# Patient Record
Sex: MALE | Race: WHITE | NOT HISPANIC OR LATINO | Employment: OTHER | URBAN - METROPOLITAN AREA
[De-identification: names, ages, dates, MRNs, and addresses within clinical notes are randomized per-mention and may not be internally consistent; named-entity substitution may affect disease eponyms.]

---

## 2017-01-12 ENCOUNTER — HOSPITAL ENCOUNTER (OUTPATIENT)
Dept: LAB | Facility: HOSPITAL | Age: 72
Discharge: HOME | End: 2017-01-12
Attending: INTERNAL MEDICINE | Admitting: INTERNAL MEDICINE

## 2017-01-12 LAB
ANION GAP SERPL CALC-SCNC: 17.7 MMOL/L (ref 10–20)
BASOPHILS # BLD AUTO: 0.1 10*3/UL (ref 0–0.2)
BASOPHILS NFR BLD AUTO: 1 % (ref 0–2)
BILIRUB UR QL STRIP: NEGATIVE MG/DL
BUN SERPL-MCNC: 58 MG/DL (ref 8–20)
BUN/CREAT SERPL: 18.7 (ref 6.2–20.3)
CALCIUM SERPL-MCNC: 9.4 MG/DL (ref 8.9–10.3)
CASTS URNS QL MICRO: ABNORMAL /[LPF]
CHLORIDE SERPL-SCNC: 106 MMOL/L (ref 101–111)
COLOR UR: YELLOW
CONV BACTERIA IN URINE MICRO: NEGATIVE
CONV CLARITY OF URINE: CLEAR
CONV CO2: 21 MMOL/L (ref 22–32)
CONV HYALINE CASTS IN URINE MICRO: ABNORMAL /[LPF] (ref 0–5)
CONV PROTEIN IN URINE BY AUTOMATED TEST STRIP: 100 MG/DL
CONV SMALL ROUND CELLS: ABNORMAL /[HPF]
CONV UROBILINOGEN IN URINE BY AUTOMATED TEST STRIP: 0.2 MG/DL
CREAT 24H UR-MCNC: 179.3 MG/DL
CREAT UR-MCNC: 3.1 MG/DL (ref 0.7–1.2)
CULTURE INDICATED?: ABNORMAL
DIFFERENTIAL METHOD BLD: (no result)
EOSINOPHIL # BLD AUTO: 0.8 10*3/UL (ref 0–0.3)
EOSINOPHIL # BLD AUTO: 6 % (ref 0–3)
ERYTHROCYTE [DISTWIDTH] IN BLOOD BY AUTOMATED COUNT: 17.1 % (ref 11.5–14.5)
GLUCOSE SERPL-MCNC: 118 MG/DL (ref 65–99)
GLUCOSE UR QL: NEGATIVE MG/DL
HCT VFR BLD AUTO: 30 % (ref 40–54)
HGB BLD-MCNC: 9.6 G/DL (ref 14–18)
HGB UR QL STRIP: NEGATIVE
IRON SERPL-MCNC: 32 UG/DL (ref 45–182)
KETONES UR QL STRIP: NEGATIVE MG/DL
LEUKOCYTE ESTERASE UR QL STRIP: NEGATIVE
LYMPHOCYTES # BLD AUTO: 1.4 10*3/UL (ref 0.8–4.8)
LYMPHOCYTES NFR BLD AUTO: 12 % (ref 18–42)
MCH RBC QN AUTO: 27.4 PG (ref 26–32)
MCHC RBC AUTO-ENTMCNC: 32 G/DL (ref 32–36)
MCV RBC AUTO: 85.5 FL (ref 80–94)
MONOCYTES # BLD AUTO: 0.8 10*3/UL (ref 0.1–1.3)
MONOCYTES NFR BLD AUTO: 7 % (ref 2–11)
NEUTROPHILS # BLD AUTO: 8.7 10*3/UL (ref 2.3–8.6)
NEUTROPHILS NFR BLD AUTO: 74 % (ref 50–75)
NITRITE UR QL STRIP: NEGATIVE
NRBC BLD AUTO-RTO: 0 /100{WBCS}
NRBC/RBC NFR BLD MANUAL: 0 10*3/UL
PH UR STRIP.AUTO: 5.5 [PH] (ref 4.5–8)
PHOSPHATE SERPL-MCNC: 4.4 MG/DL (ref 2.4–4.7)
PLATELET # BLD AUTO: 236 10*3/UL (ref 150–450)
PMV BLD AUTO: 8.5 FL (ref 7.4–10.4)
POTASSIUM SERPL-SCNC: 4.7 MMOL/L (ref 3.6–5.1)
PROT UR-MCNC: 104 MG/DL
RBC # BLD AUTO: 3.51 10*6/UL (ref 4.6–6)
RBC #/AREA URNS HPF: 2 /[HPF] (ref 0–3)
SODIUM SERPL-SCNC: 140 MMOL/L (ref 136–144)
SP GR UR: 1.02 (ref 1–1.03)
SPECIMEN SOURCE: ABNORMAL
SPERM URNS QL MICRO: ABNORMAL /[HPF]
SQUAMOUS SPT QL MICRO: 1 /[HPF] (ref 0–5)
UNIDENT CRYS URNS QL MICRO: ABNORMAL /[HPF]
WBC # BLD AUTO: 11.9 10*3/UL (ref 4.5–11.5)
WBC #/AREA URNS HPF: 1 /[HPF] (ref 0–5)
YEAST SPEC QL WET PREP: ABNORMAL /[HPF]

## 2017-01-13 LAB — PTH-INTACT SERPL-MCNC: 52 PG/ML (ref 11–72)

## 2017-03-02 ENCOUNTER — HOSPITAL ENCOUNTER (OUTPATIENT)
Dept: CARDIOLOGY | Facility: HOSPITAL | Age: 72
Discharge: HOME | End: 2017-03-02
Attending: INTERNAL MEDICINE | Admitting: INTERNAL MEDICINE

## 2017-04-04 ENCOUNTER — HOSPITAL ENCOUNTER (OUTPATIENT)
Dept: LAB | Facility: HOSPITAL | Age: 72
Discharge: HOME | End: 2017-04-04
Attending: INTERNAL MEDICINE | Admitting: INTERNAL MEDICINE

## 2017-04-04 LAB
ALBUMIN SERPL-MCNC: 3.4 G/DL (ref 3.5–4.8)
ALBUMIN/GLOB SERPL: 0.9 {RATIO} (ref 1–1.7)
ALP SERPL-CCNC: 110 IU/L (ref 32–91)
ALT SERPL-CCNC: 26 IU/L (ref 17–63)
ANION GAP SERPL CALC-SCNC: 15.5 MMOL/L (ref 10–20)
AST SERPL-CCNC: 35 IU/L (ref 15–41)
BASOPHILS # BLD AUTO: 0.2 10*3/UL (ref 0–0.2)
BASOPHILS NFR BLD AUTO: 2 % (ref 0–2)
BILIRUB SERPL-MCNC: 0.9 MG/DL (ref 0.3–1.2)
BILIRUB UR QL STRIP: NEGATIVE MG/DL
BUN SERPL-MCNC: 45 MG/DL (ref 8–20)
BUN/CREAT SERPL: 16.1 (ref 6.2–20.3)
CALCIUM SERPL-MCNC: 9 MG/DL (ref 8.9–10.3)
CASTS URNS QL MICRO: ABNORMAL /[LPF]
CHLORIDE SERPL-SCNC: 106 MMOL/L (ref 101–111)
CHOLEST SERPL-MCNC: 88 MG/DL
CHOLEST/HDLC SERPL: 2.6 {RATIO}
COLOR UR: YELLOW
CONV BACTERIA IN URINE MICRO: NEGATIVE
CONV CLARITY OF URINE: CLEAR
CONV CO2: 23 MMOL/L (ref 22–32)
CONV HYALINE CASTS IN URINE MICRO: 1 /[LPF] (ref 0–5)
CONV LDL CHOLESTEROL DIRECT: 39 MG/DL (ref 0–100)
CONV PROTEIN IN URINE BY AUTOMATED TEST STRIP: 100 MG/DL
CONV SMALL ROUND CELLS: ABNORMAL /[HPF]
CONV TOTAL PROTEIN: 7 G/DL (ref 6.1–7.9)
CONV UROBILINOGEN IN URINE BY AUTOMATED TEST STRIP: 0.2 MG/DL
CREAT 24H UR-MCNC: 153.1 MG/DL
CREAT UR-MCNC: 2.8 MG/DL (ref 0.7–1.2)
CULTURE INDICATED?: ABNORMAL
DIFFERENTIAL METHOD BLD: (no result)
EOSINOPHIL # BLD AUTO: 0.7 10*3/UL (ref 0–0.3)
EOSINOPHIL # BLD AUTO: 6 % (ref 0–3)
ERYTHROCYTE [DISTWIDTH] IN BLOOD BY AUTOMATED COUNT: 17.9 % (ref 11.5–14.5)
GLOBULIN UR ELPH-MCNC: 3.6 G/DL (ref 2.5–3.8)
GLUCOSE SERPL-MCNC: 118 MG/DL (ref 65–99)
GLUCOSE UR QL: NEGATIVE MG/DL
HCT VFR BLD AUTO: 33.2 % (ref 40–54)
HDLC SERPL-MCNC: 34 MG/DL
HGB BLD-MCNC: 11 G/DL (ref 14–18)
HGB UR QL STRIP: NEGATIVE
IRON SERPL-MCNC: 45 UG/DL (ref 45–182)
KETONES UR QL STRIP: NEGATIVE MG/DL
LDLC/HDLC SERPL: 1.2 {RATIO}
LEUKOCYTE ESTERASE UR QL STRIP: NEGATIVE
LIPID INTERPRETATION: ABNORMAL
LYMPHOCYTES # BLD AUTO: 1.1 10*3/UL (ref 0.8–4.8)
LYMPHOCYTES NFR BLD AUTO: 11 % (ref 18–42)
MCH RBC QN AUTO: 28 PG (ref 26–32)
MCHC RBC AUTO-ENTMCNC: 33.2 G/DL (ref 32–36)
MCV RBC AUTO: 84.5 FL (ref 80–94)
MONOCYTES # BLD AUTO: 0.7 10*3/UL (ref 0.1–1.3)
MONOCYTES NFR BLD AUTO: 7 % (ref 2–11)
NEUTROPHILS # BLD AUTO: 7.9 10*3/UL (ref 2.3–8.6)
NEUTROPHILS NFR BLD AUTO: 74 % (ref 50–75)
NITRITE UR QL STRIP: NEGATIVE
NRBC BLD AUTO-RTO: 0 /100{WBCS}
NRBC/RBC NFR BLD MANUAL: 0 10*3/UL
PH UR STRIP.AUTO: 5.5 [PH] (ref 4.5–8)
PHOSPHATE SERPL-MCNC: 3.7 MG/DL (ref 2.4–4.7)
PLATELET # BLD AUTO: 243 10*3/UL (ref 150–450)
PMV BLD AUTO: 8.4 FL (ref 7.4–10.4)
POTASSIUM SERPL-SCNC: 4.5 MMOL/L (ref 3.6–5.1)
PROT UR-MCNC: 84 MG/DL
RBC # BLD AUTO: 3.93 10*6/UL (ref 4.6–6)
RBC #/AREA URNS HPF: 1 /[HPF] (ref 0–3)
SODIUM SERPL-SCNC: 140 MMOL/L (ref 136–144)
SP GR UR: 1.02 (ref 1–1.03)
SPERM URNS QL MICRO: ABNORMAL /[HPF]
SQUAMOUS SPT QL MICRO: 1 /[HPF] (ref 0–5)
TRIGL SERPL-MCNC: 65 MG/DL
UNIDENT CRYS URNS QL MICRO: ABNORMAL /[HPF]
URATE SERPL-MCNC: 7.3 MG/DL (ref 4.8–8.7)
VLDLC SERPL CALC-MCNC: 14.7 MG/DL
WBC # BLD AUTO: 10.6 10*3/UL (ref 4.5–11.5)
WBC #/AREA URNS HPF: 1 /[HPF] (ref 0–5)
YEAST SPEC QL WET PREP: ABNORMAL /[HPF]

## 2017-04-05 LAB — PTH-INTACT SERPL-MCNC: 63 PG/ML (ref 11–72)

## 2017-06-13 ENCOUNTER — HOSPITAL ENCOUNTER (OUTPATIENT)
Dept: CARDIOLOGY | Facility: HOSPITAL | Age: 72
Discharge: HOME | End: 2017-06-13
Attending: INTERNAL MEDICINE | Admitting: INTERNAL MEDICINE

## 2017-06-20 ENCOUNTER — HOSPITAL ENCOUNTER (OUTPATIENT)
Dept: LAB | Facility: HOSPITAL | Age: 72
Discharge: HOME | End: 2017-06-20
Attending: INTERNAL MEDICINE | Admitting: INTERNAL MEDICINE

## 2017-06-20 LAB — BNP SERPL-MCNC: 70 PG/ML

## 2018-04-25 ENCOUNTER — HOSPITAL ENCOUNTER (OUTPATIENT)
Dept: LAB | Facility: HOSPITAL | Age: 73
Discharge: HOME | End: 2018-04-25
Attending: INTERNAL MEDICINE | Admitting: INTERNAL MEDICINE

## 2018-04-25 LAB
ALBUMIN SERPL-MCNC: 2.9 G/DL (ref 3.5–4.8)
ANION GAP SERPL CALC-SCNC: 10.8 MMOL/L (ref 10–20)
BASOPHILS # BLD AUTO: 0.1 10*3/UL (ref 0–0.2)
BASOPHILS NFR BLD AUTO: 1 % (ref 0–2)
BILIRUB UR QL STRIP: NEGATIVE MG/DL
BUN SERPL-MCNC: 40 MG/DL (ref 8–20)
BUN/CREAT SERPL: 18.2 (ref 6.2–20.3)
CALCIUM SERPL-MCNC: 8.6 MG/DL (ref 8.9–10.3)
CASTS URNS QL MICRO: ABNORMAL /[LPF]
CHLORIDE SERPL-SCNC: 107 MMOL/L (ref 101–111)
COLOR UR: YELLOW
CONV BACTERIA IN URINE MICRO: NEGATIVE
CONV CLARITY OF URINE: CLEAR
CONV CO2: 25 MMOL/L (ref 22–32)
CONV HYALINE CASTS IN URINE MICRO: 0 /[LPF] (ref 0–5)
CONV PROTEIN IN URINE BY AUTOMATED TEST STRIP: 30 MG/DL
CONV SMALL ROUND CELLS: ABNORMAL /[HPF]
CONV UROBILINOGEN IN URINE BY AUTOMATED TEST STRIP: 0.2 MG/DL
CREAT 24H UR-MCNC: 113.5 MG/DL
CREAT UR-MCNC: 2.2 MG/DL (ref 0.7–1.2)
CULTURE INDICATED?: ABNORMAL
DIFFERENTIAL METHOD BLD: (no result)
EOSINOPHIL # BLD AUTO: 0.5 10*3/UL (ref 0–0.3)
EOSINOPHIL # BLD AUTO: 4 % (ref 0–3)
ERYTHROCYTE [DISTWIDTH] IN BLOOD BY AUTOMATED COUNT: 16.4 % (ref 11.5–14.5)
GLUCOSE SERPL-MCNC: 106 MG/DL (ref 65–99)
GLUCOSE UR QL: NEGATIVE MG/DL
HCT VFR BLD AUTO: 40 % (ref 40–54)
HGB BLD-MCNC: 13.1 G/DL (ref 14–18)
HGB UR QL STRIP: NEGATIVE
KETONES UR QL STRIP: NEGATIVE MG/DL
LEUKOCYTE ESTERASE UR QL STRIP: NEGATIVE
LYMPHOCYTES # BLD AUTO: 1.6 10*3/UL (ref 0.8–4.8)
LYMPHOCYTES NFR BLD AUTO: 13 % (ref 18–42)
MCH RBC QN AUTO: 29.5 PG (ref 26–32)
MCHC RBC AUTO-ENTMCNC: 32.6 G/DL (ref 32–36)
MCV RBC AUTO: 90.4 FL (ref 80–94)
MONOCYTES # BLD AUTO: 1.2 10*3/UL (ref 0.1–1.3)
MONOCYTES NFR BLD AUTO: 9 % (ref 2–11)
NEUTROPHILS # BLD AUTO: 9.3 10*3/UL (ref 2.3–8.6)
NEUTROPHILS NFR BLD AUTO: 73 % (ref 50–75)
NITRITE UR QL STRIP: NEGATIVE
NRBC BLD AUTO-RTO: 0 /100{WBCS}
NRBC/RBC NFR BLD MANUAL: 0 10*3/UL
PH UR STRIP.AUTO: 6 [PH] (ref 4.5–8)
PHOSPHATE SERPL-MCNC: 3.4 MG/DL (ref 2.4–4.7)
PLATELET # BLD AUTO: 177 10*3/UL (ref 150–450)
PMV BLD AUTO: 8.9 FL (ref 7.4–10.4)
POTASSIUM SERPL-SCNC: 3.8 MMOL/L (ref 3.6–5.1)
PROT UR-MCNC: 39 MG/DL
PROT/CREAT UR: 0.3 MG/MG (ref 0–22)
RBC # BLD AUTO: 4.43 10*6/UL (ref 4.6–6)
RBC #/AREA URNS HPF: 2 /[HPF] (ref 0–3)
SODIUM SERPL-SCNC: 139 MMOL/L (ref 136–144)
SP GR UR: 1.02 (ref 1–1.03)
SPERM URNS QL MICRO: ABNORMAL /[HPF]
SQUAMOUS SPT QL MICRO: 0 /[HPF] (ref 0–5)
UNIDENT CRYS URNS QL MICRO: ABNORMAL /[HPF]
WBC # BLD AUTO: 12.6 10*3/UL (ref 4.5–11.5)
WBC #/AREA URNS HPF: 2 /[HPF] (ref 0–5)
YEAST SPEC QL WET PREP: ABNORMAL /[HPF]

## 2018-06-15 ENCOUNTER — HOSPITAL ENCOUNTER (OUTPATIENT)
Dept: CARDIOLOGY | Facility: HOSPITAL | Age: 73
Discharge: HOME | End: 2018-06-15
Attending: INTERNAL MEDICINE | Admitting: INTERNAL MEDICINE

## 2018-11-14 ENCOUNTER — HOSPITAL ENCOUNTER (OUTPATIENT)
Dept: LAB | Facility: HOSPITAL | Age: 73
Discharge: HOME | End: 2018-11-14
Attending: INTERNAL MEDICINE | Admitting: INTERNAL MEDICINE

## 2018-11-14 LAB
ALBUMIN SERPL-MCNC: 3.5 G/DL (ref 3.5–4.8)
ANION GAP SERPL CALC-SCNC: 14.3 MMOL/L (ref 10–20)
BASOPHILS # BLD AUTO: 0.1 10*3/UL (ref 0–0.2)
BASOPHILS NFR BLD AUTO: 1 % (ref 0–2)
BILIRUB UR QL STRIP: NEGATIVE MG/DL
BUN SERPL-MCNC: 31 MG/DL (ref 8–20)
BUN/CREAT SERPL: 13.5 (ref 6.2–20.3)
CALCIUM SERPL-MCNC: 8.7 MG/DL (ref 8.9–10.3)
CASTS URNS QL MICRO: NORMAL /[LPF]
CHLORIDE SERPL-SCNC: 102 MMOL/L (ref 101–111)
COLOR UR: YELLOW
CONV BACTERIA IN URINE MICRO: NEGATIVE
CONV CLARITY OF URINE: CLEAR
CONV CO2: 26 MMOL/L (ref 22–32)
CONV HYALINE CASTS IN URINE MICRO: 0 /[LPF] (ref 0–5)
CONV PROTEIN IN URINE BY AUTOMATED TEST STRIP: NEGATIVE MG/DL
CONV SMALL ROUND CELLS: NORMAL /[HPF]
CONV UROBILINOGEN IN URINE BY AUTOMATED TEST STRIP: 0.2 MG/DL
CREAT 24H UR-MCNC: 45.8 MG/DL
CREAT UR-MCNC: 2.3 MG/DL (ref 0.7–1.2)
CULTURE INDICATED?: NORMAL
DIFFERENTIAL METHOD BLD: (no result)
EOSINOPHIL # BLD AUTO: 0.4 10*3/UL (ref 0–0.3)
EOSINOPHIL # BLD AUTO: 4 % (ref 0–3)
ERYTHROCYTE [DISTWIDTH] IN BLOOD BY AUTOMATED COUNT: 15.9 % (ref 11.5–14.5)
GLUCOSE SERPL-MCNC: 99 MG/DL (ref 65–99)
GLUCOSE UR QL: NEGATIVE MG/DL
HCT VFR BLD AUTO: 40.8 % (ref 40–54)
HGB BLD-MCNC: 13.3 G/DL (ref 14–18)
HGB UR QL STRIP: NEGATIVE
KETONES UR QL STRIP: NEGATIVE MG/DL
LEUKOCYTE ESTERASE UR QL STRIP: NEGATIVE
LYMPHOCYTES # BLD AUTO: 1.2 10*3/UL (ref 0.8–4.8)
LYMPHOCYTES NFR BLD AUTO: 12 % (ref 18–42)
MCH RBC QN AUTO: 29.8 PG (ref 26–32)
MCHC RBC AUTO-ENTMCNC: 32.6 G/DL (ref 32–36)
MCV RBC AUTO: 91.4 FL (ref 80–94)
MONOCYTES # BLD AUTO: 0.7 10*3/UL (ref 0.1–1.3)
MONOCYTES NFR BLD AUTO: 7 % (ref 2–11)
NEUTROPHILS # BLD AUTO: 7.7 10*3/UL (ref 2.3–8.6)
NEUTROPHILS NFR BLD AUTO: 76 % (ref 50–75)
NITRITE UR QL STRIP: NEGATIVE
NRBC BLD AUTO-RTO: 0 /100{WBCS}
NRBC/RBC NFR BLD MANUAL: 0 10*3/UL
PH UR STRIP.AUTO: 5.5 [PH] (ref 4.5–8)
PHOSPHATE SERPL-MCNC: 3.5 MG/DL (ref 2.4–4.7)
PLATELET # BLD AUTO: 231 10*3/UL (ref 150–450)
PMV BLD AUTO: 9.3 FL (ref 7.4–10.4)
POTASSIUM SERPL-SCNC: 4.3 MMOL/L (ref 3.6–5.1)
PROT UR-MCNC: 10 MG/DL
PROT/CREAT UR: 0.2 MG/MG (ref 0–22)
RBC # BLD AUTO: 4.47 10*6/UL (ref 4.6–6)
RBC #/AREA URNS HPF: 0 /[HPF] (ref 0–3)
SODIUM SERPL-SCNC: 138 MMOL/L (ref 136–144)
SP GR UR: 1.01 (ref 1–1.03)
SPERM URNS QL MICRO: NORMAL /[HPF]
SQUAMOUS SPT QL MICRO: 0 /[HPF] (ref 0–5)
UNIDENT CRYS URNS QL MICRO: NORMAL /[HPF]
WBC # BLD AUTO: 10.1 10*3/UL (ref 4.5–11.5)
WBC #/AREA URNS HPF: 0 /[HPF] (ref 0–5)
YEAST SPEC QL WET PREP: NORMAL /[HPF]

## 2019-01-14 ENCOUNTER — OFFICE (AMBULATORY)
Dept: URBAN - METROPOLITAN AREA CLINIC 64 | Facility: CLINIC | Age: 74
End: 2019-01-14

## 2019-01-14 VITALS
HEIGHT: 70 IN | WEIGHT: 180 LBS | HEART RATE: 55 BPM | SYSTOLIC BLOOD PRESSURE: 99 MMHG | DIASTOLIC BLOOD PRESSURE: 65 MMHG

## 2019-01-14 DIAGNOSIS — Z86.010 PERSONAL HISTORY OF COLONIC POLYPS: ICD-10-CM

## 2019-01-14 DIAGNOSIS — N18.9 CHRONIC KIDNEY DISEASE, UNSPECIFIED: ICD-10-CM

## 2019-01-14 DIAGNOSIS — K92.1 MELENA: ICD-10-CM

## 2019-01-14 DIAGNOSIS — K52.9 NONINFECTIVE GASTROENTERITIS AND COLITIS, UNSPECIFIED: ICD-10-CM

## 2019-01-14 PROCEDURE — 99204 OFFICE O/P NEW MOD 45 MIN: CPT | Performed by: INTERNAL MEDICINE

## 2019-02-18 ENCOUNTER — OFFICE (AMBULATORY)
Dept: URBAN - METROPOLITAN AREA PATHOLOGY 4 | Facility: PATHOLOGY | Age: 74
End: 2019-02-18
Payer: COMMERCIAL

## 2019-02-18 ENCOUNTER — HOSPITAL ENCOUNTER (OUTPATIENT)
Dept: OTHER | Facility: HOSPITAL | Age: 74
Setting detail: SPECIMEN
Discharge: HOME | End: 2019-02-18
Attending: INTERNAL MEDICINE | Admitting: INTERNAL MEDICINE

## 2019-02-18 ENCOUNTER — ON CAMPUS - OUTPATIENT (AMBULATORY)
Dept: URBAN - METROPOLITAN AREA HOSPITAL 2 | Facility: HOSPITAL | Age: 74
End: 2019-02-18
Payer: COMMERCIAL

## 2019-02-18 VITALS
DIASTOLIC BLOOD PRESSURE: 69 MMHG | OXYGEN SATURATION: 97 % | HEART RATE: 72 BPM | OXYGEN SATURATION: 96 % | SYSTOLIC BLOOD PRESSURE: 74 MMHG | RESPIRATION RATE: 18 BRPM | DIASTOLIC BLOOD PRESSURE: 81 MMHG | OXYGEN SATURATION: 100 % | HEART RATE: 71 BPM | SYSTOLIC BLOOD PRESSURE: 50 MMHG | DIASTOLIC BLOOD PRESSURE: 54 MMHG | RESPIRATION RATE: 16 BRPM | SYSTOLIC BLOOD PRESSURE: 122 MMHG | SYSTOLIC BLOOD PRESSURE: 69 MMHG | OXYGEN SATURATION: 98 % | TEMPERATURE: 96.5 F | SYSTOLIC BLOOD PRESSURE: 99 MMHG | HEIGHT: 70 IN | SYSTOLIC BLOOD PRESSURE: 129 MMHG | SYSTOLIC BLOOD PRESSURE: 101 MMHG | SYSTOLIC BLOOD PRESSURE: 103 MMHG | WEIGHT: 176.4 LBS | DIASTOLIC BLOOD PRESSURE: 64 MMHG | HEART RATE: 59 BPM | HEART RATE: 54 BPM | DIASTOLIC BLOOD PRESSURE: 52 MMHG | HEART RATE: 73 BPM | HEART RATE: 70 BPM | OXYGEN SATURATION: 95 % | HEART RATE: 63 BPM | HEART RATE: 64 BPM | DIASTOLIC BLOOD PRESSURE: 47 MMHG | DIASTOLIC BLOOD PRESSURE: 38 MMHG

## 2019-02-18 DIAGNOSIS — Z86.010 PERSONAL HISTORY OF COLONIC POLYPS: ICD-10-CM

## 2019-02-18 DIAGNOSIS — D12.2 BENIGN NEOPLASM OF ASCENDING COLON: ICD-10-CM

## 2019-02-18 DIAGNOSIS — K21.0 GASTRO-ESOPHAGEAL REFLUX DISEASE WITH ESOPHAGITIS: ICD-10-CM

## 2019-02-18 DIAGNOSIS — K44.9 DIAPHRAGMATIC HERNIA WITHOUT OBSTRUCTION OR GANGRENE: ICD-10-CM

## 2019-02-18 DIAGNOSIS — K64.1 SECOND DEGREE HEMORRHOIDS: ICD-10-CM

## 2019-02-18 DIAGNOSIS — K31.89 OTHER DISEASES OF STOMACH AND DUODENUM: ICD-10-CM

## 2019-02-18 DIAGNOSIS — K29.50 UNSPECIFIED CHRONIC GASTRITIS WITHOUT BLEEDING: ICD-10-CM

## 2019-02-18 DIAGNOSIS — K57.30 DIVERTICULOSIS OF LARGE INTESTINE WITHOUT PERFORATION OR ABS: ICD-10-CM

## 2019-02-18 DIAGNOSIS — K92.1 MELENA: ICD-10-CM

## 2019-02-18 PROBLEM — K29.70 GASTRITIS, UNSPECIFIED, WITHOUT BLEEDING: Status: ACTIVE | Noted: 2019-02-18

## 2019-02-18 LAB
GI HISTOLOGY: A. SELECT: (no result)
GI HISTOLOGY: B. UNSPECIFIED: (no result)
GI HISTOLOGY: PDF REPORT: (no result)

## 2019-02-18 PROCEDURE — 43239 EGD BIOPSY SINGLE/MULTIPLE: CPT | Performed by: INTERNAL MEDICINE

## 2019-02-18 PROCEDURE — 45385 COLONOSCOPY W/LESION REMOVAL: CPT | Mod: PT | Performed by: INTERNAL MEDICINE

## 2019-02-18 PROCEDURE — 88305 TISSUE EXAM BY PATHOLOGIST: CPT | Mod: 26 | Performed by: INTERNAL MEDICINE

## 2019-02-18 RX ORDER — PANTOPRAZOLE SODIUM 40 MG/1
TABLET, DELAYED RELEASE ORAL
Qty: 90 | Refills: 11 | Status: ACTIVE
Start: 2019-02-18

## 2019-02-25 PROBLEM — K92.1 MELENA: Status: ACTIVE | Noted: 2019-02-18

## 2019-02-25 PROBLEM — K20.9 ESOPHAGITIS, UNSPECIFIED: Status: ACTIVE | Noted: 2019-02-18

## 2019-05-06 ENCOUNTER — HOSPITAL ENCOUNTER (OUTPATIENT)
Dept: WOUND CARE | Facility: HOSPITAL | Age: 74
Discharge: HOME | End: 2019-05-06
Attending: SURGERY | Admitting: SURGERY

## 2019-05-31 ENCOUNTER — ON CAMPUS - OUTPATIENT (AMBULATORY)
Dept: URBAN - METROPOLITAN AREA HOSPITAL 2 | Facility: HOSPITAL | Age: 74
End: 2019-05-31
Payer: COMMERCIAL

## 2019-05-31 VITALS
SYSTOLIC BLOOD PRESSURE: 93 MMHG | TEMPERATURE: 96.8 F | DIASTOLIC BLOOD PRESSURE: 76 MMHG | HEART RATE: 50 BPM | HEART RATE: 56 BPM | SYSTOLIC BLOOD PRESSURE: 102 MMHG | HEART RATE: 58 BPM | HEART RATE: 51 BPM | HEART RATE: 54 BPM | RESPIRATION RATE: 16 BRPM | HEIGHT: 70 IN | WEIGHT: 187.4 LBS | OXYGEN SATURATION: 96 % | OXYGEN SATURATION: 95 % | OXYGEN SATURATION: 94 % | DIASTOLIC BLOOD PRESSURE: 55 MMHG | OXYGEN SATURATION: 97 % | SYSTOLIC BLOOD PRESSURE: 86 MMHG | SYSTOLIC BLOOD PRESSURE: 117 MMHG | DIASTOLIC BLOOD PRESSURE: 53 MMHG | DIASTOLIC BLOOD PRESSURE: 58 MMHG | SYSTOLIC BLOOD PRESSURE: 144 MMHG | DIASTOLIC BLOOD PRESSURE: 60 MMHG | OXYGEN SATURATION: 98 %

## 2019-05-31 DIAGNOSIS — R13.19 OTHER DYSPHAGIA: ICD-10-CM

## 2019-05-31 DIAGNOSIS — K20.9 ESOPHAGITIS, UNSPECIFIED: ICD-10-CM

## 2019-05-31 DIAGNOSIS — K44.9 DIAPHRAGMATIC HERNIA WITHOUT OBSTRUCTION OR GANGRENE: ICD-10-CM

## 2019-05-31 PROCEDURE — 43235 EGD DIAGNOSTIC BRUSH WASH: CPT | Performed by: INTERNAL MEDICINE

## 2019-05-31 PROCEDURE — 43450 DILATE ESOPHAGUS 1/MULT PASS: CPT | Performed by: INTERNAL MEDICINE

## 2019-05-31 RX ORDER — PANTOPRAZOLE SODIUM 40 MG/1
TABLET, DELAYED RELEASE ORAL
Qty: 90 | Refills: 11 | Status: ACTIVE
Start: 2019-02-18

## 2019-06-21 ENCOUNTER — OFFICE VISIT (OUTPATIENT)
Dept: CARDIOLOGY | Facility: CLINIC | Age: 74
End: 2019-06-21

## 2019-06-21 VITALS
DIASTOLIC BLOOD PRESSURE: 66 MMHG | BODY MASS INDEX: 26.34 KG/M2 | OXYGEN SATURATION: 98 % | HEIGHT: 70 IN | WEIGHT: 184 LBS | SYSTOLIC BLOOD PRESSURE: 101 MMHG | HEART RATE: 69 BPM

## 2019-06-21 DIAGNOSIS — I10 ESSENTIAL HYPERTENSION: ICD-10-CM

## 2019-06-21 DIAGNOSIS — N18.4 CKD (CHRONIC KIDNEY DISEASE) STAGE 4, GFR 15-29 ML/MIN (HCC): ICD-10-CM

## 2019-06-21 DIAGNOSIS — I25.10 CORONARY ARTERY DISEASE INVOLVING NATIVE CORONARY ARTERY OF NATIVE HEART WITHOUT ANGINA PECTORIS: ICD-10-CM

## 2019-06-21 DIAGNOSIS — Z95.1 S/P CABG (CORONARY ARTERY BYPASS GRAFT): ICD-10-CM

## 2019-06-21 DIAGNOSIS — E11.9 TYPE II DIABETES MELLITUS WITH GOAL OF SYMPTOM MANAGEMENT (HCC): ICD-10-CM

## 2019-06-21 DIAGNOSIS — I45.10 RIGHT BUNDLE BRANCH BLOCK: ICD-10-CM

## 2019-06-21 DIAGNOSIS — E78.5 DYSLIPIDEMIA: Primary | ICD-10-CM

## 2019-06-21 PROCEDURE — 99214 OFFICE O/P EST MOD 30 MIN: CPT | Performed by: INTERNAL MEDICINE

## 2019-06-21 RX ORDER — ARFORMOTEROL TARTRATE 15 UG/2ML
SOLUTION RESPIRATORY (INHALATION)
COMMUNITY
Start: 2016-07-01 | End: 2021-02-09

## 2019-06-21 RX ORDER — RANITIDINE 300 MG/1
TABLET ORAL
Refills: 2 | COMMUNITY
Start: 2019-05-31 | End: 2021-02-09 | Stop reason: ALTCHOICE

## 2019-06-21 RX ORDER — TETANUS AND DIPHTHERIA TOXOIDS ADSORBED 2; 2 [LF]/.5ML; [LF]/.5ML
INJECTION INTRAMUSCULAR
COMMUNITY
Start: 2019-03-18 | End: 2021-05-19

## 2019-06-21 RX ORDER — LEVOFLOXACIN 750 MG/1
750 TABLET ORAL 2 TIMES DAILY
Refills: 2 | COMMUNITY
Start: 2019-05-13 | End: 2021-02-09

## 2019-06-21 RX ORDER — SODIUM BICARBONATE 650 MG/1
650 TABLET ORAL 2 TIMES DAILY
COMMUNITY
Start: 2019-04-01

## 2019-06-21 RX ORDER — GABAPENTIN 300 MG/1
CAPSULE ORAL
Refills: 4 | COMMUNITY
Start: 2019-04-22 | End: 2021-05-19

## 2019-06-21 RX ORDER — GUAIFENESIN 600 MG/1
600 TABLET, EXTENDED RELEASE ORAL 2 TIMES DAILY PRN
COMMUNITY
Start: 2016-12-05 | End: 2021-06-26

## 2019-06-21 RX ORDER — DUTASTERIDE 0.5 MG/1
0.5 CAPSULE, LIQUID FILLED ORAL NIGHTLY
COMMUNITY
Start: 2019-04-01

## 2019-06-21 RX ORDER — ONDANSETRON 4 MG/1
TABLET, FILM COATED ORAL
COMMUNITY
Start: 2019-06-17 | End: 2021-02-09

## 2019-06-21 RX ORDER — FOLIC ACID 1 MG/1
1 TABLET ORAL DAILY
COMMUNITY
Start: 2019-04-01 | End: 2021-06-26

## 2019-06-21 RX ORDER — LINEZOLID 600 MG/1
TABLET, FILM COATED ORAL
Refills: 0 | COMMUNITY
Start: 2019-05-29 | End: 2021-02-09

## 2019-06-25 ENCOUNTER — TELEPHONE (OUTPATIENT)
Dept: CARDIOLOGY | Facility: CLINIC | Age: 74
End: 2019-06-25

## 2019-06-25 NOTE — TELEPHONE ENCOUNTER
Pt left vm asking if it is ok for him to take dipathoxylate-atropine for diarrhea and making sure it wont interact with any other meds.   Please advise

## 2019-06-26 NOTE — TELEPHONE ENCOUNTER
Per Dr Mistry it is okay to take anti diarrheal med short term, however not one that has atropine in it.     I called informed pt, he understood.

## 2019-06-29 ENCOUNTER — INPATIENT HOSPITAL (AMBULATORY)
Dept: URBAN - METROPOLITAN AREA HOSPITAL 76 | Facility: HOSPITAL | Age: 74
End: 2019-06-29
Payer: COMMERCIAL

## 2019-06-29 DIAGNOSIS — I73.9 PERIPHERAL VASCULAR DISEASE, UNSPECIFIED: ICD-10-CM

## 2019-06-29 DIAGNOSIS — N28.9 DISORDER OF KIDNEY AND URETER, UNSPECIFIED: ICD-10-CM

## 2019-06-29 DIAGNOSIS — Z79.02 LONG TERM (CURRENT) USE OF ANTITHROMBOTICS/ANTIPLATELETS: ICD-10-CM

## 2019-06-29 DIAGNOSIS — R19.7 DIARRHEA, UNSPECIFIED: ICD-10-CM

## 2019-06-29 DIAGNOSIS — R11.2 NAUSEA WITH VOMITING, UNSPECIFIED: ICD-10-CM

## 2019-06-29 DIAGNOSIS — R13.10 DYSPHAGIA, UNSPECIFIED: ICD-10-CM

## 2019-06-29 DIAGNOSIS — K21.9 GASTRO-ESOPHAGEAL REFLUX DISEASE WITHOUT ESOPHAGITIS: ICD-10-CM

## 2019-06-29 DIAGNOSIS — Z98.890 OTHER SPECIFIED POSTPROCEDURAL STATES: ICD-10-CM

## 2019-06-29 DIAGNOSIS — I25.10 ATHEROSCLEROTIC HEART DISEASE OF NATIVE CORONARY ARTERY WITH: ICD-10-CM

## 2019-06-29 DIAGNOSIS — J44.9 CHRONIC OBSTRUCTIVE PULMONARY DISEASE, UNSPECIFIED: ICD-10-CM

## 2019-06-29 PROCEDURE — 99221 1ST HOSP IP/OBS SF/LOW 40: CPT | Performed by: INTERNAL MEDICINE

## 2019-06-30 ENCOUNTER — INPATIENT HOSPITAL (AMBULATORY)
Dept: URBAN - METROPOLITAN AREA HOSPITAL 76 | Facility: HOSPITAL | Age: 74
End: 2019-06-30
Payer: COMMERCIAL

## 2019-06-30 DIAGNOSIS — R13.10 DYSPHAGIA, UNSPECIFIED: ICD-10-CM

## 2019-06-30 DIAGNOSIS — J44.9 CHRONIC OBSTRUCTIVE PULMONARY DISEASE, UNSPECIFIED: ICD-10-CM

## 2019-06-30 DIAGNOSIS — R19.7 DIARRHEA, UNSPECIFIED: ICD-10-CM

## 2019-06-30 DIAGNOSIS — N28.9 DISORDER OF KIDNEY AND URETER, UNSPECIFIED: ICD-10-CM

## 2019-06-30 DIAGNOSIS — D64.9 ANEMIA, UNSPECIFIED: ICD-10-CM

## 2019-06-30 DIAGNOSIS — K21.9 GASTRO-ESOPHAGEAL REFLUX DISEASE WITHOUT ESOPHAGITIS: ICD-10-CM

## 2019-06-30 PROCEDURE — 99232 SBSQ HOSP IP/OBS MODERATE 35: CPT | Performed by: INTERNAL MEDICINE

## 2019-07-03 PROBLEM — I45.10 RIGHT BUNDLE BRANCH BLOCK: Status: ACTIVE | Noted: 2019-07-03

## 2019-07-03 PROBLEM — I25.10 CORONARY ARTERY DISEASE INVOLVING NATIVE CORONARY ARTERY OF NATIVE HEART WITHOUT ANGINA PECTORIS: Status: ACTIVE | Noted: 2019-07-03

## 2019-07-09 ENCOUNTER — LAB REQUISITION (OUTPATIENT)
Dept: LAB | Facility: HOSPITAL | Age: 74
End: 2019-07-09

## 2019-07-09 DIAGNOSIS — S88.019A: ICD-10-CM

## 2019-07-09 LAB
ANION GAP SERPL CALCULATED.3IONS-SCNC: 12.9 MMOL/L (ref 5–15)
BUN BLD-MCNC: 14 MG/DL (ref 8–20)
BUN/CREAT SERPL: 7 (ref 6.2–20.3)
CALCIUM SPEC-SCNC: 8.2 MG/DL (ref 8.9–10.3)
CHLORIDE SERPL-SCNC: 106 MMOL/L (ref 101–111)
CO2 SERPL-SCNC: 24 MMOL/L (ref 22–32)
CREAT BLD-MCNC: 2 MG/DL (ref 0.7–1.2)
DEPRECATED RDW RBC AUTO: 51.2 FL (ref 37–54)
ERYTHROCYTE [DISTWIDTH] IN BLOOD BY AUTOMATED COUNT: 16.3 % (ref 12.3–15.4)
GFR SERPL CREATININE-BSD FRML MDRD: 33 ML/MIN/1.73
GLUCOSE BLD-MCNC: 89 MG/DL (ref 65–99)
HCT VFR BLD AUTO: 24.3 % (ref 37.5–51)
HGB BLD-MCNC: 7.9 G/DL (ref 13–17.7)
MCH RBC QN AUTO: 28.9 PG (ref 26.6–33)
MCHC RBC AUTO-ENTMCNC: 32.4 G/DL (ref 31.5–35.7)
MCV RBC AUTO: 89 FL (ref 79–97)
PLATELET # BLD AUTO: 286 10*3/MM3 (ref 140–450)
PMV BLD AUTO: 8.7 FL (ref 6–12)
POTASSIUM BLD-SCNC: 3.9 MMOL/L (ref 3.6–5.1)
RBC # BLD AUTO: 2.73 10*6/MM3 (ref 4.14–5.8)
SODIUM BLD-SCNC: 139 MMOL/L (ref 136–144)
WBC NRBC COR # BLD: 6.1 10*3/MM3 (ref 3.4–10.8)

## 2019-07-09 PROCEDURE — 80048 BASIC METABOLIC PNL TOTAL CA: CPT

## 2019-07-09 PROCEDURE — 85027 COMPLETE CBC AUTOMATED: CPT

## 2019-07-12 ENCOUNTER — HOSPITAL ENCOUNTER (OUTPATIENT)
Dept: GENERAL RADIOLOGY | Facility: HOSPITAL | Age: 74
Discharge: HOME | End: 2019-07-12

## 2019-07-12 DIAGNOSIS — K56.609: ICD-10-CM

## 2019-07-12 PROCEDURE — 74018 RADEX ABDOMEN 1 VIEW: CPT

## 2019-07-18 ENCOUNTER — TELEPHONE (OUTPATIENT)
Dept: CARDIOLOGY | Facility: CLINIC | Age: 74
End: 2019-07-18

## 2019-07-18 NOTE — TELEPHONE ENCOUNTER
Spouse states BP has been running low 91/50. He was seen recently at [facility] for an infection, they reduced his Metopolol from 50 mg BID, to 12.5 mg BID. His BP is still running low. They are asking what to do. [phone].

## 2019-07-19 NOTE — TELEPHONE ENCOUNTER
Phone number for wife is wrong.   Called pt, informed him to DC metoprolol call us back if bp is still low or gets too high.

## 2019-08-19 ENCOUNTER — TELEPHONE (OUTPATIENT)
Dept: CARDIOLOGY | Facility: CLINIC | Age: 74
End: 2019-08-19

## 2019-08-19 RX ORDER — METOPROLOL TARTRATE 50 MG/1
50 TABLET, FILM COATED ORAL DAILY
Qty: 90 TABLET | Refills: 1 | Status: SHIPPED | OUTPATIENT
Start: 2019-08-19 | End: 2020-01-17

## 2019-08-19 NOTE — TELEPHONE ENCOUNTER
Patient left a VM, stating his BP has been averaging 137/84. He is taking Metoprolol 50 mg daily once a day. BP goes down to around 110/70 in the afternoon. Refill sent in.

## 2019-11-19 ENCOUNTER — TRANSCRIBE ORDERS (OUTPATIENT)
Dept: ADMINISTRATIVE | Facility: HOSPITAL | Age: 74
End: 2019-11-19

## 2019-11-19 ENCOUNTER — LAB (OUTPATIENT)
Dept: LAB | Facility: HOSPITAL | Age: 74
End: 2019-11-19

## 2019-11-19 DIAGNOSIS — I10 HYPERTENSION, ESSENTIAL: ICD-10-CM

## 2019-11-19 DIAGNOSIS — E11.22 TYPE 2 DIABETES MELLITUS WITH ESRD (END-STAGE RENAL DISEASE) (HCC): ICD-10-CM

## 2019-11-19 DIAGNOSIS — N18.6 TYPE 2 DIABETES MELLITUS WITH ESRD (END-STAGE RENAL DISEASE) (HCC): ICD-10-CM

## 2019-11-19 DIAGNOSIS — I10 HYPERTENSION, ESSENTIAL: Primary | ICD-10-CM

## 2019-11-19 DIAGNOSIS — E78.5 HYPERLIPIDEMIA, UNSPECIFIED HYPERLIPIDEMIA TYPE: ICD-10-CM

## 2019-11-19 DIAGNOSIS — N18.4 CHRONIC KIDNEY DISEASE, STAGE IV (SEVERE) (HCC): Primary | ICD-10-CM

## 2019-11-19 DIAGNOSIS — N18.4 CHRONIC KIDNEY DISEASE, STAGE IV (SEVERE) (HCC): ICD-10-CM

## 2019-11-19 LAB
25(OH)D3 SERPL-MCNC: 47.6 NG/ML (ref 30–100)
ALBUMIN SERPL-MCNC: 3.7 G/DL (ref 3.5–5.2)
ALBUMIN/GLOB SERPL: 1 G/DL
ALP SERPL-CCNC: 116 U/L (ref 39–117)
ALT SERPL W P-5'-P-CCNC: 12 U/L (ref 1–41)
ANION GAP SERPL CALCULATED.3IONS-SCNC: 15.5 MMOL/L (ref 5–15)
AST SERPL-CCNC: 17 U/L (ref 1–40)
BASOPHILS # BLD AUTO: 0.09 10*3/MM3 (ref 0–0.2)
BASOPHILS NFR BLD AUTO: 1.1 % (ref 0–1.5)
BILIRUB SERPL-MCNC: 0.5 MG/DL (ref 0.2–1.2)
BUN BLD-MCNC: 46 MG/DL (ref 8–23)
BUN/CREAT SERPL: 19.7 (ref 7–25)
CALCIUM SPEC-SCNC: 9.2 MG/DL (ref 8.6–10.5)
CHLORIDE SERPL-SCNC: 104 MMOL/L (ref 98–107)
CHOLEST SERPL-MCNC: 104 MG/DL (ref 0–200)
CO2 SERPL-SCNC: 22.5 MMOL/L (ref 22–29)
CREAT BLD-MCNC: 2.34 MG/DL (ref 0.76–1.27)
DEPRECATED RDW RBC AUTO: 50.4 FL (ref 37–54)
EOSINOPHIL # BLD AUTO: 0.59 10*3/MM3 (ref 0–0.4)
EOSINOPHIL NFR BLD AUTO: 7.1 % (ref 0.3–6.2)
ERYTHROCYTE [DISTWIDTH] IN BLOOD BY AUTOMATED COUNT: 16.1 % (ref 12.3–15.4)
GFR SERPL CREATININE-BSD FRML MDRD: 27 ML/MIN/1.73
GLOBULIN UR ELPH-MCNC: 3.6 GM/DL
GLUCOSE BLD-MCNC: 105 MG/DL (ref 65–99)
HBA1C MFR BLD: 5.8 % (ref 3.5–5.6)
HCT VFR BLD AUTO: 36.7 % (ref 37.5–51)
HDLC SERPL-MCNC: 43 MG/DL (ref 40–60)
HGB BLD-MCNC: 12.2 G/DL (ref 13–17.7)
IMM GRANULOCYTES # BLD AUTO: 0.02 10*3/MM3 (ref 0–0.05)
IMM GRANULOCYTES NFR BLD AUTO: 0.2 % (ref 0–0.5)
LDLC SERPL CALC-MCNC: 44 MG/DL (ref 0–100)
LDLC/HDLC SERPL: 1.03 {RATIO}
LYMPHOCYTES # BLD AUTO: 1.45 10*3/MM3 (ref 0.7–3.1)
LYMPHOCYTES NFR BLD AUTO: 17.5 % (ref 19.6–45.3)
MCH RBC QN AUTO: 27.9 PG (ref 26.6–33)
MCHC RBC AUTO-ENTMCNC: 33.2 G/DL (ref 31.5–35.7)
MCV RBC AUTO: 84 FL (ref 79–97)
MONOCYTES # BLD AUTO: 0.74 10*3/MM3 (ref 0.1–0.9)
MONOCYTES NFR BLD AUTO: 8.9 % (ref 5–12)
NEUTROPHILS # BLD AUTO: 5.4 10*3/MM3 (ref 1.7–7)
NEUTROPHILS NFR BLD AUTO: 65.2 % (ref 42.7–76)
NRBC BLD AUTO-RTO: 0 /100 WBC (ref 0–0.2)
PHOSPHATE SERPL-MCNC: 4 MG/DL (ref 2.5–4.5)
PLATELET # BLD AUTO: 217 10*3/MM3 (ref 140–450)
PMV BLD AUTO: 10.6 FL (ref 6–12)
POTASSIUM BLD-SCNC: 4.4 MMOL/L (ref 3.5–5.2)
PROT SERPL-MCNC: 7.3 G/DL (ref 6–8.5)
PTH-INTACT SERPL-MCNC: 44.3 PG/ML (ref 15–65)
RBC # BLD AUTO: 4.37 10*6/MM3 (ref 4.14–5.8)
SODIUM BLD-SCNC: 142 MMOL/L (ref 136–145)
TRIGL SERPL-MCNC: 84 MG/DL (ref 0–150)
VLDLC SERPL-MCNC: 16.8 MG/DL (ref 5–40)
WBC NRBC COR # BLD: 8.29 10*3/MM3 (ref 3.4–10.8)

## 2019-11-19 PROCEDURE — 83036 HEMOGLOBIN GLYCOSYLATED A1C: CPT

## 2019-11-19 PROCEDURE — 83970 ASSAY OF PARATHORMONE: CPT

## 2019-11-19 PROCEDURE — 80053 COMPREHEN METABOLIC PANEL: CPT

## 2019-11-19 PROCEDURE — 82306 VITAMIN D 25 HYDROXY: CPT

## 2019-11-19 PROCEDURE — 84100 ASSAY OF PHOSPHORUS: CPT

## 2019-11-19 PROCEDURE — 85025 COMPLETE CBC W/AUTO DIFF WBC: CPT

## 2019-11-19 PROCEDURE — 36415 COLL VENOUS BLD VENIPUNCTURE: CPT

## 2019-11-19 PROCEDURE — 80061 LIPID PANEL: CPT

## 2020-01-13 ENCOUNTER — TELEPHONE (OUTPATIENT)
Dept: CARDIOLOGY | Facility: CLINIC | Age: 75
End: 2020-01-13

## 2020-01-13 NOTE — TELEPHONE ENCOUNTER
Pt contacted the office asking if he needed labs prior to his next visit with Dr. Mistry on Friday. Left a detailed VM stating that a lipid panel was ordered at his last visit in June, however he had a lipid panel in November, he does not need to repeat it.

## 2020-01-15 RX ORDER — INFLUENZA A VIRUS A/SINGAPORE/GP1908/2015 IVR-180 (H1N1) ANTIGEN (MDCK CELL DERIVED, PROPIOLACTONE INACTIVATED), INFLUENZA A VIRUS A/NORTH CAROLINA/04/2016 (H3N2) HEMAGGLUTININ ANTIGEN (MDCK CELL DERIVED, PROPIOLACTONE INACTIVATED), INFLUENZA B VIRUS B/IOWA/06/2017 HEMAGGLUTININ ANTIGEN (MDCK CELL DERIVED, PROPIOLACTONE INACTIVATED), INFLUENZA B VIRUS B/SINGAPORE/INFTT-16-0610/2016 HEMAGGLUTININ ANTIGEN (MDCK CELL DERIVED, PROPIOLACTONE INACTIVATED) 15; 15; 15; 15 UG/.5ML; UG/.5ML; UG/.5ML; UG/.5ML
INJECTION, SUSPENSION INTRAMUSCULAR
Refills: 0 | COMMUNITY
Start: 2019-10-08 | End: 2021-05-19

## 2020-01-17 ENCOUNTER — OFFICE VISIT (OUTPATIENT)
Dept: CARDIOLOGY | Facility: CLINIC | Age: 75
End: 2020-01-17

## 2020-01-17 VITALS
HEART RATE: 66 BPM | WEIGHT: 186 LBS | SYSTOLIC BLOOD PRESSURE: 94 MMHG | DIASTOLIC BLOOD PRESSURE: 62 MMHG | BODY MASS INDEX: 26.63 KG/M2 | OXYGEN SATURATION: 96 % | HEIGHT: 70 IN

## 2020-01-17 DIAGNOSIS — I73.9 PAD (PERIPHERAL ARTERY DISEASE) (HCC): Primary | ICD-10-CM

## 2020-01-17 DIAGNOSIS — E78.5 DYSLIPIDEMIA: ICD-10-CM

## 2020-01-17 DIAGNOSIS — N18.4 CKD (CHRONIC KIDNEY DISEASE) STAGE 4, GFR 15-29 ML/MIN (HCC): ICD-10-CM

## 2020-01-17 DIAGNOSIS — R00.1 BRADYCARDIA: ICD-10-CM

## 2020-01-17 DIAGNOSIS — J43.1 PANLOBULAR EMPHYSEMA (HCC): ICD-10-CM

## 2020-01-17 DIAGNOSIS — I10 ESSENTIAL HYPERTENSION: ICD-10-CM

## 2020-01-17 DIAGNOSIS — I25.810 CORONARY ARTERY DISEASE INVOLVING CORONARY BYPASS GRAFT OF NATIVE HEART WITHOUT ANGINA PECTORIS: ICD-10-CM

## 2020-01-17 DIAGNOSIS — I77.1 STENOSIS OF RIGHT SUBCLAVIAN ARTERY (HCC): ICD-10-CM

## 2020-01-17 DIAGNOSIS — I45.10 RBBB: ICD-10-CM

## 2020-01-17 PROCEDURE — 99214 OFFICE O/P EST MOD 30 MIN: CPT | Performed by: INTERNAL MEDICINE

## 2020-01-17 PROCEDURE — 93000 ELECTROCARDIOGRAM COMPLETE: CPT | Performed by: INTERNAL MEDICINE

## 2020-01-17 RX ORDER — METRONIDAZOLE 500 MG/1
TABLET ORAL
COMMUNITY
Start: 2020-01-09 | End: 2021-02-09

## 2020-01-17 RX ORDER — METOPROLOL SUCCINATE 50 MG/1
50 TABLET, EXTENDED RELEASE ORAL DAILY
Qty: 90 TABLET | Refills: 3 | Status: SHIPPED | OUTPATIENT
Start: 2020-01-17 | End: 2020-10-26

## 2020-01-17 RX ORDER — HYDROCHLOROTHIAZIDE 25 MG/1
25 TABLET ORAL DAILY
COMMUNITY
Start: 2020-01-09 | End: 2021-05-19

## 2020-01-17 NOTE — PROGRESS NOTES
Subjective:     Encounter Date:01/17/2020      Patient ID: David A Jolissaint is a 74 y.o. male.    Chief Complaint : Follow-up for CAD, CABG, PAD, hypertension, dyslipidemia  History of Present Illness       This is a 74-year-old  male with  PMH of    #  CAD, NSTEMI, 2 vessel CABG with SVG to OM and PDA 3/9/16  #  PAD, left lower extremity amputation  #  diabetes, hypertension, COPD, tobacco abuse quit in October 2015  #  CKD  #  left BKA  #  carotid disease  #Former smoker     here for  follow-up.  Patient  denies any chest pain, headache, palpitation,  loss of conscious. has  shortness of breath and dyspnea on exertion , which is chronic CAD previous BNP levels which were normal.  patient has CKD and has  AV fistula.    Patient's arterial blood pressure is 94/62, heart rate 66, O2 sat of 96% on room   Patient states that he had diabetes because of steroids and now does not have diabetes.  Patient  had carotid Doppler 06/15/2018 which showed bilateral 50-70% worse compared to 2016 which was less than 50% bilat.   labs from 10/05/2017 showed cholesterol 98 triglycerides 87 HDL 41 LDL 32 BUN 34 creatinine 2.4 hemoglobin A1c 5.7.  Repeat labs 5/18/2019 revealed BUN 32 creatinine 2.5 follows with Dr. Mendez. proBNP was elevated at 3362.  Labs from 11/19/2019 reveal cholesterol 104 HDL 73 LDL 46, A1c 5.8, CMP with a creatinine of 46/2.34  Echo revealed normal LV function 5/18/2019 and Lexiscan was negative for ischemia.  Patient is being treated for blister and cellulitis on left BKA stump and has developed diarrhea.    Patient has poor radial pulse and brachial pulse on exam.    Patient is complaining of difficulty cutting metoprolol in half    ASSESSMENT:  #PAD with absent right radial pulse  # dyspnea on exertion, chronic  #  bradycardia, RBBB  #  CAD, CABG, history of NSTEMI  #  PAD, diabetes, hypertension, CKD, COPD, dyslipidemia  #  carotid stenosis    PLAN:  Reviewed EKG lab results with  patient  Patient has peripheral arterial disease and poor right radial pulse and right subclavian bruit will check right upper extremity arterial Doppler    Patient has a blister on left BKA and is on antibiotics and has developed diarrhea advised him to see PMD and GI   patient's dyspnea appears to be due to COPD had BNP level Dilma which was normal at 70 echo showed normal LV function.   patient is bradycardic but is asymptomatic, will continue beta-blockers atorvastatin aspirin as tolerated, Explained risk benefits alternatives.   counseled on diet exercise  Advise close follow-up with nephrology  Will change metoprolol to regular dose      Assessment:          Diagnosis Plan   1. PAD (peripheral artery disease) (CMS/[facility])     2. Stenosis of right subclavian artery (CMS/HCC)     3. Bradycardia     4. Dyslipidemia     5. Coronary artery disease involving coronary bypass graft of native heart without angina pectoris     6. CKD (chronic kidney disease) stage 4, GFR 15-29 ml/min (CMS/[facility])     7. RBBB     8. Essential hypertension     9. Panlobular emphysema (CMS/[facility])            Plan:         Past Medical History:  Past Medical History:   Diagnosis Date   • Cellulitis    • CKD (chronic kidney disease), stage III (CMS/HCC)    • COPD (chronic obstructive pulmonary disease) (CMS/[facility])    • Diabetes mellitus (CMS/[facility])    • Diverticulitis    • Dyslipidemia    • Hypertension    • Myocardial infarction (CMS/HCC)    • PVD (peripheral vascular disease) (CMS/[facility])      Past Surgical History:  Past Surgical History:   Procedure Laterality Date   • LEG AMPUTATION Left       Allergies:  No Known Allergies  Home Meds:  Current Meds:     Current Outpatient Medications:   •  arformoterol (BROVANA) 15 MCG/2ML nebulizer solution, BROVANA 15 MCG/2ML NEBU, Disp: , Rfl:   •  aspirin 81 MG EC tablet, , Disp: , Rfl: 0  •  atorvastatin (LIPITOR) 40 MG tablet, , Disp: , Rfl:   •  clopidogrel (PLAVIX) 75 MG tablet, , Disp: , Rfl:   •  folic acid  (FOLVITE) 1 MG tablet, , Disp: , Rfl:   •  hydroCHLOROthiazide (HYDRODIURIL) 25 MG tablet, Take 25 mg by mouth Daily., Disp: , Rfl:   •  HYDROcodone-acetaminophen (NORCO) 5-325 MG per tablet, , Disp: , Rfl:   •  isosorbide mononitrate (IMDUR) 60 MG 24 hr tablet, , Disp: , Rfl:   •  Multiple Vitamins-Minerals (MULTI VITAMIN/MINERALS) tablet, MULTI VITAMIN/MINERALS TABS, Disp: , Rfl:   •  raNITIdine (ZANTAC) 300 MG tablet, TK 1 T PO QHS, Disp: , Rfl: 2  •  sodium bicarbonate 650 MG tablet, , Disp: , Rfl:   •  tamsulosin (FLOMAX) 0.4 MG capsule 24 hr capsule, , Disp: , Rfl:   •  ammonium lactate (LAC-HYDRIN) 12 % lotion, , Disp: , Rfl: 0  •  ciprofloxacin (CIPRO) 500 MG tablet, , Disp: , Rfl:   •  cloNIDine (CATAPRES) 0.1 MG tablet, , Disp: , Rfl:   •  dutasteride (AVODART) 0.5 MG capsule, , Disp: , Rfl:   •  FLUCELVAX QUADRIVALENT 0.5 ML suspension prefilled syringe injection, ADM 0.5ML IM UTD, Disp: , Rfl: 0  •  gabapentin (NEURONTIN) 300 MG capsule, TK 1 C PO TID, Disp: , Rfl: 4  •  guaiFENesin (MUCINEX) 600 MG 12 hr tablet, MUCINEX 600 MG XR12H-TAB, Disp: , Rfl:   •  hydrALAZINE (APRESOLINE) 10 MG tablet, , Disp: , Rfl:   •  ipratropium-albuterol (DUO-NEB) 0.5-2.5 mg/mL nebulizer, , Disp: , Rfl: 0  •  levoFLOXacin (LEVAQUIN) 750 MG tablet, Take 750 mg by mouth 2 (Two) Times a Day., Disp: , Rfl: 2  •  linezolid (ZYVOX) 600 MG tablet, TK 1 T PO BID X 6 WEEKS, Disp: , Rfl: 0  •  metoprolol succinate XL (TOPROL-XL) 50 MG 24 hr tablet, Take 1 tablet by mouth Daily., Disp: 90 tablet, Rfl: 3  •  metroNIDAZOLE (FLAGYL) 500 MG tablet, TK 1 T PO  Q 8 H, Disp: , Rfl:   •  ondansetron (ZOFRAN) 4 MG tablet, , Disp: , Rfl:   •  ONE TOUCH ULTRA TEST test strip, , Disp: , Rfl:   •  pantoprazole (PROTONIX) 40 MG EC tablet, , Disp: , Rfl:   •  POLY-IRON 150 150 MG capsule, , Disp: , Rfl: 0  •  TDVAX 2-2 LF/0.5ML injection, , Disp: , Rfl:   Social History:   Social History     Tobacco Use   • Smoking status: Former Smoker   •  "Smokeless tobacco: Never Used   Substance Use Topics   • Alcohol use: Not on file      Family History:  Family History   Problem Relation Age of Onset   • Heart attack Father    • Heart disease Father         The following portions of the patient's history were reviewed and updated as appropriate: allergies, current medications, past family history, past medical history, past social history, past surgical history and problem list.    Review of Systems   Cardiovascular: Negative for chest pain, leg swelling and palpitations.   Respiratory: Positive for shortness of breath.    Neurological: Negative for dizziness and numbness.     Comprehensive review of systems were reviewed and all others review of systems were found to be negative other than HPI      ECG 12 Lead  Date/Time: 1/17/2020 12:19 PM  Performed by: Kali Mistry MD  Authorized by: Kali Mistry MD   Comparison: compared with previous ECG from 5/15/2019  Comparison to previous ECG: EKG done today reviewed by me shows sinus rhythm at the rate of 60 bpm with right bundle branch block, no new change compared to EKG from 559                 Objective:     Physical Exam  BP 94/62 (BP Location: Left arm, Patient Position: Sitting, Cuff Size: Large Adult)   Pulse 66   Ht 177.8 cm (70\")   Wt 84.4 kg (186 lb)   SpO2 96%   BMI 26.69 kg/m²   General:  Appears in no acute distress  Eyes: Sclera is anicteric,  conjunctiva is clear   HEENT:  No JVD. Thyroid not visibly enlarged. No mucosal pallor or cyanosis  Respiratory: Respirations regular and unlabored at rest.  Bilaterally good breath sounds, with good air entry in all fields. No crackles, rubs or wheezes auscultated  Cardiovascular: S1,S2 Regular rate and rhythm. No murmur, rub or gallop auscultated. No pretibial pitting edema  Gastrointestinal: Abdomen soft, flat, non tender. Bowel sounds present.   Musculoskeletal:  No abnormal movements  Extremities: Left BKA seen.  Absent right " radial pulse with right subclavian bruit present.  Left arm AV fistula placed  Skin: Color pink. Skin warm and dry to touch. No rashes  No xanthoma  Neuro: Alert and awake, no lateralizing deficits appreciated    Lab Reviewed:

## 2020-01-20 ENCOUNTER — TELEPHONE (OUTPATIENT)
Dept: CARDIOLOGY | Facility: CLINIC | Age: 75
End: 2020-01-20

## 2020-01-20 DIAGNOSIS — I77.1 STENOSIS OF RIGHT SUBCLAVIAN ARTERY (HCC): ICD-10-CM

## 2020-01-20 DIAGNOSIS — I73.9 PAD (PERIPHERAL ARTERY DISEASE) (HCC): Primary | ICD-10-CM

## 2020-01-20 DIAGNOSIS — I25.810 CORONARY ARTERY DISEASE INVOLVING CORONARY BYPASS GRAFT OF NATIVE HEART WITHOUT ANGINA PECTORIS: ICD-10-CM

## 2020-01-20 NOTE — TELEPHONE ENCOUNTER
----- Message from July Whitt, [employer] Rep sent at 1/20/2020 10:17 AM EST -----  Regarding: Correct order needs put in  There is an order for [id number] - US ARTERIAL DOPPLER UPPER EXTREMITY RIGHT, however, it is incorrect. [facility] said it needs to be [id number] - Doppler Arterial Extremity, right.

## 2020-01-24 ENCOUNTER — HOSPITAL ENCOUNTER (OUTPATIENT)
Dept: CARDIOLOGY | Facility: HOSPITAL | Age: 75
Discharge: HOME | End: 2020-01-24
Admitting: INTERNAL MEDICINE

## 2020-01-24 DIAGNOSIS — I73.9 PAD (PERIPHERAL ARTERY DISEASE) (HCC): ICD-10-CM

## 2020-01-24 DIAGNOSIS — I77.1 STENOSIS OF RIGHT SUBCLAVIAN ARTERY (HCC): ICD-10-CM

## 2020-01-24 DIAGNOSIS — I25.810 CORONARY ARTERY DISEASE INVOLVING CORONARY BYPASS GRAFT OF NATIVE HEART WITHOUT ANGINA PECTORIS: ICD-10-CM

## 2020-01-24 LAB
BH CV UPPER DUPLEX RIGHT AXILLARY ARTERY PSV: 43 CM/S
BH CV UPPER DUPLEX RIGHT BRACHIAL ART PSV: 56 CM/S
BH CV UPPER DUPLEX RIGHT RADIAL ART PSV: 52 CM/S
BH CV UPPER DUPLEX RIGHT SUBCLAVIAN ART PSV: 70 CM/S
BH CV UPPER DUPLEX RIGHT ULNAR ART PSV: 15 CM/S
BH CV XLRA MEAS RIGHT PROX SCLA PSV: 69.8 CM/SEC

## 2020-01-24 PROCEDURE — 93931 UPPER EXTREMITY STUDY: CPT

## 2020-03-19 ENCOUNTER — TELEPHONE (OUTPATIENT)
Dept: CARDIOLOGY | Facility: CLINIC | Age: 75
End: 2020-03-19

## 2020-05-26 ENCOUNTER — TRANSCRIBE ORDERS (OUTPATIENT)
Dept: ADMINISTRATIVE | Facility: HOSPITAL | Age: 75
End: 2020-05-26

## 2020-05-26 ENCOUNTER — LAB (OUTPATIENT)
Dept: LAB | Facility: HOSPITAL | Age: 75
End: 2020-05-26

## 2020-05-26 DIAGNOSIS — N18.4 CHRONIC KIDNEY DISEASE, STAGE IV (SEVERE) (HCC): Primary | ICD-10-CM

## 2020-05-26 DIAGNOSIS — N18.4 CHRONIC KIDNEY DISEASE, STAGE IV (SEVERE) (HCC): ICD-10-CM

## 2020-05-26 LAB
ANION GAP SERPL CALCULATED.3IONS-SCNC: 12.6 MMOL/L (ref 5–15)
ANISOCYTOSIS BLD QL: ABNORMAL
BASOPHILS # BLD MANUAL: 0.2 10*3/MM3 (ref 0–0.2)
BASOPHILS NFR BLD AUTO: 2 % (ref 0–1.5)
BUN BLD-MCNC: 36 MG/DL (ref 8–23)
BUN/CREAT SERPL: 16.1 (ref 7–25)
BURR CELLS BLD QL SMEAR: ABNORMAL
CALCIUM SPEC-SCNC: 9 MG/DL (ref 8.6–10.5)
CHLORIDE SERPL-SCNC: 102 MMOL/L (ref 98–107)
CO2 SERPL-SCNC: 22.4 MMOL/L (ref 22–29)
CREAT BLD-MCNC: 2.23 MG/DL (ref 0.76–1.27)
DEPRECATED RDW RBC AUTO: 48.7 FL (ref 37–54)
EOSINOPHIL # BLD MANUAL: 0.51 10*3/MM3 (ref 0–0.4)
EOSINOPHIL NFR BLD MANUAL: 5.1 % (ref 0.3–6.2)
ERYTHROCYTE [DISTWIDTH] IN BLOOD BY AUTOMATED COUNT: 14.7 % (ref 12.3–15.4)
GFR SERPL CREATININE-BSD FRML MDRD: 29 ML/MIN/1.73
GLUCOSE BLD-MCNC: 130 MG/DL (ref 65–99)
HCT VFR BLD AUTO: 39.6 % (ref 37.5–51)
HGB BLD-MCNC: 13.1 G/DL (ref 13–17.7)
LYMPHOCYTES # BLD MANUAL: 1.01 10*3/MM3 (ref 0.7–3.1)
LYMPHOCYTES NFR BLD MANUAL: 10.1 % (ref 19.6–45.3)
LYMPHOCYTES NFR BLD MANUAL: 9.1 % (ref 5–12)
MCH RBC QN AUTO: 29.8 PG (ref 26.6–33)
MCHC RBC AUTO-ENTMCNC: 33.1 G/DL (ref 31.5–35.7)
MCV RBC AUTO: 90 FL (ref 79–97)
MONOCYTES # BLD AUTO: 0.91 10*3/MM3 (ref 0.1–0.9)
NEUTROPHILS # BLD AUTO: 7.38 10*3/MM3 (ref 1.7–7)
NEUTROPHILS NFR BLD MANUAL: 73.7 % (ref 42.7–76)
OVALOCYTES BLD QL SMEAR: ABNORMAL
PLAT MORPH BLD: NORMAL
PLATELET # BLD AUTO: 281 10*3/MM3 (ref 140–450)
PMV BLD AUTO: 11.1 FL (ref 6–12)
POIKILOCYTOSIS BLD QL SMEAR: ABNORMAL
POTASSIUM BLD-SCNC: 4.3 MMOL/L (ref 3.5–5.2)
RBC # BLD AUTO: 4.4 10*6/MM3 (ref 4.14–5.8)
SODIUM BLD-SCNC: 137 MMOL/L (ref 136–145)
WBC MORPH BLD: NORMAL
WBC NRBC COR # BLD: 10.02 10*3/MM3 (ref 3.4–10.8)

## 2020-05-26 PROCEDURE — 85025 COMPLETE CBC W/AUTO DIFF WBC: CPT

## 2020-05-26 PROCEDURE — 85007 BL SMEAR W/DIFF WBC COUNT: CPT

## 2020-05-26 PROCEDURE — 80048 BASIC METABOLIC PNL TOTAL CA: CPT

## 2020-05-26 PROCEDURE — 36415 COLL VENOUS BLD VENIPUNCTURE: CPT

## 2020-10-26 RX ORDER — METOPROLOL SUCCINATE 50 MG/1
TABLET, EXTENDED RELEASE ORAL
Qty: 90 TABLET | Refills: 0 | Status: SHIPPED | OUTPATIENT
Start: 2020-10-26 | End: 2021-01-11

## 2021-01-11 RX ORDER — METOPROLOL SUCCINATE 50 MG/1
TABLET, EXTENDED RELEASE ORAL
Qty: 90 TABLET | Refills: 0 | Status: SHIPPED | OUTPATIENT
Start: 2021-01-11 | End: 2021-04-07

## 2021-02-09 ENCOUNTER — OFFICE VISIT (OUTPATIENT)
Dept: CARDIOLOGY | Facility: CLINIC | Age: 76
End: 2021-02-09

## 2021-02-09 VITALS
HEART RATE: 93 BPM | BODY MASS INDEX: 25.2 KG/M2 | SYSTOLIC BLOOD PRESSURE: 105 MMHG | HEIGHT: 71 IN | DIASTOLIC BLOOD PRESSURE: 65 MMHG | TEMPERATURE: 97.1 F | OXYGEN SATURATION: 96 % | WEIGHT: 180 LBS

## 2021-02-09 DIAGNOSIS — I10 ESSENTIAL HYPERTENSION: ICD-10-CM

## 2021-02-09 DIAGNOSIS — E78.5 DYSLIPIDEMIA: ICD-10-CM

## 2021-02-09 DIAGNOSIS — I45.10 RBBB: ICD-10-CM

## 2021-02-09 DIAGNOSIS — I25.810 CORONARY ARTERY DISEASE INVOLVING CORONARY BYPASS GRAFT OF NATIVE HEART WITHOUT ANGINA PECTORIS: ICD-10-CM

## 2021-02-09 DIAGNOSIS — N18.4 CKD (CHRONIC KIDNEY DISEASE) STAGE 4, GFR 15-29 ML/MIN (HCC): ICD-10-CM

## 2021-02-09 DIAGNOSIS — I65.23 BILATERAL CAROTID ARTERY STENOSIS: Primary | ICD-10-CM

## 2021-02-09 DIAGNOSIS — J43.1 PANLOBULAR EMPHYSEMA (HCC): ICD-10-CM

## 2021-02-09 DIAGNOSIS — I73.9 PAD (PERIPHERAL ARTERY DISEASE) (HCC): ICD-10-CM

## 2021-02-09 PROCEDURE — 93000 ELECTROCARDIOGRAM COMPLETE: CPT | Performed by: INTERNAL MEDICINE

## 2021-02-09 PROCEDURE — 99214 OFFICE O/P EST MOD 30 MIN: CPT | Performed by: INTERNAL MEDICINE

## 2021-02-09 RX ORDER — MONTELUKAST SODIUM 10 MG/1
10 TABLET ORAL NIGHTLY
COMMUNITY
End: 2021-05-19

## 2021-02-09 RX ORDER — BUDESONIDE 0.5 MG/2ML
0.5 INHALANT ORAL 2 TIMES DAILY
COMMUNITY

## 2021-02-09 RX ORDER — FORMOTEROL FUMARATE DIHYDRATE 20 UG/2ML
SOLUTION RESPIRATORY (INHALATION)
COMMUNITY

## 2021-02-09 RX ORDER — SACCHAROMYCES BOULARDII 250 MG
250 CAPSULE ORAL
COMMUNITY

## 2021-02-09 RX ORDER — NICOTINE POLACRILEX 2 MG
GUM BUCCAL
COMMUNITY
End: 2021-06-26

## 2021-02-09 RX ORDER — THEOPHYLLINE 300 MG/1
300 TABLET, EXTENDED RELEASE ORAL 2 TIMES DAILY
COMMUNITY

## 2021-02-09 RX ORDER — FAMOTIDINE 10 MG
40 TABLET ORAL 2 TIMES DAILY
COMMUNITY
End: 2021-06-26

## 2021-02-09 NOTE — PROGRESS NOTES
Subjective:     Encounter Date:02/09/2021      Patient ID: David A Jolissaint is a 75 y.o. male.    Chief Complaint : Follow-up for CAD, CABG, PAD, hypertension, dyslipidemia, RBBB  History of Present Illness       This is a 75-year-old  male with  PMH of    #  CAD, NSTEMI, 2 vessel CABG with SVG to OM and PDA 3/9/16  #  PAD, left lower extremity amputation  #   RBBB  #  diabetes, hypertension, COPD, tobacco abuse quit in October 2015  #  CKD  #  left BKA  #  carotid disease  #Former smoker     here for  follow-up.  Patient  denies any chest pain, headache, palpitation,  loss of conscious..  Has chronic shortness of breath and dyspnea on exertion , which is unchanged.  Patient reportedly was traveling and had emergency cholecystectomy in [facility].  Had an echocardiogram done 10/1/2020 which revealed normal LV systolic function with mild concentric LVH and left atrial enlargement.  Patient's arterial blood pressure is 105/65, heart rate 93, O2 sat of 96 % on room  Patient  had carotid Doppler 06/15/2018 which showed bilateral 50-70% worse compared to 2016 which was less than 50% bilat.    labs from 10/05/2017 showed cholesterol 98 triglycerides 87 HDL 41 LDL 32 BUN 34 creatinine 2.4 hemoglobin A1c 5.7.  Repeat labs 5/18/2019 revealed BUN 32 creatinine 2.5 follows with Dr. Mendez. proBNP was elevated at 3362.  Labs from 11/19/2019 reveal cholesterol 104 HDL 73 LDL 46, A1c 5.8, CMP with a creatinine of 46/2.34.  Labs from 5/26/2020 reveal normal CBC, BMP with BUN of 36 creatinine 2.23 GFR of 29.  Labs from 11/23/2020 revealed normal PSA, creatinine on CMP of 2.09, GFR 33, triglycerides 98 LDL 49 HDL 38.  Total cholesterol is not seen on this labs.,  CBC is normal.  Hemoglobin A1c of 5.9.  Echo revealed normal LV function 5/18/2019 and Lexiscan was negative for ischemia.  Patient is being treated for blister and cellulitis on left BKA stump and has developed diarrhea.  Patient has poor radial  pulses on exam.    ASSESSMENT:    #. Bilateral carotid stenosis  # PAD with absent right radial pulse, left BKA  # dyspnea on exertion, chronic  #  bradycardia, RBBB  #  CAD, CABG, history of NSTEMI  #  diabetes,   #  hypertension,   # CKD,   # COPD,  # dyslipidemia      PLAN:  Reviewed EKG results and lab results with patient  Continue medical management with aspirin, atorvastatin, metoprolol, isosorbide mononitrate, clonidine, Plavix, hydralazine as needed  Patient is asymptomatic from right bundle and bradycardia at the current time.  Advised patient to follow-up with nephrology Dr. Mendez.   Labs from 11/23/2020 reviewed revealing low HDL at 38 counseled on walking exercise.  Patient has carotid disease we will check carotid Dopplers.  Patient's blood pressure is doing well we will continue to monitor.  Follow-up with PMD for diabetes care.  Last A1c was 5.9.      Assessment:         MDM     Diagnosis Plan   1. Bilateral carotid artery stenosis  Duplex Carotid Ultrasound CAR    ECG 12 Lead   2. RBBB  Duplex Carotid Ultrasound CAR    ECG 12 Lead   3. Coronary artery disease involving coronary bypass graft of native heart without angina pectoris  Duplex Carotid Ultrasound CAR    ECG 12 Lead   4. Essential hypertension  Duplex Carotid Ultrasound CAR    ECG 12 Lead   5. Panlobular emphysema (CMS/HCC)  Duplex Carotid Ultrasound CAR    ECG 12 Lead   6. Dyslipidemia  Duplex Carotid Ultrasound CAR    ECG 12 Lead   7. CKD (chronic kidney disease) stage 4, GFR 15-29 ml/min (CMS/HCC)  Duplex Carotid Ultrasound CAR    ECG 12 Lead   8. PAD (peripheral artery disease) (CMS/[facility])  Duplex Carotid Ultrasound CAR    ECG 12 Lead          Plan:         Past Medical History:  Past Medical History:   Diagnosis Date   • Cellulitis    • CKD (chronic kidney disease), stage III (CMS/HCC)    • COPD (chronic obstructive pulmonary disease) (CMS/[facility])    • Diabetes mellitus (CMS/[facility])    • Diverticulitis    • Dyslipidemia    • Hypertension    •  Myocardial infarction (CMS/HCC)    • PVD (peripheral vascular disease) (CMS/HCC)      Past Surgical History:  Past Surgical History:   Procedure Laterality Date   • GALLBLADDER SURGERY     • LEG AMPUTATION Left       Allergies:  No Known Allergies  Home Meds:  Current Meds:     Current Outpatient Medications:   •  aspirin 81 MG EC tablet, , Disp: , Rfl: 0  •  atorvastatin (LIPITOR) 40 MG tablet, , Disp: , Rfl:   •  Biotin 1 MG capsule, Take  by mouth., Disp: , Rfl:   •  budesonide (PULMICORT) 0.5 MG/2ML nebulizer solution, Take 0.5 mg by nebulization Daily., Disp: , Rfl:   •  clopidogrel (PLAVIX) 75 MG tablet, , Disp: , Rfl:   •  dutasteride (AVODART) 0.5 MG capsule, , Disp: , Rfl:   •  famotidine (PEPCID) 10 MG tablet, Take 10 mg by mouth 2 (Two) Times a Day., Disp: , Rfl:   •  folic acid (FOLVITE) 1 MG tablet, , Disp: , Rfl:   •  formoterol (Perforomist) 20 MCG/2ML nebulizer solution, Take  by nebulization 2 (Two) Times a Day., Disp: , Rfl:   •  HYDROcodone-acetaminophen (NORCO) 5-325 MG per tablet, , Disp: , Rfl:   •  isosorbide mononitrate (IMDUR) 60 MG 24 hr tablet, , Disp: , Rfl:   •  metoprolol succinate XL (TOPROL-XL) 50 MG 24 hr tablet, TAKE 1 TABLET EVERY DAY, Disp: 90 tablet, Rfl: 0  •  montelukast (SINGULAIR) 10 MG tablet, Take 10 mg by mouth Every Night., Disp: , Rfl:   •  Multiple Vitamins-Minerals (MULTI VITAMIN/MINERALS) tablet, MULTI VITAMIN/MINERALS TABS, Disp: , Rfl:   •  revefenacin (YUPELRI) 175 MCG/3ML nebulizer solution, Take  by nebulization Daily., Disp: , Rfl:   •  saccharomyces boulardii (FLORASTOR) 250 MG capsule, Take 250 mg by mouth 2 (Two) Times a Day., Disp: , Rfl:   •  sodium bicarbonate 650 MG tablet, , Disp: , Rfl:   •  tamsulosin (FLOMAX) 0.4 MG capsule 24 hr capsule, , Disp: , Rfl:   •  theophylline (THEODUR) 300 MG 12 hr tablet, Take 300 mg by mouth 2 (Two) Times a Day., Disp: , Rfl:   •  cloNIDine (CATAPRES) 0.1 MG tablet, , Disp: , Rfl:   •  FLUCELVAX QUADRIVALENT 0.5 ML  suspension prefilled syringe injection, ADM 0.5ML IM UTD, Disp: , Rfl: 0  •  gabapentin (NEURONTIN) 300 MG capsule, TK 1 C PO TID, Disp: , Rfl: 4  •  guaiFENesin (MUCINEX) 600 MG 12 hr tablet, MUCINEX 600 MG XR12H-TAB, Disp: , Rfl:   •  hydrALAZINE (APRESOLINE) 10 MG tablet, , Disp: , Rfl:   •  hydroCHLOROthiazide (HYDRODIURIL) 25 MG tablet, Take 25 mg by mouth Daily., Disp: , Rfl:   •  ipratropium-albuterol (DUO-NEB) 0.5-2.5 mg/mL nebulizer, , Disp: , Rfl: 0  •  ONE TOUCH ULTRA TEST test strip, , Disp: , Rfl:   •  POLY-IRON 150 150 MG capsule, , Disp: , Rfl: 0  •  TDVAX 2-2 LF/0.5ML injection, , Disp: , Rfl:   Social History:   Social History     Tobacco Use   • Smoking status: Former Smoker   • Smokeless tobacco: Never Used   Substance Use Topics   • Alcohol use: Not on file      Family History:  Family History   Problem Relation Age of Onset   • Heart attack Father    • Heart disease Father         The following portions of the patient's history were reviewed and updated as appropriate: allergies, current medications, past family history, past medical history, past social history, past surgical history and problem list.      Review of Systems   Constitution: Negative for malaise/fatigue.   Cardiovascular: Negative for chest pain, leg swelling and palpitations.   Respiratory: Positive for shortness of breath.    Skin: Negative for rash.   Neurological: Negative for dizziness, light-headedness and numbness.     All other systems are negative      ECG 12 Lead    Date/Time: 2/9/2021 3:53 PM  Performed by: Kali Mistry MD  Authorized by: Kali Mistry MD   Comparison: compared with previous ECG from 1/17/2020  Comparison to previous ECG: EKG from today reviewed by me shows sinus rhythm with rate of 76 bpm with right bundle branch block and left posterior hemiblock, no new change compared to EKG from 1/17/2020                 Objective:     Physical Exam  /65   Pulse 93   Temp 97.1 °F  "(36.2 °C)   Ht 179.1 cm (70.5\")   Wt 81.6 kg (180 lb)   SpO2 96%   BMI 25.46 kg/m²   General:  Appears in no acute distress  Eyes: Sclera is anicteric,  conjunctiva is clear   HEENT:  No JVD.  Carotid bruits heard  Respiratory: Respirations regular and unlabored at rest.  Clear to auscultation  Cardiovascular: S1,S2 Regular rate and rhythm. No murmur, rub or gallop auscultated. No pretibial pitting edema  Gastrointestinal: Abdomen nondistended, soft  Musculoskeletal:  No abnormal movements  Extremities: Amputation left lower extremity seen.  Skin: Color pink. Skin warm and dry to touch. No rashes  No xanthoma  Neuro: Alert and awake, no lateralizing deficits appreciated    Lab Reviewed:                  "

## 2021-04-07 RX ORDER — METOPROLOL SUCCINATE 50 MG/1
TABLET, EXTENDED RELEASE ORAL
Qty: 90 TABLET | Refills: 3 | Status: SHIPPED | OUTPATIENT
Start: 2021-04-07 | End: 2021-07-09 | Stop reason: DRUGHIGH

## 2021-05-19 ENCOUNTER — APPOINTMENT (OUTPATIENT)
Dept: GENERAL RADIOLOGY | Facility: HOSPITAL | Age: 76
End: 2021-05-19

## 2021-05-19 ENCOUNTER — APPOINTMENT (OUTPATIENT)
Dept: CT IMAGING | Facility: HOSPITAL | Age: 76
End: 2021-05-19

## 2021-05-19 ENCOUNTER — HOSPITAL ENCOUNTER (OUTPATIENT)
Facility: HOSPITAL | Age: 76
Discharge: HOME | End: 2021-05-22
Attending: FAMILY MEDICINE | Admitting: FAMILY MEDICINE

## 2021-05-19 DIAGNOSIS — R07.89 CHEST PRESSURE: ICD-10-CM

## 2021-05-19 DIAGNOSIS — R59.0 MEDIASTINAL ADENOPATHY: ICD-10-CM

## 2021-05-19 DIAGNOSIS — R06.02 SHORTNESS OF BREATH: Primary | ICD-10-CM

## 2021-05-19 PROBLEM — R10.30 LOWER ABDOMINAL PAIN: Status: ACTIVE | Noted: 2021-05-19

## 2021-05-19 PROBLEM — R73.03 PREDIABETES: Status: ACTIVE | Noted: 2021-05-19

## 2021-05-19 PROBLEM — I12.9 BENIGN HYPERTENSION WITH CHRONIC KIDNEY DISEASE, STAGE IV: Status: ACTIVE | Noted: 2021-05-19

## 2021-05-19 PROBLEM — K59.00 ACUTE CONSTIPATION: Status: ACTIVE | Noted: 2021-05-19

## 2021-05-19 PROBLEM — N18.4 BENIGN HYPERTENSION WITH CHRONIC KIDNEY DISEASE, STAGE IV (HCC): Status: ACTIVE | Noted: 2021-05-19

## 2021-05-19 LAB
ALBUMIN SERPL-MCNC: 4.1 G/DL (ref 3.5–5.2)
ALBUMIN/GLOB SERPL: 1.4 G/DL
ALP SERPL-CCNC: 137 U/L (ref 39–117)
ALT SERPL W P-5'-P-CCNC: 12 U/L (ref 1–41)
ANION GAP SERPL CALCULATED.3IONS-SCNC: 13 MMOL/L (ref 5–15)
APTT PPP: 28.3 SECONDS (ref 24–31)
AST SERPL-CCNC: 18 U/L (ref 1–40)
B PARAPERT DNA SPEC QL NAA+PROBE: NOT DETECTED
B PARAPERT DNA SPEC QL NAA+PROBE: NOT DETECTED
B PERT DNA SPEC QL NAA+PROBE: NOT DETECTED
B PERT DNA SPEC QL NAA+PROBE: NOT DETECTED
BACTERIA UR QL AUTO: ABNORMAL /HPF
BASOPHILS # BLD AUTO: 0 10*3/MM3 (ref 0–0.2)
BASOPHILS NFR BLD AUTO: 0.2 % (ref 0–1.5)
BILIRUB SERPL-MCNC: 0.6 MG/DL (ref 0–1.2)
BILIRUB UR QL STRIP: NEGATIVE
BUN SERPL-MCNC: 24 MG/DL (ref 8–23)
BUN/CREAT SERPL: 10.8 (ref 7–25)
C PNEUM DNA NPH QL NAA+NON-PROBE: NOT DETECTED
C PNEUM DNA NPH QL NAA+NON-PROBE: NOT DETECTED
CALCIUM SPEC-SCNC: 9.7 MG/DL (ref 8.6–10.5)
CHLORIDE SERPL-SCNC: 99 MMOL/L (ref 98–107)
CLARITY UR: CLEAR
CO2 SERPL-SCNC: 26 MMOL/L (ref 22–29)
COLOR UR: YELLOW
CREAT SERPL-MCNC: 2.23 MG/DL (ref 0.76–1.27)
DEPRECATED RDW RBC AUTO: 55.1 FL (ref 37–54)
EOSINOPHIL # BLD AUTO: 0.3 10*3/MM3 (ref 0–0.4)
EOSINOPHIL NFR BLD AUTO: 3.1 % (ref 0.3–6.2)
ERYTHROCYTE [DISTWIDTH] IN BLOOD BY AUTOMATED COUNT: 17.8 % (ref 12.3–15.4)
FLUAV SUBTYP SPEC NAA+PROBE: NOT DETECTED
FLUAV SUBTYP SPEC NAA+PROBE: NOT DETECTED
FLUBV RNA ISLT QL NAA+PROBE: NOT DETECTED
FLUBV RNA ISLT QL NAA+PROBE: NOT DETECTED
GFR SERPL CREATININE-BSD FRML MDRD: 29 ML/MIN/1.73
GLOBULIN UR ELPH-MCNC: 3 GM/DL
GLUCOSE SERPL-MCNC: 110 MG/DL (ref 65–99)
GLUCOSE UR STRIP-MCNC: NEGATIVE MG/DL
HADV DNA SPEC NAA+PROBE: NOT DETECTED
HADV DNA SPEC NAA+PROBE: NOT DETECTED
HCOV 229E RNA SPEC QL NAA+PROBE: NOT DETECTED
HCOV 229E RNA SPEC QL NAA+PROBE: NOT DETECTED
HCOV HKU1 RNA SPEC QL NAA+PROBE: NOT DETECTED
HCOV HKU1 RNA SPEC QL NAA+PROBE: NOT DETECTED
HCOV NL63 RNA SPEC QL NAA+PROBE: NOT DETECTED
HCOV NL63 RNA SPEC QL NAA+PROBE: NOT DETECTED
HCOV OC43 RNA SPEC QL NAA+PROBE: NOT DETECTED
HCOV OC43 RNA SPEC QL NAA+PROBE: NOT DETECTED
HCT VFR BLD AUTO: 41.2 % (ref 37.5–51)
HGB BLD-MCNC: 13.7 G/DL (ref 13–17.7)
HGB UR QL STRIP.AUTO: NEGATIVE
HMPV RNA NPH QL NAA+NON-PROBE: NOT DETECTED
HMPV RNA NPH QL NAA+NON-PROBE: NOT DETECTED
HPIV1 RNA SPEC QL NAA+PROBE: NOT DETECTED
HPIV1 RNA SPEC QL NAA+PROBE: NOT DETECTED
HPIV2 RNA SPEC QL NAA+PROBE: NOT DETECTED
HPIV2 RNA SPEC QL NAA+PROBE: NOT DETECTED
HPIV3 RNA NPH QL NAA+PROBE: NOT DETECTED
HPIV3 RNA NPH QL NAA+PROBE: NOT DETECTED
HPIV4 P GENE NPH QL NAA+PROBE: NOT DETECTED
HPIV4 P GENE NPH QL NAA+PROBE: NOT DETECTED
HYALINE CASTS UR QL AUTO: ABNORMAL /LPF
INR PPP: 1.02 (ref 0.93–1.1)
KETONES UR QL STRIP: NEGATIVE
LEUKOCYTE ESTERASE UR QL STRIP.AUTO: NEGATIVE
LYMPHOCYTES # BLD AUTO: 0.8 10*3/MM3 (ref 0.7–3.1)
LYMPHOCYTES NFR BLD AUTO: 8.4 % (ref 19.6–45.3)
M PNEUMO IGG SER IA-ACNC: NOT DETECTED
M PNEUMO IGG SER IA-ACNC: NOT DETECTED
MAGNESIUM SERPL-MCNC: 2 MG/DL (ref 1.6–2.4)
MCH RBC QN AUTO: 29.8 PG (ref 26.6–33)
MCHC RBC AUTO-ENTMCNC: 33.4 G/DL (ref 31.5–35.7)
MCV RBC AUTO: 89.2 FL (ref 79–97)
MONOCYTES # BLD AUTO: 0.7 10*3/MM3 (ref 0.1–0.9)
MONOCYTES NFR BLD AUTO: 7.2 % (ref 5–12)
NEUTROPHILS NFR BLD AUTO: 7.5 10*3/MM3 (ref 1.7–7)
NEUTROPHILS NFR BLD AUTO: 81.1 % (ref 42.7–76)
NITRITE UR QL STRIP: NEGATIVE
NRBC BLD AUTO-RTO: 0.1 /100 WBC (ref 0–0.2)
NT-PROBNP SERPL-MCNC: 677.7 PG/ML (ref 0–1800)
PH UR STRIP.AUTO: 7.5 [PH] (ref 5–8)
PLATELET # BLD AUTO: 253 10*3/MM3 (ref 140–450)
PMV BLD AUTO: 8.2 FL (ref 6–12)
POTASSIUM SERPL-SCNC: 4.2 MMOL/L (ref 3.5–5.2)
PROT SERPL-MCNC: 7.1 G/DL (ref 6–8.5)
PROT UR QL STRIP: ABNORMAL
PROTHROMBIN TIME: 11.2 SECONDS (ref 9.6–11.7)
QT INTERVAL: 434 MS
RBC # BLD AUTO: 4.62 10*6/MM3 (ref 4.14–5.8)
RBC # UR: ABNORMAL /HPF
REF LAB TEST METHOD: ABNORMAL
RHINOVIRUS RNA SPEC NAA+PROBE: NOT DETECTED
RHINOVIRUS RNA SPEC NAA+PROBE: NOT DETECTED
RSV RNA NPH QL NAA+NON-PROBE: NOT DETECTED
RSV RNA NPH QL NAA+NON-PROBE: NOT DETECTED
SARS-COV-2 RNA NPH QL NAA+NON-PROBE: NOT DETECTED
SODIUM SERPL-SCNC: 138 MMOL/L (ref 136–145)
SP GR UR STRIP: 1.02 (ref 1–1.03)
SQUAMOUS #/AREA URNS HPF: ABNORMAL /HPF
THEOPHYLLINE SERPL-MCNC: 13.7 MCG/ML (ref 10–20)
TROPONIN T SERPL-MCNC: <0.01 NG/ML (ref 0–0.03)
UROBILINOGEN UR QL STRIP: ABNORMAL
WBC # BLD AUTO: 9.2 10*3/MM3 (ref 3.4–10.8)
WBC UR QL AUTO: ABNORMAL /HPF

## 2021-05-19 PROCEDURE — 87040 BLOOD CULTURE FOR BACTERIA: CPT | Performed by: NURSE PRACTITIONER

## 2021-05-19 PROCEDURE — 93005 ELECTROCARDIOGRAM TRACING: CPT

## 2021-05-19 PROCEDURE — 71045 X-RAY EXAM CHEST 1 VIEW: CPT

## 2021-05-19 PROCEDURE — 99284 EMERGENCY DEPT VISIT MOD MDM: CPT

## 2021-05-19 PROCEDURE — 87205 SMEAR GRAM STAIN: CPT | Performed by: INTERNAL MEDICINE

## 2021-05-19 PROCEDURE — 80053 COMPREHEN METABOLIC PANEL: CPT | Performed by: NURSE PRACTITIONER

## 2021-05-19 PROCEDURE — 74176 CT ABD & PELVIS W/O CONTRAST: CPT

## 2021-05-19 PROCEDURE — G0378 HOSPITAL OBSERVATION PER HR: HCPCS

## 2021-05-19 PROCEDURE — 81001 URINALYSIS AUTO W/SCOPE: CPT | Performed by: FAMILY MEDICINE

## 2021-05-19 PROCEDURE — 71250 CT THORAX DX C-: CPT

## 2021-05-19 PROCEDURE — 96374 THER/PROPH/DIAG INJ IV PUSH: CPT

## 2021-05-19 PROCEDURE — 94799 UNLISTED PULMONARY SVC/PX: CPT

## 2021-05-19 PROCEDURE — 25010000002 CEFTRIAXONE PER 250 MG: Performed by: NURSE PRACTITIONER

## 2021-05-19 PROCEDURE — 85610 PROTHROMBIN TIME: CPT | Performed by: NURSE PRACTITIONER

## 2021-05-19 PROCEDURE — 94640 AIRWAY INHALATION TREATMENT: CPT

## 2021-05-19 PROCEDURE — 85025 COMPLETE CBC W/AUTO DIFF WBC: CPT | Performed by: NURSE PRACTITIONER

## 2021-05-19 PROCEDURE — 93005 ELECTROCARDIOGRAM TRACING: CPT | Performed by: FAMILY MEDICINE

## 2021-05-19 PROCEDURE — 80198 ASSAY OF THEOPHYLLINE: CPT | Performed by: FAMILY MEDICINE

## 2021-05-19 PROCEDURE — 87633 RESP VIRUS 12-25 TARGETS: CPT | Performed by: FAMILY MEDICINE

## 2021-05-19 PROCEDURE — 99215 OFFICE O/P EST HI 40 MIN: CPT | Performed by: INTERNAL MEDICINE

## 2021-05-19 PROCEDURE — 96375 TX/PRO/DX INJ NEW DRUG ADDON: CPT

## 2021-05-19 PROCEDURE — 84145 PROCALCITONIN (PCT): CPT | Performed by: INTERNAL MEDICINE

## 2021-05-19 PROCEDURE — 0202U NFCT DS 22 TRGT SARS-COV-2: CPT | Performed by: NURSE PRACTITIONER

## 2021-05-19 PROCEDURE — 25010000002 METHYLPREDNISOLONE PER 40 MG: Performed by: NURSE PRACTITIONER

## 2021-05-19 PROCEDURE — 84484 ASSAY OF TROPONIN QUANT: CPT | Performed by: NURSE PRACTITIONER

## 2021-05-19 PROCEDURE — 85730 THROMBOPLASTIN TIME PARTIAL: CPT | Performed by: NURSE PRACTITIONER

## 2021-05-19 PROCEDURE — 83880 ASSAY OF NATRIURETIC PEPTIDE: CPT | Performed by: NURSE PRACTITIONER

## 2021-05-19 PROCEDURE — 87070 CULTURE OTHR SPECIMN AEROBIC: CPT | Performed by: INTERNAL MEDICINE

## 2021-05-19 PROCEDURE — 83735 ASSAY OF MAGNESIUM: CPT | Performed by: NURSE PRACTITIONER

## 2021-05-19 RX ORDER — ACETAMINOPHEN 160 MG/5ML
650 SOLUTION ORAL EVERY 4 HOURS PRN
Status: DISCONTINUED | OUTPATIENT
Start: 2021-05-19 | End: 2021-05-22 | Stop reason: HOSPADM

## 2021-05-19 RX ORDER — ACETAMINOPHEN 325 MG/1
650 TABLET ORAL EVERY 4 HOURS PRN
Status: DISCONTINUED | OUTPATIENT
Start: 2021-05-19 | End: 2021-05-22 | Stop reason: HOSPADM

## 2021-05-19 RX ORDER — BISACODYL 5 MG/1
5 TABLET, DELAYED RELEASE ORAL DAILY PRN
Status: DISCONTINUED | OUTPATIENT
Start: 2021-05-19 | End: 2021-05-22 | Stop reason: HOSPADM

## 2021-05-19 RX ORDER — CLOPIDOGREL BISULFATE 75 MG/1
75 TABLET ORAL DAILY
Status: DISCONTINUED | OUTPATIENT
Start: 2021-05-20 | End: 2021-05-19

## 2021-05-19 RX ORDER — NITROGLYCERIN 0.4 MG/1
0.4 TABLET SUBLINGUAL
Status: DISCONTINUED | OUTPATIENT
Start: 2021-05-19 | End: 2021-05-22 | Stop reason: HOSPADM

## 2021-05-19 RX ORDER — CALCIUM CARBONATE 200(500)MG
2 TABLET,CHEWABLE ORAL 2 TIMES DAILY PRN
Status: DISCONTINUED | OUTPATIENT
Start: 2021-05-19 | End: 2021-05-22 | Stop reason: HOSPADM

## 2021-05-19 RX ORDER — AMOXICILLIN 250 MG
2 CAPSULE ORAL 2 TIMES DAILY
Status: DISCONTINUED | OUTPATIENT
Start: 2021-05-19 | End: 2021-05-22 | Stop reason: HOSPADM

## 2021-05-19 RX ORDER — DOXYCYCLINE 100 MG/1
100 TABLET ORAL EVERY 12 HOURS SCHEDULED
Status: DISCONTINUED | OUTPATIENT
Start: 2021-05-19 | End: 2021-05-21

## 2021-05-19 RX ORDER — ACETAMINOPHEN 650 MG/1
650 SUPPOSITORY RECTAL EVERY 4 HOURS PRN
Status: DISCONTINUED | OUTPATIENT
Start: 2021-05-19 | End: 2021-05-22 | Stop reason: HOSPADM

## 2021-05-19 RX ORDER — BISACODYL 10 MG
10 SUPPOSITORY, RECTAL RECTAL DAILY PRN
Status: DISCONTINUED | OUTPATIENT
Start: 2021-05-19 | End: 2021-05-22 | Stop reason: HOSPADM

## 2021-05-19 RX ORDER — SODIUM BICARBONATE 650 MG/1
650 TABLET ORAL 2 TIMES DAILY
Status: DISCONTINUED | OUTPATIENT
Start: 2021-05-19 | End: 2021-05-22 | Stop reason: HOSPADM

## 2021-05-19 RX ORDER — SODIUM CHLORIDE 0.9 % (FLUSH) 0.9 %
10 SYRINGE (ML) INJECTION AS NEEDED
Status: DISCONTINUED | OUTPATIENT
Start: 2021-05-19 | End: 2021-05-22 | Stop reason: HOSPADM

## 2021-05-19 RX ORDER — FINASTERIDE 5 MG/1
5 TABLET, FILM COATED ORAL NIGHTLY
Status: DISCONTINUED | OUTPATIENT
Start: 2021-05-19 | End: 2021-05-22 | Stop reason: HOSPADM

## 2021-05-19 RX ORDER — THEOPHYLLINE 300 MG/1
300 TABLET, EXTENDED RELEASE ORAL EVERY 12 HOURS SCHEDULED
Status: DISCONTINUED | OUTPATIENT
Start: 2021-05-19 | End: 2021-05-22 | Stop reason: HOSPADM

## 2021-05-19 RX ORDER — FOLIC ACID 1 MG/1
1 TABLET ORAL DAILY
Status: DISCONTINUED | OUTPATIENT
Start: 2021-05-20 | End: 2021-05-22 | Stop reason: HOSPADM

## 2021-05-19 RX ORDER — ARFORMOTEROL TARTRATE 15 UG/2ML
15 SOLUTION RESPIRATORY (INHALATION)
Status: DISCONTINUED | OUTPATIENT
Start: 2021-05-19 | End: 2021-05-22 | Stop reason: HOSPADM

## 2021-05-19 RX ORDER — SODIUM CHLORIDE 0.9 % (FLUSH) 0.9 %
3 SYRINGE (ML) INJECTION EVERY 12 HOURS SCHEDULED
Status: DISCONTINUED | OUTPATIENT
Start: 2021-05-19 | End: 2021-05-22 | Stop reason: HOSPADM

## 2021-05-19 RX ORDER — METHYLPREDNISOLONE SODIUM SUCCINATE 40 MG/ML
40 INJECTION, POWDER, LYOPHILIZED, FOR SOLUTION INTRAMUSCULAR; INTRAVENOUS EVERY 8 HOURS
Status: DISCONTINUED | OUTPATIENT
Start: 2021-05-19 | End: 2021-05-22 | Stop reason: HOSPADM

## 2021-05-19 RX ORDER — SODIUM CHLORIDE 0.9 % (FLUSH) 0.9 %
3-10 SYRINGE (ML) INJECTION AS NEEDED
Status: DISCONTINUED | OUTPATIENT
Start: 2021-05-19 | End: 2021-05-22 | Stop reason: HOSPADM

## 2021-05-19 RX ORDER — ISOSORBIDE MONONITRATE 60 MG/1
60 TABLET, EXTENDED RELEASE ORAL DAILY
Status: DISCONTINUED | OUTPATIENT
Start: 2021-05-20 | End: 2021-05-22 | Stop reason: HOSPADM

## 2021-05-19 RX ORDER — ATORVASTATIN CALCIUM 40 MG/1
40 TABLET, FILM COATED ORAL DAILY
Status: DISCONTINUED | OUTPATIENT
Start: 2021-05-20 | End: 2021-05-22 | Stop reason: HOSPADM

## 2021-05-19 RX ORDER — HYDROCODONE BITARTRATE AND ACETAMINOPHEN 5; 325 MG/1; MG/1
1 TABLET ORAL NIGHTLY PRN
Status: DISCONTINUED | OUTPATIENT
Start: 2021-05-19 | End: 2021-05-22 | Stop reason: HOSPADM

## 2021-05-19 RX ORDER — ONDANSETRON 2 MG/ML
4 INJECTION INTRAMUSCULAR; INTRAVENOUS EVERY 6 HOURS PRN
Status: DISCONTINUED | OUTPATIENT
Start: 2021-05-19 | End: 2021-05-22 | Stop reason: HOSPADM

## 2021-05-19 RX ORDER — ASPIRIN 325 MG
325 TABLET ORAL ONCE
Status: COMPLETED | OUTPATIENT
Start: 2021-05-19 | End: 2021-05-19

## 2021-05-19 RX ORDER — METOPROLOL SUCCINATE 50 MG/1
50 TABLET, EXTENDED RELEASE ORAL DAILY
Status: DISCONTINUED | OUTPATIENT
Start: 2021-05-20 | End: 2021-05-22 | Stop reason: HOSPADM

## 2021-05-19 RX ORDER — FERROUS SULFATE TAB EC 324 MG (65 MG FE EQUIVALENT) 324 (65 FE) MG
324 TABLET DELAYED RESPONSE ORAL
Status: DISCONTINUED | OUTPATIENT
Start: 2021-05-20 | End: 2021-05-22 | Stop reason: HOSPADM

## 2021-05-19 RX ORDER — POLYETHYLENE GLYCOL 3350 17 G/17G
17 POWDER, FOR SOLUTION ORAL DAILY PRN
Status: DISCONTINUED | OUTPATIENT
Start: 2021-05-19 | End: 2021-05-22 | Stop reason: HOSPADM

## 2021-05-19 RX ORDER — ASPIRIN 81 MG/1
81 TABLET ORAL DAILY
Status: DISCONTINUED | OUTPATIENT
Start: 2021-05-20 | End: 2021-05-22 | Stop reason: HOSPADM

## 2021-05-19 RX ORDER — DOXYCYCLINE 100 MG/1
100 TABLET ORAL ONCE
Status: COMPLETED | OUTPATIENT
Start: 2021-05-19 | End: 2021-05-19

## 2021-05-19 RX ORDER — SACCHAROMYCES BOULARDII 250 MG
250 CAPSULE ORAL DAILY
Status: DISCONTINUED | OUTPATIENT
Start: 2021-05-19 | End: 2021-05-22 | Stop reason: HOSPADM

## 2021-05-19 RX ORDER — GUAIFENESIN 600 MG/1
600 TABLET, EXTENDED RELEASE ORAL EVERY 12 HOURS SCHEDULED
Status: DISCONTINUED | OUTPATIENT
Start: 2021-05-19 | End: 2021-05-22 | Stop reason: HOSPADM

## 2021-05-19 RX ORDER — ONDANSETRON 4 MG/1
4 TABLET, FILM COATED ORAL EVERY 6 HOURS PRN
Status: DISCONTINUED | OUTPATIENT
Start: 2021-05-19 | End: 2021-05-22 | Stop reason: HOSPADM

## 2021-05-19 RX ORDER — BUDESONIDE 0.5 MG/2ML
0.5 INHALANT ORAL
Status: DISCONTINUED | OUTPATIENT
Start: 2021-05-19 | End: 2021-05-22 | Stop reason: HOSPADM

## 2021-05-19 RX ORDER — TAMSULOSIN HYDROCHLORIDE 0.4 MG/1
0.4 CAPSULE ORAL DAILY
Status: DISCONTINUED | OUTPATIENT
Start: 2021-05-20 | End: 2021-05-22 | Stop reason: HOSPADM

## 2021-05-19 RX ADMIN — DOCUSATE SODIUM 50 MG AND SENNOSIDES 8.6 MG 2 TABLET: 8.6; 5 TABLET, FILM COATED ORAL at 20:48

## 2021-05-19 RX ADMIN — WATER 1 G: 100 INJECTION, SOLUTION INTRAVENOUS at 12:04

## 2021-05-19 RX ADMIN — Medication 250 MG: at 14:44

## 2021-05-19 RX ADMIN — SODIUM BICARBONATE 650 MG TABLET 650 MG: at 20:48

## 2021-05-19 RX ADMIN — IPRATROPIUM BROMIDE 0.5 MG: 0.5 SOLUTION RESPIRATORY (INHALATION) at 16:04

## 2021-05-19 RX ADMIN — GUAIFENESIN 600 MG: 600 TABLET, EXTENDED RELEASE ORAL at 20:48

## 2021-05-19 RX ADMIN — FINASTERIDE 5 MG: 5 TABLET, FILM COATED ORAL at 20:48

## 2021-05-19 RX ADMIN — ARFORMOTEROL TARTRATE 15 MCG: 15 SOLUTION RESPIRATORY (INHALATION) at 18:49

## 2021-05-19 RX ADMIN — Medication 3 ML: at 20:49

## 2021-05-19 RX ADMIN — DOXYCYCLINE 100 MG: 100 TABLET, FILM COATED ORAL at 12:04

## 2021-05-19 RX ADMIN — Medication 3 ML: at 14:44

## 2021-05-19 RX ADMIN — METHYLPREDNISOLONE SODIUM SUCCINATE 40 MG: 40 INJECTION, POWDER, FOR SOLUTION INTRAMUSCULAR; INTRAVENOUS at 16:44

## 2021-05-19 RX ADMIN — ASPIRIN 325 MG ORAL TABLET 325 MG: 325 PILL ORAL at 10:00

## 2021-05-19 RX ADMIN — HYDROCODONE BITARTRATE AND ACETAMINOPHEN 1 TABLET: 5; 325 TABLET ORAL at 21:29

## 2021-05-19 RX ADMIN — BUDESONIDE 0.5 MG: 0.5 SUSPENSION RESPIRATORY (INHALATION) at 18:50

## 2021-05-19 RX ADMIN — THEOPHYLLINE 300 MG: 300 TABLET, EXTENDED RELEASE ORAL at 20:47

## 2021-05-20 ENCOUNTER — ANESTHESIA (OUTPATIENT)
Dept: GASTROENTEROLOGY | Facility: HOSPITAL | Age: 76
End: 2021-05-20

## 2021-05-20 ENCOUNTER — ANESTHESIA EVENT (OUTPATIENT)
Dept: GASTROENTEROLOGY | Facility: HOSPITAL | Age: 76
End: 2021-05-20

## 2021-05-20 LAB
ALBUMIN SERPL-MCNC: 3.7 G/DL (ref 3.5–5.2)
ALP SERPL-CCNC: 133 U/L (ref 39–117)
ALT SERPL W P-5'-P-CCNC: 11 U/L (ref 1–41)
ANION GAP SERPL CALCULATED.3IONS-SCNC: 13 MMOL/L (ref 5–15)
AST SERPL-CCNC: 17 U/L (ref 1–40)
B PARAPERT DNA SPEC QL NAA+PROBE: NOT DETECTED
B PERT DNA SPEC QL NAA+PROBE: NOT DETECTED
BASOPHILS # BLD AUTO: 0 10*3/MM3 (ref 0–0.2)
BASOPHILS NFR BLD AUTO: 0.4 % (ref 0–1.5)
BILIRUB CONJ SERPL-MCNC: <0.2 MG/DL (ref 0–0.3)
BILIRUB INDIRECT SERPL-MCNC: ABNORMAL MG/DL
BILIRUB SERPL-MCNC: 0.4 MG/DL (ref 0–1.2)
BUN SERPL-MCNC: 30 MG/DL (ref 8–23)
BUN/CREAT SERPL: 12.3 (ref 7–25)
C PNEUM DNA NPH QL NAA+NON-PROBE: NOT DETECTED
CALCIUM SPEC-SCNC: 9.1 MG/DL (ref 8.6–10.5)
CHLORIDE SERPL-SCNC: 103 MMOL/L (ref 98–107)
CLOSE TME COLL+ADP + EPINEP PNL BLD: 94 % (ref 86–100)
CO2 SERPL-SCNC: 21 MMOL/L (ref 22–29)
CREAT SERPL-MCNC: 2.43 MG/DL (ref 0.76–1.27)
DEPRECATED RDW RBC AUTO: 57.3 FL (ref 37–54)
EOSINOPHIL # BLD AUTO: 0 10*3/MM3 (ref 0–0.4)
EOSINOPHIL NFR BLD AUTO: 0.2 % (ref 0.3–6.2)
ERYTHROCYTE [DISTWIDTH] IN BLOOD BY AUTOMATED COUNT: 18 % (ref 12.3–15.4)
FLUAV SUBTYP SPEC NAA+PROBE: NOT DETECTED
FLUBV RNA ISLT QL NAA+PROBE: NOT DETECTED
GFR SERPL CREATININE-BSD FRML MDRD: 26 ML/MIN/1.73
GLUCOSE SERPL-MCNC: 160 MG/DL (ref 65–99)
HADV DNA SPEC NAA+PROBE: NOT DETECTED
HBA1C MFR BLD: 5.9 % (ref 3.5–5.6)
HCOV 229E RNA SPEC QL NAA+PROBE: NOT DETECTED
HCOV HKU1 RNA SPEC QL NAA+PROBE: NOT DETECTED
HCOV NL63 RNA SPEC QL NAA+PROBE: NOT DETECTED
HCOV OC43 RNA SPEC QL NAA+PROBE: NOT DETECTED
HCT VFR BLD AUTO: 40.3 % (ref 37.5–51)
HGB BLD-MCNC: 13 G/DL (ref 13–17.7)
HMPV RNA NPH QL NAA+NON-PROBE: NOT DETECTED
HPIV1 RNA SPEC QL NAA+PROBE: NOT DETECTED
HPIV2 RNA SPEC QL NAA+PROBE: NOT DETECTED
HPIV3 RNA NPH QL NAA+PROBE: NOT DETECTED
HPIV4 P GENE NPH QL NAA+PROBE: NOT DETECTED
LYMPHOCYTES # BLD AUTO: 0.4 10*3/MM3 (ref 0.7–3.1)
LYMPHOCYTES NFR BLD AUTO: 6 % (ref 19.6–45.3)
M PNEUMO IGG SER IA-ACNC: NOT DETECTED
MAGNESIUM SERPL-MCNC: 2.1 MG/DL (ref 1.6–2.4)
MCH RBC QN AUTO: 29.2 PG (ref 26.6–33)
MCHC RBC AUTO-ENTMCNC: 32.3 G/DL (ref 31.5–35.7)
MCV RBC AUTO: 90.5 FL (ref 79–97)
MONOCYTES # BLD AUTO: 0 10*3/MM3 (ref 0.1–0.9)
MONOCYTES NFR BLD AUTO: 0.6 % (ref 5–12)
NEUTROPHILS NFR BLD AUTO: 6.9 10*3/MM3 (ref 1.7–7)
NEUTROPHILS NFR BLD AUTO: 92.8 % (ref 42.7–76)
NRBC BLD AUTO-RTO: 0 /100 WBC (ref 0–0.2)
PLATELET # BLD AUTO: 267 10*3/MM3 (ref 140–450)
PMV BLD AUTO: 8.4 FL (ref 6–12)
POTASSIUM SERPL-SCNC: 4.5 MMOL/L (ref 3.5–5.2)
PROCALCITONIN SERPL-MCNC: 0.19 NG/ML (ref 0–0.25)
PROT SERPL-MCNC: 6.8 G/DL (ref 6–8.5)
RBC # BLD AUTO: 4.46 10*6/MM3 (ref 4.14–5.8)
RHINOVIRUS RNA SPEC NAA+PROBE: NOT DETECTED
RSV RNA NPH QL NAA+NON-PROBE: NOT DETECTED
SODIUM SERPL-SCNC: 137 MMOL/L (ref 136–145)
TROPONIN T SERPL-MCNC: <0.01 NG/ML (ref 0–0.03)
WBC # BLD AUTO: 7.4 10*3/MM3 (ref 3.4–10.8)

## 2021-05-20 PROCEDURE — 63710000001 ATORVASTATIN 40 MG TABLET: Performed by: FAMILY MEDICINE

## 2021-05-20 PROCEDURE — 63710000001 THEOPHYLLINE 300 MG TABLET SUSTAINED-RELEASE 12 HOUR: Performed by: FAMILY MEDICINE

## 2021-05-20 PROCEDURE — 63710000001 HYDROCODONE-ACETAMINOPHEN 5-325 MG TABLET: Performed by: FAMILY MEDICINE

## 2021-05-20 PROCEDURE — A9270 NON-COVERED ITEM OR SERVICE: HCPCS | Performed by: FAMILY MEDICINE

## 2021-05-20 PROCEDURE — 84484 ASSAY OF TROPONIN QUANT: CPT | Performed by: FAMILY MEDICINE

## 2021-05-20 PROCEDURE — 85576 BLOOD PLATELET AGGREGATION: CPT | Performed by: INTERNAL MEDICINE

## 2021-05-20 PROCEDURE — 87633 RESP VIRUS 12-25 TARGETS: CPT | Performed by: INTERNAL MEDICINE

## 2021-05-20 PROCEDURE — 83036 HEMOGLOBIN GLYCOSYLATED A1C: CPT | Performed by: FAMILY MEDICINE

## 2021-05-20 PROCEDURE — 25010000002 PROPOFOL 10 MG/ML EMULSION: Performed by: ANESTHESIOLOGY

## 2021-05-20 PROCEDURE — 25010000002 METHYLPREDNISOLONE PER 40 MG: Performed by: NURSE PRACTITIONER

## 2021-05-20 PROCEDURE — 87206 SMEAR FLUORESCENT/ACID STAI: CPT | Performed by: INTERNAL MEDICINE

## 2021-05-20 PROCEDURE — 80048 BASIC METABOLIC PNL TOTAL CA: CPT | Performed by: FAMILY MEDICINE

## 2021-05-20 PROCEDURE — 87252 VIRUS INOCULATION TISSUE: CPT | Performed by: INTERNAL MEDICINE

## 2021-05-20 PROCEDURE — 80076 HEPATIC FUNCTION PANEL: CPT | Performed by: FAMILY MEDICINE

## 2021-05-20 PROCEDURE — 87205 SMEAR GRAM STAIN: CPT | Performed by: INTERNAL MEDICINE

## 2021-05-20 PROCEDURE — 88172 CYTP DX EVAL FNA 1ST EA SITE: CPT | Performed by: INTERNAL MEDICINE

## 2021-05-20 PROCEDURE — 99214 OFFICE O/P EST MOD 30 MIN: CPT | Performed by: INTERNAL MEDICINE

## 2021-05-20 PROCEDURE — 63710000001 ASPIRIN 81 MG TABLET DELAYED-RELEASE: Performed by: FAMILY MEDICINE

## 2021-05-20 PROCEDURE — 25010000002 NEOSTIGMINE 5 MG/5ML SOLUTION: Performed by: ANESTHESIOLOGY

## 2021-05-20 PROCEDURE — 94799 UNLISTED PULMONARY SVC/PX: CPT

## 2021-05-20 PROCEDURE — 85025 COMPLETE CBC W/AUTO DIFF WBC: CPT | Performed by: FAMILY MEDICINE

## 2021-05-20 PROCEDURE — 63710000001 SENNOSIDES-DOCUSATE 8.6-50 MG TABLET: Performed by: FAMILY MEDICINE

## 2021-05-20 PROCEDURE — 83735 ASSAY OF MAGNESIUM: CPT | Performed by: FAMILY MEDICINE

## 2021-05-20 PROCEDURE — 88177 CYTP FNA EVAL EA ADDL: CPT | Performed by: INTERNAL MEDICINE

## 2021-05-20 PROCEDURE — 87102 FUNGUS ISOLATION CULTURE: CPT | Performed by: INTERNAL MEDICINE

## 2021-05-20 PROCEDURE — 63710000001 DOXYCYCLINE 100 MG TABLET: Performed by: FAMILY MEDICINE

## 2021-05-20 PROCEDURE — 88342 IMHCHEM/IMCYTCHM 1ST ANTB: CPT | Performed by: INTERNAL MEDICINE

## 2021-05-20 PROCEDURE — 25010000002 FENTANYL CITRATE (PF) 100 MCG/2ML SOLUTION: Performed by: ANESTHESIOLOGY

## 2021-05-20 PROCEDURE — 87496 CYTOMEG DNA AMP PROBE: CPT | Performed by: INTERNAL MEDICINE

## 2021-05-20 PROCEDURE — 87116 MYCOBACTERIA CULTURE: CPT | Performed by: INTERNAL MEDICINE

## 2021-05-20 PROCEDURE — 87798 DETECT AGENT NOS DNA AMP: CPT | Performed by: FAMILY MEDICINE

## 2021-05-20 PROCEDURE — 63710000001 METOPROLOL SUCCINATE XL 50 MG TABLET SUSTAINED-RELEASE 24 HOUR: Performed by: FAMILY MEDICINE

## 2021-05-20 PROCEDURE — G0378 HOSPITAL OBSERVATION PER HR: HCPCS

## 2021-05-20 PROCEDURE — 63710000001 GUAIFENESIN 600 MG TABLET SUSTAINED-RELEASE 12 HOUR: Performed by: FAMILY MEDICINE

## 2021-05-20 PROCEDURE — 63710000001 SODIUM BICARBONATE 650 MG TABLET: Performed by: FAMILY MEDICINE

## 2021-05-20 PROCEDURE — 25010000002 CEFTRIAXONE PER 250 MG: Performed by: FAMILY MEDICINE

## 2021-05-20 PROCEDURE — 88341 IMHCHEM/IMCYTCHM EA ADD ANTB: CPT | Performed by: INTERNAL MEDICINE

## 2021-05-20 PROCEDURE — 88305 TISSUE EXAM BY PATHOLOGIST: CPT | Performed by: INTERNAL MEDICINE

## 2021-05-20 PROCEDURE — 63710000001 FINASTERIDE 5 MG TABLET: Performed by: FAMILY MEDICINE

## 2021-05-20 PROCEDURE — 87071 CULTURE AEROBIC QUANT OTHER: CPT | Performed by: INTERNAL MEDICINE

## 2021-05-20 PROCEDURE — 96375 TX/PRO/DX INJ NEW DRUG ADDON: CPT

## 2021-05-20 PROCEDURE — 87040 BLOOD CULTURE FOR BACTERIA: CPT | Performed by: INTERNAL MEDICINE

## 2021-05-20 PROCEDURE — 88108 CYTOPATH CONCENTRATE TECH: CPT | Performed by: INTERNAL MEDICINE

## 2021-05-20 PROCEDURE — 96376 TX/PRO/DX INJ SAME DRUG ADON: CPT

## 2021-05-20 PROCEDURE — C1726 CATH, BAL DIL, NON-VASCULAR: HCPCS | Performed by: INTERNAL MEDICINE

## 2021-05-20 RX ORDER — NEOSTIGMINE METHYLSULFATE 5 MG/5 ML
SYRINGE (ML) INTRAVENOUS AS NEEDED
Status: DISCONTINUED | OUTPATIENT
Start: 2021-05-20 | End: 2021-05-20 | Stop reason: SURG

## 2021-05-20 RX ORDER — FENTANYL CITRATE 50 UG/ML
50 INJECTION, SOLUTION INTRAMUSCULAR; INTRAVENOUS
Status: DISCONTINUED | OUTPATIENT
Start: 2021-05-20 | End: 2021-05-20 | Stop reason: HOSPADM

## 2021-05-20 RX ORDER — IPRATROPIUM BROMIDE AND ALBUTEROL SULFATE 2.5; .5 MG/3ML; MG/3ML
3 SOLUTION RESPIRATORY (INHALATION) ONCE AS NEEDED
Status: DISCONTINUED | OUTPATIENT
Start: 2021-05-20 | End: 2021-05-20 | Stop reason: HOSPADM

## 2021-05-20 RX ORDER — OXYCODONE HYDROCHLORIDE 5 MG/1
10 TABLET ORAL EVERY 4 HOURS PRN
Status: DISCONTINUED | OUTPATIENT
Start: 2021-05-20 | End: 2021-05-20 | Stop reason: HOSPADM

## 2021-05-20 RX ORDER — NALOXONE HCL 0.4 MG/ML
0.4 VIAL (ML) INJECTION AS NEEDED
Status: DISCONTINUED | OUTPATIENT
Start: 2021-05-20 | End: 2021-05-20 | Stop reason: HOSPADM

## 2021-05-20 RX ORDER — FENTANYL CITRATE 50 UG/ML
INJECTION, SOLUTION INTRAMUSCULAR; INTRAVENOUS AS NEEDED
Status: DISCONTINUED | OUTPATIENT
Start: 2021-05-20 | End: 2021-05-20 | Stop reason: SURG

## 2021-05-20 RX ORDER — HYDROCODONE BITARTRATE AND ACETAMINOPHEN 5; 325 MG/1; MG/1
1 TABLET ORAL ONCE AS NEEDED
Status: DISCONTINUED | OUTPATIENT
Start: 2021-05-20 | End: 2021-05-20 | Stop reason: HOSPADM

## 2021-05-20 RX ORDER — EPINEPHRINE 0.1 MG/ML
SYRINGE (ML) INJECTION
Status: DISCONTINUED
Start: 2021-05-20 | End: 2021-05-20 | Stop reason: WASHOUT

## 2021-05-20 RX ORDER — MORPHINE SULFATE 4 MG/ML
1 INJECTION, SOLUTION INTRAMUSCULAR; INTRAVENOUS
Status: DISCONTINUED | OUTPATIENT
Start: 2021-05-20 | End: 2021-05-20 | Stop reason: HOSPADM

## 2021-05-20 RX ORDER — PROPOFOL 10 MG/ML
VIAL (ML) INTRAVENOUS AS NEEDED
Status: DISCONTINUED | OUTPATIENT
Start: 2021-05-20 | End: 2021-05-20 | Stop reason: SURG

## 2021-05-20 RX ORDER — GLYCOPYRROLATE 1 MG/5 ML
SYRINGE (ML) INTRAVENOUS AS NEEDED
Status: DISCONTINUED | OUTPATIENT
Start: 2021-05-20 | End: 2021-05-20 | Stop reason: SURG

## 2021-05-20 RX ORDER — LIDOCAINE HYDROCHLORIDE 20 MG/ML
INJECTION, SOLUTION EPIDURAL; INFILTRATION; INTRACAUDAL; PERINEURAL AS NEEDED
Status: DISCONTINUED | OUTPATIENT
Start: 2021-05-20 | End: 2021-05-20 | Stop reason: SURG

## 2021-05-20 RX ORDER — SODIUM CHLORIDE 9 MG/ML
INJECTION, SOLUTION INTRAVENOUS CONTINUOUS PRN
Status: DISCONTINUED | OUTPATIENT
Start: 2021-05-20 | End: 2021-05-20 | Stop reason: SURG

## 2021-05-20 RX ORDER — LABETALOL HYDROCHLORIDE 5 MG/ML
5 INJECTION, SOLUTION INTRAVENOUS
Status: DISCONTINUED | OUTPATIENT
Start: 2021-05-20 | End: 2021-05-20 | Stop reason: HOSPADM

## 2021-05-20 RX ORDER — LIDOCAINE 50 MG/G
OINTMENT TOPICAL
Status: DISPENSED
Start: 2021-05-20 | End: 2021-05-21

## 2021-05-20 RX ORDER — ROCURONIUM BROMIDE 10 MG/ML
INJECTION, SOLUTION INTRAVENOUS AS NEEDED
Status: DISCONTINUED | OUTPATIENT
Start: 2021-05-20 | End: 2021-05-20 | Stop reason: SURG

## 2021-05-20 RX ADMIN — LIDOCAINE HYDROCHLORIDE 50 MG: 20 INJECTION, SOLUTION EPIDURAL; INFILTRATION; INTRACAUDAL; PERINEURAL at 13:02

## 2021-05-20 RX ADMIN — ASPIRIN 81 MG: 81 TABLET, COATED ORAL at 15:53

## 2021-05-20 RX ADMIN — HYDROCODONE BITARTRATE AND ACETAMINOPHEN 1 TABLET: 5; 325 TABLET ORAL at 21:20

## 2021-05-20 RX ADMIN — ARFORMOTEROL TARTRATE 15 MCG: 15 SOLUTION RESPIRATORY (INHALATION) at 06:45

## 2021-05-20 RX ADMIN — FINASTERIDE 5 MG: 5 TABLET, FILM COATED ORAL at 21:21

## 2021-05-20 RX ADMIN — SODIUM CHLORIDE: 0.9 INJECTION, SOLUTION INTRAVENOUS at 12:54

## 2021-05-20 RX ADMIN — Medication 3 ML: at 21:21

## 2021-05-20 RX ADMIN — METHYLPREDNISOLONE SODIUM SUCCINATE 40 MG: 40 INJECTION, POWDER, FOR SOLUTION INTRAMUSCULAR; INTRAVENOUS at 16:34

## 2021-05-20 RX ADMIN — SODIUM BICARBONATE 650 MG TABLET 650 MG: at 21:21

## 2021-05-20 RX ADMIN — Medication 0.6 MG: at 13:26

## 2021-05-20 RX ADMIN — BUDESONIDE 0.5 MG: 0.5 SUSPENSION RESPIRATORY (INHALATION) at 19:31

## 2021-05-20 RX ADMIN — PROPOFOL 130 MG: 10 INJECTION, EMULSION INTRAVENOUS at 13:02

## 2021-05-20 RX ADMIN — DOXYCYCLINE 100 MG: 100 TABLET, FILM COATED ORAL at 21:20

## 2021-05-20 RX ADMIN — METHYLPREDNISOLONE SODIUM SUCCINATE 40 MG: 40 INJECTION, POWDER, FOR SOLUTION INTRAMUSCULAR; INTRAVENOUS at 00:30

## 2021-05-20 RX ADMIN — Medication 4 MG: at 13:26

## 2021-05-20 RX ADMIN — DOXYCYCLINE 100 MG: 100 TABLET, FILM COATED ORAL at 01:36

## 2021-05-20 RX ADMIN — ARFORMOTEROL TARTRATE 15 MCG: 15 SOLUTION RESPIRATORY (INHALATION) at 19:31

## 2021-05-20 RX ADMIN — Medication 10 ML: at 00:30

## 2021-05-20 RX ADMIN — Medication 3 ML: at 08:52

## 2021-05-20 RX ADMIN — FENTANYL CITRATE 50 MCG: 50 INJECTION, SOLUTION INTRAMUSCULAR; INTRAVENOUS at 13:02

## 2021-05-20 RX ADMIN — METOPROLOL SUCCINATE 50 MG: 50 TABLET, EXTENDED RELEASE ORAL at 15:53

## 2021-05-20 RX ADMIN — CEFTRIAXONE SODIUM 1 G: 1 INJECTION, POWDER, FOR SOLUTION INTRAMUSCULAR; INTRAVENOUS at 16:34

## 2021-05-20 RX ADMIN — GUAIFENESIN 600 MG: 600 TABLET, EXTENDED RELEASE ORAL at 21:21

## 2021-05-20 RX ADMIN — ATORVASTATIN CALCIUM 40 MG: 40 TABLET, FILM COATED ORAL at 15:53

## 2021-05-20 RX ADMIN — BUDESONIDE 0.5 MG: 0.5 SUSPENSION RESPIRATORY (INHALATION) at 06:50

## 2021-05-20 RX ADMIN — FENTANYL CITRATE 50 MCG: 50 INJECTION, SOLUTION INTRAMUSCULAR; INTRAVENOUS at 13:11

## 2021-05-20 RX ADMIN — THEOPHYLLINE 300 MG: 300 TABLET, EXTENDED RELEASE ORAL at 21:20

## 2021-05-20 RX ADMIN — DOCUSATE SODIUM 50 MG AND SENNOSIDES 8.6 MG 2 TABLET: 8.6; 5 TABLET, FILM COATED ORAL at 21:20

## 2021-05-20 RX ADMIN — METHYLPREDNISOLONE SODIUM SUCCINATE 40 MG: 40 INJECTION, POWDER, FOR SOLUTION INTRAMUSCULAR; INTRAVENOUS at 08:52

## 2021-05-20 RX ADMIN — ROCURONIUM BROMIDE 30 MG: 10 INJECTION INTRAVENOUS at 13:02

## 2021-05-20 NOTE — ANESTHESIA PREPROCEDURE EVALUATION
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   Substance History - negative use     OB/GYN negative ob/gyn ROS         Other        ROS/Med Hx Other: NORMAL ECG -  Sinus rhythm                Anesthesia Plan    ASA 4     general     intravenous induction     Anesthetic plan, all risks, benefits, and alternatives have been provided, discussed and informed consent has been obtained with: patient.    Plan discussed with CRNA and CAA.

## 2021-05-20 NOTE — ANESTHESIA POSTPROCEDURE EVALUATION
Patient: David A Jolissaint    Procedure Summary     Date: 05/20/21 Room / Location: [facility] ENDOSCOPY 3 / [facility] ENDOSCOPY    Anesthesia Start: 1254 Anesthesia Stop: 1340    Procedure: BRONCHOSCOPY WITH ENDOBRONCHIAL ULTRASOUND WITH FINE NEEDLE ASPIRATION. BRONCHOALVEOLAR LAVAGE (N/A Bronchus) Diagnosis:       Mediastinal adenopathy      (Mediastinal adenopathy [R59.0])    Surgeons: Jackson Quezada MD Provider: Rodrigo Marks MD    Anesthesia Type: general ASA Status: 4          Anesthesia Type: general    Vitals  Vitals Value Taken Time   BP     Temp     Pulse 88 05/20/21 1349   Resp     SpO2 96 % 05/20/21 1349   Vitals shown include unvalidated device data.        Post Anesthesia Care and Evaluation    Patient location during evaluation: bedside  Patient participation: complete - patient participated  Level of consciousness: sleepy but conscious and responsive to verbal stimuli  Pain score: 0  Pain management: adequate  Airway patency: patent  Anesthetic complications: No anesthetic complications  PONV Status: none  Cardiovascular status: acceptable  Respiratory status: acceptable  Hydration status: acceptable

## 2021-05-20 NOTE — ANESTHESIA PROCEDURE NOTES
Airway  Urgency: elective    Date/Time: 5/20/2021 1:05 PM  Airway not difficult    General Information and Staff    Patient location during procedure: OR  Anesthesiologist: Rodrigo Marks MD    Indications and Patient Condition  Indications for airway management: airway protection    Preoxygenated: yes  MILS not maintained throughout  Mask difficulty assessment: 1 - vent by mask    Final Airway Details  Final airway type: endotracheal airway      Successful airway: ETT  Cuffed: yes   Successful intubation technique: direct laryngoscopy  Endotracheal tube insertion site: oral  Blade: Leticia  Blade size: 4  ETT size (mm): 8.5  Cormack-Lehane Classification: grade I - full view of glottis  Placement verified by: chest auscultation, bronchoscopy and palpation of cuff   Measured from: lips  ETT/EBT  to lips (cm): 23  Number of attempts at approach: 1  Assessment: lips, teeth, and gum same as pre-op and atraumatic intubation

## 2021-05-21 ENCOUNTER — APPOINTMENT (OUTPATIENT)
Dept: NUCLEAR MEDICINE | Facility: HOSPITAL | Age: 76
End: 2021-05-21

## 2021-05-21 LAB
ALBUMIN SERPL-MCNC: 3.3 G/DL (ref 3.5–5.2)
ANION GAP SERPL CALCULATED.3IONS-SCNC: 12 MMOL/L (ref 5–15)
BACTERIA SPEC RESP CULT: NORMAL
BASOPHILS # BLD AUTO: 0.1 10*3/MM3 (ref 0–0.2)
BASOPHILS NFR BLD AUTO: 0.4 % (ref 0–1.5)
BH CV NUCLEAR PRIOR STUDY: 3
BH CV REST NUCLEAR ISOTOPE DOSE: 7.9 MCI
BH CV STRESS BP STAGE 1: NORMAL
BH CV STRESS BP STAGE 2: NORMAL
BH CV STRESS BP STAGE 3: NORMAL
BH CV STRESS BP STAGE 4: NORMAL
BH CV STRESS COMMENTS STAGE 1: NORMAL
BH CV STRESS COMMENTS STAGE 2: NORMAL
BH CV STRESS DOSE REGADENOSON STAGE 1: 0.4
BH CV STRESS DURATION MIN STAGE 1: 0
BH CV STRESS DURATION MIN STAGE 2: 4
BH CV STRESS DURATION SEC STAGE 1: 10
BH CV STRESS DURATION SEC STAGE 2: 0
BH CV STRESS HR STAGE 1: 75
BH CV STRESS HR STAGE 2: 78
BH CV STRESS HR STAGE 3: 79
BH CV STRESS HR STAGE 4: 73
BH CV STRESS NUCLEAR ISOTOPE DOSE: 21.9 MCI
BH CV STRESS PROTOCOL 1: NORMAL
BH CV STRESS RECOVERY BP: NORMAL MMHG
BH CV STRESS RECOVERY HR: 70 BPM
BH CV STRESS STAGE 1: 1
BH CV STRESS STAGE 2: 2
BH CV STRESS STAGE 3: 3
BH CV STRESS STAGE 4: 4
BUN SERPL-MCNC: 40 MG/DL (ref 8–23)
BUN/CREAT SERPL: 16.9 (ref 7–25)
CALCIUM SPEC-SCNC: 8.9 MG/DL (ref 8.6–10.5)
CHLORIDE SERPL-SCNC: 104 MMOL/L (ref 98–107)
CO2 SERPL-SCNC: 21 MMOL/L (ref 22–29)
CREAT SERPL-MCNC: 2.36 MG/DL (ref 0.76–1.27)
DEPRECATED RDW RBC AUTO: 56.4 FL (ref 37–54)
EOSINOPHIL # BLD AUTO: 0 10*3/MM3 (ref 0–0.4)
EOSINOPHIL NFR BLD AUTO: 0.1 % (ref 0.3–6.2)
ERYTHROCYTE [DISTWIDTH] IN BLOOD BY AUTOMATED COUNT: 18.3 % (ref 12.3–15.4)
FERRITIN SERPL-MCNC: 71.08 NG/ML (ref 30–400)
FOLATE SERPL-MCNC: >20 NG/ML (ref 4.78–24.2)
GFR SERPL CREATININE-BSD FRML MDRD: 27 ML/MIN/1.73
GLUCOSE SERPL-MCNC: 132 MG/DL (ref 65–99)
GRAM STN SPEC: NORMAL
HAPTOGLOB SERPL-MCNC: 276 MG/DL (ref 30–200)
HCT VFR BLD AUTO: 39.1 % (ref 37.5–51)
HGB BLD-MCNC: 12.9 G/DL (ref 13–17.7)
IRON 24H UR-MRATE: 70 MCG/DL (ref 59–158)
IRON SATN MFR SERPL: 25 % (ref 20–50)
LAB AP CASE REPORT: NORMAL
LAB AP CASE REPORT: NORMAL
LAB AP DIAGNOSIS COMMENT: NORMAL
LYMPHOCYTES # BLD AUTO: 0.7 10*3/MM3 (ref 0.7–3.1)
LYMPHOCYTES NFR BLD AUTO: 3.7 % (ref 19.6–45.3)
Lab: NORMAL
MAXIMAL PREDICTED HEART RATE: 143 BPM
MCH RBC QN AUTO: 29.1 PG (ref 26.6–33)
MCHC RBC AUTO-ENTMCNC: 33 G/DL (ref 31.5–35.7)
MCV RBC AUTO: 88.1 FL (ref 79–97)
MONOCYTES # BLD AUTO: 0.4 10*3/MM3 (ref 0.1–0.9)
MONOCYTES NFR BLD AUTO: 2.3 % (ref 5–12)
NEUTROPHILS NFR BLD AUTO: 16.6 10*3/MM3 (ref 1.7–7)
NEUTROPHILS NFR BLD AUTO: 93.5 % (ref 42.7–76)
NRBC BLD AUTO-RTO: 0 /100 WBC (ref 0–0.2)
PATH REPORT.FINAL DX SPEC: NORMAL
PATH REPORT.FINAL DX SPEC: NORMAL
PATH REPORT.GROSS SPEC: NORMAL
PATH REPORT.GROSS SPEC: NORMAL
PERCENT MAX PREDICTED HR: 55.24 %
PHOSPHATE SERPL-MCNC: 2.6 MG/DL (ref 2.5–4.5)
PLATELET # BLD AUTO: 250 10*3/MM3 (ref 140–450)
PMV BLD AUTO: 8.4 FL (ref 6–12)
POTASSIUM SERPL-SCNC: 5 MMOL/L (ref 3.5–5.2)
RBC # BLD AUTO: 4.44 10*6/MM3 (ref 4.14–5.8)
RETICS # AUTO: 0.09 10*6/MM3 (ref 0.02–0.13)
RETICS/RBC NFR AUTO: 1.96 % (ref 0.7–1.9)
SODIUM SERPL-SCNC: 137 MMOL/L (ref 136–145)
STRESS BASELINE BP: NORMAL MMHG
STRESS BASELINE HR: 68 BPM
STRESS PERCENT HR: 65 %
STRESS POST PEAK BP: NORMAL MMHG
STRESS POST PEAK HR: 79 BPM
STRESS TARGET HR: 122 BPM
TIBC SERPL-MCNC: 283 MCG/DL (ref 298–536)
TRANSFERRIN SERPL-MCNC: 190 MG/DL (ref 200–360)
VIT B12 BLD-MCNC: 1060 PG/ML (ref 211–946)
WBC # BLD AUTO: 17.7 10*3/MM3 (ref 3.4–10.8)

## 2021-05-21 PROCEDURE — 93017 CV STRESS TEST TRACING ONLY: CPT

## 2021-05-21 PROCEDURE — G0378 HOSPITAL OBSERVATION PER HR: HCPCS

## 2021-05-21 PROCEDURE — 25010000002 REGADENOSON 0.4 MG/5ML SOLUTION: Performed by: FAMILY MEDICINE

## 2021-05-21 PROCEDURE — 85045 AUTOMATED RETICULOCYTE COUNT: CPT | Performed by: NURSE PRACTITIONER

## 2021-05-21 PROCEDURE — A9270 NON-COVERED ITEM OR SERVICE: HCPCS | Performed by: FAMILY MEDICINE

## 2021-05-21 PROCEDURE — 63710000001 METOPROLOL SUCCINATE XL 50 MG TABLET SUSTAINED-RELEASE 24 HOUR: Performed by: FAMILY MEDICINE

## 2021-05-21 PROCEDURE — 78452 HT MUSCLE IMAGE SPECT MULT: CPT | Performed by: INTERNAL MEDICINE

## 2021-05-21 PROCEDURE — 82607 VITAMIN B-12: CPT | Performed by: NURSE PRACTITIONER

## 2021-05-21 PROCEDURE — 94799 UNLISTED PULMONARY SVC/PX: CPT

## 2021-05-21 PROCEDURE — 25010000002 METHYLPREDNISOLONE PER 40 MG: Performed by: NURSE PRACTITIONER

## 2021-05-21 PROCEDURE — 85025 COMPLETE CBC W/AUTO DIFF WBC: CPT | Performed by: FAMILY MEDICINE

## 2021-05-21 PROCEDURE — A9270 NON-COVERED ITEM OR SERVICE: HCPCS | Performed by: INTERNAL MEDICINE

## 2021-05-21 PROCEDURE — 80069 RENAL FUNCTION PANEL: CPT | Performed by: INTERNAL MEDICINE

## 2021-05-21 PROCEDURE — 63710000001 AMLODIPINE 5 MG TABLET: Performed by: INTERNAL MEDICINE

## 2021-05-21 PROCEDURE — 63710000001 THEOPHYLLINE 300 MG TABLET SUSTAINED-RELEASE 12 HOUR: Performed by: FAMILY MEDICINE

## 2021-05-21 PROCEDURE — 82728 ASSAY OF FERRITIN: CPT | Performed by: NURSE PRACTITIONER

## 2021-05-21 PROCEDURE — 63710000001 ATORVASTATIN 40 MG TABLET: Performed by: FAMILY MEDICINE

## 2021-05-21 PROCEDURE — A9500 TC99M SESTAMIBI: HCPCS | Performed by: FAMILY MEDICINE

## 2021-05-21 PROCEDURE — 63710000001 HYDROCODONE-ACETAMINOPHEN 5-325 MG TABLET: Performed by: FAMILY MEDICINE

## 2021-05-21 PROCEDURE — 0 TECHNETIUM SESTAMIBI: Performed by: FAMILY MEDICINE

## 2021-05-21 PROCEDURE — 63710000001 FERROUS SULFATE 324 (65 FE) MG TABLET DELAYED-RELEASE: Performed by: FAMILY MEDICINE

## 2021-05-21 PROCEDURE — 84466 ASSAY OF TRANSFERRIN: CPT | Performed by: NURSE PRACTITIONER

## 2021-05-21 PROCEDURE — 63710000001 FOLIC ACID 1 MG TABLET: Performed by: FAMILY MEDICINE

## 2021-05-21 PROCEDURE — 25010000002 LORAZEPAM PER 2 MG: Performed by: FAMILY MEDICINE

## 2021-05-21 PROCEDURE — 83010 ASSAY OF HAPTOGLOBIN QUANT: CPT | Performed by: NURSE PRACTITIONER

## 2021-05-21 PROCEDURE — 63710000001 ASPIRIN 81 MG TABLET DELAYED-RELEASE: Performed by: FAMILY MEDICINE

## 2021-05-21 PROCEDURE — 63710000001 GUAIFENESIN 600 MG TABLET SUSTAINED-RELEASE 12 HOUR: Performed by: FAMILY MEDICINE

## 2021-05-21 PROCEDURE — 25010000002 CEFTRIAXONE PER 250 MG: Performed by: FAMILY MEDICINE

## 2021-05-21 PROCEDURE — 93018 CV STRESS TEST I&R ONLY: CPT | Performed by: NURSE PRACTITIONER

## 2021-05-21 PROCEDURE — 82746 ASSAY OF FOLIC ACID SERUM: CPT | Performed by: NURSE PRACTITIONER

## 2021-05-21 PROCEDURE — 99214 OFFICE O/P EST MOD 30 MIN: CPT | Performed by: INTERNAL MEDICINE

## 2021-05-21 PROCEDURE — 63710000001 SENNOSIDES-DOCUSATE 8.6-50 MG TABLET: Performed by: FAMILY MEDICINE

## 2021-05-21 PROCEDURE — 82525 ASSAY OF COPPER: CPT | Performed by: NURSE PRACTITIONER

## 2021-05-21 PROCEDURE — 63710000001 BISACODYL 5 MG TABLET DELAYED-RELEASE: Performed by: FAMILY MEDICINE

## 2021-05-21 PROCEDURE — 63710000001 ISOSORBIDE MONONITRATE 60 MG TABLET SUSTAINED-RELEASE 24 HOUR: Performed by: FAMILY MEDICINE

## 2021-05-21 PROCEDURE — 86316 IMMUNOASSAY TUMOR OTHER: CPT | Performed by: NURSE PRACTITIONER

## 2021-05-21 PROCEDURE — 63710000001 TAMSULOSIN 0.4 MG CAPSULE: Performed by: FAMILY MEDICINE

## 2021-05-21 PROCEDURE — 63710000001 FINASTERIDE 5 MG TABLET: Performed by: FAMILY MEDICINE

## 2021-05-21 PROCEDURE — 83540 ASSAY OF IRON: CPT | Performed by: NURSE PRACTITIONER

## 2021-05-21 PROCEDURE — 63710000001 DOXYCYCLINE 100 MG TABLET: Performed by: FAMILY MEDICINE

## 2021-05-21 PROCEDURE — 78452 HT MUSCLE IMAGE SPECT MULT: CPT

## 2021-05-21 PROCEDURE — 63710000001 SODIUM BICARBONATE 650 MG TABLET: Performed by: FAMILY MEDICINE

## 2021-05-21 PROCEDURE — 63710000001 PROBIOTIC 250 MG CAPSULE: Performed by: FAMILY MEDICINE

## 2021-05-21 RX ORDER — AMLODIPINE BESYLATE 5 MG/1
5 TABLET ORAL
Status: DISCONTINUED | OUTPATIENT
Start: 2021-05-21 | End: 2021-05-22 | Stop reason: HOSPADM

## 2021-05-21 RX ORDER — LORAZEPAM 2 MG/ML
1 INJECTION INTRAMUSCULAR EVERY 6 HOURS PRN
Status: DISCONTINUED | OUTPATIENT
Start: 2021-05-21 | End: 2021-05-22 | Stop reason: HOSPADM

## 2021-05-21 RX ADMIN — BUDESONIDE 0.5 MG: 0.5 SUSPENSION RESPIRATORY (INHALATION) at 19:47

## 2021-05-21 RX ADMIN — GUAIFENESIN 600 MG: 600 TABLET, EXTENDED RELEASE ORAL at 09:13

## 2021-05-21 RX ADMIN — BISACODYL 5 MG: 5 TABLET, COATED ORAL at 21:02

## 2021-05-21 RX ADMIN — HYDROCODONE BITARTRATE AND ACETAMINOPHEN 1 TABLET: 5; 325 TABLET ORAL at 21:02

## 2021-05-21 RX ADMIN — ISOSORBIDE MONONITRATE 60 MG: 60 TABLET, EXTENDED RELEASE ORAL at 09:13

## 2021-05-21 RX ADMIN — METHYLPREDNISOLONE SODIUM SUCCINATE 40 MG: 40 INJECTION, POWDER, FOR SOLUTION INTRAMUSCULAR; INTRAVENOUS at 00:35

## 2021-05-21 RX ADMIN — METOPROLOL SUCCINATE 50 MG: 50 TABLET, EXTENDED RELEASE ORAL at 09:13

## 2021-05-21 RX ADMIN — REGADENOSON 0.4 MG: 0.08 INJECTION, SOLUTION INTRAVENOUS at 11:03

## 2021-05-21 RX ADMIN — DOCUSATE SODIUM 50 MG AND SENNOSIDES 8.6 MG 2 TABLET: 8.6; 5 TABLET, FILM COATED ORAL at 09:13

## 2021-05-21 RX ADMIN — TECHNETIUM TC 99M SESTAMIBI 1 DOSE: 1 INJECTION INTRAVENOUS at 11:03

## 2021-05-21 RX ADMIN — ARFORMOTEROL TARTRATE 15 MCG: 15 SOLUTION RESPIRATORY (INHALATION) at 19:47

## 2021-05-21 RX ADMIN — ATORVASTATIN CALCIUM 40 MG: 40 TABLET, FILM COATED ORAL at 09:13

## 2021-05-21 RX ADMIN — THEOPHYLLINE 300 MG: 300 TABLET, EXTENDED RELEASE ORAL at 21:01

## 2021-05-21 RX ADMIN — TAMSULOSIN HYDROCHLORIDE 0.4 MG: 0.4 CAPSULE ORAL at 09:13

## 2021-05-21 RX ADMIN — ASPIRIN 81 MG: 81 TABLET, COATED ORAL at 09:13

## 2021-05-21 RX ADMIN — AMLODIPINE BESYLATE 5 MG: 5 TABLET ORAL at 09:13

## 2021-05-21 RX ADMIN — DOCUSATE SODIUM 50 MG AND SENNOSIDES 8.6 MG 2 TABLET: 8.6; 5 TABLET, FILM COATED ORAL at 21:01

## 2021-05-21 RX ADMIN — Medication 3 ML: at 09:13

## 2021-05-21 RX ADMIN — METHYLPREDNISOLONE SODIUM SUCCINATE 40 MG: 40 INJECTION, POWDER, FOR SOLUTION INTRAMUSCULAR; INTRAVENOUS at 09:12

## 2021-05-21 RX ADMIN — FOLIC ACID 1 MG: 1 TABLET ORAL at 09:13

## 2021-05-21 RX ADMIN — ARFORMOTEROL TARTRATE 15 MCG: 15 SOLUTION RESPIRATORY (INHALATION) at 07:27

## 2021-05-21 RX ADMIN — SODIUM BICARBONATE 650 MG TABLET 650 MG: at 21:01

## 2021-05-21 RX ADMIN — FERROUS SULFATE TAB EC 324 MG (65 MG FE EQUIVALENT) 324 MG: 324 (65 FE) TABLET DELAYED RESPONSE at 09:13

## 2021-05-21 RX ADMIN — SODIUM BICARBONATE 650 MG TABLET 650 MG: at 09:13

## 2021-05-21 RX ADMIN — THEOPHYLLINE 300 MG: 300 TABLET, EXTENDED RELEASE ORAL at 09:13

## 2021-05-21 RX ADMIN — GUAIFENESIN 600 MG: 600 TABLET, EXTENDED RELEASE ORAL at 21:01

## 2021-05-21 RX ADMIN — Medication 250 MG: at 09:13

## 2021-05-21 RX ADMIN — METHYLPREDNISOLONE SODIUM SUCCINATE 40 MG: 40 INJECTION, POWDER, FOR SOLUTION INTRAMUSCULAR; INTRAVENOUS at 16:24

## 2021-05-21 RX ADMIN — DOXYCYCLINE 100 MG: 100 TABLET, FILM COATED ORAL at 09:13

## 2021-05-21 RX ADMIN — LORAZEPAM 1 MG: 2 INJECTION INTRAMUSCULAR; INTRAVENOUS at 21:01

## 2021-05-21 RX ADMIN — BUDESONIDE 0.5 MG: 0.5 SUSPENSION RESPIRATORY (INHALATION) at 07:27

## 2021-05-21 RX ADMIN — TECHNETIUM TC 99M SESTAMIBI 1 DOSE: 1 INJECTION INTRAVENOUS at 09:30

## 2021-05-21 RX ADMIN — CEFTRIAXONE SODIUM 1 G: 1 INJECTION, POWDER, FOR SOLUTION INTRAMUSCULAR; INTRAVENOUS at 11:44

## 2021-05-21 RX ADMIN — FINASTERIDE 5 MG: 5 TABLET, FILM COATED ORAL at 21:00

## 2021-05-21 RX ADMIN — Medication 3 ML: at 21:01

## 2021-05-22 ENCOUNTER — APPOINTMENT (OUTPATIENT)
Dept: MRI IMAGING | Facility: HOSPITAL | Age: 76
End: 2021-05-22

## 2021-05-22 VITALS
OXYGEN SATURATION: 98 % | BODY MASS INDEX: 24.51 KG/M2 | TEMPERATURE: 98 F | HEIGHT: 71 IN | SYSTOLIC BLOOD PRESSURE: 135 MMHG | RESPIRATION RATE: 18 BRPM | DIASTOLIC BLOOD PRESSURE: 75 MMHG | WEIGHT: 175.04 LBS | HEART RATE: 77 BPM

## 2021-05-22 PROBLEM — C34.90 SMALL CELL LUNG CANCER: Status: ACTIVE | Noted: 2021-05-22

## 2021-05-22 LAB
ALBUMIN SERPL-MCNC: 3.6 G/DL (ref 3.5–5.2)
ANION GAP SERPL CALCULATED.3IONS-SCNC: 13 MMOL/L (ref 5–15)
BACTERIA SPEC AEROBE CULT: NO GROWTH
BASOPHILS # BLD AUTO: 0 10*3/MM3 (ref 0–0.2)
BASOPHILS NFR BLD AUTO: 0.1 % (ref 0–1.5)
BUN SERPL-MCNC: 45 MG/DL (ref 8–23)
BUN/CREAT SERPL: 20.9 (ref 7–25)
CALCIUM SPEC-SCNC: 9.1 MG/DL (ref 8.6–10.5)
CHLORIDE SERPL-SCNC: 103 MMOL/L (ref 98–107)
CO2 SERPL-SCNC: 22 MMOL/L (ref 22–29)
CREAT SERPL-MCNC: 2.15 MG/DL (ref 0.76–1.27)
DEPRECATED RDW RBC AUTO: 57.8 FL (ref 37–54)
EOSINOPHIL # BLD AUTO: 0 10*3/MM3 (ref 0–0.4)
EOSINOPHIL NFR BLD AUTO: 0 % (ref 0.3–6.2)
ERYTHROCYTE [DISTWIDTH] IN BLOOD BY AUTOMATED COUNT: 18.4 % (ref 12.3–15.4)
GFR SERPL CREATININE-BSD FRML MDRD: 30 ML/MIN/1.73
GLUCOSE SERPL-MCNC: 147 MG/DL (ref 65–99)
GRAM STN SPEC: NORMAL
HCT VFR BLD AUTO: 39.6 % (ref 37.5–51)
HGB BLD-MCNC: 13.1 G/DL (ref 13–17.7)
LYMPHOCYTES # BLD AUTO: 0.6 10*3/MM3 (ref 0.7–3.1)
LYMPHOCYTES NFR BLD AUTO: 3.3 % (ref 19.6–45.3)
MCH RBC QN AUTO: 29.2 PG (ref 26.6–33)
MCHC RBC AUTO-ENTMCNC: 33 G/DL (ref 31.5–35.7)
MCV RBC AUTO: 88.6 FL (ref 79–97)
MONOCYTES # BLD AUTO: 0.3 10*3/MM3 (ref 0.1–0.9)
MONOCYTES NFR BLD AUTO: 1.7 % (ref 5–12)
NEUTROPHILS NFR BLD AUTO: 16.1 10*3/MM3 (ref 1.7–7)
NEUTROPHILS NFR BLD AUTO: 94.9 % (ref 42.7–76)
NRBC BLD AUTO-RTO: 0 /100 WBC (ref 0–0.2)
PHOSPHATE SERPL-MCNC: 3.4 MG/DL (ref 2.5–4.5)
PLATELET # BLD AUTO: 271 10*3/MM3 (ref 140–450)
PMV BLD AUTO: 8.5 FL (ref 6–12)
POTASSIUM SERPL-SCNC: 4.8 MMOL/L (ref 3.5–5.2)
RBC # BLD AUTO: 4.47 10*6/MM3 (ref 4.14–5.8)
SODIUM SERPL-SCNC: 138 MMOL/L (ref 136–145)
WBC # BLD AUTO: 17 10*3/MM3 (ref 3.4–10.8)

## 2021-05-22 PROCEDURE — A9270 NON-COVERED ITEM OR SERVICE: HCPCS | Performed by: FAMILY MEDICINE

## 2021-05-22 PROCEDURE — 63710000001 METOPROLOL SUCCINATE XL 50 MG TABLET SUSTAINED-RELEASE 24 HOUR: Performed by: FAMILY MEDICINE

## 2021-05-22 PROCEDURE — 80069 RENAL FUNCTION PANEL: CPT | Performed by: INTERNAL MEDICINE

## 2021-05-22 PROCEDURE — 63710000001 TAMSULOSIN 0.4 MG CAPSULE: Performed by: FAMILY MEDICINE

## 2021-05-22 PROCEDURE — 63710000001 ASPIRIN 81 MG TABLET DELAYED-RELEASE: Performed by: FAMILY MEDICINE

## 2021-05-22 PROCEDURE — 25010000002 LORAZEPAM PER 2 MG: Performed by: FAMILY MEDICINE

## 2021-05-22 PROCEDURE — 63710000001 THEOPHYLLINE 300 MG TABLET SUSTAINED-RELEASE 12 HOUR: Performed by: FAMILY MEDICINE

## 2021-05-22 PROCEDURE — 63710000001 AMLODIPINE 5 MG TABLET: Performed by: INTERNAL MEDICINE

## 2021-05-22 PROCEDURE — G0378 HOSPITAL OBSERVATION PER HR: HCPCS

## 2021-05-22 PROCEDURE — 94799 UNLISTED PULMONARY SVC/PX: CPT

## 2021-05-22 PROCEDURE — 63710000001 GUAIFENESIN 600 MG TABLET SUSTAINED-RELEASE 12 HOUR: Performed by: FAMILY MEDICINE

## 2021-05-22 PROCEDURE — 63710000001 SODIUM BICARBONATE 650 MG TABLET: Performed by: FAMILY MEDICINE

## 2021-05-22 PROCEDURE — 70551 MRI BRAIN STEM W/O DYE: CPT

## 2021-05-22 PROCEDURE — 63710000001 FOLIC ACID 1 MG TABLET: Performed by: FAMILY MEDICINE

## 2021-05-22 PROCEDURE — 25010000002 METHYLPREDNISOLONE PER 40 MG: Performed by: NURSE PRACTITIONER

## 2021-05-22 PROCEDURE — 63710000001 FERROUS SULFATE 324 (65 FE) MG TABLET DELAYED-RELEASE: Performed by: FAMILY MEDICINE

## 2021-05-22 PROCEDURE — 99214 OFFICE O/P EST MOD 30 MIN: CPT | Performed by: INTERNAL MEDICINE

## 2021-05-22 PROCEDURE — 85025 COMPLETE CBC W/AUTO DIFF WBC: CPT | Performed by: FAMILY MEDICINE

## 2021-05-22 PROCEDURE — A9270 NON-COVERED ITEM OR SERVICE: HCPCS | Performed by: INTERNAL MEDICINE

## 2021-05-22 PROCEDURE — 63710000001 PROBIOTIC 250 MG CAPSULE: Performed by: FAMILY MEDICINE

## 2021-05-22 PROCEDURE — 63710000001 ATORVASTATIN 40 MG TABLET: Performed by: FAMILY MEDICINE

## 2021-05-22 PROCEDURE — 63710000001 ISOSORBIDE MONONITRATE 60 MG TABLET SUSTAINED-RELEASE 24 HOUR: Performed by: FAMILY MEDICINE

## 2021-05-22 RX ORDER — DOXYCYCLINE HYCLATE 100 MG/1
100 TABLET, DELAYED RELEASE ORAL 2 TIMES DAILY
Qty: 10 TABLET | Refills: 0 | Status: SHIPPED | OUTPATIENT
Start: 2021-05-22 | End: 2021-05-27

## 2021-05-22 RX ORDER — AMLODIPINE BESYLATE 5 MG/1
5 TABLET ORAL
Qty: 15 TABLET | Refills: 0 | Status: SHIPPED | OUTPATIENT
Start: 2021-05-23 | End: 2021-06-26

## 2021-05-22 RX ADMIN — Medication 250 MG: at 08:30

## 2021-05-22 RX ADMIN — GUAIFENESIN 600 MG: 600 TABLET, EXTENDED RELEASE ORAL at 08:30

## 2021-05-22 RX ADMIN — Medication 3 ML: at 08:30

## 2021-05-22 RX ADMIN — BUDESONIDE 0.5 MG: 0.5 SUSPENSION RESPIRATORY (INHALATION) at 07:02

## 2021-05-22 RX ADMIN — ARFORMOTEROL TARTRATE 15 MCG: 15 SOLUTION RESPIRATORY (INHALATION) at 07:02

## 2021-05-22 RX ADMIN — METOPROLOL SUCCINATE 50 MG: 50 TABLET, EXTENDED RELEASE ORAL at 08:30

## 2021-05-22 RX ADMIN — FERROUS SULFATE TAB EC 324 MG (65 MG FE EQUIVALENT) 324 MG: 324 (65 FE) TABLET DELAYED RESPONSE at 08:30

## 2021-05-22 RX ADMIN — FOLIC ACID 1 MG: 1 TABLET ORAL at 08:30

## 2021-05-22 RX ADMIN — THEOPHYLLINE 300 MG: 300 TABLET, EXTENDED RELEASE ORAL at 08:38

## 2021-05-22 RX ADMIN — LORAZEPAM 1 MG: 2 INJECTION INTRAMUSCULAR; INTRAVENOUS at 08:31

## 2021-05-22 RX ADMIN — METHYLPREDNISOLONE SODIUM SUCCINATE 40 MG: 40 INJECTION, POWDER, FOR SOLUTION INTRAMUSCULAR; INTRAVENOUS at 00:51

## 2021-05-22 RX ADMIN — ATORVASTATIN CALCIUM 40 MG: 40 TABLET, FILM COATED ORAL at 08:30

## 2021-05-22 RX ADMIN — SODIUM BICARBONATE 650 MG TABLET 650 MG: at 08:30

## 2021-05-22 RX ADMIN — ISOSORBIDE MONONITRATE 60 MG: 60 TABLET, EXTENDED RELEASE ORAL at 08:30

## 2021-05-22 RX ADMIN — AMLODIPINE BESYLATE 5 MG: 5 TABLET ORAL at 08:30

## 2021-05-22 RX ADMIN — ASPIRIN 81 MG: 81 TABLET, COATED ORAL at 08:30

## 2021-05-22 RX ADMIN — METHYLPREDNISOLONE SODIUM SUCCINATE 40 MG: 40 INJECTION, POWDER, FOR SOLUTION INTRAMUSCULAR; INTRAVENOUS at 09:48

## 2021-05-22 RX ADMIN — TAMSULOSIN HYDROCHLORIDE 0.4 MG: 0.4 CAPSULE ORAL at 08:30

## 2021-05-23 LAB
CMV DNA SPEC QL NAA+PROBE: NEGATIVE
SPECIMEN SOURCE: NORMAL

## 2021-05-24 LAB
BACTERIA SPEC AEROBE CULT: NORMAL
BACTERIA SPEC AEROBE CULT: NORMAL

## 2021-05-25 LAB
BACTERIA SPEC AEROBE CULT: NORMAL
BACTERIA SPEC AEROBE CULT: NORMAL
P JIROVECII DNA # SPEC NAA+PROBE: NEGATIVE {COPIES}/ML
SPECIMEN SOURCE: NORMAL

## 2021-05-26 LAB
COPPER SERPL-MCNC: 128 UG/DL (ref 69–132)
NSE SERPL IA-MCNC: 19.7 NG/ML (ref 0–17.6)

## 2021-05-31 LAB — VIRUS SPEC CULT: NORMAL

## 2021-06-06 ENCOUNTER — APPOINTMENT (OUTPATIENT)
Dept: CT IMAGING | Facility: HOSPITAL | Age: 76
End: 2021-06-06

## 2021-06-06 ENCOUNTER — HOSPITAL ENCOUNTER (EMERGENCY)
Facility: HOSPITAL | Age: 76
Discharge: HOME | End: 2021-06-06
Admitting: EMERGENCY MEDICINE

## 2021-06-06 VITALS
RESPIRATION RATE: 18 BRPM | DIASTOLIC BLOOD PRESSURE: 79 MMHG | SYSTOLIC BLOOD PRESSURE: 132 MMHG | BODY MASS INDEX: 24.11 KG/M2 | WEIGHT: 168.4 LBS | TEMPERATURE: 98.2 F | OXYGEN SATURATION: 97 % | HEART RATE: 92 BPM | HEIGHT: 70 IN

## 2021-06-06 DIAGNOSIS — R19.7 DIARRHEA, UNSPECIFIED TYPE: Primary | ICD-10-CM

## 2021-06-06 DIAGNOSIS — R10.9 ABDOMINAL PAIN, UNSPECIFIED ABDOMINAL LOCATION: ICD-10-CM

## 2021-06-06 LAB
ALBUMIN SERPL-MCNC: 3.7 G/DL (ref 3.5–5.2)
ALBUMIN/GLOB SERPL: 1.3 G/DL
ALP SERPL-CCNC: 145 U/L (ref 39–117)
ALT SERPL W P-5'-P-CCNC: 13 U/L (ref 1–41)
ANION GAP SERPL CALCULATED.3IONS-SCNC: 11 MMOL/L (ref 5–15)
AST SERPL-CCNC: 16 U/L (ref 1–40)
BASOPHILS # BLD AUTO: 0 10*3/MM3 (ref 0–0.2)
BASOPHILS NFR BLD AUTO: 0.1 % (ref 0–1.5)
BILIRUB SERPL-MCNC: 0.5 MG/DL (ref 0–1.2)
BUN SERPL-MCNC: 16 MG/DL (ref 8–23)
BUN/CREAT SERPL: 8.4 (ref 7–25)
CALCIUM SPEC-SCNC: 9.1 MG/DL (ref 8.6–10.5)
CHLORIDE SERPL-SCNC: 102 MMOL/L (ref 98–107)
CO2 SERPL-SCNC: 25 MMOL/L (ref 22–29)
CREAT SERPL-MCNC: 1.91 MG/DL (ref 0.76–1.27)
DEPRECATED RDW RBC AUTO: 55.1 FL (ref 37–54)
EOSINOPHIL # BLD AUTO: 0.9 10*3/MM3 (ref 0–0.4)
EOSINOPHIL NFR BLD AUTO: 8.5 % (ref 0.3–6.2)
ERYTHROCYTE [DISTWIDTH] IN BLOOD BY AUTOMATED COUNT: 17.9 % (ref 12.3–15.4)
GFR SERPL CREATININE-BSD FRML MDRD: 34 ML/MIN/1.73
GLOBULIN UR ELPH-MCNC: 2.9 GM/DL
GLUCOSE SERPL-MCNC: 115 MG/DL (ref 65–99)
HCT VFR BLD AUTO: 39.5 % (ref 37.5–51)
HGB BLD-MCNC: 13.2 G/DL (ref 13–17.7)
LIPASE SERPL-CCNC: 17 U/L (ref 13–60)
LYMPHOCYTES # BLD AUTO: 0.7 10*3/MM3 (ref 0.7–3.1)
LYMPHOCYTES NFR BLD AUTO: 7 % (ref 19.6–45.3)
MCH RBC QN AUTO: 29.2 PG (ref 26.6–33)
MCHC RBC AUTO-ENTMCNC: 33.5 G/DL (ref 31.5–35.7)
MCV RBC AUTO: 87.1 FL (ref 79–97)
MONOCYTES # BLD AUTO: 0.5 10*3/MM3 (ref 0.1–0.9)
MONOCYTES NFR BLD AUTO: 5.4 % (ref 5–12)
NEUTROPHILS NFR BLD AUTO: 7.9 10*3/MM3 (ref 1.7–7)
NEUTROPHILS NFR BLD AUTO: 79 % (ref 42.7–76)
NRBC BLD AUTO-RTO: 0.1 /100 WBC (ref 0–0.2)
PLATELET # BLD AUTO: 236 10*3/MM3 (ref 140–450)
PMV BLD AUTO: 8.2 FL (ref 6–12)
POTASSIUM SERPL-SCNC: 3.6 MMOL/L (ref 3.5–5.2)
PROT SERPL-MCNC: 6.6 G/DL (ref 6–8.5)
RBC # BLD AUTO: 4.53 10*6/MM3 (ref 4.14–5.8)
SODIUM SERPL-SCNC: 138 MMOL/L (ref 136–145)
WBC # BLD AUTO: 10.1 10*3/MM3 (ref 3.4–10.8)

## 2021-06-06 PROCEDURE — 96375 TX/PRO/DX INJ NEW DRUG ADDON: CPT

## 2021-06-06 PROCEDURE — 25010000002 ONDANSETRON PER 1 MG: Performed by: NURSE PRACTITIONER

## 2021-06-06 PROCEDURE — 80053 COMPREHEN METABOLIC PANEL: CPT | Performed by: NURSE PRACTITIONER

## 2021-06-06 PROCEDURE — 25010000002 MORPHINE PER 10 MG: Performed by: NURSE PRACTITIONER

## 2021-06-06 PROCEDURE — 83690 ASSAY OF LIPASE: CPT | Performed by: NURSE PRACTITIONER

## 2021-06-06 PROCEDURE — 99283 EMERGENCY DEPT VISIT LOW MDM: CPT

## 2021-06-06 PROCEDURE — 85025 COMPLETE CBC W/AUTO DIFF WBC: CPT | Performed by: NURSE PRACTITIONER

## 2021-06-06 PROCEDURE — 74176 CT ABD & PELVIS W/O CONTRAST: CPT

## 2021-06-06 PROCEDURE — 96374 THER/PROPH/DIAG INJ IV PUSH: CPT

## 2021-06-06 RX ORDER — GABAPENTIN 300 MG/1
300 CAPSULE ORAL 3 TIMES DAILY
COMMUNITY
End: 2021-06-26

## 2021-06-06 RX ORDER — MORPHINE SULFATE 4 MG/ML
4 INJECTION, SOLUTION INTRAMUSCULAR; INTRAVENOUS ONCE
Status: COMPLETED | OUTPATIENT
Start: 2021-06-06 | End: 2021-06-06

## 2021-06-06 RX ORDER — ONDANSETRON 4 MG/1
4 TABLET, FILM COATED ORAL EVERY 6 HOURS PRN
Qty: 11 TABLET | Refills: 0 | Status: SHIPPED | OUTPATIENT
Start: 2021-06-06 | End: 2023-03-14

## 2021-06-06 RX ORDER — ONDANSETRON 2 MG/ML
4 INJECTION INTRAMUSCULAR; INTRAVENOUS ONCE
Status: COMPLETED | OUTPATIENT
Start: 2021-06-06 | End: 2021-06-06

## 2021-06-06 RX ORDER — SODIUM CHLORIDE 0.9 % (FLUSH) 0.9 %
10 SYRINGE (ML) INJECTION AS NEEDED
Status: DISCONTINUED | OUTPATIENT
Start: 2021-06-06 | End: 2021-06-06 | Stop reason: HOSPADM

## 2021-06-06 RX ADMIN — MORPHINE SULFATE 4 MG: 4 INJECTION INTRAVENOUS at 12:49

## 2021-06-06 RX ADMIN — SODIUM CHLORIDE 1000 ML: 9 INJECTION, SOLUTION INTRAVENOUS at 12:50

## 2021-06-06 RX ADMIN — ONDANSETRON 4 MG: 2 INJECTION INTRAMUSCULAR; INTRAVENOUS at 12:49

## 2021-06-06 NOTE — ED PROVIDER NOTES
Subjective   History: Patient is a 76-year-old male history chronic kidney disease COPD diverticulitis hypertension MI PVD recently diagnosed small cell lung cancer complains of ongoing diarrhea and nausea over the last 3 weeks.  Has generalized abdominal discomfort.  Reports recently treated for diverticulitis was placed on Augmentin and completed antibiotics yesterday.  States he has had some black stool and chills but he is on iron pills.  Yesterday he had diarrhea 4-5 times as well as last night and once today..  Scheduled to have a port placed Dilma 15 and will start chemo after that for small cell lung CA.      Onset: 3 weeks  Location:   Duration: Waxes wanes  Character: Diarrhea  Aggravating/Alleviating factors: None  Radiation not applicable  Severity: Moderate to severe          Review of Systems   Constitutional: Positive for chills. Negative for fatigue and fever.   HENT: Negative for congestion, sore throat, tinnitus and trouble swallowing.    Eyes: Negative for photophobia, discharge and visual disturbance.   Respiratory: Negative for cough and shortness of breath.    Cardiovascular: Negative for chest pain.   Gastrointestinal: Positive for abdominal pain, diarrhea and nausea. Negative for vomiting.   Genitourinary: Negative for dysuria, frequency and urgency.   Musculoskeletal: Negative for back pain and myalgias.   Skin: Negative for rash.   Neurological: Negative for dizziness, syncope, weakness, light-headedness and headaches.   Psychiatric/Behavioral: Negative for confusion.       Past Medical History:   Diagnosis Date   • Cellulitis    • CKD (chronic kidney disease), stage III (CMS/HCC)    • COPD (chronic obstructive pulmonary disease) (CMS/HCC)    • Diverticulitis    • Dyslipidemia    • Hypertension    • Myocardial infarction (CMS/HCC)    • PVD (peripheral vascular disease) (CMS/[facility])        No Known Allergies    Past Surgical History:   Procedure Laterality Date   • BRONCHOSCOPY N/A 5/20/2021     Procedure: BRONCHOSCOPY WITH ENDOBRONCHIAL ULTRASOUND WITH FINE NEEDLE ASPIRATION. BRONCHOALVEOLAR LAVAGE;  Surgeon: Jackson Quezada MD;  Location: [facility] ENDOSCOPY;  Service: Pulmonary;  Laterality: N/A;  subcarinal mass   • GALLBLADDER SURGERY     • LEG AMPUTATION Left        Family History   Problem Relation Age of Onset   • Heart attack Father    • Heart disease Father        Social History     Socioeconomic History   • Marital status:      Spouse name: Not on file   • Number of children: Not on file   • Years of education: Not on file   • Highest education level: Not on file   Tobacco Use   • Smoking status: Former Smoker     Packs/day: 2.00     Years: 50.00     Pack years: 100.00   • Smokeless tobacco: Never Used   • Tobacco comment: Says he quit approximately in 2005.   Substance and Sexual Activity   • Alcohol use: Not Currently   • Drug use: Never   • Sexual activity: Defer           Objective   Physical Exam  Vitals reviewed.   Constitutional:       General: He is not in acute distress.     Appearance: He is normal weight. He is not toxic-appearing.   HENT:      Head: Normocephalic.   Cardiovascular:      Rate and Rhythm: Normal rate.      Heart sounds: Normal heart sounds. No murmur heard.     Pulmonary:      Effort: Pulmonary effort is normal.      Breath sounds: Normal breath sounds.   Abdominal:      General: Abdomen is flat. Bowel sounds are normal.      Palpations: Abdomen is soft.      Tenderness: There is generalized abdominal tenderness. There is guarding. There is no right CVA tenderness, left CVA tenderness or rebound. Negative signs include Perez's sign, Rovsing's sign and McBurney's sign.   Skin:     General: Skin is warm and dry.      Findings: No rash.   Neurological:      Mental Status: He is alert and oriented to person, place, and time.   Psychiatric:         Mood and Affect: Mood normal.         Behavior: Behavior normal.         Procedures           ED Course      /69 (BP  "Location: Right arm, Patient Position: Lying)   Pulse 96   Temp 98.5 °F (36.9 °C) (Oral)   Resp 16   Ht 177.8 cm (70\")   Wt 76.4 kg (168 lb 6.4 oz)   SpO2 95%   BMI 24.16 kg/m²   Labs Reviewed   COMPREHENSIVE METABOLIC PANEL - Abnormal; Notable for the following components:       Result Value    Glucose 115 (*)     Creatinine 1.91 (*)     Alkaline Phosphatase 145 (*)     eGFR Non  Amer 34 (*)     All other components within normal limits    Narrative:     GFR Normal >60  Chronic Kidney Disease <60  Kidney Failure <15     CBC WITH AUTO DIFFERENTIAL - Abnormal; Notable for the following components:    RDW 17.9 (*)     RDW-SD 55.1 (*)     Neutrophil % 79.0 (*)     Lymphocyte % 7.0 (*)     Eosinophil % 8.5 (*)     Neutrophils, Absolute 7.90 (*)     Eosinophils, Absolute 0.90 (*)     All other components within normal limits   LIPASE - Normal   CLOSTRIDIUM DIFFICILE TOXIN    Narrative:     The following orders were created for panel order Clostridium Difficile Toxin - Stool, Per Rectum.  Procedure                               Abnormality         Status                     ---------                               -----------         ------                     Clostridium Difficile EI...[[id number]]                                                   Please view results for these tests on the individual orders.   CLOSTRIDIUM DIFFICILE EIA   OCCULT BLOOD X 1, STOOL   CBC AND DIFFERENTIAL    Narrative:     The following orders were created for panel order CBC & Differential.  Procedure                               Abnormality         Status                     ---------                               -----------         ------                     CBC Auto Differential[[id number]]        Abnormal            Final result                 Please view results for these tests on the individual orders.     Medications   sodium chloride 0.9 % flush 10 mL (has no administration in time range)   sodium chloride 0.9 % bolus " 1,000 mL (0 mL Intravenous Stopped 6/6/21 1320)   Morphine sulfate (PF) injection 4 mg (4 mg Intravenous Given 6/6/21 1249)   ondansetron (ZOFRAN) injection 4 mg (4 mg Intravenous Given 6/6/21 1249)     CT Abdomen Pelvis Without Contrast    Result Date: 6/6/2021  1.No acute process identified within abdomen/pelivs. 2.Suggestion of round atelectasis within left lower lobe similar to prior exam. 3.Sigmoid diverticulosis.    Electronically Signed By-Brian Cast MD On:6/6/2021 1:42 PM This report was finalized on [id number] by  Brian Cast MD.                                         MDM     I examined the patient using the appropriate personal protective equipment.      DISPOSITION:   Chart Review:  Comorbidity:  has a past medical history of Cellulitis, CKD (chronic kidney disease), stage III (CMS/HCC), COPD (chronic obstructive pulmonary disease) (CMS/HCC), Diverticulitis, Dyslipidemia, Hypertension, Myocardial infarction (CMS/HCC), and PVD (peripheral vascular disease) (CMS/[facility]).  Differentials:this list is not all inclusive and does not constitute the entirety of considered causes --> colitis diverticulitis viral syndrome C. difficile  ECG: interpreted by ER physician and reviewed by myself: Not applicable labs: CBC WBC 10.10 H&H 13.2 and 39.5 platelets 236.  Metabolic panel glucose 115 creatinine 1.91 alk phos 145 GFR 34.  Lipase negative.  Occult blood x1+    Imaging: Was interpreted by physician and reviewed by myself:  CT Abdomen Pelvis Without Contrast    Result Date: 6/6/2021  1.No acute process identified within abdomen/pelivs. 2.Suggestion of round atelectasis within left lower lobe similar to prior exam. 3.Sigmoid diverticulosis.    Electronically Signed By-Brian Cast MD On:6/6/2021 1:42 PM This report was finalized on [id number] by  Brian Cast MD.      Disposition/Treatment:    Patient had IV established blood drawn CT abdomen pelvis given normal saline 1 L morphine and  Zofran.  Patient does not appear ill afebrile is not tachycardic.  Physical exam General abdominal tenderness.    White count is normal H&H is normal occult blood per primary RN was positive.  Patient reports dark stool states he takes iron supplement denies any hematochezia.  Creatinine is 1.91 which is improved from the last 2 weeks creatinine has been up to 2.43.    Patient did not have any diarrhea while in ER reports he is ready to go home.  Unable to produce stool sample for C. difficile.  Will discharge patient home with Zofran continue liquid diet and return stool sample for C. difficile.  Return to ER for worsening of symptoms emergent reasons discussed with patient family bedside.  Patient verbalized understanding of plan of care.  Patient reports a colonoscopy within the last 3 years with Dr. Chavis encouraged to follow-up with gastroenterologist.    Description: Zofran, Cdiff outpatient lab work    Final diagnoses:   Diarrhea, unspecified type   Abdominal pain, unspecified abdominal location       ED Disposition  ED Disposition     ED Disposition Condition Comment    Discharge Stable           Mario Chavis MD  [address] [place] [address]  [phone]               Medication List      New Prescriptions    ondansetron 4 MG tablet  Commonly known as: ZOFRAN  Take 1 tablet by mouth Every 6 (Six) Hours As Needed for Nausea or Vomiting.           Where to Get Your Medications      You can get these medications from any pharmacy    Bring a paper prescription for each of these medications  · ondansetron 4 MG tablet          Jordyn Zelaya, APRN  06/06/21 7674

## 2021-06-06 NOTE — DISCHARGE INSTRUCTIONS
Take Zofran as needed for nausea.  Continue clear liquid and bland diet.  Bring stool sample back for outpatient lab work.  Follow-up with Dr. Chavis.  Return to ER for increased abdominal pain vomiting fever.

## 2021-06-07 PROCEDURE — 87493 C DIFF AMPLIFIED PROBE: CPT

## 2021-06-08 ENCOUNTER — TRANSCRIBE ORDERS (OUTPATIENT)
Dept: ADMINISTRATIVE | Facility: HOSPITAL | Age: 76
End: 2021-06-08

## 2021-06-08 ENCOUNTER — APPOINTMENT (OUTPATIENT)
Dept: CT IMAGING | Facility: HOSPITAL | Age: 76
End: 2021-06-08

## 2021-06-08 ENCOUNTER — HOSPITAL ENCOUNTER (EMERGENCY)
Facility: HOSPITAL | Age: 76
Discharge: HOME | End: 2021-06-08
Attending: EMERGENCY MEDICINE

## 2021-06-08 ENCOUNTER — LAB (OUTPATIENT)
Dept: LAB | Facility: HOSPITAL | Age: 76
End: 2021-06-08

## 2021-06-08 VITALS
HEART RATE: 88 BPM | OXYGEN SATURATION: 98 % | WEIGHT: 168.43 LBS | SYSTOLIC BLOOD PRESSURE: 155 MMHG | RESPIRATION RATE: 20 BRPM | DIASTOLIC BLOOD PRESSURE: 83 MMHG | HEIGHT: 70 IN | TEMPERATURE: 98 F | BODY MASS INDEX: 24.11 KG/M2

## 2021-06-08 DIAGNOSIS — R10.9 ABDOMINAL PAIN, UNSPECIFIED ABDOMINAL LOCATION: Primary | ICD-10-CM

## 2021-06-08 DIAGNOSIS — R19.7 DIARRHEA, UNSPECIFIED TYPE: ICD-10-CM

## 2021-06-08 DIAGNOSIS — R19.7 DIARRHEA, UNSPECIFIED TYPE: Primary | ICD-10-CM

## 2021-06-08 LAB
ALBUMIN SERPL-MCNC: 3.4 G/DL (ref 3.5–5.2)
ALBUMIN/GLOB SERPL: 1.1 G/DL
ALP SERPL-CCNC: 148 U/L (ref 39–117)
ALT SERPL W P-5'-P-CCNC: 14 U/L (ref 1–41)
ANION GAP SERPL CALCULATED.3IONS-SCNC: 14 MMOL/L (ref 5–15)
AST SERPL-CCNC: 24 U/L (ref 1–40)
BACTERIA UR QL AUTO: ABNORMAL /HPF
BASOPHILS # BLD AUTO: 0 10*3/MM3 (ref 0–0.2)
BASOPHILS NFR BLD AUTO: 0.4 % (ref 0–1.5)
BILIRUB SERPL-MCNC: 0.5 MG/DL (ref 0–1.2)
BILIRUB UR QL STRIP: NEGATIVE
BUN SERPL-MCNC: 14 MG/DL (ref 8–23)
BUN/CREAT SERPL: 7.3 (ref 7–25)
C DIFF GDH STL QL: NORMAL
C DIFF TOX GENS STL QL NAA+PROBE: NOT DETECTED
CALCIUM SPEC-SCNC: 9.3 MG/DL (ref 8.6–10.5)
CHLORIDE SERPL-SCNC: 100 MMOL/L (ref 98–107)
CLARITY UR: CLEAR
CO2 SERPL-SCNC: 22 MMOL/L (ref 22–29)
COLOR UR: YELLOW
CREAT SERPL-MCNC: 1.93 MG/DL (ref 0.76–1.27)
DEPRECATED RDW RBC AUTO: 53.4 FL (ref 37–54)
EOSINOPHIL # BLD AUTO: 0.7 10*3/MM3 (ref 0–0.4)
EOSINOPHIL NFR BLD AUTO: 6.7 % (ref 0.3–6.2)
ERYTHROCYTE [DISTWIDTH] IN BLOOD BY AUTOMATED COUNT: 17.6 % (ref 12.3–15.4)
GFR SERPL CREATININE-BSD FRML MDRD: 34 ML/MIN/1.73
GLOBULIN UR ELPH-MCNC: 3.2 GM/DL
GLUCOSE SERPL-MCNC: 104 MG/DL (ref 65–99)
GLUCOSE UR STRIP-MCNC: NEGATIVE MG/DL
HCT VFR BLD AUTO: 40.8 % (ref 37.5–51)
HGB BLD-MCNC: 13.6 G/DL (ref 13–17.7)
HGB UR QL STRIP.AUTO: NEGATIVE
HYALINE CASTS UR QL AUTO: ABNORMAL /LPF
KETONES UR QL STRIP: NEGATIVE
LEUKOCYTE ESTERASE UR QL STRIP.AUTO: NEGATIVE
LIPASE SERPL-CCNC: 20 U/L (ref 13–60)
LYMPHOCYTES # BLD AUTO: 0.9 10*3/MM3 (ref 0.7–3.1)
LYMPHOCYTES NFR BLD AUTO: 8.5 % (ref 19.6–45.3)
MCH RBC QN AUTO: 29.1 PG (ref 26.6–33)
MCHC RBC AUTO-ENTMCNC: 33.4 G/DL (ref 31.5–35.7)
MCV RBC AUTO: 87.2 FL (ref 79–97)
MONOCYTES # BLD AUTO: 0.5 10*3/MM3 (ref 0.1–0.9)
MONOCYTES NFR BLD AUTO: 5.2 % (ref 5–12)
NEUTROPHILS NFR BLD AUTO: 79.2 % (ref 42.7–76)
NEUTROPHILS NFR BLD AUTO: 8.1 10*3/MM3 (ref 1.7–7)
NITRITE UR QL STRIP: NEGATIVE
NRBC BLD AUTO-RTO: 0 /100 WBC (ref 0–0.2)
PH UR STRIP.AUTO: 7.5 [PH] (ref 5–8)
PLATELET # BLD AUTO: 238 10*3/MM3 (ref 140–450)
PMV BLD AUTO: 8.4 FL (ref 6–12)
POTASSIUM SERPL-SCNC: 4 MMOL/L (ref 3.5–5.2)
PROT SERPL-MCNC: 6.6 G/DL (ref 6–8.5)
PROT UR QL STRIP: ABNORMAL
RBC # BLD AUTO: 4.67 10*6/MM3 (ref 4.14–5.8)
RBC # UR: ABNORMAL /HPF
REF LAB TEST METHOD: ABNORMAL
SODIUM SERPL-SCNC: 136 MMOL/L (ref 136–145)
SP GR UR STRIP: 1.01 (ref 1–1.03)
SQUAMOUS #/AREA URNS HPF: ABNORMAL /HPF
UROBILINOGEN UR QL STRIP: ABNORMAL
WBC # BLD AUTO: 10.2 10*3/MM3 (ref 3.4–10.8)
WBC UR QL AUTO: ABNORMAL /HPF

## 2021-06-08 PROCEDURE — 80053 COMPREHEN METABOLIC PANEL: CPT | Performed by: EMERGENCY MEDICINE

## 2021-06-08 PROCEDURE — 81001 URINALYSIS AUTO W/SCOPE: CPT | Performed by: EMERGENCY MEDICINE

## 2021-06-08 PROCEDURE — 99283 EMERGENCY DEPT VISIT LOW MDM: CPT

## 2021-06-08 PROCEDURE — 83690 ASSAY OF LIPASE: CPT | Performed by: EMERGENCY MEDICINE

## 2021-06-08 PROCEDURE — 85025 COMPLETE CBC W/AUTO DIFF WBC: CPT | Performed by: EMERGENCY MEDICINE

## 2021-06-08 PROCEDURE — 74176 CT ABD & PELVIS W/O CONTRAST: CPT

## 2021-06-08 RX ORDER — DIPHENOXYLATE HYDROCHLORIDE AND ATROPINE SULFATE 2.5; .025 MG/1; MG/1
1 TABLET ORAL 4 TIMES DAILY PRN
Qty: 15 TABLET | Refills: 0 | Status: SHIPPED | OUTPATIENT
Start: 2021-06-08 | End: 2021-06-26

## 2021-06-08 RX ORDER — SODIUM CHLORIDE, SODIUM LACTATE, POTASSIUM CHLORIDE, CALCIUM CHLORIDE 600; 310; 30; 20 MG/100ML; MG/100ML; MG/100ML; MG/100ML
125 INJECTION, SOLUTION INTRAVENOUS CONTINUOUS
Status: DISCONTINUED | OUTPATIENT
Start: 2021-06-08 | End: 2021-06-08 | Stop reason: HOSPADM

## 2021-06-08 NOTE — ED PROVIDER NOTES
Subjective   History of Present Illness  Context: 76-year-old male presents with complaints of abdominal pain.  He notes it more when he sits up.  He describes it both in his left lower abdomen and some in his upper abdomen.  He reports no fever.  He has had some nausea some dry heaves this morning.  He was recently diagnosed with a small cell lung cancer.  He describes the pain as somewhat of a crampy.  He had tried Tracey-Johnson.  He states the pain seems to be worse at night.  He has had some loose stools.  He has had some cough.  He has had no urinary difficulty.  Location: Abdomen  Quality: Somewhat crampy  Duration: Several days  Timing: Constant  Severity: Moderate   Modifying factors: Seems to be worse at night or when he is sits up  Associated signs and symptoms: Nausea some dry heaves loose stool    Review of Systems  Negative for fever new cough or shortness of breath.  No urinary difficulty.  Positive for nausea loose stools.  Past Medical History:   Diagnosis Date   • Cellulitis    • CKD (chronic kidney disease), stage III (CMS/[facility])    • COPD (chronic obstructive pulmonary disease) (CMS/[facility])    • Diverticulitis    • Dyslipidemia    • Hypertension    • Myocardial infarction (CMS/HCC)    • PVD (peripheral vascular disease) (CMS/[facility])        No Known Allergies    Past Surgical History:   Procedure Laterality Date   • BRONCHOSCOPY N/A 5/20/2021    Procedure: BRONCHOSCOPY WITH ENDOBRONCHIAL ULTRASOUND WITH FINE NEEDLE ASPIRATION. BRONCHOALVEOLAR LAVAGE;  Surgeon: Jackson Quezada MD;  Location: [facility] ENDOSCOPY;  Service: Pulmonary;  Laterality: N/A;  subcarinal mass   • GALLBLADDER SURGERY     • LEG AMPUTATION Left        Family History   Problem Relation Age of Onset   • Heart attack Father    • Heart disease Father        Social History     Socioeconomic History   • Marital status:      Spouse name: Not on file   • Number of children: Not on file   • Years of education: Not on file   • Highest education  level: Not on file   Tobacco Use   • Smoking status: Former Smoker     Packs/day: 2.00     Years: 50.00     Pack years: 100.00   • Smokeless tobacco: Never Used   • Tobacco comment: Says he quit approximately in 2005.   Substance and Sexual Activity   • Alcohol use: Not Currently   • Drug use: Never   • Sexual activity: Defer     Prior to Admission medications    Medication Sig Start Date End Date Taking? Authorizing Provider   amLODIPine (NORVASC) 5 MG tablet Take 1 tablet by mouth Daily. 5/23/21   Carie Benoit DO   aspirin 81 MG EC tablet  1/24/16   Charu Prince MD   atorvastatin (LIPITOR) 40 MG tablet Take 40 mg by mouth Daily. 2/15/16   Charu Prince MD   Biotin 1 MG capsule Take  by mouth.    Charu Prince MD   budesonide (PULMICORT) 0.5 MG/2ML nebulizer solution Take 0.5 mg by nebulization 2 (two) times a day.    Charu Prince MD   clopidogrel (PLAVIX) 75 MG tablet Take 75 mg by mouth Daily. 2/15/16   Charu Prince MD   dutasteride (AVODART) 0.5 MG capsule Take 0.5 mg by mouth Daily. 4/1/19   Charu Prince MD   famotidine (PEPCID) 10 MG tablet Take 40 mg by mouth 2 (Two) Times a Day.    Charu Prince MD   folic acid (FOLVITE) 1 MG tablet Take 1 mg by mouth Daily. 4/1/19   Charu Prince MD   formoterol (Perforomist) 20 MCG/2ML nebulizer solution Take  by nebulization 2 (Two) Times a Day.    Charu Prince MD   gabapentin (NEURONTIN) 300 MG capsule Take 300 mg by mouth 3 (Three) Times a Day.    Charu Prince MD   guaiFENesin (MUCINEX) 600 MG 12 hr tablet Take 600 mg by mouth 2 (Two) Times a Day As Needed. 12/5/16   Charu Prince MD   HYDROcodone-acetaminophen (NORCO) 5-325 MG per tablet Take 1 tablet by mouth At Night As Needed for Moderate Pain . 3/2/16   Charu Prince MD   isosorbide mononitrate (IMDUR) 60 MG 24 hr tablet Take 60 mg by mouth Daily. 2/15/16   Charu Prince MD   metoprolol succinate XL  (TOPROL-XL) 50 MG 24 hr tablet TAKE 1 TABLET EVERY DAY 4/7/21   Kali Mistry MD   Multiple Vitamins-Minerals (MULTI VITAMIN/MINERALS) tablet 1 tablet Daily. 4/1/16   Charu Prince MD   ondansetron (ZOFRAN) 4 MG tablet Take 1 tablet by mouth Every 6 (Six) Hours As Needed for Nausea or Vomiting. 6/6/21   Jordyn Zelaya APRN   POLY-IRON 150 150 MG capsule Take 150 mg by mouth Daily. 1/24/16   Charu Prince MD   revefenacin (YUPELRI) 175 MCG/3ML nebulizer solution Take 175 mcg by nebulization Daily.    Charu Prince MD   saccharomyces boulardii (FLORASTOR) 250 MG capsule Take 250 mg by mouth Daily.    Charu Prince MD   sodium bicarbonate 650 MG tablet Take 650 mg by mouth 2 (Two) Times a Day. 4/1/19   Charu Prince MD   tamsulosin (FLOMAX) 0.4 MG capsule 24 hr capsule 1 capsule Daily. 2/16/16   Charu Prince MD   theophylline (THEODUR) 300 MG 12 hr tablet Take 300 mg by mouth 2 (Two) Times a Day.    Charu Prince MD           Objective   Physical Exam  76-year-old male awake alert.  He is chronically ill in appearance thin.  Pupils equal round at light.  Neck supple chest revealed few rhonchi cardiovascular regular rhythm abdomen is soft.  He has midline abdominal hernia when he sits up.  He does not complain of localizing tenderness rebound or guarding.  Bowel sounds positive.  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  Skin without rash noted.  Procedures           ED Course      Results for orders placed or performed during the hospital encounter of 06/08/21   Comprehensive Metabolic Panel    Specimen: Blood   Result Value Ref Range    Glucose 104 (H) 65 - 99 mg/dL    BUN 14 8 - 23 mg/dL    Creatinine 1.93 (H) 0.76 - 1.27 mg/dL    Sodium 136 136 - 145 mmol/L    Potassium 4.0 3.5 - 5.2 mmol/L    Chloride 100 98 - 107 mmol/L    CO2 22.0 22.0 - 29.0 mmol/L    Calcium 9.3 8.6 - 10.5 mg/dL    Total Protein 6.6 6.0 - 8.5 g/dL     Albumin 3.40 (L) 3.50 - 5.20 g/dL    ALT (SGPT) 14 1 - 41 U/L    AST (SGOT) 24 1 - 40 U/L    Alkaline Phosphatase 148 (H) 39 - 117 U/L    Total Bilirubin 0.5 0.0 - 1.2 mg/dL    eGFR Non African Amer 34 (L) >60 mL/min/1.73    Globulin 3.2 gm/dL    A/G Ratio 1.1 g/dL    BUN/Creatinine Ratio 7.3 7.0 - 25.0    Anion Gap 14.0 5.0 - 15.0 mmol/L   Lipase    Specimen: Blood   Result Value Ref Range    Lipase 20 13 - 60 U/L   Urinalysis With Microscopic If Indicated (No Culture) - Urine, Clean Catch    Specimen: Urine, Clean Catch   Result Value Ref Range    Color, UA Yellow Yellow, Straw    Appearance, UA Clear Clear    pH, UA 7.5 5.0 - 8.0    Specific Gravity, UA 1.012 1.005 - 1.030    Glucose, UA Negative Negative    Ketones, UA Negative Negative    Bilirubin, UA Negative Negative    Blood, UA Negative Negative    Protein,  mg/dL (2+) (A) Negative    Leuk Esterase, UA Negative Negative    Nitrite, UA Negative Negative    Urobilinogen, UA 0.2 E.U./dL 0.2 - 1.0 E.U./dL   CBC Auto Differential    Specimen: Blood   Result Value Ref Range    WBC 10.20 3.40 - 10.80 10*3/mm3    RBC 4.67 4.14 - 5.80 10*6/mm3    Hemoglobin 13.6 13.0 - 17.7 g/dL    Hematocrit 40.8 37.5 - 51.0 %    MCV 87.2 79.0 - 97.0 fL    MCH 29.1 26.6 - 33.0 pg    MCHC 33.4 31.5 - 35.7 g/dL    RDW 17.6 (H) 12.3 - 15.4 %    RDW-SD 53.4 37.0 - 54.0 fl    MPV 8.4 6.0 - 12.0 fL    Platelets 238 140 - 450 10*3/mm3    Neutrophil % 79.2 (H) 42.7 - 76.0 %    Lymphocyte % 8.5 (L) 19.6 - 45.3 %    Monocyte % 5.2 5.0 - 12.0 %    Eosinophil % 6.7 (H) 0.3 - 6.2 %    Basophil % 0.4 0.0 - 1.5 %    Neutrophils, Absolute 8.10 (H) 1.70 - 7.00 10*3/mm3    Lymphocytes, Absolute 0.90 0.70 - 3.10 10*3/mm3    Monocytes, Absolute 0.50 0.10 - 0.90 10*3/mm3    Eosinophils, Absolute 0.70 (H) 0.00 - 0.40 10*3/mm3    Basophils, Absolute 0.00 0.00 - 0.20 10*3/mm3    nRBC 0.0 0.0 - 0.2 /100 WBC   Urinalysis, Microscopic Only - Urine, Clean Catch    Specimen: Urine, Clean Catch   Result  "Value Ref Range    RBC, UA 0-2 (A) None Seen /HPF    WBC, UA 0-2 (A) None Seen /HPF    Bacteria, UA None Seen None Seen /HPF    Squamous Epithelial Cells, UA 0-2 None Seen, 0-2 /HPF    Hyaline Casts, UA 0-2 None Seen /LPF    Methodology Automated Microscopy        Medications   lactated ringers infusion (has no administration in time range)     /88   Pulse 89   Temp 97.6 °F (36.4 °C) (Oral)   Resp 20   Ht 177.8 cm (70\")   Wt 76.4 kg (168 lb 6.9 oz)   SpO2 96%   BMI 24.17 kg/m²                                        [facility]  Chart review: Review of his chart shows that he has not had diverticulitis but has had diverticular disease.  He has been found to have large mediastinal subcarinal and as ago esophageal recess mass consistent with malignancy.  Comorbidity: As per past history  Differential: Small cell lung cancer, peripheral vascular disease, COPD  My EKG interpretation:   Lab: Urinalysis 02 red cells 0-2 white cells, CBC no significant normality.  Comprehensive metabolic panel creatinine 1.93.  Normal ALT AST.  Radiology: I reviewed CT scan discussed with radiologist.  There is evidence of pleural-based mass.  He has vascular calcification but no significant new intra-abdominal or pelvic abnormality noted.  Discussion/treatment: Patient reported he did not need anything for pain at this time.  Review of his labs shows his creatinine is at baseline.  His findings were discussed with him.  He reports he actually is been having diarrhea for a couple weeks he states it seems to be worse at night though.  He has hydrocodone for pain but usually just takes 1 tablet at bedtime.  Patient's findings were discussed with him and family.  He was discharged.  He was placed on the multiple and advised to double up his pain medication at bedtime since this is when he is having most of his problems.  To follow-up with his oncologist and primary care provider return new or worsening symptoms  Patient was evaluated " using appropriate PPE      Final diagnoses:   Abdominal pain, unspecified abdominal location   Diarrhea, unspecified type       ED Disposition  ED Disposition     ED Disposition Condition Comment    Discharge Stable           No follow-up provider specified.       Medication List      No changes were made to your prescriptions during this visit.          Jaren Banda MD  06/08/21 0757

## 2021-06-08 NOTE — DISCHARGE INSTRUCTIONS
Follow-up with Dr. Valero or Dr. Hedrick, may double up on your hydrocodone at bedtime, return new or worsening symptoms.

## 2021-06-17 LAB — FUNGUS WND CULT: ABNORMAL

## 2021-06-26 ENCOUNTER — APPOINTMENT (OUTPATIENT)
Dept: GENERAL RADIOLOGY | Facility: HOSPITAL | Age: 76
End: 2021-06-26

## 2021-06-26 ENCOUNTER — HOSPITAL ENCOUNTER (OUTPATIENT)
Facility: HOSPITAL | Age: 76
Setting detail: OBSERVATION
Discharge: HOME | End: 2021-06-27
Attending: EMERGENCY MEDICINE | Admitting: FAMILY MEDICINE

## 2021-06-26 DIAGNOSIS — R07.9 CHEST PAIN, UNSPECIFIED TYPE: Primary | ICD-10-CM

## 2021-06-26 DIAGNOSIS — D61.818 PANCYTOPENIA (HCC): ICD-10-CM

## 2021-06-26 LAB
ALBUMIN SERPL-MCNC: 3.3 G/DL (ref 3.5–5.2)
ALBUMIN/GLOB SERPL: 1.2 G/DL
ALP SERPL-CCNC: 119 U/L (ref 39–117)
ALT SERPL W P-5'-P-CCNC: 13 U/L (ref 1–41)
ANION GAP SERPL CALCULATED.3IONS-SCNC: 13 MMOL/L (ref 5–15)
ANISOCYTOSIS BLD QL: ABNORMAL
APTT PPP: 27.8 SECONDS (ref 24–31)
AST SERPL-CCNC: 14 U/L (ref 1–40)
BILIRUB SERPL-MCNC: 1.1 MG/DL (ref 0–1.2)
BLASTS NFR BLD MANUAL: 4 % (ref 0–0)
BUN SERPL-MCNC: 26 MG/DL (ref 8–23)
BUN/CREAT SERPL: 14.9 (ref 7–25)
CALCIUM SPEC-SCNC: 8.4 MG/DL (ref 8.6–10.5)
CHLORIDE SERPL-SCNC: 104 MMOL/L (ref 98–107)
CO2 SERPL-SCNC: 22 MMOL/L (ref 22–29)
CREAT SERPL-MCNC: 1.75 MG/DL (ref 0.76–1.27)
D-LACTATE SERPL-SCNC: 1.2 MMOL/L (ref 0.5–2)
DEPRECATED RDW RBC AUTO: 52.1 FL (ref 37–54)
EOSINOPHIL # BLD MANUAL: 0.14 10*3/MM3 (ref 0–0.4)
EOSINOPHIL NFR BLD MANUAL: 8 % (ref 0.3–6.2)
ERYTHROCYTE [DISTWIDTH] IN BLOOD BY AUTOMATED COUNT: 17 % (ref 12.3–15.4)
GFR SERPL CREATININE-BSD FRML MDRD: 38 ML/MIN/1.73
GLOBULIN UR ELPH-MCNC: 2.7 GM/DL
GLUCOSE SERPL-MCNC: 99 MG/DL (ref 65–99)
HCT VFR BLD AUTO: 35 % (ref 37.5–51)
HGB BLD-MCNC: 11.5 G/DL (ref 13–17.7)
INR PPP: 0.99 (ref 0.93–1.1)
LYMPHOCYTES # BLD MANUAL: 0.47 10*3/MM3 (ref 0.7–3.1)
LYMPHOCYTES NFR BLD MANUAL: 24 % (ref 19.6–45.3)
LYMPHOCYTES NFR BLD MANUAL: 7 % (ref 5–12)
MCH RBC QN AUTO: 28.6 PG (ref 26.6–33)
MCHC RBC AUTO-ENTMCNC: 32.9 G/DL (ref 31.5–35.7)
MCV RBC AUTO: 87 FL (ref 79–97)
METAMYELOCYTES NFR BLD MANUAL: 1 % (ref 0–0)
MONOCYTES # BLD AUTO: 0.13 10*3/MM3 (ref 0.1–0.9)
MYELOCYTES NFR BLD MANUAL: 2 % (ref 0–0)
NEUTROPHILS # BLD AUTO: 0.88 10*3/MM3 (ref 1.7–7)
NEUTROPHILS NFR BLD MANUAL: 37 % (ref 42.7–76)
NEUTS BAND NFR BLD MANUAL: 12 % (ref 0–5)
PATHOLOGY REVIEW: YES
PLATELET # BLD AUTO: 54 10*3/MM3 (ref 140–450)
PMV BLD AUTO: 8.3 FL (ref 6–12)
POTASSIUM SERPL-SCNC: 4.3 MMOL/L (ref 3.5–5.2)
PROMYELOCYTES NFR BLD MANUAL: 3 % (ref 0–0)
PROT SERPL-MCNC: 6 G/DL (ref 6–8.5)
PROTHROMBIN TIME: 11 SECONDS (ref 9.6–11.7)
RBC # BLD AUTO: 4.02 10*6/MM3 (ref 4.14–5.8)
SARS-COV-2 RNA PNL SPEC NAA+PROBE: NOT DETECTED
SCAN SLIDE: NORMAL
SMALL PLATELETS BLD QL SMEAR: ABNORMAL
SODIUM SERPL-SCNC: 139 MMOL/L (ref 136–145)
THEOPHYLLINE SERPL-MCNC: 9.5 MCG/ML (ref 10–20)
TOXIC GRANULATION: ABNORMAL
TROPONIN T SERPL-MCNC: <0.01 NG/ML (ref 0–0.03)
TROPONIN T SERPL-MCNC: <0.01 NG/ML (ref 0–0.03)
VARIANT LYMPHS NFR BLD MANUAL: 2 % (ref 0–5)
WBC # BLD AUTO: 1.8 10*3/MM3 (ref 3.4–10.8)

## 2021-06-26 PROCEDURE — 96366 THER/PROPH/DIAG IV INF ADDON: CPT

## 2021-06-26 PROCEDURE — 93005 ELECTROCARDIOGRAM TRACING: CPT | Performed by: EMERGENCY MEDICINE

## 2021-06-26 PROCEDURE — 25010000002 VANCOMYCIN 10 G RECONSTITUTED SOLUTION: Performed by: EMERGENCY MEDICINE

## 2021-06-26 PROCEDURE — 96367 TX/PROPH/DG ADDL SEQ IV INF: CPT

## 2021-06-26 PROCEDURE — 87040 BLOOD CULTURE FOR BACTERIA: CPT | Performed by: EMERGENCY MEDICINE

## 2021-06-26 PROCEDURE — G0378 HOSPITAL OBSERVATION PER HR: HCPCS

## 2021-06-26 PROCEDURE — 86665 EPSTEIN-BARR CAPSID VCA: CPT | Performed by: NURSE PRACTITIONER

## 2021-06-26 PROCEDURE — 99285 EMERGENCY DEPT VISIT HI MDM: CPT

## 2021-06-26 PROCEDURE — 96365 THER/PROPH/DIAG IV INF INIT: CPT

## 2021-06-26 PROCEDURE — 84484 ASSAY OF TROPONIN QUANT: CPT | Performed by: EMERGENCY MEDICINE

## 2021-06-26 PROCEDURE — 83605 ASSAY OF LACTIC ACID: CPT

## 2021-06-26 PROCEDURE — 74022 RADEX COMPL AQT ABD SERIES: CPT

## 2021-06-26 PROCEDURE — 80053 COMPREHEN METABOLIC PANEL: CPT | Performed by: EMERGENCY MEDICINE

## 2021-06-26 PROCEDURE — C9803 HOPD COVID-19 SPEC COLLECT: HCPCS

## 2021-06-26 PROCEDURE — 80198 ASSAY OF THEOPHYLLINE: CPT | Performed by: EMERGENCY MEDICINE

## 2021-06-26 PROCEDURE — 85610 PROTHROMBIN TIME: CPT | Performed by: EMERGENCY MEDICINE

## 2021-06-26 PROCEDURE — 25010000002 CEFEPIME PER 500 MG: Performed by: EMERGENCY MEDICINE

## 2021-06-26 PROCEDURE — 87635 SARS-COV-2 COVID-19 AMP PRB: CPT | Performed by: EMERGENCY MEDICINE

## 2021-06-26 PROCEDURE — 86645 CMV ANTIBODY IGM: CPT | Performed by: NURSE PRACTITIONER

## 2021-06-26 PROCEDURE — 85025 COMPLETE CBC W/AUTO DIFF WBC: CPT | Performed by: EMERGENCY MEDICINE

## 2021-06-26 PROCEDURE — 85730 THROMBOPLASTIN TIME PARTIAL: CPT | Performed by: EMERGENCY MEDICINE

## 2021-06-26 PROCEDURE — 25010000002 CEFEPIME PER 500 MG: Performed by: FAMILY MEDICINE

## 2021-06-26 PROCEDURE — 25010000002 VANCOMYCIN PER 500 MG: Performed by: EMERGENCY MEDICINE

## 2021-06-26 PROCEDURE — 99214 OFFICE O/P EST MOD 30 MIN: CPT | Performed by: INTERNAL MEDICINE

## 2021-06-26 PROCEDURE — 85007 BL SMEAR W/DIFF WBC COUNT: CPT | Performed by: EMERGENCY MEDICINE

## 2021-06-26 PROCEDURE — 36415 COLL VENOUS BLD VENIPUNCTURE: CPT | Performed by: EMERGENCY MEDICINE

## 2021-06-26 RX ORDER — CLOPIDOGREL BISULFATE 75 MG/1
75 TABLET ORAL EVERY MORNING
Status: DISCONTINUED | OUTPATIENT
Start: 2021-06-27 | End: 2021-06-27 | Stop reason: HOSPADM

## 2021-06-26 RX ORDER — BUDESONIDE 0.5 MG/2ML
0.5 INHALANT ORAL
Status: DISCONTINUED | OUTPATIENT
Start: 2021-06-26 | End: 2021-06-27 | Stop reason: HOSPADM

## 2021-06-26 RX ORDER — PANTOPRAZOLE SODIUM 40 MG/1
40 TABLET, DELAYED RELEASE ORAL EVERY MORNING
Status: DISCONTINUED | OUTPATIENT
Start: 2021-06-27 | End: 2021-06-27 | Stop reason: HOSPADM

## 2021-06-26 RX ORDER — HYDROCODONE BITARTRATE AND ACETAMINOPHEN 5; 325 MG/1; MG/1
1 TABLET ORAL NIGHTLY PRN
Status: DISCONTINUED | OUTPATIENT
Start: 2021-06-26 | End: 2021-06-27 | Stop reason: HOSPADM

## 2021-06-26 RX ORDER — SODIUM CHLORIDE 0.9 % (FLUSH) 0.9 %
10 SYRINGE (ML) INJECTION AS NEEDED
Status: DISCONTINUED | OUTPATIENT
Start: 2021-06-26 | End: 2021-06-27 | Stop reason: HOSPADM

## 2021-06-26 RX ORDER — ISOSORBIDE MONONITRATE 60 MG/1
60 TABLET, EXTENDED RELEASE ORAL EVERY MORNING
Status: DISCONTINUED | OUTPATIENT
Start: 2021-06-27 | End: 2021-06-27 | Stop reason: HOSPADM

## 2021-06-26 RX ORDER — ONDANSETRON 4 MG/1
4 TABLET, FILM COATED ORAL EVERY 6 HOURS PRN
Status: DISCONTINUED | OUTPATIENT
Start: 2021-06-26 | End: 2021-06-27 | Stop reason: HOSPADM

## 2021-06-26 RX ORDER — ARFORMOTEROL TARTRATE 15 UG/2ML
15 SOLUTION RESPIRATORY (INHALATION)
Status: DISCONTINUED | OUTPATIENT
Start: 2021-06-26 | End: 2021-06-27 | Stop reason: HOSPADM

## 2021-06-26 RX ORDER — MONTELUKAST SODIUM 10 MG/1
10 TABLET ORAL NIGHTLY
Status: DISCONTINUED | OUTPATIENT
Start: 2021-06-26 | End: 2021-06-27 | Stop reason: HOSPADM

## 2021-06-26 RX ORDER — METOPROLOL SUCCINATE 50 MG/1
50 TABLET, EXTENDED RELEASE ORAL DAILY
Status: DISCONTINUED | OUTPATIENT
Start: 2021-06-27 | End: 2021-06-27 | Stop reason: HOSPADM

## 2021-06-26 RX ORDER — MONTELUKAST SODIUM 10 MG/1
10 TABLET ORAL NIGHTLY
COMMUNITY

## 2021-06-26 RX ORDER — ATORVASTATIN CALCIUM 40 MG/1
40 TABLET, FILM COATED ORAL EVERY MORNING
Status: DISCONTINUED | OUTPATIENT
Start: 2021-06-27 | End: 2021-06-27 | Stop reason: HOSPADM

## 2021-06-26 RX ORDER — FINASTERIDE 5 MG/1
5 TABLET, FILM COATED ORAL EVERY EVENING
Status: DISCONTINUED | OUTPATIENT
Start: 2021-06-26 | End: 2021-06-27 | Stop reason: HOSPADM

## 2021-06-26 RX ORDER — ASPIRIN 81 MG/1
81 TABLET ORAL
Status: DISCONTINUED | OUTPATIENT
Start: 2021-06-26 | End: 2021-06-27 | Stop reason: HOSPADM

## 2021-06-26 RX ORDER — TAMSULOSIN HYDROCHLORIDE 0.4 MG/1
0.4 CAPSULE ORAL NIGHTLY
Status: DISCONTINUED | OUTPATIENT
Start: 2021-06-26 | End: 2021-06-27 | Stop reason: HOSPADM

## 2021-06-26 RX ORDER — OMEPRAZOLE 40 MG/1
40 CAPSULE, DELAYED RELEASE ORAL 2 TIMES DAILY
Status: ON HOLD | COMMUNITY
End: 2021-11-10

## 2021-06-26 RX ORDER — SODIUM BICARBONATE 650 MG/1
650 TABLET ORAL 2 TIMES DAILY
Status: DISCONTINUED | OUTPATIENT
Start: 2021-06-26 | End: 2021-06-27 | Stop reason: HOSPADM

## 2021-06-26 RX ADMIN — ASPIRIN 81 MG: 81 TABLET, COATED ORAL at 16:13

## 2021-06-26 RX ADMIN — CEFEPIME 2 G: 2 INJECTION, POWDER, FOR SOLUTION INTRAVENOUS at 20:26

## 2021-06-26 RX ADMIN — CEFEPIME 2 G: 2 INJECTION, POWDER, FOR SOLUTION INTRAVENOUS at 12:11

## 2021-06-26 RX ADMIN — TAMSULOSIN HYDROCHLORIDE 0.4 MG: 0.4 CAPSULE ORAL at 20:26

## 2021-06-26 RX ADMIN — VANCOMYCIN HYDROCHLORIDE 1500 MG: 500 INJECTION, POWDER, LYOPHILIZED, FOR SOLUTION INTRAVENOUS at 12:44

## 2021-06-26 RX ADMIN — FINASTERIDE 5 MG: 5 TABLET, FILM COATED ORAL at 16:13

## 2021-06-26 RX ADMIN — SODIUM BICARBONATE 650 MG: 650 TABLET ORAL at 20:26

## 2021-06-26 RX ADMIN — MONTELUKAST 10 MG: 10 TABLET, FILM COATED ORAL at 20:26

## 2021-06-26 RX ADMIN — Medication 10 ML: at 20:26

## 2021-06-26 RX ADMIN — HYDROCODONE BITARTRATE AND ACETAMINOPHEN 1 TABLET: 5; 325 TABLET ORAL at 20:26

## 2021-06-27 ENCOUNTER — APPOINTMENT (OUTPATIENT)
Dept: CARDIOLOGY | Facility: HOSPITAL | Age: 76
End: 2021-06-27

## 2021-06-27 VITALS
WEIGHT: 163 LBS | HEIGHT: 71 IN | RESPIRATION RATE: 16 BRPM | BODY MASS INDEX: 22.82 KG/M2 | TEMPERATURE: 97.8 F | OXYGEN SATURATION: 94 % | DIASTOLIC BLOOD PRESSURE: 88 MMHG | HEART RATE: 84 BPM | SYSTOLIC BLOOD PRESSURE: 146 MMHG

## 2021-06-27 PROBLEM — I25.10 CORONARY ATHEROSCLEROSIS: Status: ACTIVE | Noted: 2019-07-03

## 2021-06-27 PROBLEM — C34.90 SMALL CELL CARCINOMA OF LUNG: Status: ACTIVE | Noted: 2021-05-22

## 2021-06-27 PROBLEM — R59.0 MEDIASTINAL LYMPHADENOPATHY: Status: ACTIVE | Noted: 2021-05-19

## 2021-06-27 PROBLEM — I65.23 BILATERAL CAROTID ARTERY STENOSIS: Status: ACTIVE | Noted: 2017-12-14

## 2021-06-27 PROBLEM — E78.5 DYSLIPIDEMIA: Status: ACTIVE | Noted: 2018-06-21

## 2021-06-27 LAB
BASOPHILS # BLD MANUAL: 0.16 10*3/MM3 (ref 0–0.2)
BASOPHILS NFR BLD AUTO: 2 % (ref 0–1.5)
BH CV ECHO MEAS - ACS: 2.1 CM
BH CV ECHO MEAS - AO MAX PG (FULL): 0.96 MMHG
BH CV ECHO MEAS - AO MAX PG: 4.3 MMHG
BH CV ECHO MEAS - AO MEAN PG (FULL): 0.15 MMHG
BH CV ECHO MEAS - AO MEAN PG: 2.1 MMHG
BH CV ECHO MEAS - AO ROOT AREA (BSA CORRECTED): 1.8
BH CV ECHO MEAS - AO ROOT AREA: 9.3 CM^2
BH CV ECHO MEAS - AO ROOT DIAM: 3.4 CM
BH CV ECHO MEAS - AO V2 MAX: 104.2 CM/SEC
BH CV ECHO MEAS - AO V2 MEAN: 67.8 CM/SEC
BH CV ECHO MEAS - AO V2 VTI: 19.6 CM
BH CV ECHO MEAS - AORTIC HR: 82.4 BPM
BH CV ECHO MEAS - AORTIC R-R: 0.73 SEC
BH CV ECHO MEAS - ASC AORTA: 2.9 CM
BH CV ECHO MEAS - AVA(I,A): 3.4 CM^2
BH CV ECHO MEAS - AVA(I,D): 3.4 CM^2
BH CV ECHO MEAS - AVA(V,A): 3.2 CM^2
BH CV ECHO MEAS - AVA(V,D): 3.2 CM^2
BH CV ECHO MEAS - BSA(HAYCOCK): 1.9 M^2
BH CV ECHO MEAS - BSA: 1.9 M^2
BH CV ECHO MEAS - BZI_BMI: 22.7 KILOGRAMS/M^2
BH CV ECHO MEAS - BZI_METRIC_HEIGHT: 180.3 CM
BH CV ECHO MEAS - BZI_METRIC_WEIGHT: 73.9 KG
BH CV ECHO MEAS - CI(AO): 7.7 L/MIN/M^2
BH CV ECHO MEAS - CI(LVOT): 2.8 L/MIN/M^2
BH CV ECHO MEAS - CO(AO): 14.9 L/MIN
BH CV ECHO MEAS - CO(LVOT): 5.5 L/MIN
BH CV ECHO MEAS - EDV(CUBED): 73.8 ML
BH CV ECHO MEAS - EDV(MOD-SP4): 68.2 ML
BH CV ECHO MEAS - EDV(TEICH): 78.4 ML
BH CV ECHO MEAS - EF(CUBED): 81.4 %
BH CV ECHO MEAS - EF(MOD-BP): 67 %
BH CV ECHO MEAS - EF(MOD-SP4): 67.1 %
BH CV ECHO MEAS - EF(TEICH): 74.4 %
BH CV ECHO MEAS - ESV(CUBED): 13.7 ML
BH CV ECHO MEAS - ESV(MOD-SP4): 22.4 ML
BH CV ECHO MEAS - ESV(TEICH): 20.1 ML
BH CV ECHO MEAS - FS: 42.9 %
BH CV ECHO MEAS - IVS/LVPW: 1.4
BH CV ECHO MEAS - IVSD: 1.8 CM
BH CV ECHO MEAS - LA DIMENSION(2D): 3.6 CM
BH CV ECHO MEAS - LV DIASTOLIC VOL/BSA (35-75): 35.3 ML/M^2
BH CV ECHO MEAS - LV MASS(C)D: 253.1 GRAMS
BH CV ECHO MEAS - LV MASS(C)DI: 130.9 GRAMS/M^2
BH CV ECHO MEAS - LV MAX PG: 3.4 MMHG
BH CV ECHO MEAS - LV MEAN PG: 1.9 MMHG
BH CV ECHO MEAS - LV SYSTOLIC VOL/BSA (12-30): 11.6 ML/M^2
BH CV ECHO MEAS - LV V1 MAX: 91.9 CM/SEC
BH CV ECHO MEAS - LV V1 MEAN: 66.7 CM/SEC
BH CV ECHO MEAS - LV V1 VTI: 18.5 CM
BH CV ECHO MEAS - LVIDD: 4.2 CM
BH CV ECHO MEAS - LVIDS: 2.4 CM
BH CV ECHO MEAS - LVOT AREA: 3.6 CM^2
BH CV ECHO MEAS - LVOT DIAM: 2.1 CM
BH CV ECHO MEAS - LVPWD: 1.2 CM
BH CV ECHO MEAS - MV A MAX VEL: 93.3 CM/SEC
BH CV ECHO MEAS - MV DEC SLOPE: 282.4 CM/SEC^2
BH CV ECHO MEAS - MV DEC TIME: 0.23 SEC
BH CV ECHO MEAS - MV E MAX VEL: 65 CM/SEC
BH CV ECHO MEAS - MV E/A: 0.7
BH CV ECHO MEAS - MV MAX PG: 5.7 MMHG
BH CV ECHO MEAS - MV MEAN PG: 2.1 MMHG
BH CV ECHO MEAS - MV V2 MAX: 119.1 CM/SEC
BH CV ECHO MEAS - MV V2 MEAN: 67 CM/SEC
BH CV ECHO MEAS - MV V2 VTI: 28.3 CM
BH CV ECHO MEAS - MVA(VTI): 2.3 CM^2
BH CV ECHO MEAS - PA MAX PG (FULL): 2 MMHG
BH CV ECHO MEAS - PA MAX PG: 3.6 MMHG
BH CV ECHO MEAS - PA MEAN PG (FULL): 1.5 MMHG
BH CV ECHO MEAS - PA MEAN PG: 2.2 MMHG
BH CV ECHO MEAS - PA V2 MAX: 94.7 CM/SEC
BH CV ECHO MEAS - PA V2 MEAN: 70.7 CM/SEC
BH CV ECHO MEAS - PA V2 VTI: 18.4 CM
BH CV ECHO MEAS - PULM A REVS DUR: 0.1 SEC
BH CV ECHO MEAS - PULM A REVS VEL: 26.6 CM/SEC
BH CV ECHO MEAS - PULM DIAS VEL: 36.5 CM/SEC
BH CV ECHO MEAS - PULM S/D: 1.3
BH CV ECHO MEAS - PULM SYS VEL: 48.1 CM/SEC
BH CV ECHO MEAS - RV MAX PG: 1.6 MMHG
BH CV ECHO MEAS - RV MEAN PG: 0.68 MMHG
BH CV ECHO MEAS - RV V1 MAX: 63.2 CM/SEC
BH CV ECHO MEAS - RV V1 MEAN: 38 CM/SEC
BH CV ECHO MEAS - RV V1 VTI: 9.9 CM
BH CV ECHO MEAS - RVDD: 2.5 CM
BH CV ECHO MEAS - SI(AO): 93.6 ML/M^2
BH CV ECHO MEAS - SI(CUBED): 31.1 ML/M^2
BH CV ECHO MEAS - SI(LVOT): 34.3 ML/M^2
BH CV ECHO MEAS - SI(MOD-SP4): 23.7 ML/M^2
BH CV ECHO MEAS - SI(TEICH): 30.2 ML/M^2
BH CV ECHO MEAS - SV(AO): 181 ML
BH CV ECHO MEAS - SV(CUBED): 60.1 ML
BH CV ECHO MEAS - SV(LVOT): 66.3 ML
BH CV ECHO MEAS - SV(MOD-SP4): 45.8 ML
BH CV ECHO MEAS - SV(TEICH): 58.3 ML
BLASTS NFR BLD MANUAL: 1 % (ref 0–0)
BURR CELLS BLD QL SMEAR: ABNORMAL
DEPRECATED RDW RBC AUTO: 52.1 FL (ref 37–54)
EOSINOPHIL # BLD MANUAL: 0.16 10*3/MM3 (ref 0–0.4)
EOSINOPHIL NFR BLD MANUAL: 2 % (ref 0.3–6.2)
ERYTHROCYTE [DISTWIDTH] IN BLOOD BY AUTOMATED COUNT: 17.1 % (ref 12.3–15.4)
HCT VFR BLD AUTO: 34 % (ref 37.5–51)
HGB BLD-MCNC: 11.3 G/DL (ref 13–17.7)
LARGE PLATELETS: ABNORMAL
LYMPHOCYTES # BLD MANUAL: 1.05 10*3/MM3 (ref 0.7–3.1)
LYMPHOCYTES NFR BLD MANUAL: 10 % (ref 19.6–45.3)
LYMPHOCYTES NFR BLD MANUAL: 7 % (ref 5–12)
MCH RBC QN AUTO: 28.7 PG (ref 26.6–33)
MCHC RBC AUTO-ENTMCNC: 33.3 G/DL (ref 31.5–35.7)
MCV RBC AUTO: 86.4 FL (ref 79–97)
METAMYELOCYTES NFR BLD MANUAL: 2 % (ref 0–0)
MONOCYTES # BLD AUTO: 0.57 10*3/MM3 (ref 0.1–0.9)
MYELOCYTES NFR BLD MANUAL: 3 % (ref 0–0)
NEUTROPHILS # BLD AUTO: 5.51 10*3/MM3 (ref 1.7–7)
NEUTROPHILS NFR BLD MANUAL: 56 % (ref 42.7–76)
NEUTS BAND NFR BLD MANUAL: 12 % (ref 0–5)
NEUTS VAC BLD QL SMEAR: ABNORMAL
PLATELET # BLD AUTO: 45 10*3/MM3 (ref 140–450)
PMV BLD AUTO: 8.4 FL (ref 6–12)
PROMYELOCYTES NFR BLD MANUAL: 2 % (ref 0–0)
QT INTERVAL: 407 MS
RBC # BLD AUTO: 3.94 10*6/MM3 (ref 4.14–5.8)
SCAN SLIDE: NORMAL
SMALL PLATELETS BLD QL SMEAR: ABNORMAL
VARIANT LYMPHS NFR BLD MANUAL: 3 % (ref 0–5)
WBC # BLD AUTO: 8.1 10*3/MM3 (ref 3.4–10.8)

## 2021-06-27 PROCEDURE — 85007 BL SMEAR W/DIFF WBC COUNT: CPT | Performed by: NURSE PRACTITIONER

## 2021-06-27 PROCEDURE — 93306 TTE W/DOPPLER COMPLETE: CPT | Performed by: INTERNAL MEDICINE

## 2021-06-27 PROCEDURE — G0378 HOSPITAL OBSERVATION PER HR: HCPCS

## 2021-06-27 PROCEDURE — 85025 COMPLETE CBC W/AUTO DIFF WBC: CPT | Performed by: NURSE PRACTITIONER

## 2021-06-27 PROCEDURE — 93306 TTE W/DOPPLER COMPLETE: CPT

## 2021-06-27 PROCEDURE — 25010000002 CEFEPIME PER 500 MG: Performed by: FAMILY MEDICINE

## 2021-06-27 RX ORDER — CEFDINIR 300 MG/1
300 CAPSULE ORAL 2 TIMES DAILY
Qty: 14 CAPSULE | Refills: 0 | Status: SHIPPED | OUTPATIENT
Start: 2021-06-27 | End: 2021-07-04

## 2021-06-27 RX ORDER — PREDNISONE 10 MG/1
10 TABLET ORAL DAILY
Qty: 5 TABLET | Refills: 0 | Status: ON HOLD | OUTPATIENT
Start: 2021-06-27 | End: 2021-11-10

## 2021-06-27 RX ADMIN — SODIUM BICARBONATE 650 MG: 650 TABLET ORAL at 07:49

## 2021-06-27 RX ADMIN — METOPROLOL SUCCINATE 50 MG: 50 TABLET, EXTENDED RELEASE ORAL at 07:49

## 2021-06-27 RX ADMIN — CLOPIDOGREL BISULFATE 75 MG: 75 TABLET ORAL at 07:49

## 2021-06-27 RX ADMIN — Medication 10 ML: at 07:49

## 2021-06-27 RX ADMIN — CEFEPIME 2 G: 2 INJECTION, POWDER, FOR SOLUTION INTRAVENOUS at 07:49

## 2021-06-27 RX ADMIN — PANTOPRAZOLE SODIUM 40 MG: 40 TABLET, DELAYED RELEASE ORAL at 07:48

## 2021-06-27 RX ADMIN — ISOSORBIDE MONONITRATE 60 MG: 60 TABLET, EXTENDED RELEASE ORAL at 07:49

## 2021-06-27 RX ADMIN — ATORVASTATIN CALCIUM 40 MG: 40 TABLET, FILM COATED ORAL at 07:48

## 2021-06-28 ENCOUNTER — READMISSION MANAGEMENT (OUTPATIENT)
Dept: CALL CENTER | Facility: HOSPITAL | Age: 76
End: 2021-06-28

## 2021-06-28 LAB
LAB AP CASE REPORT: NORMAL
PATH REPORT.FINAL DX SPEC: NORMAL

## 2021-06-28 NOTE — OUTREACH NOTE
Prep Survey      Responses   [facility] facility patient discharged from?  [facility]   Is LACE score < 7 ?  No   Emergency Room discharge w/ pulse ox?  No   Eligibility  Readm Mgmt   Discharge diagnosis  Substernal chest pain likely secondary to pleural inflammation   Does the patient have one of the following disease processes/diagnoses(primary or secondary)?  Other   Does the patient have Home health ordered?  No   Is there a DME ordered?  No   Prep survey completed?  Yes          Sharon Armstrong RN

## 2021-06-29 ENCOUNTER — READMISSION MANAGEMENT (OUTPATIENT)
Dept: CALL CENTER | Facility: HOSPITAL | Age: 76
End: 2021-06-29

## 2021-06-29 LAB
CMV IGM SERPL IA-ACNC: <30 AU/ML (ref 0–29.9)
EBV VCA IGM SER IA-ACNC: <36 U/ML (ref 0–35.9)

## 2021-06-29 NOTE — OUTREACH NOTE
Medical Week 1 Survey      Responses   [facility] patient discharged from?  [facility]   Does the patient have one of the following disease processes/diagnoses(primary or secondary)?  Other   Week 1 attempt successful?  Yes   Call start time  0952   Call end time  0953   Meds reviewed with patient/caregiver?  Yes   Is the patient having any side effects they believe may be caused by any medication additions or changes?  No   Does the patient have all medications ordered at discharge?  Yes   Is the patient taking all medications as directed (includes completed medication regime)?  Yes   Does the patient have a primary care provider?   Yes   Does the patient have an appointment with their PCP within 7 days of discharge?  Yes   Has the patient kept scheduled appointments due by today?  N/A   Has home health visited the patient within 72 hours of discharge?  N/A   Psychosocial issues?  No   Did the patient receive a copy of their discharge instructions?  Yes   What is the patient's perception of their health status since discharge?  Improving   Is the patient/caregiver able to teach back the hierarchy of who to call/visit for symptoms/problems? PCP, Specialist, Home health nurse, Urgent Care, ED, 911  Yes   If the patient is a current smoker, are they able to teach back resources for cessation?  Not a smoker   Week 1 call completed?  Yes   Graduated  Yes   Is the patient interested in additional calls from an ambulatory ?  NOTE:  applies to high risk patients requiring additional follow-up.  No   Did the patient feel the follow up calls were helpful during their recovery period?  Yes   Was the number of calls appropriate?  Yes   Graduated/Revoked comments  Brief call.  States that he is fine and has no needs.          Clover Roldan RN

## 2021-07-01 LAB
BACTERIA SPEC AEROBE CULT: NORMAL
BACTERIA SPEC AEROBE CULT: NORMAL
MYCOBACTERIUM SPEC CULT: NORMAL
NIGHT BLUE STAIN TISS: NORMAL

## 2021-07-09 ENCOUNTER — TELEPHONE (OUTPATIENT)
Dept: CARDIOLOGY | Facility: CLINIC | Age: 76
End: 2021-07-09

## 2021-07-09 RX ORDER — METOPROLOL SUCCINATE 25 MG/1
25 TABLET, EXTENDED RELEASE ORAL DAILY
COMMUNITY
End: 2022-02-23 | Stop reason: SDUPTHER

## 2021-07-09 NOTE — TELEPHONE ENCOUNTER
Pt getting chemo treatments. His bp is in the 90's/50's in the mornings. Cancer center suggested he reach out to his cardiologist.

## 2021-08-05 DIAGNOSIS — D64.81 ANEMIA DUE TO CHEMOTHERAPY: Primary | ICD-10-CM

## 2021-08-05 DIAGNOSIS — T45.1X5A ANEMIA DUE TO CHEMOTHERAPY: Primary | ICD-10-CM

## 2021-08-05 DIAGNOSIS — E86.0 DEHYDRATION: ICD-10-CM

## 2021-08-05 PROBLEM — I87.8 POOR VENOUS ACCESS: Status: ACTIVE | Noted: 2021-08-05

## 2021-08-05 RX ORDER — HEPARIN SODIUM (PORCINE) LOCK FLUSH IV SOLN 100 UNIT/ML 100 UNIT/ML
500 SOLUTION INTRAVENOUS AS NEEDED
Status: CANCELLED | OUTPATIENT
Start: 2021-08-06

## 2021-08-05 RX ORDER — DEXTROSE AND SODIUM CHLORIDE 5; .9 G/100ML; G/100ML
500 INJECTION, SOLUTION INTRAVENOUS CONTINUOUS
Status: DISCONTINUED | OUTPATIENT
Start: 2021-08-06 | End: 2021-08-05

## 2021-08-05 RX ORDER — SODIUM CHLORIDE 0.9 % (FLUSH) 0.9 %
20 SYRINGE (ML) INJECTION AS NEEDED
Status: CANCELLED | OUTPATIENT
Start: 2021-08-06

## 2021-08-06 ENCOUNTER — HOSPITAL ENCOUNTER (OUTPATIENT)
Dept: INFUSION THERAPY | Facility: HOSPITAL | Age: 76
Setting detail: INFUSION SERIES
Discharge: HOME | End: 2021-08-06

## 2021-08-06 VITALS
RESPIRATION RATE: 16 BRPM | BODY MASS INDEX: 32.39 KG/M2 | TEMPERATURE: 97.7 F | OXYGEN SATURATION: 99 % | HEART RATE: 77 BPM | DIASTOLIC BLOOD PRESSURE: 49 MMHG | WEIGHT: 165 LBS | HEIGHT: 60 IN | SYSTOLIC BLOOD PRESSURE: 117 MMHG

## 2021-08-06 DIAGNOSIS — T45.1X5A ANEMIA DUE TO CHEMOTHERAPY: Primary | ICD-10-CM

## 2021-08-06 DIAGNOSIS — D64.81 ANEMIA DUE TO CHEMOTHERAPY: Primary | ICD-10-CM

## 2021-08-06 DIAGNOSIS — I87.8 POOR VENOUS ACCESS: ICD-10-CM

## 2021-08-06 LAB
ABO GROUP BLD: NORMAL
ANISOCYTOSIS BLD QL: ABNORMAL
BASOPHILS # BLD MANUAL: 0.08 10*3/MM3 (ref 0–0.2)
BASOPHILS NFR BLD AUTO: 2 % (ref 0–1.5)
BB HOLD TUBE: NORMAL
BLD GP AB SCN SERPL QL: NEGATIVE
DEPRECATED RDW RBC AUTO: 59.9 FL (ref 37–54)
ERYTHROCYTE [DISTWIDTH] IN BLOOD BY AUTOMATED COUNT: 19.3 % (ref 12.3–15.4)
HCT VFR BLD AUTO: 21.1 % (ref 37.5–51)
HGB BLD-MCNC: 7.1 G/DL (ref 13–17.7)
LYMPHOCYTES # BLD MANUAL: 0.23 10*3/MM3 (ref 0.7–3.1)
LYMPHOCYTES NFR BLD MANUAL: 3 % (ref 5–12)
LYMPHOCYTES NFR BLD MANUAL: 6 % (ref 19.6–45.3)
MCH RBC QN AUTO: 30.3 PG (ref 26.6–33)
MCHC RBC AUTO-ENTMCNC: 33.6 G/DL (ref 31.5–35.7)
MCV RBC AUTO: 90.1 FL (ref 79–97)
MONOCYTES # BLD AUTO: 0.12 10*3/MM3 (ref 0.1–0.9)
NEUTROPHILS # BLD AUTO: 3.47 10*3/MM3 (ref 1.7–7)
NEUTROPHILS NFR BLD MANUAL: 85 % (ref 42.7–76)
NEUTS BAND NFR BLD MANUAL: 4 % (ref 0–5)
PLAT MORPH BLD: NORMAL
PLATELET # BLD AUTO: 228 10*3/MM3 (ref 140–450)
PMV BLD AUTO: 7.6 FL (ref 6–12)
RBC # BLD AUTO: 2.34 10*6/MM3 (ref 4.14–5.8)
RH BLD: NEGATIVE
SCAN SLIDE: NORMAL
T&S EXPIRATION DATE: NORMAL
WBC # BLD AUTO: 3.9 10*3/MM3 (ref 3.4–10.8)
WBC MORPH BLD: NORMAL

## 2021-08-06 PROCEDURE — 85007 BL SMEAR W/DIFF WBC COUNT: CPT | Performed by: NURSE PRACTITIONER

## 2021-08-06 PROCEDURE — 96366 THER/PROPH/DIAG IV INF ADDON: CPT

## 2021-08-06 PROCEDURE — P9016 RBC LEUKOCYTES REDUCED: HCPCS

## 2021-08-06 PROCEDURE — 86850 RBC ANTIBODY SCREEN: CPT | Performed by: INTERNAL MEDICINE

## 2021-08-06 PROCEDURE — 25010000002 HEPARIN LOCK FLUSH PER 10 UNITS: Performed by: NURSE PRACTITIONER

## 2021-08-06 PROCEDURE — 86900 BLOOD TYPING SEROLOGIC ABO: CPT

## 2021-08-06 PROCEDURE — 36430 TRANSFUSION BLD/BLD COMPNT: CPT

## 2021-08-06 PROCEDURE — 85025 COMPLETE CBC W/AUTO DIFF WBC: CPT | Performed by: NURSE PRACTITIONER

## 2021-08-06 PROCEDURE — 96365 THER/PROPH/DIAG IV INF INIT: CPT

## 2021-08-06 PROCEDURE — 86923 COMPATIBILITY TEST ELECTRIC: CPT

## 2021-08-06 PROCEDURE — 86901 BLOOD TYPING SEROLOGIC RH(D): CPT

## 2021-08-06 PROCEDURE — 86901 BLOOD TYPING SEROLOGIC RH(D): CPT | Performed by: INTERNAL MEDICINE

## 2021-08-06 PROCEDURE — 86900 BLOOD TYPING SEROLOGIC ABO: CPT | Performed by: INTERNAL MEDICINE

## 2021-08-06 RX ORDER — HEPARIN SODIUM (PORCINE) LOCK FLUSH IV SOLN 100 UNIT/ML 100 UNIT/ML
500 SOLUTION INTRAVENOUS AS NEEDED
Status: CANCELLED | OUTPATIENT
Start: 2021-08-06

## 2021-08-06 RX ORDER — SODIUM CHLORIDE 0.9 % (FLUSH) 0.9 %
20 SYRINGE (ML) INJECTION AS NEEDED
Status: DISCONTINUED | OUTPATIENT
Start: 2021-08-06 | End: 2021-08-08 | Stop reason: HOSPADM

## 2021-08-06 RX ORDER — DEXTROSE AND SODIUM CHLORIDE 5; .9 G/100ML; G/100ML
500 INJECTION, SOLUTION INTRAVENOUS CONTINUOUS
Status: DISPENSED | OUTPATIENT
Start: 2021-08-06 | End: 2021-08-06

## 2021-08-06 RX ORDER — SODIUM CHLORIDE 0.9 % (FLUSH) 0.9 %
20 SYRINGE (ML) INJECTION AS NEEDED
Status: CANCELLED | OUTPATIENT
Start: 2021-08-06

## 2021-08-06 RX ORDER — HEPARIN SODIUM (PORCINE) LOCK FLUSH IV SOLN 100 UNIT/ML 100 UNIT/ML
500 SOLUTION INTRAVENOUS AS NEEDED
Status: DISCONTINUED | OUTPATIENT
Start: 2021-08-06 | End: 2021-08-08 | Stop reason: HOSPADM

## 2021-08-06 RX ORDER — IRON ASPGLY,PS/C/B12/FA/CA/SUC 150-25-1
1 CAPSULE ORAL
COMMUNITY

## 2021-08-06 RX ADMIN — Medication 500 UNITS: at 12:59

## 2021-08-06 RX ADMIN — DEXTROSE AND SODIUM CHLORIDE 325 ML/HR: 5; 900 INJECTION, SOLUTION INTRAVENOUS at 08:50

## 2021-08-06 RX ADMIN — Medication 20 ML: at 12:59

## 2021-08-07 LAB
BH BB BLOOD EXPIRATION DATE: NORMAL
BH BB BLOOD TYPE BARCODE: 9500
BH BB DISPENSE STATUS: NORMAL
BH BB PRODUCT CODE: NORMAL
BH BB UNIT NUMBER: NORMAL
CROSSMATCH INTERPRETATION: NORMAL
UNIT  ABO: NORMAL
UNIT  RH: NORMAL

## 2021-08-17 ENCOUNTER — LAB REQUISITION (OUTPATIENT)
Dept: LAB | Facility: HOSPITAL | Age: 76
End: 2021-08-17

## 2021-08-17 DIAGNOSIS — C34.90 MALIGNANT NEOPLASM OF UNSPECIFIED PART OF UNSPECIFIED BRONCHUS OR LUNG (HCC): ICD-10-CM

## 2021-08-17 LAB
ALBUMIN SERPL-MCNC: 3.8 G/DL (ref 3.5–5.2)
ALBUMIN/GLOB SERPL: 1.5 G/DL
ALP SERPL-CCNC: 144 U/L (ref 39–117)
ALT SERPL W P-5'-P-CCNC: 10 U/L (ref 1–41)
ANION GAP SERPL CALCULATED.3IONS-SCNC: 10 MMOL/L (ref 5–15)
AST SERPL-CCNC: 14 U/L (ref 1–40)
BILIRUB SERPL-MCNC: 0.3 MG/DL (ref 0–1.2)
BUN SERPL-MCNC: 28 MG/DL (ref 8–23)
BUN/CREAT SERPL: 15.1 (ref 7–25)
CALCIUM SPEC-SCNC: 8.9 MG/DL (ref 8.6–10.5)
CHLORIDE SERPL-SCNC: 105 MMOL/L (ref 98–107)
CO2 SERPL-SCNC: 25 MMOL/L (ref 22–29)
CREAT SERPL-MCNC: 1.85 MG/DL (ref 0.76–1.27)
GFR SERPL CREATININE-BSD FRML MDRD: 36 ML/MIN/1.73
GLOBULIN UR ELPH-MCNC: 2.5 GM/DL
GLUCOSE SERPL-MCNC: 123 MG/DL (ref 65–99)
POTASSIUM SERPL-SCNC: 4 MMOL/L (ref 3.5–5.2)
PROT SERPL-MCNC: 6.3 G/DL (ref 6–8.5)
SODIUM SERPL-SCNC: 140 MMOL/L (ref 136–145)

## 2021-08-17 PROCEDURE — 80053 COMPREHEN METABOLIC PANEL: CPT | Performed by: INTERNAL MEDICINE

## 2021-09-28 DIAGNOSIS — C34.90 SMALL CELL LUNG CANCER (HCC): Primary | ICD-10-CM

## 2021-09-28 DIAGNOSIS — T45.1X5A ANEMIA DUE TO CHEMOTHERAPY: ICD-10-CM

## 2021-09-28 DIAGNOSIS — D64.81 ANEMIA DUE TO CHEMOTHERAPY: ICD-10-CM

## 2021-09-28 LAB
ABO GROUP BLD: NORMAL
BB HOLD TUBE: NORMAL
BLD GP AB SCN SERPL QL: NEGATIVE
HCT VFR BLD AUTO: 21.2 % (ref 37.5–51)
HGB BLD-MCNC: 7.1 G/DL (ref 13–17.7)
RH BLD: NEGATIVE
T&S EXPIRATION DATE: NORMAL

## 2021-09-28 PROCEDURE — 86850 RBC ANTIBODY SCREEN: CPT | Performed by: INTERNAL MEDICINE

## 2021-09-28 PROCEDURE — 85018 HEMOGLOBIN: CPT | Performed by: INTERNAL MEDICINE

## 2021-09-28 PROCEDURE — 36591 DRAW BLOOD OFF VENOUS DEVICE: CPT

## 2021-09-28 PROCEDURE — 86901 BLOOD TYPING SEROLOGIC RH(D): CPT | Performed by: INTERNAL MEDICINE

## 2021-09-28 PROCEDURE — 85014 HEMATOCRIT: CPT | Performed by: INTERNAL MEDICINE

## 2021-09-28 PROCEDURE — 86900 BLOOD TYPING SEROLOGIC ABO: CPT | Performed by: INTERNAL MEDICINE

## 2021-09-28 PROCEDURE — 86923 COMPATIBILITY TEST ELECTRIC: CPT

## 2021-09-29 ENCOUNTER — HOSPITAL ENCOUNTER (OUTPATIENT)
Dept: INFUSION THERAPY | Facility: HOSPITAL | Age: 76
Setting detail: INFUSION SERIES
Discharge: HOME | End: 2021-09-29

## 2021-09-29 VITALS
HEART RATE: 77 BPM | TEMPERATURE: 97.6 F | OXYGEN SATURATION: 100 % | DIASTOLIC BLOOD PRESSURE: 81 MMHG | SYSTOLIC BLOOD PRESSURE: 126 MMHG | RESPIRATION RATE: 18 BRPM

## 2021-09-29 DIAGNOSIS — D64.81 ANEMIA DUE TO CHEMOTHERAPY: ICD-10-CM

## 2021-09-29 DIAGNOSIS — C34.90 SMALL CELL LUNG CANCER (HCC): ICD-10-CM

## 2021-09-29 DIAGNOSIS — C34.90 SMALL CELL CARCINOMA OF LUNG (HCC): Primary | ICD-10-CM

## 2021-09-29 DIAGNOSIS — T45.1X5A ANEMIA DUE TO CHEMOTHERAPY: ICD-10-CM

## 2021-09-29 DIAGNOSIS — I87.8 POOR VENOUS ACCESS: ICD-10-CM

## 2021-09-29 PROCEDURE — P9016 RBC LEUKOCYTES REDUCED: HCPCS

## 2021-09-29 PROCEDURE — 86900 BLOOD TYPING SEROLOGIC ABO: CPT

## 2021-09-29 PROCEDURE — 36430 TRANSFUSION BLD/BLD COMPNT: CPT

## 2021-09-29 RX ORDER — SODIUM CHLORIDE 0.9 % (FLUSH) 0.9 %
20 SYRINGE (ML) INJECTION AS NEEDED
Status: DISCONTINUED | OUTPATIENT
Start: 2021-09-29 | End: 2021-10-01 | Stop reason: HOSPADM

## 2021-09-29 RX ORDER — SODIUM CHLORIDE 0.9 % (FLUSH) 0.9 %
20 SYRINGE (ML) INJECTION AS NEEDED
OUTPATIENT
Start: 2021-09-29

## 2021-09-29 RX ORDER — HEPARIN SODIUM (PORCINE) LOCK FLUSH IV SOLN 100 UNIT/ML 100 UNIT/ML
500 SOLUTION INTRAVENOUS AS NEEDED
OUTPATIENT
Start: 2021-09-29

## 2021-09-29 RX ORDER — HEPARIN SODIUM (PORCINE) LOCK FLUSH IV SOLN 100 UNIT/ML 100 UNIT/ML
500 SOLUTION INTRAVENOUS AS NEEDED
Status: DISCONTINUED | OUTPATIENT
Start: 2021-09-29 | End: 2021-10-01 | Stop reason: HOSPADM

## 2021-11-09 ENCOUNTER — APPOINTMENT (OUTPATIENT)
Dept: GENERAL RADIOLOGY | Facility: HOSPITAL | Age: 76
End: 2021-11-09

## 2021-11-09 ENCOUNTER — APPOINTMENT (OUTPATIENT)
Dept: MRI IMAGING | Facility: HOSPITAL | Age: 76
End: 2021-11-09

## 2021-11-09 ENCOUNTER — HOSPITAL ENCOUNTER (INPATIENT)
Facility: HOSPITAL | Age: 76
LOS: 2 days | Discharge: HOME HEALTH | End: 2021-11-12
Attending: EMERGENCY MEDICINE | Admitting: INTERNAL MEDICINE

## 2021-11-09 DIAGNOSIS — E86.0 DEHYDRATION: ICD-10-CM

## 2021-11-09 DIAGNOSIS — D64.9 ANEMIA, NORMOCYTIC NORMOCHROMIC: ICD-10-CM

## 2021-11-09 DIAGNOSIS — C34.90 MALIGNANT NEOPLASM OF LUNG, UNSPECIFIED LATERALITY, UNSPECIFIED PART OF LUNG (HCC): ICD-10-CM

## 2021-11-09 DIAGNOSIS — R55 NEAR SYNCOPE: Primary | ICD-10-CM

## 2021-11-09 LAB
ALBUMIN SERPL-MCNC: 3.5 G/DL (ref 3.5–5.2)
ALBUMIN/GLOB SERPL: 1.3 G/DL
ALP SERPL-CCNC: 108 U/L (ref 39–117)
ALT SERPL W P-5'-P-CCNC: 11 U/L (ref 1–41)
ANION GAP SERPL CALCULATED.3IONS-SCNC: 15 MMOL/L (ref 5–15)
AST SERPL-CCNC: 14 U/L (ref 1–40)
BACTERIA UR QL AUTO: ABNORMAL /HPF
BASOPHILS # BLD AUTO: 0 10*3/MM3 (ref 0–0.2)
BASOPHILS NFR BLD AUTO: 0.4 % (ref 0–1.5)
BILIRUB SERPL-MCNC: 0.5 MG/DL (ref 0–1.2)
BILIRUB UR QL STRIP: NEGATIVE
BUN SERPL-MCNC: 46 MG/DL (ref 8–23)
BUN/CREAT SERPL: 25.4 (ref 7–25)
CALCIUM SPEC-SCNC: 8.7 MG/DL (ref 8.6–10.5)
CHLORIDE SERPL-SCNC: 100 MMOL/L (ref 98–107)
CLARITY UR: CLEAR
CO2 SERPL-SCNC: 21 MMOL/L (ref 22–29)
COLOR UR: YELLOW
CREAT SERPL-MCNC: 1.81 MG/DL (ref 0.76–1.27)
CRP SERPL-MCNC: 6.66 MG/DL (ref 0–0.5)
DEPRECATED RDW RBC AUTO: 71.3 FL (ref 37–54)
EOSINOPHIL # BLD AUTO: 0.3 10*3/MM3 (ref 0–0.4)
EOSINOPHIL NFR BLD AUTO: 2.6 % (ref 0.3–6.2)
ERYTHROCYTE [DISTWIDTH] IN BLOOD BY AUTOMATED COUNT: 22.1 % (ref 12.3–15.4)
GFR SERPL CREATININE-BSD FRML MDRD: 37 ML/MIN/1.73
GLOBULIN UR ELPH-MCNC: 2.6 GM/DL
GLUCOSE SERPL-MCNC: 106 MG/DL (ref 65–99)
GLUCOSE UR STRIP-MCNC: NEGATIVE MG/DL
HCT VFR BLD AUTO: 26.2 % (ref 37.5–51)
HGB BLD-MCNC: 8.8 G/DL (ref 13–17.7)
HGB UR QL STRIP.AUTO: NEGATIVE
HYALINE CASTS UR QL AUTO: ABNORMAL /LPF
KETONES UR QL STRIP: NEGATIVE
LEUKOCYTE ESTERASE UR QL STRIP.AUTO: NEGATIVE
LYMPHOCYTES # BLD AUTO: 0.2 10*3/MM3 (ref 0.7–3.1)
LYMPHOCYTES NFR BLD AUTO: 1.6 % (ref 19.6–45.3)
MAGNESIUM SERPL-MCNC: 1.9 MG/DL (ref 1.6–2.4)
MCH RBC QN AUTO: 31.6 PG (ref 26.6–33)
MCHC RBC AUTO-ENTMCNC: 33.7 G/DL (ref 31.5–35.7)
MCV RBC AUTO: 93.8 FL (ref 79–97)
MONOCYTES # BLD AUTO: 0.8 10*3/MM3 (ref 0.1–0.9)
MONOCYTES NFR BLD AUTO: 7.8 % (ref 5–12)
NEUTROPHILS NFR BLD AUTO: 8.9 10*3/MM3 (ref 1.7–7)
NEUTROPHILS NFR BLD AUTO: 87.6 % (ref 42.7–76)
NITRITE UR QL STRIP: NEGATIVE
NRBC BLD AUTO-RTO: 0.1 /100 WBC (ref 0–0.2)
PH UR STRIP.AUTO: 6 [PH] (ref 5–8)
PLATELET # BLD AUTO: 208 10*3/MM3 (ref 140–450)
PMV BLD AUTO: 7.1 FL (ref 6–12)
POTASSIUM SERPL-SCNC: 4.2 MMOL/L (ref 3.5–5.2)
PROT SERPL-MCNC: 6.1 G/DL (ref 6–8.5)
PROT UR QL STRIP: ABNORMAL
RBC # BLD AUTO: 2.79 10*6/MM3 (ref 4.14–5.8)
RBC # UR: ABNORMAL /HPF
REF LAB TEST METHOD: ABNORMAL
SARS-COV-2 RNA PNL SPEC NAA+PROBE: NOT DETECTED
SODIUM SERPL-SCNC: 136 MMOL/L (ref 136–145)
SP GR UR STRIP: 1.01 (ref 1–1.03)
SQUAMOUS #/AREA URNS HPF: ABNORMAL /HPF
THEOPHYLLINE SERPL-MCNC: 12.3 MCG/ML (ref 10–20)
TROPONIN T SERPL-MCNC: 0.03 NG/ML (ref 0–0.03)
UROBILINOGEN UR QL STRIP: ABNORMAL
WBC # BLD AUTO: 10.1 10*3/MM3 (ref 3.4–10.8)
WBC UR QL AUTO: ABNORMAL /HPF

## 2021-11-09 PROCEDURE — 80198 ASSAY OF THEOPHYLLINE: CPT | Performed by: EMERGENCY MEDICINE

## 2021-11-09 PROCEDURE — 25010000002 MIDAZOLAM PER 1 MG: Performed by: EMERGENCY MEDICINE

## 2021-11-09 PROCEDURE — G0378 HOSPITAL OBSERVATION PER HR: HCPCS

## 2021-11-09 PROCEDURE — 83735 ASSAY OF MAGNESIUM: CPT | Performed by: EMERGENCY MEDICINE

## 2021-11-09 PROCEDURE — U0003 INFECTIOUS AGENT DETECTION BY NUCLEIC ACID (DNA OR RNA); SEVERE ACUTE RESPIRATORY SYNDROME CORONAVIRUS 2 (SARS-COV-2) (CORONAVIRUS DISEASE [COVID-19]), AMPLIFIED PROBE TECHNIQUE, MAKING USE OF HIGH THROUGHPUT TECHNOLOGIES AS DESCRIBED BY CMS-2020-01-R: HCPCS | Performed by: EMERGENCY MEDICINE

## 2021-11-09 PROCEDURE — 25010000002 CEFEPIME PER 500 MG: Performed by: EMERGENCY MEDICINE

## 2021-11-09 PROCEDURE — 87040 BLOOD CULTURE FOR BACTERIA: CPT | Performed by: EMERGENCY MEDICINE

## 2021-11-09 PROCEDURE — 86140 C-REACTIVE PROTEIN: CPT | Performed by: EMERGENCY MEDICINE

## 2021-11-09 PROCEDURE — 99284 EMERGENCY DEPT VISIT MOD MDM: CPT

## 2021-11-09 PROCEDURE — 84484 ASSAY OF TROPONIN QUANT: CPT | Performed by: EMERGENCY MEDICINE

## 2021-11-09 PROCEDURE — 80053 COMPREHEN METABOLIC PANEL: CPT | Performed by: EMERGENCY MEDICINE

## 2021-11-09 PROCEDURE — 25010000002 DEXAMETHASONE PER 1 MG: Performed by: EMERGENCY MEDICINE

## 2021-11-09 PROCEDURE — 81001 URINALYSIS AUTO W/SCOPE: CPT | Performed by: EMERGENCY MEDICINE

## 2021-11-09 PROCEDURE — 93005 ELECTROCARDIOGRAM TRACING: CPT | Performed by: EMERGENCY MEDICINE

## 2021-11-09 PROCEDURE — U0005 INFEC AGEN DETEC AMPLI PROBE: HCPCS | Performed by: EMERGENCY MEDICINE

## 2021-11-09 PROCEDURE — 70551 MRI BRAIN STEM W/O DYE: CPT

## 2021-11-09 PROCEDURE — 71045 X-RAY EXAM CHEST 1 VIEW: CPT

## 2021-11-09 PROCEDURE — 85025 COMPLETE CBC W/AUTO DIFF WBC: CPT | Performed by: EMERGENCY MEDICINE

## 2021-11-09 RX ORDER — MIDAZOLAM HYDROCHLORIDE 1 MG/ML
2 INJECTION INTRAMUSCULAR; INTRAVENOUS ONCE
Status: COMPLETED | OUTPATIENT
Start: 2021-11-09 | End: 2021-11-09

## 2021-11-09 RX ORDER — DEXAMETHASONE SODIUM PHOSPHATE 4 MG/ML
8 INJECTION, SOLUTION INTRA-ARTICULAR; INTRALESIONAL; INTRAMUSCULAR; INTRAVENOUS; SOFT TISSUE ONCE
Status: COMPLETED | OUTPATIENT
Start: 2021-11-09 | End: 2021-11-09

## 2021-11-09 RX ADMIN — MIDAZOLAM 2 MG: 1 INJECTION INTRAMUSCULAR; INTRAVENOUS at 17:07

## 2021-11-09 RX ADMIN — CEFEPIME 2 G: 2 INJECTION, POWDER, FOR SOLUTION INTRAVENOUS at 19:35

## 2021-11-09 RX ADMIN — DEXAMETHASONE SODIUM PHOSPHATE 8 MG: 4 INJECTION, SOLUTION INTRAMUSCULAR; INTRAVENOUS at 16:33

## 2021-11-09 RX ADMIN — SODIUM CHLORIDE, POTASSIUM CHLORIDE, SODIUM LACTATE AND CALCIUM CHLORIDE 2286 ML: 600; 310; 30; 20 INJECTION, SOLUTION INTRAVENOUS at 20:40

## 2021-11-09 NOTE — ED NOTES
Pt reports he fell around 2 hours ago at home on hard xander. Pt tried getting up on his own and hit his head on a table. Pt denies passing out. Pt on Plavix.      Bon Duke, [place]  11/09/21 2093

## 2021-11-10 ENCOUNTER — APPOINTMENT (OUTPATIENT)
Dept: CARDIOLOGY | Facility: HOSPITAL | Age: 76
End: 2021-11-10

## 2021-11-10 LAB
ANION GAP SERPL CALCULATED.3IONS-SCNC: 14 MMOL/L (ref 5–15)
BUN SERPL-MCNC: 47 MG/DL (ref 8–23)
BUN/CREAT SERPL: 26.7 (ref 7–25)
CALCIUM SPEC-SCNC: 8.6 MG/DL (ref 8.6–10.5)
CHLORIDE SERPL-SCNC: 106 MMOL/L (ref 98–107)
CO2 SERPL-SCNC: 20 MMOL/L (ref 22–29)
CREAT SERPL-MCNC: 1.76 MG/DL (ref 0.76–1.27)
D DIMER PPP FEU-MCNC: 2.49 MG/L (FEU) (ref 0–0.59)
DEPRECATED RDW RBC AUTO: 69.1 FL (ref 37–54)
ERYTHROCYTE [DISTWIDTH] IN BLOOD BY AUTOMATED COUNT: 21.2 % (ref 12.3–15.4)
FERRITIN SERPL-MCNC: 474.6 NG/ML (ref 30–400)
GFR SERPL CREATININE-BSD FRML MDRD: 38 ML/MIN/1.73
GLUCOSE SERPL-MCNC: 145 MG/DL (ref 65–99)
HCT VFR BLD AUTO: 26.1 % (ref 37.5–51)
HGB BLD-MCNC: 8.7 G/DL (ref 13–17.7)
IRON 24H UR-MRATE: 28 MCG/DL (ref 59–158)
IRON SATN MFR SERPL: 12 % (ref 20–50)
MCH RBC QN AUTO: 31.1 PG (ref 26.6–33)
MCHC RBC AUTO-ENTMCNC: 33.3 G/DL (ref 31.5–35.7)
MCV RBC AUTO: 93.3 FL (ref 79–97)
NT-PROBNP SERPL-MCNC: 1930 PG/ML (ref 0–1800)
PLATELET # BLD AUTO: 222 10*3/MM3 (ref 140–450)
PMV BLD AUTO: 7.6 FL (ref 6–12)
POTASSIUM SERPL-SCNC: 4.4 MMOL/L (ref 3.5–5.2)
QT INTERVAL: 392 MS
RBC # BLD AUTO: 2.8 10*6/MM3 (ref 4.14–5.8)
SODIUM SERPL-SCNC: 140 MMOL/L (ref 136–145)
TIBC SERPL-MCNC: 231 MCG/DL (ref 298–536)
TRANSFERRIN SERPL-MCNC: 155 MG/DL (ref 200–360)
WBC # BLD AUTO: 8.2 10*3/MM3 (ref 3.4–10.8)

## 2021-11-10 PROCEDURE — 97162 PT EVAL MOD COMPLEX 30 MIN: CPT

## 2021-11-10 PROCEDURE — 25010000002 CEFEPIME PER 500 MG: Performed by: FAMILY MEDICINE

## 2021-11-10 PROCEDURE — 99222 1ST HOSP IP/OBS MODERATE 55: CPT | Performed by: INTERNAL MEDICINE

## 2021-11-10 PROCEDURE — 85379 FIBRIN DEGRADATION QUANT: CPT | Performed by: INTERNAL MEDICINE

## 2021-11-10 PROCEDURE — 82728 ASSAY OF FERRITIN: CPT | Performed by: NURSE PRACTITIONER

## 2021-11-10 PROCEDURE — 94799 UNLISTED PULMONARY SVC/PX: CPT

## 2021-11-10 PROCEDURE — 84466 ASSAY OF TRANSFERRIN: CPT | Performed by: NURSE PRACTITIONER

## 2021-11-10 PROCEDURE — 87040 BLOOD CULTURE FOR BACTERIA: CPT | Performed by: EMERGENCY MEDICINE

## 2021-11-10 PROCEDURE — 83880 ASSAY OF NATRIURETIC PEPTIDE: CPT | Performed by: INTERNAL MEDICINE

## 2021-11-10 PROCEDURE — 93306 TTE W/DOPPLER COMPLETE: CPT

## 2021-11-10 PROCEDURE — 80048 BASIC METABOLIC PNL TOTAL CA: CPT | Performed by: FAMILY MEDICINE

## 2021-11-10 PROCEDURE — 93306 TTE W/DOPPLER COMPLETE: CPT | Performed by: INTERNAL MEDICINE

## 2021-11-10 PROCEDURE — 85027 COMPLETE CBC AUTOMATED: CPT | Performed by: FAMILY MEDICINE

## 2021-11-10 PROCEDURE — 83540 ASSAY OF IRON: CPT | Performed by: NURSE PRACTITIONER

## 2021-11-10 PROCEDURE — 25010000002 ENOXAPARIN PER 10 MG: Performed by: INTERNAL MEDICINE

## 2021-11-10 RX ORDER — HYDROCODONE BITARTRATE AND ACETAMINOPHEN 5; 325 MG/1; MG/1
1 TABLET ORAL NIGHTLY PRN
Status: DISCONTINUED | OUTPATIENT
Start: 2021-11-10 | End: 2021-11-12 | Stop reason: HOSPADM

## 2021-11-10 RX ORDER — SODIUM CHLORIDE 9 MG/ML
100 INJECTION, SOLUTION INTRAVENOUS CONTINUOUS
Status: DISCONTINUED | OUTPATIENT
Start: 2021-11-10 | End: 2021-11-12 | Stop reason: HOSPADM

## 2021-11-10 RX ORDER — SODIUM BICARBONATE 650 MG/1
650 TABLET ORAL 2 TIMES DAILY
Status: DISCONTINUED | OUTPATIENT
Start: 2021-11-10 | End: 2021-11-12 | Stop reason: HOSPADM

## 2021-11-10 RX ORDER — ATORVASTATIN CALCIUM 40 MG/1
40 TABLET, FILM COATED ORAL DAILY
Status: DISCONTINUED | OUTPATIENT
Start: 2021-11-10 | End: 2021-11-12 | Stop reason: HOSPADM

## 2021-11-10 RX ORDER — IRON POLYSACCHARIDE COMPLEX 150 MG
CAPSULE ORAL
Status: ON HOLD | COMMUNITY
Start: 2021-09-13 | End: 2021-11-10

## 2021-11-10 RX ORDER — ARFORMOTEROL TARTRATE 15 UG/2ML
15 SOLUTION RESPIRATORY (INHALATION)
Status: DISCONTINUED | OUTPATIENT
Start: 2021-11-10 | End: 2021-11-12 | Stop reason: HOSPADM

## 2021-11-10 RX ORDER — BUDESONIDE 0.5 MG/2ML
0.5 INHALANT ORAL
Status: DISCONTINUED | OUTPATIENT
Start: 2021-11-10 | End: 2021-11-12 | Stop reason: HOSPADM

## 2021-11-10 RX ORDER — PANTOPRAZOLE SODIUM 40 MG/1
40 TABLET, DELAYED RELEASE ORAL DAILY
COMMUNITY
Start: 2021-09-07

## 2021-11-10 RX ORDER — PREDNISONE 10 MG/1
10 TABLET ORAL DAILY
Status: DISCONTINUED | OUTPATIENT
Start: 2021-11-10 | End: 2021-11-10

## 2021-11-10 RX ORDER — CLOPIDOGREL BISULFATE 75 MG/1
75 TABLET ORAL DAILY
Status: DISCONTINUED | OUTPATIENT
Start: 2021-11-10 | End: 2021-11-12 | Stop reason: HOSPADM

## 2021-11-10 RX ORDER — METOPROLOL SUCCINATE 25 MG/1
25 TABLET, EXTENDED RELEASE ORAL DAILY
Status: DISCONTINUED | OUTPATIENT
Start: 2021-11-10 | End: 2021-11-12 | Stop reason: HOSPADM

## 2021-11-10 RX ORDER — FERROUS SULFATE TAB EC 324 MG (65 MG FE EQUIVALENT) 324 (65 FE) MG
324 TABLET DELAYED RESPONSE ORAL
Status: DISCONTINUED | OUTPATIENT
Start: 2021-11-11 | End: 2021-11-12 | Stop reason: HOSPADM

## 2021-11-10 RX ORDER — THEOPHYLLINE 300 MG/1
300 TABLET, EXTENDED RELEASE ORAL EVERY 12 HOURS SCHEDULED
Status: DISCONTINUED | OUTPATIENT
Start: 2021-11-10 | End: 2021-11-12 | Stop reason: HOSPADM

## 2021-11-10 RX ORDER — ASPIRIN 81 MG/1
81 TABLET ORAL
Status: DISCONTINUED | OUTPATIENT
Start: 2021-11-10 | End: 2021-11-12 | Stop reason: HOSPADM

## 2021-11-10 RX ORDER — PANTOPRAZOLE SODIUM 40 MG/1
40 TABLET, DELAYED RELEASE ORAL DAILY
Status: DISCONTINUED | OUTPATIENT
Start: 2021-11-10 | End: 2021-11-12 | Stop reason: HOSPADM

## 2021-11-10 RX ORDER — ONDANSETRON 4 MG/1
4 TABLET, FILM COATED ORAL EVERY 6 HOURS PRN
Status: DISCONTINUED | OUTPATIENT
Start: 2021-11-10 | End: 2021-11-12 | Stop reason: HOSPADM

## 2021-11-10 RX ORDER — SACCHAROMYCES BOULARDII 250 MG
250 CAPSULE ORAL
Status: DISCONTINUED | OUTPATIENT
Start: 2021-11-10 | End: 2021-11-12 | Stop reason: HOSPADM

## 2021-11-10 RX ORDER — ACETAMINOPHEN 325 MG/1
650 TABLET ORAL EVERY 6 HOURS PRN
Status: DISCONTINUED | OUTPATIENT
Start: 2021-11-10 | End: 2021-11-12 | Stop reason: HOSPADM

## 2021-11-10 RX ORDER — MONTELUKAST SODIUM 10 MG/1
10 TABLET ORAL NIGHTLY
Status: DISCONTINUED | OUTPATIENT
Start: 2021-11-10 | End: 2021-11-12 | Stop reason: HOSPADM

## 2021-11-10 RX ORDER — BACLOFEN 5 MG/1
TABLET ORAL
Status: ON HOLD | COMMUNITY
Start: 2021-09-08 | End: 2021-11-10

## 2021-11-10 RX ADMIN — ENOXAPARIN SODIUM 30 MG: 30 INJECTION SUBCUTANEOUS at 20:56

## 2021-11-10 RX ADMIN — SODIUM BICARBONATE 650 MG TABLET 650 MG: at 20:56

## 2021-11-10 RX ADMIN — ATORVASTATIN CALCIUM 40 MG: 40 TABLET, FILM COATED ORAL at 14:58

## 2021-11-10 RX ADMIN — ACETAMINOPHEN 650 MG: 325 TABLET, FILM COATED ORAL at 11:57

## 2021-11-10 RX ADMIN — ASPIRIN 81 MG: 81 TABLET, COATED ORAL at 14:57

## 2021-11-10 RX ADMIN — SODIUM CHLORIDE 100 ML/HR: 9 INJECTION, SOLUTION INTRAVENOUS at 05:00

## 2021-11-10 RX ADMIN — Medication 250 MG: at 14:58

## 2021-11-10 RX ADMIN — CEFEPIME 2 G: 2 INJECTION, POWDER, FOR SOLUTION INTRAVENOUS at 11:57

## 2021-11-10 RX ADMIN — ENOXAPARIN SODIUM 40 MG: 40 INJECTION SUBCUTANEOUS at 14:58

## 2021-11-10 RX ADMIN — CLOPIDOGREL BISULFATE 75 MG: 75 TABLET ORAL at 14:58

## 2021-11-10 RX ADMIN — THEOPHYLLINE 300 MG: 300 TABLET, EXTENDED RELEASE ORAL at 14:57

## 2021-11-10 RX ADMIN — BUDESONIDE 0.5 MG: 0.5 SUSPENSION RESPIRATORY (INHALATION) at 20:14

## 2021-11-10 RX ADMIN — SODIUM CHLORIDE 100 ML/HR: 9 INJECTION, SOLUTION INTRAVENOUS at 11:52

## 2021-11-10 RX ADMIN — PANTOPRAZOLE SODIUM 40 MG: 40 TABLET, DELAYED RELEASE ORAL at 14:57

## 2021-11-10 RX ADMIN — THEOPHYLLINE 300 MG: 300 TABLET, EXTENDED RELEASE ORAL at 20:58

## 2021-11-10 RX ADMIN — MONTELUKAST 10 MG: 10 TABLET, FILM COATED ORAL at 20:56

## 2021-11-10 RX ADMIN — METOPROLOL SUCCINATE 25 MG: 25 TABLET, EXTENDED RELEASE ORAL at 14:58

## 2021-11-10 NOTE — CASE MANAGEMENT/SOCIAL WORK
Discharge Planning Assessment   [facility]     Patient Name: David A Jolissaint  MRN: [id number]  Today's Date: 11/10/2021    Admit Date: 11/9/2021     Discharge Needs Assessment     Row Name 11/10/21 1602       Living Environment    Lives With spouse    Name(s) of Who Lives With Patient Mary    Current Living Arrangements home/apartment/condo    Primary Care Provided by self    Provides Primary Care For no one    Family Caregiver if Needed significant other    Quality of Family Relationships unable to assess    Able to Return to Prior Arrangements yes       Resource/Environmental Concerns    Resource/Environmental Concerns none    Transportation Concerns car, none       Transition Planning    Patient/Family Anticipates Transition to home with family    Patient/Family Anticipated Services at Transition home health care    Transportation Anticipated family or friend will provide       Discharge Needs Assessment    Equipment Currently Used at Home walker, rolling; wheelchair; shower chair; prosthesis    Concerns to be Addressed discharge planning    Anticipated Changes Related to Illness none    Equipment Needed After Discharge none    Outpatient/Agency/Support Group Needs homecare agency    Discharge Facility/Level of Care Needs home with home health    Provided Post Acute Provider List? Yes    Post Acute Provider List Nursing Home    Delivered To Patient    Method of Delivery In person               Discharge Plan     Row Name 11/10/21 1606       Plan    Plan Home,  possible with Home health    Plan Comments Spoke with patient at bedside. Confirmed pharmacy and pcp.  Spoke with patient about possible need for home health.  Patient did not want  committed to this, however stated he would like to think about,  Gave patient list of home health providers.  Will check back with patient tomorrow.              Continued Care and Services - Admitted Since 11/9/2021    Coordination has not been started for this encounter.        Expected Discharge Date and Time     Expected Discharge Date Expected Discharge Time    Nov 12, 2021          Demographic Summary     Row Name 11/10/21 1601       General Information    Admission Type observation    Arrived From emergency department    Required Notices Provided Observation Status Notice    Referral Source admission list    Reason for Consult discharge planning    Preferred Language English               Functional Status     Row Name 11/10/21 1602       Functional Status    Usual Activity Tolerance good    Current Activity Tolerance moderate       Functional Status, IADL    Medications independent    Meal Preparation assistive person    Housekeeping assistive person    Laundry assistive person    Shopping assistive person       Mental Status    General Appearance WDL WDL       Mental Status Summary    Recent Changes in Mental Status/Cognitive Functioning no changes                         Daisha Lima RN   Met with patient in room wearing PPE: mask, face shield/goggles, gloves, gown.      Maintained distance greater than six feet and spent less than 15 minutes in the room.

## 2021-11-10 NOTE — H&P
Patient Care Team:  Walter Valero MD as PCP - General  [facility] MD as Consulting Physician (Interventional Cardiology)  Riki Hedrick MD as Consulting Physician (Hematology and Oncology)  Hoang Mendez MD as Consulting Physician (Nephrology)    Chief complaint weakness    Subjective     75y/o WM presented to the ER complaining of increased weakness yesterday.   patient states he slumped to the floor after he stood up at the refrigerator.   States he was weak and lightheaded/dizzy.  He states that he has had no vomiting or diarrhea.  He states that he had chemotherapy recently.  He reports no hemoptysis.  He denies melena, hematemesis hematochezia.  He states he has not felt good for about 24 hours  (weak and tired).  Reports no focal neurologic defects or lateralizing neurologic signs. he states he intermittently has a headache since his radiation.  he gets like this when he needs to drink more fluids.  In the ER, creatine was slightly elevated at 1.8, UA with ketones, hgb 8.8 (stable), CXR without pneumonia, MRI brain without acute changes.  He was started on IVF and admitted for dehydration and near syncope.  Today, he does feel a little better.  Still weak and occasionally dizzy.    Review of Systems   Constitutional: Positive for activity change, appetite change and fatigue. Negative for fever.   Respiratory: Positive for cough and wheezing.    Cardiovascular: Negative.    Gastrointestinal: Negative.    Musculoskeletal: Positive for arthralgias, back pain and gait problem.   Neurological: Positive for dizziness, syncope (near syncope) and weakness.          History  Past Medical History:   Diagnosis Date   • Anemia    • Cellulitis    • CKD (chronic kidney disease), stage III (CMS/HCC)    • COPD (chronic obstructive pulmonary disease) (CMS/HCC)    • Diverticulitis    • Dyslipidemia    • Hypertension    • Myocardial infarction (CMS/HCC)    • PVD (peripheral vascular disease) (CMS/[facility])      Past  Surgical History:   Procedure Laterality Date   • BRONCHOSCOPY N/A 5/20/2021    Procedure: BRONCHOSCOPY WITH ENDOBRONCHIAL ULTRASOUND WITH FINE NEEDLE ASPIRATION. BRONCHOALVEOLAR LAVAGE;  Surgeon: Jackson Quezada MD;  Location: [facility] ENDOSCOPY;  Service: Pulmonary;  Laterality: N/A;  subcarinal mass   • CARDIAC SURGERY     • CHOLECYSTECTOMY     • GALLBLADDER SURGERY     • LEG AMPUTATION Left      Family History   Problem Relation Age of Onset   • Heart attack Father    • Heart disease Father      Social History     Tobacco Use   • Smoking status: Former Smoker     Packs/day: 2.00     Years: 50.00     Pack years: 100.00   • Smokeless tobacco: Never Used   • Tobacco comment: Says he quit approximately in 2005.   Substance Use Topics   • Alcohol use: Not Currently   • Drug use: Never     Medications Prior to Admission   Medication Sig Dispense Refill Last Dose   • aspirin 81 MG EC tablet Take 81 mg by mouth Daily With Lunch.  0    • atorvastatin (LIPITOR) 40 MG tablet Take 40 mg by mouth Every Morning.      • budesonide (PULMICORT) 0.5 MG/2ML nebulizer solution Take 0.5 mg by nebulization 2 (two) times a day.      • clopidogrel (PLAVIX) 75 MG tablet Take 75 mg by mouth Every Morning.      • dutasteride (AVODART) 0.5 MG capsule Take 0.5 mg by mouth Every Night.      • formoterol (Perforomist) 20 MCG/2ML nebulizer solution Take  by nebulization 2 (Two) Times a Day.      • HYDROcodone-acetaminophen (NORCO) 5-325 MG per tablet Take 1 tablet by mouth At Night As Needed for Moderate Pain .      • iron polysacch complex-B12-VitC-FA-Succ (Ferrex 150 Forte Plus)  MG capsule capsule Take 1 capsule by mouth Daily With Breakfast.      • metoprolol succinate XL (TOPROL-XL) 25 MG 24 hr tablet Take 25 mg by mouth Daily.      • montelukast (SINGULAIR) 10 MG tablet Take 10 mg by mouth Every Night.      • Multiple Vitamins-Minerals (MULTI VITAMIN/MINERALS) tablet 1 tablet Daily With Lunch.      • omeprazole (priLOSEC) 40 MG capsule  Take 40 mg by mouth 2 (two) times a day.      • ondansetron (ZOFRAN) 4 MG tablet Take 1 tablet by mouth Every 6 (Six) Hours As Needed for Nausea or Vomiting. 11 tablet 0    • predniSONE (DELTASONE) 10 MG tablet Take 1 tablet by mouth Daily. 5 tablet 0    • revefenacin (YUPELRI) 175 MCG/3ML nebulizer solution Take 175 mcg by nebulization Daily.      • saccharomyces boulardii (FLORASTOR) 250 MG capsule Take 250 mg by mouth Daily With Lunch.      • sodium bicarbonate 650 MG tablet Take 650 mg by mouth 2 (Two) Times a Day.      • tamsulosin (FLOMAX) 0.4 MG capsule 24 hr capsule Take 1 capsule by mouth 2 (two) times a day.      • theophylline (THEODUR) 300 MG 12 hr tablet Take 300 mg by mouth 2 (Two) Times a Day.        Allergies:  Patient has no known allergies.    Objective     Vital Signs  Temp:  [97.4 °F (36.3 °C)-98.2 °F (36.8 °C)] 97.4 °F (36.3 °C)  Heart Rate:  [] 66  Resp:  [16-22] 18  BP: (106-131)/(54-96) 117/72     Physical Exam:      General Appearance:    Alert, cooperative, in no acute distress   Head:    Normocephalic, without obvious abnormality, atraumatic   Eyes:            Lids and lashes normal, conjunctivae and sclerae normal, no   icterus, no pallor, corneas clear, PERRLA   Ears:    Ears appear intact with no abnormalities noted   Throat:   No oral lesions, no thrush, oral mucosa moist   Neck:   No adenopathy, supple, trachea midline, no thyromegaly, no   carotid bruit, no JVD   Lungs:     Clear to auscultation,respirations regular, even and                  unlabored    Heart:    Regular rhythm and normal rate, normal S1 and S2, no            murmur, no gallop, no rub, no click   Chest Wall:    No abnormalities observed   Abdomen:     Normal bowel sounds, no masses, no organomegaly, soft        non-tender, non-distended, no guarding, no rebound                tenderness   Extremities: S/p left BKA   Pulses:   Pulses palpable and equal bilaterally   Skin:   No bleeding, bruising or rash    Lymph nodes:   No palpable adenopathy   Neurologic:   Cranial nerves 2 - 12 grossly intact, sensation intact, DTR       present and equal bilaterally       Results Review:     Imaging Results (Last 24 Hours)     Procedure Component Value Units Date/Time    MRI Brain Without Contrast [[id number]] Collected: 11/09/21 1806     Updated: 11/09/21 1813    Narrative:      MRI BRAIN WO CONTRAST-     Date of Exam: 11/9/2021 5:25 PM     Indication: Syncope, increasing weakness, history of lung cancer     Comparison: 05/22/2021.     Technique: Multiplanar multisequence images of the brain were performed  without contrast according to routine brain MRI protocol.     FINDINGS:  The study is somewhat limited due to patient motion artifact. There is  prominence of the cerebral sulci, fissures, ventricles, and basal  cisterns indicating volume loss secondary to mild cerebral atrophy.  There are abnormal signal changes in the periventricular and subcortical  white matter which are nonspecific, but usually indicates chronic  microvascular ischemia. There is no restricted diffusion to indicate  acute ischemia. There is no acute hemorrhage, midline shift, or  suspicious extra-axial fluid collections. There are normal signal voids  within the intracranial arteries and the major dural sinuses. The  patient has had previous cataract surgery. The paranasal and mastoid  sinuses are clear.          Impression:         1. Some of the images are motion degraded.  2. No acute findings.  3. Mild volume loss secondary to cerebral atrophy.  4. Persistent nonspecific subcortical and periventricular white matter  signal felt to be secondary to chronic microvascular ischemia.        Electronically Signed By-Nj Hernández MD On:11/9/2021 6:11 PM  This report was finalized on [id number] by  Nj Hernández MD.    XR Chest 1 View [[id number]] Collected: 11/09/21 1618     Updated: 11/09/21 1622    Narrative:      DATE OF EXAM:  11/9/2021 4:16 PM      PROCEDURE:  XR CHEST 1 VW-     INDICATIONS:  Near syncope, history of lung cancer       COMPARISON:  AP portable chest 5/19/2021. CT chest 5/19/2021.     TECHNIQUE:   Single radiographic view of the chest was obtained.     FINDINGS:  There is stable cardiac enlargement with signs of prior median  sternotomy and CABG. Chronic right pleural calcification is again noted.  Chronic appearing subpleural interstitial thickening in the lateral  right mid to lower lung zone thought to represent areas of scarring. No  superimposed acute airspace disease is identified. Right chest wall  peter catheter tip terminates at the upper SVC level. No discernible  pneumothorax. No acute osseous abnormalities.       Impression:         1. No acute chest finding.  2. Chronic right pleural thickening and pleural calcification with  chronic right subpleural scarring, without significant change.     Electronically Signed By-Jayda Tam MD On:11/9/2021 4:20 PM  This report was finalized on [id number] by  Jayda Tam MD.           Lab Results (last 24 hours)     Procedure Component Value Units Date/Time    COVID PRE-OP / PRE-PROCEDURE SCREENING ORDER (NO ISOLATION) - Swab, Nasopharynx [[id number]]  (Normal) Collected: 11/09/21 1939    Specimen: Swab from Nasopharynx Updated: 11/09/21 2042    Narrative:      The following orders were created for panel order COVID PRE-OP / PRE-PROCEDURE SCREENING ORDER (NO ISOLATION) - Swab, Nasopharynx.  Procedure                               Abnormality         Status                     ---------                               -----------         ------                     COVID-19,CEPHEID/CIRA/BD...[[id number]]  Normal              Final result                 Please view results for these tests on the individual orders.    COVID-19,CEPHEID/CIRA/BDMAX,COR/ALONA/PAD/ANA CRISTINA IN-HOUSE(OR EMERGENT/ADD-ON),NP SWAB IN TRANSPORT MEDIA 3-4 HR TAT, RT-PCR - Swab, Nasopharynx [[id number]]  (Normal) Collected:  11/09/21 1939    Specimen: Swab from Nasopharynx Updated: 11/09/21 2042     COVID19 Not Detected    Narrative:      Fact sheet for providers: https://www.fda.gov/media/537475/download     Fact sheet for patients: https://www.fda.gov/media/336316/download  Fact sheet for providers: https://www.fda.gov/media/747729/download     Fact sheet for patients: https://www.fda.gov/media/925199/download    Urinalysis, Microscopic Only - Urine, Clean Catch [[id number]]  (Abnormal) Collected: 11/09/21 1853    Specimen: Urine, Clean Catch Updated: 11/09/21 1903     RBC, UA 0-2 /HPF      WBC, UA 0-2 /HPF      Bacteria, UA None Seen /HPF      Squamous Epithelial Cells, UA None Seen /HPF      Hyaline Casts, UA 0-2 /LPF      Methodology Automated Microscopy    Urinalysis With Culture If Indicated - Urine, Clean Catch [[id number]]  (Abnormal) Collected: 11/09/21 1853    Specimen: Urine, Clean Catch Updated: 11/09/21 1903     Color, UA Yellow     Appearance, UA Clear     pH, UA 6.0     Specific Gravity, UA 1.014     Glucose, UA Negative     Ketones, UA Negative     Bilirubin, UA Negative     Blood, UA Negative     Protein,  mg/dL (2+)     Leuk Esterase, UA Negative     Nitrite, UA Negative     Urobilinogen, UA 0.2 E.U./dL    Theophylline Level [[id number]]  (Normal) Collected: 11/09/21 1614    Specimen: Blood Updated: 11/09/21 1721     Theophylline Level 12.3 mcg/mL     C-reactive Protein [[id number]]  (Abnormal) Collected: 11/09/21 1614    Specimen: Blood Updated: 11/09/21 1721     C-Reactive Protein 6.66 mg/dL     Comprehensive Metabolic Panel [[id number]]  (Abnormal) Collected: 11/09/21 1614    Specimen: Blood Updated: 11/09/21 1648     Glucose 106 mg/dL      BUN 46 mg/dL      Creatinine 1.81 mg/dL      Sodium 136 mmol/L      Potassium 4.2 mmol/L      Chloride 100 mmol/L      CO2 21.0 mmol/L      Calcium 8.7 mg/dL      Total Protein 6.1 g/dL      Albumin 3.50 g/dL      ALT (SGPT) 11 U/L      AST (SGOT) 14 U/L      Alkaline  Phosphatase 108 U/L      Total Bilirubin 0.5 mg/dL      eGFR Non African Amer 37 mL/min/1.73      Globulin 2.6 gm/dL      A/G Ratio 1.3 g/dL      BUN/Creatinine Ratio 25.4     Anion Gap 15.0 mmol/L     Narrative:      GFR Normal >60  Chronic Kidney Disease <60  Kidney Failure <15      Troponin [[id number]]  (Normal) Collected: 11/09/21 1614    Specimen: Blood Updated: 11/09/21 1648     Troponin T 0.026 ng/mL     Narrative:      Troponin T Reference Range:  <= 0.03 ng/mL-   Negative for AMI  >0.03 ng/mL-     Abnormal for myocardial necrosis.  Clinicians would have to utilize clinical acumen, EKG, Troponin and serial changes to determine if it is an Acute Myocardial Infarction or myocardial injury due to an underlying chronic condition.       Results may be falsely decreased if patient taking Biotin.      Magnesium [[id number]]  (Normal) Collected: 11/09/21 1614    Specimen: Blood Updated: 11/09/21 1648     Magnesium 1.9 mg/dL     CBC & Differential [[id number]]  (Abnormal) Collected: 11/09/21 1614    Specimen: Blood Updated: 11/09/21 1620    Narrative:      The following orders were created for panel order CBC & Differential.  Procedure                               Abnormality         Status                     ---------                               -----------         ------                     CBC Auto Differential[[id number]]        Abnormal            Final result                 Please view results for these tests on the individual orders.    CBC Auto Differential [[id number]]  (Abnormal) Collected: 11/09/21 1614    Specimen: Blood Updated: 11/09/21 1620     WBC 10.10 10*3/mm3      RBC 2.79 10*6/mm3      Hemoglobin 8.8 g/dL      Hematocrit 26.2 %      MCV 93.8 fL      MCH 31.6 pg      MCHC 33.7 g/dL      RDW 22.1 %      RDW-SD 71.3 fl      MPV 7.1 fL      Platelets 208 10*3/mm3      Neutrophil % 87.6 %      Lymphocyte % 1.6 %      Monocyte % 7.8 %      Eosinophil % 2.6 %      Basophil % 0.4 %      Neutrophils,  Absolute 8.90 10*3/mm3      Lymphocytes, Absolute 0.20 10*3/mm3      Monocytes, Absolute 0.80 10*3/mm3      Eosinophils, Absolute 0.30 10*3/mm3      Basophils, Absolute 0.00 10*3/mm3      nRBC 0.1 /100 WBC     Blood Culture - Blood, Blood, Venous Line [[id number]] Collected: 11/09/21 1614    Specimen: Blood, Venous Line Updated: 11/09/21 1618           I reviewed the patient's new clinical results.    Assessment/Plan       Near syncope  - admit for further evaluation  Dehydration - IVF  Acute bronchitis based on physical exam - abx.  Nebs prn  Lung cancer s/p chemo and radiation - consult Dr. Hedrick  Anemia - stable  CAD with h/o CABG - consult Dr. Mistry  Weakness - treat underlying conditions and consult PT.   HTN with CKD3 - creatine improved with IVF.  Follow  COPD - continue home meds and neb tx.        I discussed the patients findings and my recommendations with patient.     Kusum Tesfaye MD  11/10/21  09:45 EST

## 2021-11-10 NOTE — PLAN OF CARE
Goal Outcome Evaluation:         Patient admitted form observation today.  Patient is progressing- cardiac  work up.  Will DC to home.
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
## 2021-11-10 NOTE — CONSULTS
Cardiology Consult Note    Patient Identification:  Name: David A Jolissaint  Age: 76 y.o.  Sex: male  :  1945  MRN: [id number]             Requesting Physician : Dr. Tesfaye    Reason for Consultation / Chief Complaint : Syncope    History of Present Illness:         This is a 76-year-old  male with  PMH of     #  CAD, NSTEMI, 2 vessel CABG with SVG to OM and PDA 3/9/16  #   RBBB  #  PAD, left lower extremity amputation  #  Carotid artery disease, Doppler 6/15/2018 with 50-74% right and left  #  diabetes,   #. hypertension,   #  COPD, small cell lung cancer, chemotherapy  #  CKD 4  #. BPH with LUTS  #  Cellulitis    #  left BKA, cholecystectomy  #  Positive family history of MI and CAD in father  #. Former smoker quit 10/2015     Patient presented to the emergency room 11/10/2021 with complaint of syncope.  Patient has lung cancer and recently finished chemotherapy and had XRT to the chest and had prophylactic XRT to the brain.  And is saying he is not feeling well since then has history of amputation and prosthetic limb on the left lower extremity.  Could not stand and collapsed/slumped to ground and could not get up denies any loss of consciousness.  Denies any bowel bladder incontinence.  Patient thinks it is because of his legs getting weak.  Review of records reveal :  Had an echocardiogram done 10/1/2020 which revealed normal LV systolic function with mild concentric LVH and left atrial enlargement.  Patient  had carotid Doppler 06/15/2018 which showed bilateral 50-70% worse compared to  which was less than 50% bilat.     Work-up here 21-11/10/2021 revealed proBNP of 1930.  Normal troponin.  Normal CMP except BUN/creatinine 46/1.81.  D-dimer elevated at 2.49, hemoglobin of 8.7.  Chest x-ray 2021 with no acute chest findings chronic right pleural thickening and pleural calcification on the right subpleural area  EKG done 2021 reviewed/interpreted by me reveals sinus rhythm  with rate of 92 bpm with PACs, right bundle branch block     ASSESSMENT:     #Near syncope  #Weakness  #Non-small cell lung CA, chemotherapy, XRT, XRT of the brain  #. Bilateral carotid stenosis  # PAD with absent right radial pulse, left BKA  # dyspnea on exertion, chronic  #  bradycardia, RBBB  #  CAD, CABG, history of NSTEMI  #  prediabetes,   #  hypertension,   # CKD,   # COPD,  # dyslipidemia        PLAN:  Monitor rhythm on telemetry  Serial cardiac enzymes   We will check orthostatics  Continue medical management with aspirin, atorvastatin, metoprolol, to help with CAD, MI, hypertension, dyslipidemia, CAD, PAD as tolerated.  Follow-up CKD with nephrology for CKD.  Patient has carotid disease, we will check repeat carotid Dopplers.  Patient's blood pressure is doing well we will continue to monitor.  Monitor sugars and  Last A1c was 5.9.  Patient underwent Lexiscan Cardiolite 5/21/2021 which was negative for ischemia.    We will check echocardiogram.  We will follow up and consider further evaluation and treatment.      MRI of the brain 11/9/2021:  IMPRESSION:     1. Some of the images are motion degraded.  2. No acute findings.  3. Mild volume loss secondary to cerebral atrophy.  4. Persistent nonspecific subcortical and periventricular white matter  signal felt to be secondary to chronic microvascular ischemia.      Electronically Signed By-Nj Hernández MD On:11/9/2021 6:11 PM           Diagnosis Plan   1. Near syncope     2. Dehydration     3. Malignant neoplasm of lung, unspecified laterality, unspecified part of lung (HCC)     4. Anemia, normocytic normochromic                Past Medical History:  Past Medical History:   Diagnosis Date   • Anemia    • Cellulitis    • CKD (chronic kidney disease), stage III (CMS/HCC)    • COPD (chronic obstructive pulmonary disease) (CMS/HCC)    • Diverticulitis    • Dyslipidemia    • Hypertension    • Myocardial infarction (CMS/HCC)    • PVD (peripheral vascular disease)  (CMS/HCC)      Past Surgical History:  Past Surgical History:   Procedure Laterality Date   • BRONCHOSCOPY N/A 5/20/2021    Procedure: BRONCHOSCOPY WITH ENDOBRONCHIAL ULTRASOUND WITH FINE NEEDLE ASPIRATION. BRONCHOALVEOLAR LAVAGE;  Surgeon: Jacskon Quezada MD;  Location: [facility];  Service: Pulmonary;  Laterality: N/A;  subcarinal mass   • CARDIAC SURGERY     • CHOLECYSTECTOMY     • GALLBLADDER SURGERY     • LEG AMPUTATION Left       Allergies:  No Known Allergies  Home Meds:  Medications Prior to Admission   Medication Sig Dispense Refill Last Dose   • aspirin 81 MG EC tablet Take 81 mg by mouth Daily With Lunch.  0 11/9/2021 at Unknown time   • atorvastatin (LIPITOR) 40 MG tablet Take 40 mg by mouth Every Morning.   11/9/2021 at Unknown time   • Baclofen 5 MG tablet TAKE 1 TABLET BY MOUTH TWICE DAILY AS NEEDED FOR HICCUPS   11/9/2021 at Unknown time   • budesonide (PULMICORT) 0.5 MG/2ML nebulizer solution Take 0.5 mg by nebulization 2 (two) times a day.   11/9/2021 at Unknown time   • clopidogrel (PLAVIX) 75 MG tablet Take 75 mg by mouth Every Morning.   11/9/2021 at Unknown time   • dutasteride (AVODART) 0.5 MG capsule Take 0.5 mg by mouth Every Night.   11/9/2021 at Unknown time   • Ferrex 150 150 MG capsule    11/9/2021 at Unknown time   • formoterol (Perforomist) 20 MCG/2ML nebulizer solution Take  by nebulization 2 (Two) Times a Day.   11/9/2021 at Unknown time   • HYDROcodone-acetaminophen (NORCO) 5-325 MG per tablet Take 1 tablet by mouth At Night As Needed for Moderate Pain .   11/9/2021 at Unknown time   • iron polysacch complex-B12-VitC-FA-Succ (Ferrex 150 Forte Plus)  MG capsule capsule Take 1 capsule by mouth Daily With Breakfast.   11/9/2021 at Unknown time   • metoprolol succinate XL (TOPROL-XL) 25 MG 24 hr tablet Take 25 mg by mouth Daily.   11/9/2021 at Unknown time   • montelukast (SINGULAIR) 10 MG tablet Take 10 mg by mouth Every Night.   11/9/2021 at Unknown time   • Multiple  Vitamins-Minerals (MULTI VITAMIN/MINERALS) tablet 1 tablet Daily With Lunch.   11/9/2021 at Unknown time   • omeprazole (priLOSEC) 40 MG capsule Take 40 mg by mouth 2 (two) times a day.   11/9/2021 at Unknown time   • ondansetron (ZOFRAN) 4 MG tablet Take 1 tablet by mouth Every 6 (Six) Hours As Needed for Nausea or Vomiting. 11 tablet 0 11/9/2021 at Unknown time   • predniSONE (DELTASONE) 10 MG tablet Take 1 tablet by mouth Daily. 5 tablet 0 11/9/2021 at Unknown time   • revefenacin (YUPELRI) 175 MCG/3ML nebulizer solution Take 175 mcg by nebulization Daily.   11/9/2021 at Unknown time   • saccharomyces boulardii (FLORASTOR) 250 MG capsule Take 250 mg by mouth Daily With Lunch.   11/9/2021 at Unknown time   • sodium bicarbonate 650 MG tablet Take 650 mg by mouth 2 (Two) Times a Day.   11/9/2021 at Unknown time   • tamsulosin (FLOMAX) 0.4 MG capsule 24 hr capsule Take 1 capsule by mouth 2 (two) times a day.   11/9/2021 at Unknown time   • theophylline (THEODUR) 300 MG 12 hr tablet Take 300 mg by mouth 2 (Two) Times a Day.   11/9/2021 at Unknown time   • pantoprazole (PROTONIX) 40 MG EC tablet Take 40 mg by mouth 2 (Two) Times a Day.        Current Meds:     Current Facility-Administered Medications:   •  acetaminophen (TYLENOL) tablet 650 mg, 650 mg, Oral, Q6H PRN, Kusum Tesfaye MD, 650 mg at 11/10/21 1157  •  cefepime 2 gm IVPB in 100 ml NS (MBP), 2 g, Intravenous, Q24H, Kusum Tesfaye MD, 2 g at 11/10/21 1157  •  enoxaparin (LOVENOX) syringe 40 mg, 40 mg, Subcutaneous, Q24H, She Thakur MD  •  Pharmacy to Dose enoxaparin (LOVENOX), , Does not apply, Continuous PRN, Kusum Tesfaye MD  •  sodium chloride 0.9 % infusion, 100 mL/hr, Intravenous, Continuous, Nj Morataya MD, Last Rate: 100 mL/hr at 11/10/21 1152, 100 mL/hr at 11/10/21 1152  Social History:   Social History     Tobacco Use   • Smoking status: Former Smoker     Packs/day: 2.00     Years: 50.00     Pack years: 100.00   • Smokeless tobacco:  "Never Used   • Tobacco comment: Says he quit approximately in 2005.   Substance Use Topics   • Alcohol use: Not Currently      Family History:  Family History   Problem Relation Age of Onset   • Heart attack Father    • Heart disease Father         Review of Systems : Review of Systems   Respiratory: Positive for shortness of breath.    Neurological: Positive for weakness.   All other systems reviewed and are negative.                 Constitutional:  Temp:  [97.4 °F (36.3 °C)-98.2 °F (36.8 °C)] 97.4 °F (36.3 °C)  Heart Rate:  [] 66  Resp:  [16-22] 18  BP: (106-131)/(54-96) 117/72    Physical Exam   /72 (BP Location: Right arm, Patient Position: Lying)   Pulse 66   Temp 97.4 °F (36.3 °C) (Oral)   Resp 18   Ht 177.8 cm (70\")   Wt 70.4 kg (155 lb 3.3 oz)   SpO2 96%   BMI 22.27 kg/m²   Physical Exam  General:  Appears chronically ill.  Eyes: Sclera is anicteric,  conjunctiva is clear   HEENT:  No JVD. Thyroid not visibly enlarged. No mucosal pallor or cyanosis  Respiratory: Respirations regular and unlabored at rest.  Prolonged expiration and scattered rhonchi  Cardiovascular: S1,S2 Regular rate and rhythm. No murmur, rub or gallop auscultated. No pretibial pitting edema  Gastrointestinal: Abdomen soft, flat, non tender. Bowel sounds present.   Musculoskeletal:  No abnormal movements  Extremities: Left BKA seen  Skin: Color pink. Skin warm and dry to touch. No rashes  No xanthoma  Neuro: Alert and awake, no lateralizing deficits appreciated    Cardiographics  ECG: EKG tracing was  personally reviewed/interpreted by me  ECG 12 Lead   Final Result   HEART RATE= 92  bpm   RR Interval= 668  ms   [place] Interval= 127  ms   P Horizontal Axis= -32  deg   P Front Axis= 60  deg   QRSD Interval= 148  ms   QT Interval= 392  ms   QRS Axis= 79  deg   T Wave Axis= 61  deg   - ABNORMAL ECG -   Sinus rhythm   Atrial premature complex   Right bundle branch block   When compared with ECG of 26-Jun-2021 8:33:26,   No " significant change   Electronically Signed By: Luis Antonio Bruno (Last) 10-Nov-2021 10:39:29   Date and Time of Study: 2021-11-09 19:28:33          Telemetry: Sinus rhythm    Echocardiogram:   Results for orders placed during the hospital encounter of 06/26/21    Adult Transthoracic Echo Complete W/ Cont if Necessary Per Protocol    Interpretation Summary  · Estimated left ventricular EF was in agreement with the calculated left ventricular EF. Left ventricular ejection fraction appears to be 61 - 65%. Left ventricular systolic function is normal.  · Left ventricular diastolic function is consistent with (grade Ia w/high LAP) impaired relaxation.  · Estimated right ventricular systolic pressure from tricuspid regurgitation is normal (<35 mmHg).      Imaging  Chest X-ray:   Imaging Results (Last 24 Hours)     Procedure Component Value Units Date/Time    MRI Brain Without Contrast [[id number]] Collected: 11/09/21 1806     Updated: 11/09/21 1813    Narrative:      MRI BRAIN WO CONTRAST-     Date of Exam: 11/9/2021 5:25 PM     Indication: Syncope, increasing weakness, history of lung cancer     Comparison: 05/22/2021.     Technique: Multiplanar multisequence images of the brain were performed  without contrast according to routine brain MRI protocol.     FINDINGS:  The study is somewhat limited due to patient motion artifact. There is  prominence of the cerebral sulci, fissures, ventricles, and basal  cisterns indicating volume loss secondary to mild cerebral atrophy.  There are abnormal signal changes in the periventricular and subcortical  white matter which are nonspecific, but usually indicates chronic  microvascular ischemia. There is no restricted diffusion to indicate  acute ischemia. There is no acute hemorrhage, midline shift, or  suspicious extra-axial fluid collections. There are normal signal voids  within the intracranial arteries and the major dural sinuses. The  patient has had previous cataract surgery. The  paranasal and mastoid  sinuses are clear.          Impression:         1. Some of the images are motion degraded.  2. No acute findings.  3. Mild volume loss secondary to cerebral atrophy.  4. Persistent nonspecific subcortical and periventricular white matter  signal felt to be secondary to chronic microvascular ischemia.        Electronically Signed By-Nj Hernández MD On:11/9/2021 6:11 PM  This report was finalized on [id number] by  Nj Hernández MD.    XR Chest 1 View [[id number]] Collected: 11/09/21 1618     Updated: 11/09/21 1622    Narrative:      DATE OF EXAM:  11/9/2021 4:16 PM     PROCEDURE:  XR CHEST 1 VW-     INDICATIONS:  Near syncope, history of lung cancer       COMPARISON:  AP portable chest 5/19/2021. CT chest 5/19/2021.     TECHNIQUE:   Single radiographic view of the chest was obtained.     FINDINGS:  There is stable cardiac enlargement with signs of prior median  sternotomy and CABG. Chronic right pleural calcification is again noted.  Chronic appearing subpleural interstitial thickening in the lateral  right mid to lower lung zone thought to represent areas of scarring. No  superimposed acute airspace disease is identified. Right chest wall  peter catheter tip terminates at the upper SVC level. No discernible  pneumothorax. No acute osseous abnormalities.       Impression:         1. No acute chest finding.  2. Chronic right pleural thickening and pleural calcification with  chronic right subpleural scarring, without significant change.     Electronically Signed By-Jayda Tam MD On:11/9/2021 4:20 PM  This report was finalized on [id number] by  Jayda Tam MD.          Lab Review: I have reviewed the labs  Results from last 7 days   Lab Units 11/09/21  1614   TROPONIN T ng/mL 0.026     Results from last 7 days   Lab Units 11/09/21  1614   MAGNESIUM mg/dL 1.9     Results from last 7 days   Lab Units 11/10/21  1150   SODIUM mmol/L 140   POTASSIUM mmol/L 4.4   BUN mg/dL 47*   CREATININE  mg/dL 1.76*   CALCIUM mg/dL 8.6     @[id number]@  Results from last 7 days   Lab Units 11/10/21  1150 11/09/21  1614   WBC 10*3/mm3 8.20 10.10   HEMOGLOBIN g/dL 8.7* 8.8*   HEMATOCRIT % 26.1* 26.2*   PLATELETS 10*3/mm3 222 208                 Kali Mistry MD  11/10/2021, 13:27 EST      EMR Dragon/Transcription:   Dictated utilizing Dragon dictation  Much of the above report is an electronic transcription/translation of the spoken language to printed text using Dragon Software. As such, the subtleties and finesse of the spoken language may permit erroneous, or at times, nonsensical words or phrases to be inadvertently transcribed; thus changes may be made at a later date to rectify these errors.

## 2021-11-10 NOTE — CONSULTS
Hematology/Oncology Inpatient Consultation    Patient name: David A Jolissaint  : 1945  MRN: [id number]  Referring Provider: Kusum Tesfaye MD  Reason for Consultation: Established patient     Chief complaint: Weakness     History of present illness:    76 y.o. male presented to [facility] emergency department on 2021 after his knees buckled leading to a partial fall and then hitting his head on a table when he tried to get up. His wife was not able to get him up. He denied any loss of consciousness. Per his wife he was not coherent at home. Patient had increased weakness and felt lightheaded for 2 days prior to coming to the hospital. He reported malaise ongoing for 24 hours prior to coming to the hospital.  He complained of an intermittent headache and occasional cough with yellow sputum production. A few days ago he had some jerking movements of his bilateral upper extremities. He has chronic tingling of his fingers.   Labs in the ED were was follows: WBC 10.1, hemoglobin 8.8, MCV 93.8, platelets 208,000, CMP significant for creatinine 1.81, BUN 46, magnesium 1.9 (1.6-2.4), CRP 6.66 (0-0.5), troponin 0.026 (0.0-0.03), pro-BNP 1930 (0.0-1800), UA 1+ protein, 0-2 RBC, 0-2 WBC. Covid-19 was not detected. Blood cultures were drawn.  A chest x-ray showed no acute chest findings and chronic right pleural thickening and pleural calcification with chronic right subpleural scarring, without significant change. MRI brain without contrast on 2021 showed mild volume loss secondary to cerebral atrophy and persistent nonspecific subcortical and periventricular white matter signal felt to be secondary to chronic microvascular ischemia. He was admitted for further evaluation and work-up. Cardiology was consulted due to near syncope and a 2D echocardiogram was ordered. D-dimer was 2.49 (0.00-0.59).  Iron studies on 11/10/2021 showed iron 28 () , iron saturation percent 12 (20-50), TIBC 231  (298-536), transferrin 155 (200-360) and ferritin 474.6 (). Iron deficit was 799 mg. Started Ferrlecit 250 mg IV x 3 days on 11/10/2021.    11/10/21  Hematology/Oncology was consulted by Dr. 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  CT CAP on 5/19/2021 showed a large mediastinal subcarinal azygoesophageal recess mass and pathologically enlarged right hilar lymphadenopathy, a left lower lobe opacity and a new 11 mm right upper lobe pulmonary nodule all suspicious for metastatic disease.  He was diagnosed by bronchoscopy on 5/20/2021 during his admission to [facility] with fine-needle aspiration of the bronchus consistent with small cell lung cancer. PET/CT scan showed hypermetabolic activity in mediastinal and right hilar lymphadenopathy as well as uptake in a subcutaneous nodule on the posterior upper right chest wall posterior to the right shoulder and immediately deep to the skin surface.  The lesion was felt to be an inflamed epidermal inclusion cyst but metastatic disease was not entirely excluded.  A native right upper lobe lung nodule showed no FDG activity but felt to be too small for PET detection.  There was no abnormal FDG activity in the neck, abdomen, pelvis or bones. He underwent six cycles of chemotherapy with carboplatin+etoposide on 6/16/2021 - 9/30/2021. He underwent radiation to right lung from 7/26/2021 to 9/3/2021 with Dr. Herrera at [facility]. Prophylactic cranial radiation was completed the last week of 10/2021.  CT chest, abdomen, and pelvis at [facility] on 10/27/2021 showed interval decrease in size of mediastinal and right hilar adenopathy. Pulmonary nodes within the right upper lobe along the right minor fissure appeared stable. There was no metastatic disease in the abdomen or pelvis. Labs on 9/28/2021 revealed iron deficiency anemia: iron 25 (), ferritin 364 (),  iron percent saturation 12 (20-55), TIBC 213 (228-428).  Injectafer was ordered but patient has not yet received. Hemoglobin was 7.91 on 11/1/2021.     PCP: Walter Valero MD    History:  Past Medical History:   Diagnosis Date   • Anemia    • Cellulitis    • CKD (chronic kidney disease), stage III (CMS/HCC)    • COPD (chronic obstructive pulmonary disease) (CMS/HCC)    • Diverticulitis    • Dyslipidemia    • Hypertension    • Myocardial infarction (CMS/HCC)    • PVD (peripheral vascular disease) (CMS/HCC)    ,   Past Surgical History:   Procedure Laterality Date   • BRONCHOSCOPY N/A 5/20/2021    Procedure: BRONCHOSCOPY WITH ENDOBRONCHIAL ULTRASOUND WITH FINE NEEDLE ASPIRATION. BRONCHOALVEOLAR LAVAGE;  Surgeon: Jackson Quezada MD;  Location: [facility] ENDOSCOPY;  Service: Pulmonary;  Laterality: N/A;  subcarinal mass   • CARDIAC SURGERY     • CHOLECYSTECTOMY     • GALLBLADDER SURGERY     • LEG AMPUTATION Left    ,   Family History   Problem Relation Age of Onset   • Heart attack Father    • Heart disease Father    ,   Social History     Tobacco Use   • Smoking status: Former Smoker     Packs/day: 2.00     Years: 50.00     Pack years: 100.00   • Smokeless tobacco: Never Used   • Tobacco comment: Says he quit approximately in 2005.   Substance Use Topics   • Alcohol use: Not Currently   • Drug use: Never   ,   Medications Prior to Admission   Medication Sig Dispense Refill Last Dose   • aspirin 81 MG EC tablet Take 81 mg by mouth Daily With Lunch.  0 11/9/2021 at Unknown time   • atorvastatin (LIPITOR) 40 MG tablet Take 40 mg by mouth Every Morning.   11/9/2021 at Unknown time   • Baclofen 5 MG tablet TAKE 1 TABLET BY MOUTH TWICE DAILY AS NEEDED FOR HICCUPS   11/9/2021 at Unknown time   • budesonide (PULMICORT) 0.5 MG/2ML nebulizer solution Take 0.5 mg by nebulization 2 (two) times a day.   11/9/2021 at Unknown time   • clopidogrel (PLAVIX) 75 MG tablet Take 75 mg by mouth Every Morning.   11/9/2021 at Unknown time   •  dutasteride (AVODART) 0.5 MG capsule Take 0.5 mg by mouth Every Night.   11/9/2021 at Unknown time   • Ferrex 150 150 MG capsule    11/9/2021 at Unknown time   • formoterol (Perforomist) 20 MCG/2ML nebulizer solution Take  by nebulization 2 (Two) Times a Day.   11/9/2021 at Unknown time   • HYDROcodone-acetaminophen (NORCO) 5-325 MG per tablet Take 1 tablet by mouth At Night As Needed for Moderate Pain .   11/9/2021 at Unknown time   • iron polysacch complex-B12-VitC-FA-Succ (Ferrex 150 Forte Plus)  MG capsule capsule Take 1 capsule by mouth Daily With Breakfast.   11/9/2021 at Unknown time   • metoprolol succinate XL (TOPROL-XL) 25 MG 24 hr tablet Take 25 mg by mouth Daily.   11/9/2021 at Unknown time   • montelukast (SINGULAIR) 10 MG tablet Take 10 mg by mouth Every Night.   11/9/2021 at Unknown time   • Multiple Vitamins-Minerals (MULTI VITAMIN/MINERALS) tablet 1 tablet Daily With Lunch.   11/9/2021 at Unknown time   • omeprazole (priLOSEC) 40 MG capsule Take 40 mg by mouth 2 (two) times a day.   11/9/2021 at Unknown time   • ondansetron (ZOFRAN) 4 MG tablet Take 1 tablet by mouth Every 6 (Six) Hours As Needed for Nausea or Vomiting. 11 tablet 0 11/9/2021 at Unknown time   • predniSONE (DELTASONE) 10 MG tablet Take 1 tablet by mouth Daily. 5 tablet 0 11/9/2021 at Unknown time   • revefenacin (YUPELRI) 175 MCG/3ML nebulizer solution Take 175 mcg by nebulization Daily.   11/9/2021 at Unknown time   • saccharomyces boulardii (FLORASTOR) 250 MG capsule Take 250 mg by mouth Daily With Lunch.   11/9/2021 at Unknown time   • sodium bicarbonate 650 MG tablet Take 650 mg by mouth 2 (Two) Times a Day.   11/9/2021 at Unknown time   • tamsulosin (FLOMAX) 0.4 MG capsule 24 hr capsule Take 1 capsule by mouth 2 (two) times a day.   11/9/2021 at Unknown time   • theophylline (THEODUR) 300 MG 12 hr tablet Take 300 mg by mouth 2 (Two) Times a Day.   11/9/2021 at Unknown time   • pantoprazole (PROTONIX) 40 MG EC tablet Take  40 mg by mouth 2 (Two) Times a Day.      , Scheduled Meds:  arformoterol, 15 mcg, Nebulization, BID - RT  aspirin, 81 mg, Oral, Daily With Lunch  atorvastatin, 40 mg, Oral, Daily  budesonide, 0.5 mg, Nebulization, BID - RT  cefepime, 2 g, Intravenous, Q24H  clopidogrel, 75 mg, Oral, Daily  enoxaparin, 40 mg, Subcutaneous, Q24H  [START ON 11/11/2021] ferrous sulfate, 324 mg, Oral, Daily With Breakfast  metoprolol succinate XL, 25 mg, Oral, Daily  montelukast, 10 mg, Oral, Nightly  pantoprazole, 40 mg, Oral, Daily  predniSONE, 10 mg, Oral, Daily  saccharomyces boulardii, 250 mg, Oral, Daily With Lunch  sodium bicarbonate, 650 mg, Oral, BID  theophylline, 300 mg, Oral, Q12H    , Continuous Infusions:  Pharmacy to Dose enoxaparin (LOVENOX),   sodium chloride, 100 mL/hr, Last Rate: 100 mL/hr (11/10/21 1152)    , PRN Meds:  •  acetaminophen  •  HYDROcodone-acetaminophen  •  ondansetron  •  Pharmacy to Dose enoxaparin (LOVENOX)   Allergies:  Patient has no known allergies.    ROS:  Review of Systems   Constitutional: Negative for activity change, chills, diaphoresis, fatigue, fever and unexpected weight change.   HENT: Positive for tinnitus. Negative for congestion, dental problem, hearing loss, mouth sores, nosebleeds, sore throat and trouble swallowing.    Eyes: Negative for photophobia and visual disturbance.   Respiratory: Positive for shortness of breath ( chronic). Negative for cough and chest tightness.    Cardiovascular: Negative for chest pain, palpitations and leg swelling.   Gastrointestinal: Negative for abdominal distention, abdominal pain, blood in stool, constipation, diarrhea, nausea and vomiting.   Endocrine: Negative for cold intolerance and heat intolerance.   Genitourinary: Negative for decreased urine volume, difficulty urinating, dysuria, frequency, hematuria and urgency.   Musculoskeletal: Negative for arthralgias and gait problem.        Chronic tingling fingertips   Skin: Negative for rash and  "wound.   Neurological: Positive for syncope ( near syncope), weakness and light-headedness. Negative for dizziness, tremors, numbness and headaches.   Hematological: Negative for adenopathy. Does not bruise/bleed easily.   Psychiatric/Behavioral: Negative for confusion and hallucinations. The patient is not nervous/anxious.    All other systems reviewed and are negative.     Objective     Vital Signs:   /72 (BP Location: Right arm, Patient Position: Lying)   Pulse 66   Temp 97.4 °F (36.3 °C) (Oral)   Resp 18   Ht 177.8 cm (70\")   Wt 70.4 kg (155 lb 3.3 oz)   SpO2 96%   BMI 22.27 kg/m²     Physical Exam:  Physical Exam  Vitals and nursing note reviewed.   Constitutional:       General: He is not in acute distress.     Appearance: Normal appearance. He is well-developed. He is not diaphoretic.   HENT:      Head: Normocephalic and atraumatic. Hair is abnormal ( alopecia).      Right Ear: External ear normal.      Left Ear: External ear normal.      Nose: Nose normal.      Mouth/Throat:      Mouth: Mucous membranes are moist.      Dentition: Abnormal dentition ( edentulous).      Pharynx: Oropharynx is clear. No oropharyngeal exudate or posterior oropharyngeal erythema.   Eyes:      General: No scleral icterus.     Extraocular Movements: Extraocular movements intact.      Conjunctiva/sclera: Conjunctivae normal.      Pupils: Pupils are equal, round, and reactive to light.   Cardiovascular:      Rate and Rhythm: Normal rate and regular rhythm.      Heart sounds: Normal heart sounds. No murmur heard.       Comments: Cardiac monitor leads.     Pulmonary:      Effort: Pulmonary effort is normal. No respiratory distress.      Breath sounds: Normal breath sounds. No wheezing or rales.   Chest:   Breasts:      Right: No supraclavicular adenopathy.      Left: No supraclavicular adenopathy.        Comments: Right chest wall Jgwedi-Z-Sxar.   Abdominal:      General: Bowel sounds are normal. There is no distension.    "   Palpations: Abdomen is soft. There is no mass.      Tenderness: There is no abdominal tenderness. There is no guarding.   Genitourinary:     Comments: Deferred   Musculoskeletal:         General: No swelling, tenderness or deformity. Normal range of motion.      Cervical back: Normal range of motion and neck supple.      Right lower leg: No edema.      Left lower leg: No edema.      Left Lower Extremity: Left leg is amputated below knee.   Lymphadenopathy:      Cervical: No cervical adenopathy.      Upper Body:      Right upper body: No supraclavicular adenopathy.      Left upper body: No supraclavicular adenopathy.   Skin:     General: Skin is warm and dry.      Coloration: Skin is not pale.      Findings: Ecchymosis ( BUE) present. No erythema or rash.   Neurological:      General: No focal deficit present.      Mental Status: He is alert and oriented to person, place, and time.      Coordination: Coordination normal.   Psychiatric:         Mood and Affect: Mood normal.         Behavior: Behavior normal.         Thought Content: Thought content normal.          Results Review:  Lab Results (last 48 hours)     Procedure Component Value Units Date/Time    BNP [[id number]]  (Abnormal) Collected: 11/10/21 1150    Specimen: Blood Updated: 11/10/21 1401     proBNP 1,930.0 pg/mL     Narrative:      Among patients with dyspnea, NT-proBNP is highly sensitive for the detection of acute congestive heart failure. In addition NT-proBNP of <300 pg/ml effectively rules out acute congestive heart failure with 99% negative predictive value.    Results may be falsely decreased if patient taking Biotin.      Basic Metabolic Panel [[id number]]  (Abnormal) Collected: 11/10/21 1150    Specimen: Blood Updated: 11/10/21 1308     Glucose 145 mg/dL      BUN 47 mg/dL      Creatinine 1.76 mg/dL      Sodium 140 mmol/L      Potassium 4.4 mmol/L      Chloride 106 mmol/L      CO2 20.0 mmol/L      Calcium 8.6 mg/dL      eGFR Non  Amer 38  mL/min/1.73      BUN/Creatinine Ratio 26.7     Anion Gap 14.0 mmol/L     Narrative:      GFR Normal >60  Chronic Kidney Disease <60  Kidney Failure <15      CBC (No Diff) [[id number]]  (Abnormal) Collected: 11/10/21 1150    Specimen: Blood Updated: 11/10/21 1243     WBC 8.20 10*3/mm3      RBC 2.80 10*6/mm3      Hemoglobin 8.7 g/dL      Hematocrit 26.1 %      MCV 93.3 fL      MCH 31.1 pg      MCHC 33.3 g/dL      RDW 21.2 %      RDW-SD 69.1 fl      MPV 7.6 fL      Platelets 222 10*3/mm3     Blood Culture - Blood, Chest, Right [[id number]] Collected: 11/10/21 1156    Specimen: Blood from Chest, Right Updated: 11/10/21 1239    COVID PRE-OP / PRE-PROCEDURE SCREENING ORDER (NO ISOLATION) - Swab, Nasopharynx [[id number]]  (Normal) Collected: 11/09/21 1939    Specimen: Swab from Nasopharynx Updated: 11/09/21 2042    Narrative:      The following orders were created for panel order COVID PRE-OP / PRE-PROCEDURE SCREENING ORDER (NO ISOLATION) - Swab, Nasopharynx.  Procedure                               Abnormality         Status                     ---------                               -----------         ------                     COVID-19,CEPHEID/CIRA/BD...[[id number]]  Normal              Final result                 Please view results for these tests on the individual orders.    COVID-19,CEPHEID/CIRA/BDMAX,COR/ALONA/PAD/ANA CRISTINA IN-HOUSE(OR EMERGENT/ADD-ON),NP SWAB IN TRANSPORT MEDIA 3-4 HR TAT, RT-PCR - Swab, Nasopharynx [[id number]]  (Normal) Collected: 11/09/21 1939    Specimen: Swab from Nasopharynx Updated: 11/09/21 2042     COVID19 Not Detected    Narrative:      Fact sheet for providers: https://www.fda.gov/media/104974/download     Fact sheet for patients: https://www.fda.gov/media/773037/download  Fact sheet for providers: https://www.fda.gov/media/913927/download     Fact sheet for patients: https://www.fda.gov/media/180024/download    Urinalysis, Microscopic Only - Urine, Clean Catch [[id number]]  (Abnormal) Collected:  11/09/21 1853    Specimen: Urine, Clean Catch Updated: 11/09/21 1903     RBC, UA 0-2 /HPF      WBC, UA 0-2 /HPF      Bacteria, UA None Seen /HPF      Squamous Epithelial Cells, UA None Seen /HPF      Hyaline Casts, UA 0-2 /LPF      Methodology Automated Microscopy    Urinalysis With Culture If Indicated - Urine, Clean Catch [[id number]]  (Abnormal) Collected: 11/09/21 1853    Specimen: Urine, Clean Catch Updated: 11/09/21 1903     Color, UA Yellow     Appearance, UA Clear     pH, UA 6.0     Specific Gravity, UA 1.014     Glucose, UA Negative     Ketones, UA Negative     Bilirubin, UA Negative     Blood, UA Negative     Protein,  mg/dL (2+)     Leuk Esterase, UA Negative     Nitrite, UA Negative     Urobilinogen, UA 0.2 E.U./dL    Theophylline Level [[id number]]  (Normal) Collected: 11/09/21 1614    Specimen: Blood Updated: 11/09/21 1721     Theophylline Level 12.3 mcg/mL     C-reactive Protein [[id number]]  (Abnormal) Collected: 11/09/21 1614    Specimen: Blood Updated: 11/09/21 1721     C-Reactive Protein 6.66 mg/dL     Comprehensive Metabolic Panel [[id number]]  (Abnormal) Collected: 11/09/21 1614    Specimen: Blood Updated: 11/09/21 1648     Glucose 106 mg/dL      BUN 46 mg/dL      Creatinine 1.81 mg/dL      Sodium 136 mmol/L      Potassium 4.2 mmol/L      Chloride 100 mmol/L      CO2 21.0 mmol/L      Calcium 8.7 mg/dL      Total Protein 6.1 g/dL      Albumin 3.50 g/dL      ALT (SGPT) 11 U/L      AST (SGOT) 14 U/L      Alkaline Phosphatase 108 U/L      Total Bilirubin 0.5 mg/dL      eGFR Non African Amer 37 mL/min/1.73      Globulin 2.6 gm/dL      A/G Ratio 1.3 g/dL      BUN/Creatinine Ratio 25.4     Anion Gap 15.0 mmol/L     Narrative:      GFR Normal >60  Chronic Kidney Disease <60  Kidney Failure <15      Troponin [[id number]]  (Normal) Collected: 11/09/21 1614    Specimen: Blood Updated: 11/09/21 1648     Troponin T 0.026 ng/mL     Narrative:      Troponin T Reference Range:  <= 0.03 ng/mL-   Negative for  AMI  >0.03 ng/mL-     Abnormal for myocardial necrosis.  Clinicians would have to utilize clinical acumen, EKG, Troponin and serial changes to determine if it is an Acute Myocardial Infarction or myocardial injury due to an underlying chronic condition.       Results may be falsely decreased if patient taking Biotin.      Magnesium [[id number]]  (Normal) Collected: 11/09/21 1614    Specimen: Blood Updated: 11/09/21 1648     Magnesium 1.9 mg/dL     CBC & Differential [[id number]]  (Abnormal) Collected: 11/09/21 1614    Specimen: Blood Updated: 11/09/21 1620    Narrative:      The following orders were created for panel order CBC & Differential.  Procedure                               Abnormality         Status                     ---------                               -----------         ------                     CBC Auto Differential[[id number]]        Abnormal            Final result                 Please view results for these tests on the individual orders.    CBC Auto Differential [[id number]]  (Abnormal) Collected: 11/09/21 1614    Specimen: Blood Updated: 11/09/21 1620     WBC 10.10 10*3/mm3      RBC 2.79 10*6/mm3      Hemoglobin 8.8 g/dL      Hematocrit 26.2 %      MCV 93.8 fL      MCH 31.6 pg      MCHC 33.7 g/dL      RDW 22.1 %      RDW-SD 71.3 fl      MPV 7.1 fL      Platelets 208 10*3/mm3      Neutrophil % 87.6 %      Lymphocyte % 1.6 %      Monocyte % 7.8 %      Eosinophil % 2.6 %      Basophil % 0.4 %      Neutrophils, Absolute 8.90 10*3/mm3      Lymphocytes, Absolute 0.20 10*3/mm3      Monocytes, Absolute 0.80 10*3/mm3      Eosinophils, Absolute 0.30 10*3/mm3      Basophils, Absolute 0.00 10*3/mm3      nRBC 0.1 /100 WBC     Blood Culture - Blood, Blood, Venous Line [[id number]] Collected: 11/09/21 1614    Specimen: Blood, Venous Line Updated: 11/09/21 1618         Pending Results: 2D echocardiogram, BLE Doppler, V/Q scan, blood cultures  (11/9/2021 and 11/10/2021), NSE, EEG.    Imaging Reviewed:   MRI  Brain Without Contrast    Result Date: 11/9/2021   1. Some of the images are motion degraded. 2. No acute findings. 3. Mild volume loss secondary to cerebral atrophy. 4. Persistent nonspecific subcortical and periventricular white matter signal felt to be secondary to chronic microvascular ischemia.   Electronically Signed By-Nj Hernández MD On:11/9/2021 6:11 PM This report was finalized on [id number] by  Nj Hernández MD.    XR Chest 1 View    Result Date: 11/9/2021   1. No acute chest finding. 2. Chronic right pleural thickening and pleural calcification with chronic right subpleural scarring, without significant change.  Electronically Signed By-Jayda Tam MD On:11/9/2021 4:20 PM This report was finalized on [id number] by  Jayda Tam MD.    I have reviewed the patient's labs, imaging, reports, and other clinician documentation.         Assessment/Plan   ASSESSMENT  1. Limited stage small cell lung carcinoma right hilum: S/p 6 cycles etoposide+carboplatin 6/16/2021 - 9/30/2021 and s/p lung radiation.   Prophylactic cranial radiation completed in 10/2021.  MRI brain negative for metastatic disease.  2. Anemia/Iron deficiency anemia: On oral iron and PPI. Orders were written to receive outpatient IV iron on 11/1/2021, but not given. Repeat iron studies show continued iron deficiency anemia.  Started Ferrlecit 250 mg IV x 3 days on 11/10/2021.   3. Near syncope/Elevated d-dimer/CAD/NSTEMI with two-vessel CABG 2016/HLD: Cardiology consulted. 2D echocardiogram pending.   4. Elevated D-dimer: BLE Doppler and V/Q scan to be ordered by primary team.   5. Cough/Possible PNA/COPD: CXR showed no acute finding. On cefepime. Blood cultures NGTD/pending.   6. CKD Stage IV: He follows with Dr. Mendez as an outpatient. Creatinine 1.8 on admission.   7. PAD/S/p LLE amputation and RLE stent: Followed by vascular surgery as an outpatient.  On aspirin and Plavix.   8. Constipation/BPH with LUTS:  management per Primary  team.     PLAN:  1. Follow CBC.   2. Iron profile and ferritin.   3. Continue ferrous sulfate 325 mg po once daily.   4. Start Ferrlecit 250 mg IV x 3 days, day 1/3.  5. Continue IV antibiotics.   6. Check NSE.  7. 2D echocardiogram.   8. EEG.  9. BLE Doppler and V/Q scan.       Electronically signed by TERE Higgins, 11/10/21, 6:39 PM EST.    I have personally performed a face-to-face diagnostic evaluation on this patient.  I have discussed the case with Rhea Mcbrdie NP, have edited/reviewed the note, and agree with the care plan.  The patient claims he is feeling much better from the weakness and lightheadedness that he had suffered.  He has had some tingling in his fingertips and jerking movements of his upper extremities recently.  On examination, he has alopecia and left BKA.  Labs are significant for an anemia.  Work-up for the fall is being performed.  Will check EEG to look for presence of seizure activity.  We will give IV iron to help the anemia and will check tumor marker.          I discussed the patients findings and my recommendations with patient.    Thank you for this consult.  We will be happy to follow along in the care of this patient.     Electronically signed by Riki Hedrick MD, 11/10/21, 7:05 PM EST.

## 2021-11-10 NOTE — PLAN OF CARE
Problem: Fall Injury Risk  Goal: Absence of Fall and Fall-Related Injury  Outcome: Ongoing, Progressing  Intervention: Promote Injury-Free Environment  Recent Flowsheet Documentation  Taken 11/10/2021 0300 by Kristi Hampton RN  Safety Promotion/Fall Prevention:   activity supervised   assistive device/personal items within reach   fall prevention program maintained   lighting adjusted   room organization consistent   safety round/check completed   toileting scheduled  Taken 11/10/2021 0100 by Kristi Hampton, RN  Safety Promotion/Fall Prevention:   activity supervised   assistive device/personal items within reach   lighting adjusted   room organization consistent   safety round/check completed   toileting scheduled  Taken 11/9/2021 2200 by Kristi Hampton RN  Safety Promotion/Fall Prevention:   activity supervised   assistive device/personal items within reach   clutter free environment maintained   lighting adjusted   room organization consistent   safety round/check completed   toileting scheduled   Goal Outcome Evaluation:     Patient resting in bed comfortably w/ no complaints of pain. Call light within reach, bed alarm activated. Will continue to monitor.

## 2021-11-10 NOTE — PLAN OF CARE
Problem: Adult Inpatient Plan of Care  Goal: Plan of Care Review  Outcome: Ongoing, Progressing  Flowsheets  Taken 11/10/2021 1532  Plan of Care Reviewed With: patient  Outcome Summary: Pt is 77 yo male admitted after weakness and fall hitting head.  Pt recently undergoing chemo and radiation.  MRI brain (-) acute changes.  Pt has left BKA with prosthesis that was donned sitting EOB.  Orthostatic hypotension testing completed with 27 point drop in systolic BP from supine->sitting.  Pt requiring Tana for bed mobility and minAx2 for transfers.  Pt exhibiting postural lateral sway in static standing requiring minAx2 for balance.  Pt able to ambulate with minAx2 x50 ft continuing to exhibit impaired balance.  Pt far from functional mobility baseline and is a high risk for further falls due to orthostatic hypotension and impaired balance.  At this time, PT is recommending IP rehab to address deficits.  PT will follow 5x/week and continue to address d/c needs pending progress.

## 2021-11-10 NOTE — THERAPY EVALUATION
Patient Name: David A Jolissaint  : 1945    MRN: [id number]                              Today's Date: 11/10/2021       Admit Date: 2021    Visit Dx:     ICD-10-CM ICD-9-CM   1. Near syncope  R55 780.2   2. Dehydration  E86.0 276.51   3. Malignant neoplasm of lung, unspecified laterality, unspecified part of lung (HCC)  C34.90 162.9   4. Anemia, normocytic normochromic  D64.9 285.9     Patient Active Problem List   Diagnosis   • Coronary artery disease involving native coronary artery of native heart without angina pectoris   • Right bundle branch block   • Shortness of breath   • Chest pain, atypical   • Lower abdominal pain   • Acute constipation   • Chest pressure   • Benign hypertension with chronic kidney disease, stage IV (HCC)   • Prediabetes   • Mediastinal adenopathy   • Small cell lung cancer (HCC)   • Chest pain   • Bilateral carotid artery stenosis   • Chronic obstructive pulmonary disease (HCC)   • Chronic renal insufficiency, stage III (moderate) (HCC)   • Dehiscence of amputation stump (HCC)   • Dyslipidemia   • History of prosthetic heart valve   • Peripheral arterial occlusive disease (HCC)   • Status post unilateral below knee amputation   • Atypical chest pain   • Atherosclerosis of artery   • Atherosclerosis of coronary artery bypass graft   • Coronary atherosclerosis   • Mediastinal lymphadenopathy   • Small cell carcinoma of lung (HCC)   • Poor venous access   • Near syncope     Past Medical History:   Diagnosis Date   • Anemia    • Cellulitis    • CKD (chronic kidney disease), stage III (CMS/HCC)    • COPD (chronic obstructive pulmonary disease) (CMS/HCC)    • Diverticulitis    • Dyslipidemia    • Hypertension    • Myocardial infarction (CMS/HCC)    • PVD (peripheral vascular disease) (CMS/HCC)      Past Surgical History:   Procedure Laterality Date   • BRONCHOSCOPY N/A 2021    Procedure: BRONCHOSCOPY WITH ENDOBRONCHIAL ULTRASOUND WITH FINE NEEDLE ASPIRATION. BRONCHOALVEOLAR  LAVAGE;  Surgeon: Jackson Quezada MD;  Location: [facility] ENDOSCOPY;  Service: Pulmonary;  Laterality: N/A;  subcarinal mass   • CARDIAC SURGERY     • CHOLECYSTECTOMY     • GALLBLADDER SURGERY     • LEG AMPUTATION Left       General Information     Row Name 11/10/21 1527          Physical Therapy Time and Intention    Document Type evaluation  -     Mode of Treatment physical therapy  -     Row Name 11/10/21 1527          General Information    Patient Profile Reviewed yes  -HC     Prior Level of Function independent:  has left BKA with prosthesis  -     Existing Precautions/Restrictions fall  hypotension  -     Row Name 11/10/21 1527          Living Environment    Lives With significant other  significant other works from home and can assist  -     Row Name 11/10/21 1527          Home Main Entrance    Number of Stairs, Main Entrance none  -HC     Row Name 11/10/21 1527          Cognition    Orientation Status (Cognition) oriented x 4  -     Row Name 11/10/21 1527          Safety Issues, Functional Mobility    Safety Issues Affecting Function (Mobility) safety precaution awareness  -     Impairments Affecting Function (Mobility) balance; endurance/activity tolerance; strength; postural/trunk control  -     Comment, Safety Issues/Impairments (Mobility) orthostatic hypotension  -           User Key  (r) = Recorded By, (t) = Taken By, (c) = Cosigned By    Initials Name Provider Type    HC Danielle Cummins, PT Physical Therapist               Mobility     Row Name 11/10/21 1528          Bed Mobility    Bed Mobility supine-sit  -HC     Supine-Sit [place] (Bed Mobility) minimum assist (75% patient effort)  -     Row Name 11/10/21 1528          Sit-Stand Transfer    Sit-Stand [place] (Transfers) minimum assist (75% patient effort)  -     Assistive Device (Sit-Stand Transfers) --  HHA  -     Row Name 11/10/21 1528          Gait/Stairs (Locomotion)    [place] Level (Gait) minimum assist (75%  patient effort)  -     Assistive Device (Gait) --  HHA  -HC     Distance in Feet (Gait) 50 ft  -HC     Deviations/Abnormal Patterns (Gait) gait speed decreased  -HC     Comment (Gait/Stairs) lateral sway, impaired balance  -HC           User Key  (r) = Recorded By, (t) = Taken By, (c) = Cosigned By    Initials Name Provider Type     Danielle Cummins, PT Physical Therapist               Obj/Interventions     Row Name 11/10/21 1528          Range of Motion Comprehensive    Comment, General Range of Motion BUEs WFL, right LE WFL, left BKA  -HC     Row Name 11/10/21 1528          Strength Comprehensive (MMT)    Comment, General Manual Muscle Testing (MMT) Assessment BUEs grossly 4-/5, right LE grossly 4/5  -HC     Row Name 11/10/21 1528          Balance    Balance Assessment sitting static balance; sitting dynamic balance; standing static balance; standing dynamic balance  -HC     Static Sitting Balance WFL  -HC     Dynamic Sitting Balance WFL  -HC     Static Standing Balance mild impairment  -HC     Dynamic Standing Balance moderate impairment  -           User Key  (r) = Recorded By, (t) = Taken By, (c) = Cosigned By    Initials Name Provider Type     Danielle Cummins, PT Physical Therapist               Goals/Plan     Row Name 11/10/21 1531          Bed Mobility Goal 1 (PT)    Activity/Assistive Device (Bed Mobility Goal 1, PT) bed mobility activities, all  -HC     [place] Level/Cues Needed (Bed Mobility Goal 1, PT) modified independence  -HC     Time Frame (Bed Mobility Goal 1, PT) 2 weeks  -     Row Name 11/10/21 1531          Transfer Goal 1 (PT)    Activity/Assistive Device (Transfer Goal 1, PT) transfers, all  -HC     [place] Level/Cues Needed (Transfer Goal 1, PT) modified independence  -HC     Time Frame (Transfer Goal 1, PT) 2 weeks  -     Row Name 11/10/21 1531          Gait Training Goal 1 (PT)    Activity/Assistive Device (Gait Training Goal 1, PT) gait (walking locomotion);  assistive device use  -HC     [place] Level (Gait Training Goal 1, PT) modified independence  -HC     Distance (Gait Training Goal 1, PT) 100 ft  -HC     Time Frame (Gait Training Goal 1, PT) 2 weeks  -HC           User Key  (r) = Recorded By, (t) = Taken By, (c) = Cosigned By    Initials Name Provider Type     Danielle Cummins, PT Physical Therapist               Clinical Impression     Row Name 11/10/21 1529          Pain    Additional Documentation Pain Scale: Numbers Pre/Post-Treatment (Group)  -     Row Name 11/10/21 1529          Pain Scale: Numbers Pre/Post-Treatment    Pretreatment Pain Rating 0/10 - no pain  -HC     Posttreatment Pain Rating 0/10 - no pain  -HC     Row Name 11/10/21 1529          Plan of Care Review    Outcome Summary Pt is 75 yo male admitted after weakness and fall hitting head.  Pt recently undergoing chemo and radiation.  MRI brain (-) acute changes.  -     Row Name 11/10/21 1529          Therapy Assessment/Plan (PT)    Patient/Family Therapy Goals Statement (PT) increase mobility  -HC     Rehab Potential (PT) good, to achieve stated therapy goals  -     Criteria for Skilled Interventions Met (PT) yes; skilled treatment is necessary  -     Predicted Duration of Therapy Intervention (PT) until d/c  -HC     Row Name 11/10/21 1529          Vital Signs    Pre Systolic BP Rehab 125  -HC     Pre Treatment Diastolic BP 66  -HC     Intra Systolic BP Rehab 98  -HC     Intra Treatment Diastolic BP 48  after sitting /73  -HC     Post Systolic BP Rehab 141  -HC     Post Treatment Diastolic BP 74  -HC     Row Name 11/10/21 1529          Positioning and Restraints    Pre-Treatment Position in bed  -HC     Post Treatment Position chair  -HC     In Chair notified nsg; call light within reach; encouraged to call for assist; exit alarm on  -HC           User Key  (r) = Recorded By, (t) = Taken By, (c) = Cosigned By    Initials Name Provider Type     Daneille Cummins, PT Physical  Therapist               Outcome Measures     Row Name 11/10/21 1531          How much help from another person do you currently need...    Turning from your back to your side while in flat bed without using bedrails? 3  -HC     Moving from lying on back to sitting on the side of a flat bed without bedrails? 3  -HC     Moving to and from a bed to a chair (including a wheelchair)? 2  -HC     Standing up from a chair using your arms (e.g., wheelchair, bedside chair)? 2  -HC     Climbing 3-5 steps with a railing? 1  -HC     To walk in hospital room? 2  -HC     AM-PAC 6 Clicks Score (PT) 13  -HC     Row Name 11/10/21 1531          Functional Assessment    Outcome Measure Options AM-PAC 6 Clicks Basic Mobility (PT)  -           User Key  (r) = Recorded By, (t) = Taken By, (c) = Cosigned By    Initials Name Provider Type     Danielle Cummins, PT Physical Therapist                             Physical Therapy Education                 Title: PT OT SLP Therapies (Done)     Topic: Physical Therapy (Done)     Point: Mobility training (Done)     Learning Progress Summary           Patient Acceptance, E, VU by  at 11/10/2021 1532                   Point: Precautions (Done)     Learning Progress Summary           Patient Acceptance, E, VU by  at 11/10/2021 1532                               User Key     Initials Effective Dates Name Provider Type Discipline     06/16/21 -  Danielle Cummins, PT Physical Therapist PT              PT Recommendation and Plan  Planned Therapy Interventions (PT): balance training, bed mobility training, gait training, neuromuscular re-education, strengthening, patient/family education, postural re-education, transfer training, ROM (range of motion)  Plan of Care Reviewed With: patient  Outcome Summary: Pt is 75 yo male admitted after weakness and fall hitting head.  Pt recently undergoing chemo and radiation.  MRI brain (-) acute changes.  Pt has left BKA with prosthesis that was donned  sitting EOB.  Orthostatic hypotension testing completed with 27 point drop in systolic BP from supine->sitting.  Pt requiring Tana for bed mobility and minAx2 for transfers.  Pt exhibiting postural lateral sway in static standing requiring minAx2 for balance.  Pt able to ambulate with minAx2 x50 ft continuing to exhibit impaired balance.  Pt far from functional mobility baseline and is a high risk for further falls due to orthostatic hypotension and impaired balance.  At this time, PT is recommending IP rehab to address deficits.  PT will follow 5x/week and continue to address d/c needs pending progress.     Time Calculation:    PT Charges     Row Name 11/10/21 1537             Time Calculation    Start Time 1420  -      Stop Time 1455  -      Time Calculation (min) 35 min  -      PT Received On 11/10/21  -      PT - Next Appointment 11/11/21  -      PT Goal Re-Cert Due Date 11/24/21  -              Time Calculation- PT    Total Timed Code Minutes- PT 0 minute(s)  -            User Key  (r) = Recorded By, (t) = Taken By, (c) = Cosigned By    Initials Name Provider Type     Danielle Cummins, PT Physical Therapist              Therapy Charges for Today     Code Description Service Date Service Provider Modifiers Qty    79069692321  PT EVAL MOD COMPLEXITY 4 11/10/2021 Danielle Cummins, PT GP 1          PT G-Codes  Outcome Measure Options: AM-PAC 6 Clicks Basic Mobility (PT)  AM-PAC 6 Clicks Score (PT): 13    Danielle Cummins PT  11/10/2021

## 2021-11-10 NOTE — ED PROVIDER NOTES
Subjective   76-year-old male complaining of increased weakness today patient states he slumped to the floor then he stood up and hit his head.  States he was weak and lightheaded.  He states that he has had no vomiting or diarrhea.  He states that he has had chemotherapy recently.  He reports no hemoptysis.  He denies melena hematemesis hematochezia.  He states he has not felt good for about 24 hours been weak and tired.  Reports no focal neurologic defects or lateralizing neurologic signs he states he intermittently has a headache and states that sometimes he gets like this when he needs to drink more fluids      Patient states he has had an occasional cough productive of yellowish sputum    Review of Systems   Constitutional: Positive for activity change and appetite change. Negative for chills and fever.   Respiratory: Positive for cough, chest tightness and shortness of breath.    Cardiovascular: Negative for chest pain, palpitations and leg swelling.   Neurological: Positive for syncope, weakness, light-headedness and headaches. Negative for dizziness and facial asymmetry.   Hematological: Does not bruise/bleed easily.   All other systems reviewed and are negative.      Past Medical History:   Diagnosis Date   • Anemia    • Cellulitis    • CKD (chronic kidney disease), stage III (CMS/HCC)    • COPD (chronic obstructive pulmonary disease) (CMS/HCC)    • Diverticulitis    • Dyslipidemia    • Hypertension    • Myocardial infarction (CMS/HCC)    • PVD (peripheral vascular disease) (CMS/HCC)        No Known Allergies    Past Surgical History:   Procedure Laterality Date   • BRONCHOSCOPY N/A 5/20/2021    Procedure: BRONCHOSCOPY WITH ENDOBRONCHIAL ULTRASOUND WITH FINE NEEDLE ASPIRATION. BRONCHOALVEOLAR LAVAGE;  Surgeon: Jackson Quezada MD;  Location: [facility] ENDOSCOPY;  Service: Pulmonary;  Laterality: N/A;  subcarinal mass   • CARDIAC SURGERY     • CHOLECYSTECTOMY     • GALLBLADDER SURGERY     • LEG AMPUTATION Left         Family History   Problem Relation Age of Onset   • Heart attack Father    • Heart disease Father        Social History     Socioeconomic History   • Marital status:    Tobacco Use   • Smoking status: Former Smoker     Packs/day: 2.00     Years: 50.00     Pack years: 100.00   • Smokeless tobacco: Never Used   • Tobacco comment: Says he quit approximately in 2005.   Substance and Sexual Activity   • Alcohol use: Not Currently   • Drug use: Never   • Sexual activity: Defer           Objective   Physical Exam  Alert [place] Coma Scale 15   HEENT: Pupils equal and reactive to light. Conjunctivae are not injected. normal tympanic membranes. Oropharynx and nares are normal.   Neck: Supple. Midline trachea. No JVD. No goiter.   Chest: A few scattered rhonchi and expiratory wheezes are detected and equal breath sounds bilaterally regular rate and rhythm without murmur or rub.   Abdomen: Positive bowel sounds nontender nondistended. No rebound or peritoneal signs. No CVA tenderness.   Extremities no clubbing cyanosis or edema motor sensory exam is normal the full range of motion is intact   skin: Warm and dry, no rashes or petechia.   Lymphatic: No regional lymphadenopathy. No calf pain, swelling or Arash's sign    Procedures           ED Course                                 Labs Reviewed   COMPREHENSIVE METABOLIC PANEL - Abnormal; Notable for the following components:       Result Value    Glucose 106 (*)     BUN 46 (*)     Creatinine 1.81 (*)     CO2 21.0 (*)     eGFR Non African Amer 37 (*)     BUN/Creatinine Ratio 25.4 (*)     All other components within normal limits    Narrative:     GFR Normal >60  Chronic Kidney Disease <60  Kidney Failure <15     URINALYSIS W/ CULTURE IF INDICATED - Abnormal; Notable for the following components:    Protein,  mg/dL (2+) (*)     All other components within normal limits   C-REACTIVE PROTEIN - Abnormal; Notable for the following components:    C-Reactive Protein  6.66 (*)     All other components within normal limits   CBC WITH AUTO DIFFERENTIAL - Abnormal; Notable for the following components:    RBC 2.79 (*)     Hemoglobin 8.8 (*)     Hematocrit 26.2 (*)     RDW 22.1 (*)     RDW-SD 71.3 (*)     Neutrophil % 87.6 (*)     Lymphocyte % 1.6 (*)     Neutrophils, Absolute 8.90 (*)     Lymphocytes, Absolute 0.20 (*)     All other components within normal limits   URINALYSIS, MICROSCOPIC ONLY - Abnormal; Notable for the following components:    RBC, UA 0-2 (*)     WBC, UA 0-2 (*)     All other components within normal limits   THEOPHYLLINE LEVEL - Normal   TROPONIN (IN-HOUSE) - Normal    Narrative:     Troponin T Reference Range:  <= 0.03 ng/mL-   Negative for AMI  >0.03 ng/mL-     Abnormal for myocardial necrosis.  Clinicians would have to utilize clinical acumen, EKG, Troponin and serial changes to determine if it is an Acute Myocardial Infarction or myocardial injury due to an underlying chronic condition.       Results may be falsely decreased if patient taking Biotin.     MAGNESIUM - Normal   BLOOD CULTURE   BLOOD CULTURE   CBC AND DIFFERENTIAL    Narrative:     The following orders were created for panel order CBC & Differential.  Procedure                               Abnormality         Status                     ---------                               -----------         ------                     CBC Auto Differential[[id number]]        Abnormal            Final result                 Please view results for these tests on the individual orders.     Medications   cefepime 2 gm IVPB in 100 ml NS (MBP) (has no administration in time range)   lactated ringers bolus 2,286 mL (has no administration in time range)   dexamethasone (DECADRON) injection 8 mg (8 mg Intravenous Given 11/9/21 1633)   midazolam (VERSED) injection 2 mg (2 mg Intravenous Given 11/9/21 1707)     MRI Brain Without Contrast    Result Date: 11/9/2021   1. Some of the images are motion degraded. 2. No acute  findings. 3. Mild volume loss secondary to cerebral atrophy. 4. Persistent nonspecific subcortical and periventricular white matter signal felt to be secondary to chronic microvascular ischemia.   Electronically Signed By-Nj Hernández MD On:11/9/2021 6:11 PM This report was finalized on [id number] by  Nj Hernández MD.    XR Chest 1 View    Result Date: 11/9/2021   1. No acute chest finding. 2. Chronic right pleural thickening and pleural calcification with chronic right subpleural scarring, without significant change.  Electronically Signed By-Jayda Tam MD On:11/9/2021 4:20 PM This report was finalized on [id number] by  Jayda Tam MD.              MDM  Number of Diagnoses or Management Options     Amount and/or Complexity of Data Reviewed  Clinical lab tests: reviewed and ordered  Tests in the radiology section of CPT®: ordered and reviewed  Discuss the patient with other providers: yes  Independent visualization of images, tracings, or specimens: yes    Risk of Complications, Morbidity, and/or Mortality  Presenting problems: high  Diagnostic procedures: high  Management options: high  General comments: The patient be admitted for hydration we will cover the patient with cefepime for his underlying bronchitis.  The patient was agreeable to this plan of treatment.        Final diagnoses:   Near syncope   Dehydration   Malignant neoplasm of lung, unspecified laterality, unspecified part of lung (HCC)   Anemia, normocytic normochromic       ED Disposition  ED Disposition     ED Disposition Condition Comment    Decision to Admit  Level of Care: Telemetry [5]   Diagnosis: Near syncope [062578]   Admitting Physician: KATHLEEN DIGGS [[id number]]   Attending Physician: KATHLEEN DIGGS [[id number]]            No follow-up provider specified.       Medication List      No changes were made to your prescriptions during this visit.          Luis Antonio Bruno MD  11/09/21 1917

## 2021-11-11 ENCOUNTER — APPOINTMENT (OUTPATIENT)
Dept: CARDIOLOGY | Facility: HOSPITAL | Age: 76
End: 2021-11-11

## 2021-11-11 ENCOUNTER — APPOINTMENT (OUTPATIENT)
Dept: NUCLEAR MEDICINE | Facility: HOSPITAL | Age: 76
End: 2021-11-11

## 2021-11-11 ENCOUNTER — APPOINTMENT (OUTPATIENT)
Dept: GENERAL RADIOLOGY | Facility: HOSPITAL | Age: 76
End: 2021-11-11

## 2021-11-11 ENCOUNTER — APPOINTMENT (OUTPATIENT)
Dept: NEUROLOGY | Facility: HOSPITAL | Age: 76
End: 2021-11-11

## 2021-11-11 LAB
ANION GAP SERPL CALCULATED.3IONS-SCNC: 13 MMOL/L (ref 5–15)
BASOPHILS # BLD AUTO: 0 10*3/MM3 (ref 0–0.2)
BASOPHILS NFR BLD AUTO: 0.4 % (ref 0–1.5)
BH CV ECHO MEAS - ACS: 2.3 CM
BH CV ECHO MEAS - AO MAX PG (FULL): 0.91 MMHG
BH CV ECHO MEAS - AO MAX PG: 5.3 MMHG
BH CV ECHO MEAS - AO MEAN PG (FULL): 0.26 MMHG
BH CV ECHO MEAS - AO MEAN PG: 2.3 MMHG
BH CV ECHO MEAS - AO ROOT AREA (BSA CORRECTED): 1.9
BH CV ECHO MEAS - AO ROOT AREA: 9.7 CM^2
BH CV ECHO MEAS - AO ROOT DIAM: 3.5 CM
BH CV ECHO MEAS - AO V2 MAX: 115.1 CM/SEC
BH CV ECHO MEAS - AO V2 MEAN: 69.1 CM/SEC
BH CV ECHO MEAS - AO V2 VTI: 22.1 CM
BH CV ECHO MEAS - AORTIC HR: 71.3 BPM
BH CV ECHO MEAS - AORTIC R-R: 0.84 SEC
BH CV ECHO MEAS - ASC AORTA: 2.9 CM
BH CV ECHO MEAS - AVA(I,A): 3.6 CM^2
BH CV ECHO MEAS - AVA(I,D): 3.6 CM^2
BH CV ECHO MEAS - AVA(V,A): 3.1 CM^2
BH CV ECHO MEAS - AVA(V,D): 3.1 CM^2
BH CV ECHO MEAS - BSA(HAYCOCK): 1.9 M^2
BH CV ECHO MEAS - BSA: 1.9 M^2
BH CV ECHO MEAS - BZI_BMI: 22.2 KILOGRAMS/M^2
BH CV ECHO MEAS - BZI_METRIC_HEIGHT: 177.8 CM
BH CV ECHO MEAS - BZI_METRIC_WEIGHT: 70.3 KG
BH CV ECHO MEAS - CI(AO): 8.2 L/MIN/M^2
BH CV ECHO MEAS - CI(LVOT): 3 L/MIN/M^2
BH CV ECHO MEAS - CO(AO): 15.3 L/MIN
BH CV ECHO MEAS - CO(LVOT): 5.7 L/MIN
BH CV ECHO MEAS - EDV(CUBED): 101.2 ML
BH CV ECHO MEAS - EDV(MOD-SP4): 112.9 ML
BH CV ECHO MEAS - EDV(TEICH): 100.3 ML
BH CV ECHO MEAS - EF(CUBED): 74.4 %
BH CV ECHO MEAS - EF(MOD-BP): 77 %
BH CV ECHO MEAS - EF(MOD-SP4): 76.9 %
BH CV ECHO MEAS - EF(TEICH): 66.3 %
BH CV ECHO MEAS - ESV(CUBED): 25.9 ML
BH CV ECHO MEAS - ESV(MOD-SP4): 26.1 ML
BH CV ECHO MEAS - ESV(TEICH): 33.9 ML
BH CV ECHO MEAS - FS: 36.5 %
BH CV ECHO MEAS - IVS/LVPW: 1.1
BH CV ECHO MEAS - IVSD: 1.3 CM
BH CV ECHO MEAS - LA DIMENSION(2D): 3.2 CM
BH CV ECHO MEAS - LV DIASTOLIC VOL/BSA (35-75): 60.3 ML/M^2
BH CV ECHO MEAS - LV MASS(C)D: 217.9 GRAMS
BH CV ECHO MEAS - LV MASS(C)DI: 116.3 GRAMS/M^2
BH CV ECHO MEAS - LV MAX PG: 4.4 MMHG
BH CV ECHO MEAS - LV MEAN PG: 2 MMHG
BH CV ECHO MEAS - LV SYSTOLIC VOL/BSA (12-30): 13.9 ML/M^2
BH CV ECHO MEAS - LV V1 MAX: 104.8 CM/SEC
BH CV ECHO MEAS - LV V1 MEAN: 64 CM/SEC
BH CV ECHO MEAS - LV V1 VTI: 23.1 CM
BH CV ECHO MEAS - LVIDD: 4.7 CM
BH CV ECHO MEAS - LVIDS: 3 CM
BH CV ECHO MEAS - LVOT AREA: 3.4 CM^2
BH CV ECHO MEAS - LVOT DIAM: 2.1 CM
BH CV ECHO MEAS - LVPWD: 1.2 CM
BH CV ECHO MEAS - MV A MAX VEL: 137.7 CM/SEC
BH CV ECHO MEAS - MV DEC SLOPE: 381.8 CM/SEC^2
BH CV ECHO MEAS - MV DEC TIME: 0.28 SEC
BH CV ECHO MEAS - MV E MAX VEL: 105 CM/SEC
BH CV ECHO MEAS - MV E/A: 0.76
BH CV ECHO MEAS - MV MAX PG: 8.1 MMHG
BH CV ECHO MEAS - MV MEAN PG: 3.7 MMHG
BH CV ECHO MEAS - MV V2 MAX: 141.9 CM/SEC
BH CV ECHO MEAS - MV V2 MEAN: 91.9 CM/SEC
BH CV ECHO MEAS - MV V2 VTI: 30.1 CM
BH CV ECHO MEAS - MVA(VTI): 2.6 CM^2
BH CV ECHO MEAS - PA ACC TIME: 0.17 SEC
BH CV ECHO MEAS - PA MAX PG (FULL): 1.9 MMHG
BH CV ECHO MEAS - PA MAX PG: 3.4 MMHG
BH CV ECHO MEAS - PA MEAN PG (FULL): 0.94 MMHG
BH CV ECHO MEAS - PA MEAN PG: 1.8 MMHG
BH CV ECHO MEAS - PA PR(ACCEL): 3.6 MMHG
BH CV ECHO MEAS - PA V2 MAX: 92.4 CM/SEC
BH CV ECHO MEAS - PA V2 MEAN: 62.3 CM/SEC
BH CV ECHO MEAS - PA V2 VTI: 19.7 CM
BH CV ECHO MEAS - RAP SYSTOLE: 3 MMHG
BH CV ECHO MEAS - RV MAX PG: 1.5 MMHG
BH CV ECHO MEAS - RV MEAN PG: 0.84 MMHG
BH CV ECHO MEAS - RV V1 MAX: 61.8 CM/SEC
BH CV ECHO MEAS - RV V1 MEAN: 43.5 CM/SEC
BH CV ECHO MEAS - RV V1 VTI: 15.4 CM
BH CV ECHO MEAS - RVDD: 2.3 CM
BH CV ECHO MEAS - RVSP: 13.5 MMHG
BH CV ECHO MEAS - SI(AO): 114.5 ML/M^2
BH CV ECHO MEAS - SI(CUBED): 40.2 ML/M^2
BH CV ECHO MEAS - SI(LVOT): 42.4 ML/M^2
BH CV ECHO MEAS - SI(MOD-SP4): 46.4 ML/M^2
BH CV ECHO MEAS - SI(TEICH): 35.5 ML/M^2
BH CV ECHO MEAS - SV(AO): 214.5 ML
BH CV ECHO MEAS - SV(CUBED): 75.2 ML
BH CV ECHO MEAS - SV(LVOT): 79.4 ML
BH CV ECHO MEAS - SV(MOD-SP4): 86.8 ML
BH CV ECHO MEAS - SV(TEICH): 66.5 ML
BH CV ECHO MEAS - TR MAX VEL: 161.9 CM/SEC
BH CV LOWER VASCULAR LEFT COMMON FEMORAL AUGMENT: NORMAL
BH CV LOWER VASCULAR LEFT COMMON FEMORAL COMPETENT: NORMAL
BH CV LOWER VASCULAR LEFT COMMON FEMORAL COMPRESS: NORMAL
BH CV LOWER VASCULAR LEFT COMMON FEMORAL PHASIC: NORMAL
BH CV LOWER VASCULAR LEFT COMMON FEMORAL SPONT: NORMAL
BH CV LOWER VASCULAR RIGHT COMMON FEMORAL AUGMENT: NORMAL
BH CV LOWER VASCULAR RIGHT COMMON FEMORAL COMPETENT: NORMAL
BH CV LOWER VASCULAR RIGHT COMMON FEMORAL COMPRESS: NORMAL
BH CV LOWER VASCULAR RIGHT COMMON FEMORAL PHASIC: NORMAL
BH CV LOWER VASCULAR RIGHT COMMON FEMORAL SPONT: NORMAL
BH CV LOWER VASCULAR RIGHT DISTAL FEMORAL COMPRESS: NORMAL
BH CV LOWER VASCULAR RIGHT GASTRONEMIUS COMPRESS: NORMAL
BH CV LOWER VASCULAR RIGHT GREATER SAPH AK COMPRESS: NORMAL
BH CV LOWER VASCULAR RIGHT GREATER SAPH BK COMPRESS: NORMAL
BH CV LOWER VASCULAR RIGHT LESSER SAPH COMPRESS: NORMAL
BH CV LOWER VASCULAR RIGHT MID FEMORAL AUGMENT: NORMAL
BH CV LOWER VASCULAR RIGHT MID FEMORAL COMPETENT: NORMAL
BH CV LOWER VASCULAR RIGHT MID FEMORAL COMPRESS: NORMAL
BH CV LOWER VASCULAR RIGHT MID FEMORAL PHASIC: NORMAL
BH CV LOWER VASCULAR RIGHT MID FEMORAL SPONT: NORMAL
BH CV LOWER VASCULAR RIGHT PERONEAL COMPRESS: NORMAL
BH CV LOWER VASCULAR RIGHT POPLITEAL AUGMENT: NORMAL
BH CV LOWER VASCULAR RIGHT POPLITEAL COMPETENT: NORMAL
BH CV LOWER VASCULAR RIGHT POPLITEAL COMPRESS: NORMAL
BH CV LOWER VASCULAR RIGHT POPLITEAL PHASIC: NORMAL
BH CV LOWER VASCULAR RIGHT POPLITEAL SPONT: NORMAL
BH CV LOWER VASCULAR RIGHT POSTERIOR TIBIAL COMPRESS: NORMAL
BH CV LOWER VASCULAR RIGHT PROXIMAL FEMORAL COMPRESS: NORMAL
BH CV LOWER VASCULAR RIGHT SAPHENOFEMORAL JUNCTION COMPRESS: NORMAL
BH CV XLRA MEAS LEFT CCA RATIO VEL: 98 CM/SEC
BH CV XLRA MEAS LEFT DIST CCA EDV: 27.4 CM/SEC
BH CV XLRA MEAS LEFT DIST CCA PSV: 92.5 CM/SEC
BH CV XLRA MEAS LEFT DIST ICA EDV: -32.9 CM/SEC
BH CV XLRA MEAS LEFT DIST ICA PSV: -115 CM/SEC
BH CV XLRA MEAS LEFT ICA RATIO VEL: -150 CM/SEC
BH CV XLRA MEAS LEFT ICA/CCA RATIO: -1.5
BH CV XLRA MEAS LEFT PROX CCA EDV: 18.8 CM/SEC
BH CV XLRA MEAS LEFT PROX CCA PSV: 98 CM/SEC
BH CV XLRA MEAS LEFT PROX ECA PSV: -144 CM/SEC
BH CV XLRA MEAS LEFT PROX ICA EDV: -32.1 CM/SEC
BH CV XLRA MEAS LEFT PROX ICA PSV: -150 CM/SEC
BH CV XLRA MEAS LEFT PROX SCLA PSV: 270 CM/SEC
BH CV XLRA MEAS LEFT VERTEBRAL A PSV: -89.2 CM/SEC
BH CV XLRA MEAS RIGHT CCA RATIO VEL: 80.1 CM/SEC
BH CV XLRA MEAS RIGHT DIST CCA EDV: 15.5 CM/SEC
BH CV XLRA MEAS RIGHT DIST CCA PSV: 75.2 CM/SEC
BH CV XLRA MEAS RIGHT DIST ICA EDV: -19.9 CM/SEC
BH CV XLRA MEAS RIGHT DIST ICA PSV: -107 CM/SEC
BH CV XLRA MEAS RIGHT ICA RATIO VEL: -240 CM/SEC
BH CV XLRA MEAS RIGHT ICA/CCA RATIO: -3
BH CV XLRA MEAS RIGHT PROX CCA EDV: 13.7 CM/SEC
BH CV XLRA MEAS RIGHT PROX CCA PSV: 80.1 CM/SEC
BH CV XLRA MEAS RIGHT PROX ECA PSV: -121 CM/SEC
BH CV XLRA MEAS RIGHT PROX ICA EDV: -45.5 CM/SEC
BH CV XLRA MEAS RIGHT PROX ICA PSV: -240 CM/SEC
BH CV XLRA MEAS RIGHT PROX SCLA PSV: 106 CM/SEC
BH CV XLRA MEAS RIGHT VERTEBRAL A PSV: -58.5 CM/SEC
BUN SERPL-MCNC: 43 MG/DL (ref 8–23)
BUN/CREAT SERPL: 25.3 (ref 7–25)
CALCIUM SPEC-SCNC: 8.4 MG/DL (ref 8.6–10.5)
CHLORIDE SERPL-SCNC: 107 MMOL/L (ref 98–107)
CO2 SERPL-SCNC: 21 MMOL/L (ref 22–29)
CREAT SERPL-MCNC: 1.7 MG/DL (ref 0.76–1.27)
DEPRECATED RDW RBC AUTO: 71.3 FL (ref 37–54)
EOSINOPHIL # BLD AUTO: 0.6 10*3/MM3 (ref 0–0.4)
EOSINOPHIL NFR BLD AUTO: 6.4 % (ref 0.3–6.2)
ERYTHROCYTE [DISTWIDTH] IN BLOOD BY AUTOMATED COUNT: 21.9 % (ref 12.3–15.4)
GFR SERPL CREATININE-BSD FRML MDRD: 39 ML/MIN/1.73
GLUCOSE SERPL-MCNC: 111 MG/DL (ref 65–99)
HCT VFR BLD AUTO: 26.2 % (ref 37.5–51)
HGB BLD-MCNC: 8.8 G/DL (ref 13–17.7)
LYMPHOCYTES # BLD AUTO: 0.2 10*3/MM3 (ref 0.7–3.1)
LYMPHOCYTES NFR BLD AUTO: 2.1 % (ref 19.6–45.3)
MAGNESIUM SERPL-MCNC: 1.9 MG/DL (ref 1.6–2.4)
MAXIMAL PREDICTED HEART RATE: 143 BPM
MAXIMAL PREDICTED HEART RATE: 143 BPM
MCH RBC QN AUTO: 31 PG (ref 26.6–33)
MCHC RBC AUTO-ENTMCNC: 33.4 G/DL (ref 31.5–35.7)
MCV RBC AUTO: 92.9 FL (ref 79–97)
MONOCYTES # BLD AUTO: 0.7 10*3/MM3 (ref 0.1–0.9)
MONOCYTES NFR BLD AUTO: 7.2 % (ref 5–12)
NEUTROPHILS NFR BLD AUTO: 7.8 10*3/MM3 (ref 1.7–7)
NEUTROPHILS NFR BLD AUTO: 83.9 % (ref 42.7–76)
NRBC BLD AUTO-RTO: 0.1 /100 WBC (ref 0–0.2)
PLATELET # BLD AUTO: 216 10*3/MM3 (ref 140–450)
PMV BLD AUTO: 7.7 FL (ref 6–12)
POTASSIUM SERPL-SCNC: 4.1 MMOL/L (ref 3.5–5.2)
RBC # BLD AUTO: 2.82 10*6/MM3 (ref 4.14–5.8)
RIGHT ARM BP: NORMAL MMHG
SODIUM SERPL-SCNC: 141 MMOL/L (ref 136–145)
STRESS TARGET HR: 122 BPM
STRESS TARGET HR: 122 BPM
WBC # BLD AUTO: 9.3 10*3/MM3 (ref 3.4–10.8)

## 2021-11-11 PROCEDURE — 71046 X-RAY EXAM CHEST 2 VIEWS: CPT

## 2021-11-11 PROCEDURE — 25010000002 CEFEPIME PER 500 MG: Performed by: FAMILY MEDICINE

## 2021-11-11 PROCEDURE — 93880 EXTRACRANIAL BILAT STUDY: CPT

## 2021-11-11 PROCEDURE — 25010000002 NA FERRIC GLUC CPLX PER 12.5 MG: Performed by: NURSE PRACTITIONER

## 2021-11-11 PROCEDURE — A9540 TC99M MAA: HCPCS | Performed by: INTERNAL MEDICINE

## 2021-11-11 PROCEDURE — 94799 UNLISTED PULMONARY SVC/PX: CPT

## 2021-11-11 PROCEDURE — 83735 ASSAY OF MAGNESIUM: CPT | Performed by: FAMILY MEDICINE

## 2021-11-11 PROCEDURE — 95816 EEG AWAKE AND DROWSY: CPT | Performed by: PSYCHIATRY & NEUROLOGY

## 2021-11-11 PROCEDURE — 97530 THERAPEUTIC ACTIVITIES: CPT

## 2021-11-11 PROCEDURE — A9538 TC99M PYROPHOSPHATE: HCPCS | Performed by: INTERNAL MEDICINE

## 2021-11-11 PROCEDURE — 99232 SBSQ HOSP IP/OBS MODERATE 35: CPT | Performed by: INTERNAL MEDICINE

## 2021-11-11 PROCEDURE — 93971 EXTREMITY STUDY: CPT

## 2021-11-11 PROCEDURE — 25010000002 ENOXAPARIN PER 10 MG: Performed by: INTERNAL MEDICINE

## 2021-11-11 PROCEDURE — 97116 GAIT TRAINING THERAPY: CPT

## 2021-11-11 PROCEDURE — 95816 EEG AWAKE AND DROWSY: CPT

## 2021-11-11 PROCEDURE — 0 TECHNETIUM TC99M PYROPHOSPHATE: Performed by: INTERNAL MEDICINE

## 2021-11-11 PROCEDURE — 0 TECHNETIUM ALBUMIN AGGREGATED: Performed by: INTERNAL MEDICINE

## 2021-11-11 PROCEDURE — 78582 LUNG VENTILAT&PERFUS IMAGING: CPT

## 2021-11-11 PROCEDURE — 86316 IMMUNOASSAY TUMOR OTHER: CPT | Performed by: INTERNAL MEDICINE

## 2021-11-11 PROCEDURE — 85025 COMPLETE CBC W/AUTO DIFF WBC: CPT | Performed by: FAMILY MEDICINE

## 2021-11-11 PROCEDURE — 80048 BASIC METABOLIC PNL TOTAL CA: CPT | Performed by: FAMILY MEDICINE

## 2021-11-11 RX ADMIN — ASPIRIN 81 MG: 81 TABLET, COATED ORAL at 13:50

## 2021-11-11 RX ADMIN — HYDROCODONE BITARTRATE AND ACETAMINOPHEN 1 TABLET: 5; 325 TABLET ORAL at 20:36

## 2021-11-11 RX ADMIN — SODIUM CHLORIDE 250 MG: 9 INJECTION, SOLUTION INTRAVENOUS at 13:51

## 2021-11-11 RX ADMIN — TECHNETIUM TC99M PYROPHOSPHATE 1 DOSE: 12 INJECTION INTRAVENOUS at 12:15

## 2021-11-11 RX ADMIN — PANTOPRAZOLE SODIUM 40 MG: 40 TABLET, DELAYED RELEASE ORAL at 10:32

## 2021-11-11 RX ADMIN — ATORVASTATIN CALCIUM 40 MG: 40 TABLET, FILM COATED ORAL at 10:32

## 2021-11-11 RX ADMIN — CLOPIDOGREL BISULFATE 75 MG: 75 TABLET ORAL at 10:32

## 2021-11-11 RX ADMIN — Medication 250 MG: at 13:50

## 2021-11-11 RX ADMIN — ENOXAPARIN SODIUM 70 MG: 100 INJECTION SUBCUTANEOUS at 06:37

## 2021-11-11 RX ADMIN — THEOPHYLLINE 300 MG: 300 TABLET, EXTENDED RELEASE ORAL at 10:32

## 2021-11-11 RX ADMIN — BUDESONIDE 0.5 MG: 0.5 SUSPENSION RESPIRATORY (INHALATION) at 19:11

## 2021-11-11 RX ADMIN — SODIUM CHLORIDE 100 ML/HR: 9 INJECTION, SOLUTION INTRAVENOUS at 15:04

## 2021-11-11 RX ADMIN — SODIUM BICARBONATE 650 MG TABLET 650 MG: at 20:35

## 2021-11-11 RX ADMIN — METOPROLOL SUCCINATE 25 MG: 25 TABLET, EXTENDED RELEASE ORAL at 10:32

## 2021-11-11 RX ADMIN — KIT FOR THE PREPARATION OF TECHNETIUM TC 99M ALBUMIN AGGREGATED 1 DOSE: 2.5 INJECTION, POWDER, FOR SOLUTION INTRAVENOUS at 12:45

## 2021-11-11 RX ADMIN — ENOXAPARIN SODIUM 70 MG: 100 INJECTION SUBCUTANEOUS at 17:30

## 2021-11-11 RX ADMIN — ARFORMOTEROL TARTRATE 15 MCG: 15 SOLUTION RESPIRATORY (INHALATION) at 19:11

## 2021-11-11 RX ADMIN — MONTELUKAST 10 MG: 10 TABLET, FILM COATED ORAL at 20:35

## 2021-11-11 RX ADMIN — HYDROCODONE BITARTRATE AND ACETAMINOPHEN 1 TABLET: 5; 325 TABLET ORAL at 02:16

## 2021-11-11 RX ADMIN — SODIUM BICARBONATE 650 MG TABLET 650 MG: at 10:32

## 2021-11-11 RX ADMIN — THEOPHYLLINE 300 MG: 300 TABLET, EXTENDED RELEASE ORAL at 20:35

## 2021-11-11 RX ADMIN — FERROUS SULFATE TAB EC 324 MG (65 MG FE EQUIVALENT) 324 MG: 324 (65 FE) TABLET DELAYED RESPONSE at 10:32

## 2021-11-11 RX ADMIN — CEFEPIME 2 G: 2 INJECTION, POWDER, FOR SOLUTION INTRAVENOUS at 10:33

## 2021-11-11 NOTE — CASE MANAGEMENT/SOCIAL WORK
Continued Stay Note  FERNANDEZ Walls     Patient Name: David A Jolissaint  MRN: [id number]  Today's Date: 11/11/2021    Admit Date: 11/9/2021     Discharge Plan     Row Name 11/11/21 1152       Plan    Plan IP rehab recommended, list provided 11/11. No precert required.    Provided Post Acute Provider List? Yes    Post Acute Provider List Inpatient Rehab    Delivered To Patient    Method of Delivery In person    Patient/Family in Agreement with Plan yes    Plan Comments Met with patient at bedside to discuss IP rehab, he is undecided at this time and wanted to talk to his SO. I asked if CM could call SO and he declined and stated she was at work and he would discuss it with her. Patient has been to [facility] in the past. Asked patient if he ends up declining IP rehab if he was agreeable to HH, and he declines HH at this time. Will follow up.              Met with patient in room wearing PPE: mask     Maintained distance greater than six feet and spent less than 15 minutes in the room.          Debby Catalan RN

## 2021-11-11 NOTE — PROGRESS NOTES
Hematology/Oncology Inpatient Progress Note    PATIENT NAME: David A Jolissaint  : 1945  MRN: [id number]    CHIEF COMPLAINT: Limited stage small cell lung cancer and iron deficiency    HISTORY OF PRESENT ILLNESS:  76 y.o. male presented to [facility] emergency department on 2021 after his knees buckled leading to a partial fall and then hitting his head on a table when he tried to get up. His wife was not able to get him up. He denied any loss of consciousness. Per his wife, he was not coherent at home. Patient had increased weakness and felt lightheaded for 2 days prior to coming to the hospital. He reported malaise ongoing for 24 hours prior to coming to the hospital.  He complained of an intermittent headache and occasional cough with yellow sputum production. A few days ago, he had some jerking movements of his bilateral upper extremities. He has chronic tingling of his fingers.  Labs in the ED were was follows: WBC 10.1, hemoglobin 8.8, MCV 93.8, platelets 208,000, CMP significant for creatinine 1.81, BUN 46, magnesium 1.9 (1.6-2.4), CRP 6.66 (0-0.5), troponin 0.026 (0.0-0.03), pro-BNP 1930 (0.0-1800), UA 1+ protein, 0-2 RBC, 0-2 WBC. Covid-19 was not detected. Blood cultures were drawn.  A chest x-ray showed no acute chest findings and chronic right pleural thickening and pleural calcification with chronic right subpleural scarring, without significant change. MRI brain without contrast on 2021 showed mild volume loss secondary to cerebral atrophy and persistent nonspecific subcortical and periventricular white matter signal felt to be secondary to chronic microvascular ischemia. He was admitted for further evaluation and work-up. Cardiology was consulted due to near syncope and a 2D echocardiogram was ordered. D-dimer was 2.49 (0.00-0.59).  Iron studies on 11/10/2021, showed iron 28 () , iron saturation percent 12 (20-50), TIBC 231 (298-536), transferrin 155 (200-360) and  ferritin 474.6 (). Iron deficit was 799 mg.      11/10/21  Hematology/Oncology was consulted by Dr. 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  CT CAP on 5/19/2021 showed a large mediastinal subcarinal azygoesophageal recess mass and pathologically enlarged right hilar lymphadenopathy, a left lower lobe opacity and a new 11 mm right upper lobe pulmonary nodule all suspicious for metastatic disease.  He was diagnosed by bronchoscopy on 5/20/2021 during his admission to [facility] with fine-needle aspiration of the bronchus consistent with small cell lung cancer. PET/CT scan showed hypermetabolic activity in mediastinal and right hilar lymphadenopathy as well as uptake in a subcutaneous nodule on the posterior upper right chest wall posterior to the right shoulder and immediately deep to the skin surface.  The lesion was felt to be an inflamed epidermal inclusion cyst but metastatic disease was not entirely excluded.  A native right upper lobe lung nodule showed no FDG activity but felt to be too small for PET detection.  There was no abnormal FDG activity in the neck, abdomen, pelvis or bones. He underwent six cycles of chemotherapy with carboplatin+etoposide on 6/16/2021 - 9/30/2021. He underwent radiation to right lung from 7/26/2021 to 9/3/2021 with Dr. Herrera at [facility]. Prophylactic cranial radiation was completed the last week of 10/2021.  CT chest, abdomen, and pelvis at [facility] on 10/27/2021 showed interval decrease in size of mediastinal and right hilar adenopathy. Pulmonary nodes within the right upper lobe along the right minor fissure appeared stable. There was no metastatic disease in the abdomen or pelvis. Labs on 9/28/2021, revealed iron deficiency anemia: iron 25 (), ferritin 364 (), iron percent saturation 12 (20-55), TIBC 213 (228-428).  Injectafer was ordered but patient  has not yet received. Hemoglobin was 7.91 on 11/1/2021.      PCP: Walter Valero MD    INTERVAL HISTORY:  • 11/11/2021-initiated on daily Ferrlecit 250 mg IV x3.  Hemoglobin 8.8.  EEG normal.    Subjective   Complains of weakness and lightheadedness.  He has a headache and a cough.  Reports chronic numbness in his hands and feet.    ROS:  Review of Systems   Constitutional: Negative for activity change, chills, fatigue, fever and unexpected weight change.   HENT: Negative for congestion, dental problem, hearing loss, mouth sores, nosebleeds, sore throat and trouble swallowing.    Eyes: Negative for photophobia and visual disturbance.   Respiratory: Positive for cough. Negative for chest tightness and shortness of breath.    Cardiovascular: Negative for chest pain, palpitations and leg swelling.   Gastrointestinal: Negative for abdominal distention, abdominal pain, blood in stool, constipation, diarrhea, nausea and vomiting.   Endocrine: Negative for cold intolerance and heat intolerance.   Genitourinary: Negative for decreased urine volume, difficulty urinating, frequency, hematuria and urgency.   Musculoskeletal: Negative for arthralgias and gait problem.   Skin: Negative for rash and wound.   Neurological: Positive for weakness, light-headedness, numbness (In hands and feet.  Chronic.) and headaches. Negative for dizziness, tremors and seizures.   Hematological: Negative for adenopathy. Does not bruise/bleed easily.   Psychiatric/Behavioral: Negative for confusion and hallucinations. The patient is not nervous/anxious.    All other systems reviewed and are negative.       MEDICATIONS:    Scheduled Meds:  arformoterol, 15 mcg, Nebulization, BID - RT  aspirin, 81 mg, Oral, Daily With Lunch  atorvastatin, 40 mg, Oral, Daily  budesonide, 0.5 mg, Nebulization, BID - RT  cefepime, 2 g, Intravenous, Q24H  clopidogrel, 75 mg, Oral, Daily  enoxaparin, 1 mg/kg, Subcutaneous, Q12H  ferrous sulfate, 324 mg, Oral, Daily With  "Breakfast  metoprolol succinate XL, 25 mg, Oral, Daily  montelukast, 10 mg, Oral, Nightly  pantoprazole, 40 mg, Oral, Daily  saccharomyces boulardii, 250 mg, Oral, Daily With Lunch  sodium bicarbonate, 650 mg, Oral, BID  theophylline, 300 mg, Oral, Q12H       Continuous Infusions:  Pharmacy to Dose enoxaparin (LOVENOX),   sodium chloride, 100 mL/hr, Last Rate: 100 mL/hr (11/10/21 1152)       PRN Meds:  •  acetaminophen  •  HYDROcodone-acetaminophen  •  ondansetron  •  Pharmacy to Dose enoxaparin (LOVENOX)     ALLERGIES:  No Known Allergies    Objective    VITALS:   /76 (BP Location: Right arm, Patient Position: Lying)   Pulse 87   Temp 97.7 °F (36.5 °C) (Oral)   Resp 18   Ht 177.8 cm (70\")   Wt 70.3 kg (155 lb)   SpO2 96%   BMI 22.24 kg/m²     PHYSICAL EXAM:  Physical Exam  Vitals and nursing note reviewed.   Constitutional:       General: He is not in acute distress.     Appearance: Normal appearance. He is well-developed and normal weight. He is not diaphoretic.   HENT:      Head: Normocephalic and atraumatic.      Comments: Alopecia.     Right Ear: External ear normal.      Left Ear: External ear normal.      Nose: Nose normal.      Comments: Oxygen per nasal cannula.     Mouth/Throat:      Mouth: Mucous membranes are moist.      Pharynx: Oropharynx is clear. No oropharyngeal exudate or posterior oropharyngeal erythema.      Comments: Edentulous.  Eyes:      General: No scleral icterus.     Extraocular Movements: Extraocular movements intact.      Conjunctiva/sclera: Conjunctivae normal.      Pupils: Pupils are equal, round, and reactive to light.   Cardiovascular:      Rate and Rhythm: Normal rate and regular rhythm.      Heart sounds: Normal heart sounds. No murmur heard.      Pulmonary:      Effort: Pulmonary effort is normal. No respiratory distress.      Breath sounds: Rhonchi (Bilateral and scattered.) present. No wheezing or rales.      Comments: Right chest wall [id number].  Chest: "   Breasts:      Right: No supraclavicular adenopathy.      Left: No supraclavicular adenopathy.       Abdominal:      General: Bowel sounds are normal. There is no distension.      Palpations: Abdomen is soft. There is no mass.      Tenderness: There is no abdominal tenderness. There is no guarding.   Genitourinary:     Comments: Deferred   Musculoskeletal:         General: No swelling, tenderness or deformity. Normal range of motion.      Cervical back: Normal range of motion and neck supple.      Right lower leg: No edema.      Left lower leg: No edema.      Comments: Left BKA.   Lymphadenopathy:      Cervical: No cervical adenopathy.      Upper Body:      Right upper body: No supraclavicular adenopathy.      Left upper body: No supraclavicular adenopathy.   Skin:     General: Skin is warm and dry.      Coloration: Skin is not pale.      Findings: Rash (Stasis changes upper extremities.) present. No bruising or erythema.   Neurological:      General: No focal deficit present.      Mental Status: He is alert and oriented to person, place, and time.      Coordination: Coordination normal.   Psychiatric:         Mood and Affect: Mood normal.         Behavior: Behavior normal.         Thought Content: Thought content normal.         Judgment: Judgment normal.           RECENT LABS:  Lab Results (last 24 hours)     Procedure Component Value Units Date/Time    Basic Metabolic Panel [[id number]]  (Abnormal) Collected: 11/11/21 0745    Specimen: Blood Updated: 11/11/21 0843     Glucose 111 mg/dL      BUN 43 mg/dL      Creatinine 1.70 mg/dL      Sodium 141 mmol/L      Potassium 4.1 mmol/L      Chloride 107 mmol/L      CO2 21.0 mmol/L      Calcium 8.4 mg/dL      eGFR Non African Amer 39 mL/min/1.73      BUN/Creatinine Ratio 25.3     Anion Gap 13.0 mmol/L     Narrative:      GFR Normal >60  Chronic Kidney Disease <60  Kidney Failure <15      Magnesium [[id number]]  (Normal) Collected: 11/11/21 0745    Specimen: Blood Updated:  11/11/21 0843     Magnesium 1.9 mg/dL     CBC & Differential [[id number]]  (Abnormal) Collected: 11/11/21 0745    Specimen: Blood Updated: 11/11/21 0816    Narrative:      The following orders were created for panel order CBC & Differential.  Procedure                               Abnormality         Status                     ---------                               -----------         ------                     CBC Auto Differential[[id number]]        Abnormal            Final result                 Please view results for these tests on the individual orders.    CBC Auto Differential [[id number]]  (Abnormal) Collected: 11/11/21 0745    Specimen: Blood Updated: 11/11/21 0816     WBC 9.30 10*3/mm3      RBC 2.82 10*6/mm3      Hemoglobin 8.8 g/dL      Hematocrit 26.2 %      MCV 92.9 fL      MCH 31.0 pg      MCHC 33.4 g/dL      RDW 21.9 %      RDW-SD 71.3 fl      MPV 7.7 fL      Platelets 216 10*3/mm3      Neutrophil % 83.9 %      Lymphocyte % 2.1 %      Monocyte % 7.2 %      Eosinophil % 6.4 %      Basophil % 0.4 %      Neutrophils, Absolute 7.80 10*3/mm3      Lymphocytes, Absolute 0.20 10*3/mm3      Monocytes, Absolute 0.70 10*3/mm3      Eosinophils, Absolute 0.60 10*3/mm3      Basophils, Absolute 0.00 10*3/mm3      nRBC 0.1 /100 WBC     Neuron-Specific Enolase [[id number]] Collected: 11/11/21 0745    Specimen: Blood Updated: 11/11/21 0809    Blood Culture - Blood, Blood, Venous Line [[id number]]  (Normal) Collected: 11/09/21 1614    Specimen: Blood, Venous Line Updated: 11/10/21 1630     Blood Culture No growth at 24 hours    D-dimer, Quantitative [[id number]]  (Abnormal) Collected: 11/10/21 1519    Specimen: Blood Updated: 11/10/21 1605     D-Dimer, Quantitative 2.49 mg/L (FEU)     Narrative:      Reference Range  --------------------------------------------------------------------     < 0.50   Negative Predictive Value  0.50-0.59   Indeterminate    >= 0.60   Probable VTE             A very low percentage of patients  with DVT may yield D-Dimer results   below the cut-off of 0.50 mg/L FEU.  This is known to be more   prevalent in patients with distal DVT.             Results of this test should always be interpreted in conjunction with   the patient's medical history, clinical presentation and other   findings.  Clinical diagnosis should not be based on the result of   [place] D-Dimer alone.    Ferritin [[id number]]  (Abnormal) Collected: 11/10/21 1150    Specimen: Blood Updated: 11/10/21 1601     Ferritin 474.60 ng/mL     Narrative:      Results may be falsely decreased if patient taking Biotin.      Iron Profile [[id number]]  (Abnormal) Collected: 11/10/21 1150    Specimen: Blood Updated: 11/10/21 1554     Iron 28 mcg/dL      Iron Saturation 12 %      Transferrin 155 mg/dL      TIBC 231 mcg/dL     BNP [[id number]]  (Abnormal) Collected: 11/10/21 1150    Specimen: Blood Updated: 11/10/21 1401     proBNP 1,930.0 pg/mL     Narrative:      Among patients with dyspnea, NT-proBNP is highly sensitive for the detection of acute congestive heart failure. In addition NT-proBNP of <300 pg/ml effectively rules out acute congestive heart failure with 99% negative predictive value.    Results may be falsely decreased if patient taking Biotin.      Basic Metabolic Panel [[id number]]  (Abnormal) Collected: 11/10/21 1150    Specimen: Blood Updated: 11/10/21 1308     Glucose 145 mg/dL      BUN 47 mg/dL      Creatinine 1.76 mg/dL      Sodium 140 mmol/L      Potassium 4.4 mmol/L      Chloride 106 mmol/L      CO2 20.0 mmol/L      Calcium 8.6 mg/dL      eGFR Non African Amer 38 mL/min/1.73      BUN/Creatinine Ratio 26.7     Anion Gap 14.0 mmol/L     Narrative:      GFR Normal >60  Chronic Kidney Disease <60  Kidney Failure <15      CBC (No Diff) [[id number]]  (Abnormal) Collected: 11/10/21 1150    Specimen: Blood Updated: 11/10/21 1243     WBC 8.20 10*3/mm3      RBC 2.80 10*6/mm3      Hemoglobin 8.7 g/dL      Hematocrit 26.1 %      MCV 93.3 fL      MCH  31.1 pg      MCHC 33.3 g/dL      RDW 21.2 %      RDW-SD 69.1 fl      MPV 7.6 fL      Platelets 222 10*3/mm3     Blood Culture - Blood, Chest, Right [[id number]] Collected: 11/10/21 1156    Specimen: Blood from Chest, Right Updated: 11/10/21 1239          PENDING RESULTS: 2D echocardiogram, RLE Doppler, bilateral carotid ultrasound, V/Q scan, blood cultures  (11/9/2021 and 11/10/2021), NSE.    IMAGING REVIEWED:  MRI Brain Without Contrast    Result Date: 11/9/2021   1. Some of the images are motion degraded. 2. No acute findings. 3. Mild volume loss secondary to cerebral atrophy. 4. Persistent nonspecific subcortical and periventricular white matter signal felt to be secondary to chronic microvascular ischemia.   Electronically Signed By-Nj Hernández MD On:11/9/2021 6:11 PM This report was finalized on [id number] by  Nj Hernández MD.    XR Chest 1 View    Result Date: 11/9/2021   1. No acute chest finding. 2. Chronic right pleural thickening and pleural calcification with chronic right subpleural scarring, without significant change.  Electronically Signed By-Jayda Tam MD On:11/9/2021 4:20 PM This report was finalized on [id number] by  Jayda Tam MD.      I have reviewed the patient's labs, imaging, reports, and other clinician documentation.    Assessment/Plan   ASSESSMENT:  1. Limited stage small cell lung carcinoma right hilum: S/p 6 cycles etoposide+carboplatin 6/16/2021 - 9/30/2021 and s/p lung radiation.   Prophylactic cranial radiation completed in 10/2021.  MRI brain negative for metastatic disease.  2. Anemia/Iron deficiency anemia: On oral iron and PPI. Orders were written to receive outpatient IV iron on 11/1/2021, but not given. Repeat iron studies show continued iron deficiency anemia.  Started Ferrlecit 250 mg IV x 3 days on 11/11/2021.   3. Near syncope/Elevated d-dimer/CAD/NSTEMI with two-vessel CABG 2016/HLD: Cardiology consulted.  On ASA, Plavix and prophylactic Lovenox.  EEG negative.   2D echocardiogram, right lower extremity venous Doppler, VQ scan and bilateral carotid ultrasound pending.    4. COPD: CXR showed no acute finding. On cefepime.    5. CKD Stage IV: He follows with Dr. Mendez as an outpatient.   Stable.     6. PAD/S/p LLE amputation and RLE stent: Followed by vascular surgery as an outpatient.  On aspirin and Plavix.   7. Constipation/BPH with LUTS:  management per Primary team.     PLAN:  1. Follow CBC.   2. Continue ferrous sulfate 325 mg po once daily.   3. Start Ferrlecit 250 mg IV x 3 days, day 1/3.  4. NSE pending.  5. 2D echocardiogram, right lower extremity venous Doppler, bilateral carotid ultrasound pending.   6. VQ scan pending.   7. Outpatient GI work-up.    Patient seen and evaluated by Dr. Hedrick.  Electronically signed by TERE Santana, 11/11/21, 10:38 AM EST.        I have personally performed a face-to-face diagnostic evaluation on this patient.  I have discussed the case with Emily Parrish NP, have edited/reviewed the note, and agree with the care plan.  The patient is complaining of weakness, lightheadedness, headache, cough and numbness in his fingers and toes.  On examination, he has alopecia and is edentulous.  There are stasis changes of upper extremities and right chest wall [id number].  Scattered rhonchi in the lungs are present.  Left BKA is present.  Labs are significant for an anemia and elevated creatinine.  Iron saturation is low.  The patient is receiving IV iron.  Work-up for fall is in progress.          I discussed the patients findings and my recommendations with patient.    Electronically signed by Riki Hedrick MD, 11/11/21, 6:24 PM EST.

## 2021-11-11 NOTE — PROGRESS NOTES
Cardiology Progress Note    Patient Identification:  Name: David A Jolissaint  Age: 76 y.o.  Sex: male  :  1945  MRN: [id number]                 Follow Up / Chief Complaint: Fall, possible near syncope  Chief Complaint   Patient presents with   • Fall       Interval History: Patient presented with legs giving out and collapsing. Is severely orthostatic.       Subjective: Patient seen and examined. Chart reviewed. Labs reviewed. Discussed with RN taking care of patient.      Objective: Glucose elevated, BUN/creatinine of 43/1.7, hemoglobin of 8.8.      History of Present Illness:           This is a 76-year-old  male with  PMH of     #  CAD, NSTEMI, 2 vessel CABG with SVG to OM and PDA 3/9/16  #   RBBB  #  PAD, left lower extremity amputation  #  Carotid artery disease, Doppler 6/15/2018 with 50-74% right and left  #  diabetes,   #. hypertension,   #  COPD, small cell lung cancer, chemotherapy  #  CKD 4  #. BPH with LUTS  #  Cellulitis    #  left BKA, cholecystectomy  #  Positive family history of MI and CAD in father  #. Former smoker quit 10/2015     Patient presented to the emergency room 11/10/2021 with complaint of syncope.  Patient has lung cancer and recently finished chemotherapy and had XRT to the chest and had prophylactic XRT to the brain.  And is saying he is not feeling well since then has history of amputation and prosthetic limb on the left lower extremity.  Could not stand and collapsed/slumped to ground and could not get up denies any loss of consciousness.  Denies any bowel bladder incontinence.  Patient thinks it is because of his legs getting weak.  Review of records reveal :  Had an echocardiogram done 10/1/2020 which revealed normal LV systolic function with mild concentric LVH and left atrial enlargement.  Patient  had carotid Doppler 06/15/2018 which showed bilateral 50-70% worse compared to 2016 which was less than 50% bilat.      Work-up here 21-11/10/2021 revealed  proBNP of 1930.  Normal troponin.  Normal CMP except BUN/creatinine 46/1.81.  D-dimer elevated at 2.49, hemoglobin of 8.7.  Chest x-ray 11/9/2021 with no acute chest findings chronic right pleural thickening and pleural calcification on the right subpleural area  EKG done 11/9/2021 reviewed/interpreted by me reveals sinus rhythm with rate of 92 bpm with PACs, right bundle branch block     ASSESSMENT:     #Near syncope  #Weakness  #Non-small cell lung CA, chemotherapy, XRT, XRT of the brain  #. Bilateral carotid stenosis  # PAD with absent right radial pulse, left BKA  # dyspnea on exertion, chronic  #  bradycardia, RBBB  #  CAD, CABG, history of NSTEMI  #  prediabetes,   #  hypertension,   # CKD,   # COPD,  # dyslipidemia        PLAN:  Monitor rhythm on telemetry  Serial cardiac enzymes are negative for MI.   We will check orthostatics, patient is orthostatic.  Will check a.m. cortisol.  Give him knee-high stockings  IV fluids  Will consider Florinef if it does not improve  Continue medical management with aspirin, atorvastatin, metoprolol, to help with CAD, MI, hypertension, dyslipidemia, CAD, PAD as tolerated.  Follow-up CKD with nephrology for CKD.  Patient has carotid disease,  repeat carotid Dopplers 11/11/2021 revealed moderate bilateral disease  Patient's blood pressure is doing well we will continue to monitor.  Monitor sugars and  Last A1c was 5.9.  Patient underwent Lexiscan Cardiolite 5/21/2021 which was negative for ischemia.    Echocardiogram 11/10/2021 reveals normal LV systolic function EF of 55% with paradoxical septal motion  We will follow up and consider further evaluation and treatment.        MRI of the brain 11/9/2021:  IMPRESSION:     1. Some of the images are motion degraded.  2. No acute findings.  3. Mild volume loss secondary to cerebral atrophy.  4. Persistent nonspecific subcortical and periventricular white matter  signal felt to be secondary to chronic microvascular  ischemia.      Electronically Signed By-Nj Hernández MD On:11/9/2021 6:11 PM       Past Medical History:  Past Medical History:   Diagnosis Date   • Anemia    • Cellulitis    • CKD (chronic kidney disease), stage III (CMS/HCC)    • COPD (chronic obstructive pulmonary disease) (CMS/HCC)    • Diverticulitis    • Dyslipidemia    • Hypertension    • Myocardial infarction (CMS/HCC)    • PVD (peripheral vascular disease) (CMS/HCC)      Past Surgical History:  Past Surgical History:   Procedure Laterality Date   • BRONCHOSCOPY N/A 5/20/2021    Procedure: BRONCHOSCOPY WITH ENDOBRONCHIAL ULTRASOUND WITH FINE NEEDLE ASPIRATION. BRONCHOALVEOLAR LAVAGE;  Surgeon: Jackson Quezada MD;  Location: [facility] ENDOSCOPY;  Service: Pulmonary;  Laterality: N/A;  subcarinal mass   • CARDIAC SURGERY     • CHOLECYSTECTOMY     • GALLBLADDER SURGERY     • LEG AMPUTATION Left         Social History:   Social History     Tobacco Use   • Smoking status: Former Smoker     Packs/day: 2.00     Years: 50.00     Pack years: 100.00   • Smokeless tobacco: Never Used   • Tobacco comment: Says he quit approximately in 2005.   Substance Use Topics   • Alcohol use: Not Currently      Family History:  Family History   Problem Relation Age of Onset   • Heart attack Father    • Heart disease Father           Allergies:  No Known Allergies  Scheduled Meds:  arformoterol, 15 mcg, BID - RT  aspirin, 81 mg, Daily With Lunch  atorvastatin, 40 mg, Daily  budesonide, 0.5 mg, BID - RT  cefepime, 2 g, Q24H  clopidogrel, 75 mg, Daily  enoxaparin, 1 mg/kg, Q12H  ferrous sulfate, 324 mg, Daily With Breakfast  metoprolol succinate XL, 25 mg, Daily  montelukast, 10 mg, Nightly  pantoprazole, 40 mg, Daily  saccharomyces boulardii, 250 mg, Daily With Lunch  sodium bicarbonate, 650 mg, BID  theophylline, 300 mg, Q12H          Review of Systems:   ROS  Review of Systems   Constitution: Negative for chills and fever.   Cardiovascular: Negative for chest pain and palpitations.  "  Respiratory: Negative for cough and hemoptysis.    Gastrointestinal: Negative for nausea.        Constitutional:  Temp:  [97.4 °F (36.3 °C)-97.7 °F (36.5 °C)] 97.7 °F (36.5 °C)  Heart Rate:  [72-87] 87  Resp:  [16-18] 18  BP: (117-143)/(72-80) 139/76    Physical Exam   /76 (BP Location: Right arm, Patient Position: Lying)   Pulse 87   Temp 97.7 °F (36.5 °C) (Oral)   Resp 18   Ht 177.8 cm (70\")   Wt 70.3 kg (155 lb)   SpO2 96%   BMI 22.24 kg/m²   General:  Appears in no acute distress  Eyes: Sclera is anicteric,  conjunctiva is clear   HEENT:  No JVD. Thyroid not visibly enlarged. No mucosal pallor or cyanosis  Respiratory: Respirations regular and unlabored at rest.  Bilaterally good breath sounds, with good air entry in all fields. No crackles, rubs or wheezes auscultated  Cardiovascular: S1,S2 Regular rate and rhythm. No murmur, rub or gallop auscultated.  . No pretibial pitting edema  Gastrointestinal: Abdomen nondistended, soft  Musculoskeletal:  No abnormal movements  Extremities: No digital clubbing or cyanosis  Skin: Color pink. Skin warm and dry to touch. No rashes  No xanthoma  Neuro: Alert and awake, no lateralizing deficits appreciated    INTAKE AND OUTPUT:    Intake/Output Summary (Last 24 hours) at 11/11/2021 1002  Last data filed at 11/11/2021 0815  Gross per 24 hour   Intake 120 ml   Output --   Net 120 ml       Cardiographics  Telemetry: Sinus rhythm    ECG:   ECG 12 Lead   Final Result   HEART RATE= 92  bpm   RR Interval= 668  ms   MS Interval= 127  ms   P Horizontal Axis= -32  deg   P Front Axis= 60  deg   QRSD Interval= 148  ms   QT Interval= 392  ms   QRS Axis= 79  deg   T Wave Axis= 61  deg   - ABNORMAL ECG -   Sinus rhythm   Atrial premature complex   Right bundle branch block   When compared with ECG of 26-Jun-2021 8:33:26,   No significant change   Electronically Signed By: Luis Antonio Bruno) 10-Nov-2021 10:39:29   Date and Time of Study: 2021-11-09 19:28:33        I have " "personally reviewed EKG    Echocardiogram: Results for orders placed during the hospital encounter of 06/26/21    Adult Transthoracic Echo Complete W/ Cont if Necessary Per Protocol    Interpretation Summary  · Estimated left ventricular EF was in agreement with the calculated left ventricular EF. Left ventricular ejection fraction appears to be 61 - 65%. Left ventricular systolic function is normal.  · Left ventricular diastolic function is consistent with (grade Ia w/high LAP) impaired relaxation.  · Estimated right ventricular systolic pressure from tricuspid regurgitation is normal (<35 mmHg).      Lab Review   I have reviewed the labs  Results from last 7 days   Lab Units 11/09/21  1614   TROPONIN T ng/mL 0.026     Results from last 7 days   Lab Units 11/11/21  0745   MAGNESIUM mg/dL 1.9     Results from last 7 days   Lab Units 11/11/21  0745   SODIUM mmol/L 141   POTASSIUM mmol/L 4.1   BUN mg/dL 43*   CREATININE mg/dL 1.70*   CALCIUM mg/dL 8.4*         Results from last 7 days   Lab Units 11/11/21  0745 11/10/21  1150 11/09/21  1614   WBC 10*3/mm3 9.30 8.20 10.10   HEMOGLOBIN g/dL 8.8* 8.7* 8.8*   HEMATOCRIT % 26.2* 26.1* 26.2*   PLATELETS 10*3/mm3 216 222 208           RADIOLOGY:  Imaging Results (Last 24 Hours)     ** No results found for the last 24 hours. **                )11/11/2021  MD MIKIE Love Dragon/Transcription:   \"Dictated utilizing Dragon dictation\".   "

## 2021-11-11 NOTE — PLAN OF CARE
Goal Outcome Evaluation:     Bed mobility - CGA supine to sit   Transfers - CGA and Min-A sit to stand first stance pt unsteady, 2nd stance pt steadier   Ambulation - 75 feet CGA and with rolling walker    Pt would benefit from Inpatient Rehabilitation placement at discharge from facility   Pt desires Inpatient Rehabilitation placement at discharge. Pt cooperative; agreeable to therapeutic recommendations and plan of care.

## 2021-11-11 NOTE — PROGRESS NOTES
LOS: 1 day   Patient Care Team:  Walter Valero MD as PCP - General  [facility] as Consulting Physician (Interventional Cardiology)  Riki Hedrick MD as Consulting Physician (Hematology and Oncology)  Hoang Mendez MD as Consulting Physician (Nephrology)    Subjective     Interval History:     Patient Complaints: pt says that he is feeling better, less dizzy    History taken from: patient    Review of Systems   Constitutional: Positive for activity change and fatigue. Negative for fever.   Respiratory: Positive for shortness of breath.    Cardiovascular: Negative.    Musculoskeletal: Positive for arthralgias and back pain.   Neurological: Positive for dizziness and weakness.           Objective     Vital Signs  Temp:  [97.4 °F (36.3 °C)-97.7 °F (36.5 °C)] 97.6 °F (36.4 °C)  Heart Rate:  [72-90] 90  Resp:  [16-18] 18  BP: (110-146)/(71-80) 110/75    Physical Exam:     General Appearance:    Alert, cooperative, in no acute distress   Head:    Normocephalic, without obvious abnormality, atraumatic   Eyes:            Lids and lashes normal, conjunctivae and sclerae normal, no   icterus, no pallor, corneas clear, PERRLA   Ears:    Ears appear intact with no abnormalities noted   Throat:   No oral lesions, no thrush, oral mucosa moist   Neck:   No adenopathy, supple, trachea midline, no thyromegaly, no   carotid bruit, no JVD   Lungs:     Clear to auscultation,respirations regular, even and                  unlabored    Heart:    Regular rhythm and normal rate, normal S1 and S2, no            murmur, no gallop, no rub, no click   Chest Wall:    No abnormalities observed   Abdomen:     Normal bowel sounds, no masses, no organomegaly, soft        non-tender, non-distended, no guarding, no rebound                tenderness   Extremities:   Moves all extremities well, no edema, no cyanosis, no             redness   Pulses:   Pulses palpable and equal bilaterally   Skin:   No bleeding, bruising or rash    Lymph nodes:   No palpable adenopathy   Neurologic:   Cranial nerves 2 - 12 grossly intact, sensation intact, DTR       present and equal bilaterally        Results Review:    Lab Results (last 24 hours)     Procedure Component Value Units Date/Time    Blood Culture - Blood, Chest, Right [[id number]]  (Normal) Collected: 11/10/21 1156    Specimen: Blood from Chest, Right Updated: 11/11/21 1245     Blood Culture No growth at 24 hours    Basic Metabolic Panel [[id number]]  (Abnormal) Collected: 11/11/21 0745    Specimen: Blood Updated: 11/11/21 0843     Glucose 111 mg/dL      BUN 43 mg/dL      Creatinine 1.70 mg/dL      Sodium 141 mmol/L      Potassium 4.1 mmol/L      Chloride 107 mmol/L      CO2 21.0 mmol/L      Calcium 8.4 mg/dL      eGFR Non African Amer 39 mL/min/1.73      BUN/Creatinine Ratio 25.3     Anion Gap 13.0 mmol/L     Narrative:      GFR Normal >60  Chronic Kidney Disease <60  Kidney Failure <15      Magnesium [[id number]]  (Normal) Collected: 11/11/21 0745    Specimen: Blood Updated: 11/11/21 0843     Magnesium 1.9 mg/dL     CBC & Differential [[id number]]  (Abnormal) Collected: 11/11/21 0745    Specimen: Blood Updated: 11/11/21 0816    Narrative:      The following orders were created for panel order CBC & Differential.  Procedure                               Abnormality         Status                     ---------                               -----------         ------                     CBC Auto Differential[[id number]]        Abnormal            Final result                 Please view results for these tests on the individual orders.    CBC Auto Differential [[id number]]  (Abnormal) Collected: 11/11/21 0745    Specimen: Blood Updated: 11/11/21 0816     WBC 9.30 10*3/mm3      RBC 2.82 10*6/mm3      Hemoglobin 8.8 g/dL      Hematocrit 26.2 %      MCV 92.9 fL      MCH 31.0 pg      MCHC 33.4 g/dL      RDW 21.9 %      RDW-SD 71.3 fl      MPV 7.7 fL      Platelets 216 10*3/mm3      Neutrophil % 83.9 %       Lymphocyte % 2.1 %      Monocyte % 7.2 %      Eosinophil % 6.4 %      Basophil % 0.4 %      Neutrophils, Absolute 7.80 10*3/mm3      Lymphocytes, Absolute 0.20 10*3/mm3      Monocytes, Absolute 0.70 10*3/mm3      Eosinophils, Absolute 0.60 10*3/mm3      Basophils, Absolute 0.00 10*3/mm3      nRBC 0.1 /100 WBC     Neuron-Specific Enolase [[id number]] Collected: 11/11/21 0745    Specimen: Blood Updated: 11/11/21 0809    Blood Culture - Blood, Blood, Venous Line [[id number]]  (Normal) Collected: 11/09/21 1614    Specimen: Blood, Venous Line Updated: 11/10/21 1630     Blood Culture No growth at 24 hours    D-dimer, Quantitative [[id number]]  (Abnormal) Collected: 11/10/21 1519    Specimen: Blood Updated: 11/10/21 1605     D-Dimer, Quantitative 2.49 mg/L (FEU)     Narrative:      Reference Range  --------------------------------------------------------------------     < 0.50   Negative Predictive Value  0.50-0.59   Indeterminate    >= 0.60   Probable VTE             A very low percentage of patients with DVT may yield D-Dimer results   below the cut-off of 0.50 mg/L FEU.  This is known to be more   prevalent in patients with distal DVT.             Results of this test should always be interpreted in conjunction with   the patient's medical history, clinical presentation and other   findings.  Clinical diagnosis should not be based on the result of   INNOVANCE D-Dimer alone.    Ferritin [[id number]]  (Abnormal) Collected: 11/10/21 1150    Specimen: Blood Updated: 11/10/21 1601     Ferritin 474.60 ng/mL     Narrative:      Results may be falsely decreased if patient taking Biotin.      Iron Profile [[id number]]  (Abnormal) Collected: 11/10/21 1150    Specimen: Blood Updated: 11/10/21 1554     Iron 28 mcg/dL      Iron Saturation 12 %      Transferrin 155 mg/dL      TIBC 231 mcg/dL            Imaging Results (Last 24 Hours)     Procedure Component Value Units Date/Time    NM Lung Ventilation Perfusion Aerosol [[id number]]  Resulted: 11/11/21 1104     Updated: 11/11/21 1316               I reviewed the patient's new clinical results.    Medication Review:   Scheduled Meds:arformoterol, 15 mcg, Nebulization, BID - RT  aspirin, 81 mg, Oral, Daily With Lunch  atorvastatin, 40 mg, Oral, Daily  budesonide, 0.5 mg, Nebulization, BID - RT  cefepime, 2 g, Intravenous, Q24H  clopidogrel, 75 mg, Oral, Daily  enoxaparin, 1 mg/kg, Subcutaneous, Q12H  ferric gluconate, 250 mg, Intravenous, Daily  ferrous sulfate, 324 mg, Oral, Daily With Breakfast  metoprolol succinate XL, 25 mg, Oral, Daily  montelukast, 10 mg, Oral, Nightly  pantoprazole, 40 mg, Oral, Daily  saccharomyces boulardii, 250 mg, Oral, Daily With Lunch  sodium bicarbonate, 650 mg, Oral, BID  theophylline, 300 mg, Oral, Q12H      Continuous Infusions:Pharmacy to Dose enoxaparin (LOVENOX),   sodium chloride, 100 mL/hr, Last Rate: 100 mL/hr (11/11/21 1504)      PRN Meds:.•  acetaminophen  •  HYDROcodone-acetaminophen  •  ondansetron  •  Pharmacy to Dose enoxaparin (LOVENOX)     Assessment/Plan       Near syncope  -carotid dopplers and echo ordered.  Results pending.  EEG negative  Dehydration - IVF  Acute bronchitis based on physical exam - abx.  Nebs prn  Lung cancer s/p chemo and radiation - Dr. Hedrick following  Anemia - stable  CAD with h/o CABG -  Dr. Mistry following  Weakness - treat underlying conditions.  PT is recommending inpatient rehab  HTN with CKD3 - creatine improved with IVF.  Follow  COPD - continue home meds and neb tx  Elevated d-dimer - BLE doppler negative. V/Q scan ordered, pending      Plan for disposition:KELLEY Tesfaye MD  11/11/21  15:52 EST

## 2021-11-11 NOTE — PROCEDURES
This is an inpatient,   Digitally recorded multi-montage EEG with leads placed according to the international 10/20 system  Photic stimulation was done  Hyperventilation was done    With the patient fully arouse the background was 8.5 to 9 Hz posterior dominant alpha rhythm which is symmetric and attenuates with eyes opening.  The patient did become drowsy but I did not see any deeper stages of sleep    No asymmetry    Nothing suggesting clear-cut epileptiform activity or asymmetry  No clinical events  Hyperventilation was attempted but I did not see any significant changes or accentuation of slowing  Photic stimulation was attempted in intermittent stepwise pattern after the flash frequency of 21 hertz but I did not see any driving, asymmetry of paroxysmal activity    Impression:    This is essentially normal adult awake and drowsy EEG  No seizures but EEG like this does not rule out epilepsy

## 2021-11-12 ENCOUNTER — READMISSION MANAGEMENT (OUTPATIENT)
Dept: CALL CENTER | Facility: HOSPITAL | Age: 76
End: 2021-11-12

## 2021-11-12 VITALS
WEIGHT: 155 LBS | BODY MASS INDEX: 22.19 KG/M2 | TEMPERATURE: 98.4 F | DIASTOLIC BLOOD PRESSURE: 79 MMHG | HEART RATE: 102 BPM | HEIGHT: 70 IN | RESPIRATION RATE: 18 BRPM | SYSTOLIC BLOOD PRESSURE: 156 MMHG | OXYGEN SATURATION: 91 %

## 2021-11-12 LAB
ANION GAP SERPL CALCULATED.3IONS-SCNC: 13 MMOL/L (ref 5–15)
BUN SERPL-MCNC: 35 MG/DL (ref 8–23)
BUN/CREAT SERPL: 19.1 (ref 7–25)
CALCIUM SPEC-SCNC: 8.2 MG/DL (ref 8.6–10.5)
CHLORIDE SERPL-SCNC: 108 MMOL/L (ref 98–107)
CO2 SERPL-SCNC: 20 MMOL/L (ref 22–29)
CREAT SERPL-MCNC: 1.83 MG/DL (ref 0.76–1.27)
DEPRECATED RDW RBC AUTO: 67.8 FL (ref 37–54)
ERYTHROCYTE [DISTWIDTH] IN BLOOD BY AUTOMATED COUNT: 21.4 % (ref 12.3–15.4)
GFR SERPL CREATININE-BSD FRML MDRD: 36 ML/MIN/1.73
GLUCOSE SERPL-MCNC: 83 MG/DL (ref 65–99)
HCT VFR BLD AUTO: 26.3 % (ref 37.5–51)
HGB BLD-MCNC: 8.5 G/DL (ref 13–17.7)
MAGNESIUM SERPL-MCNC: 1.8 MG/DL (ref 1.6–2.4)
MCH RBC QN AUTO: 30.2 PG (ref 26.6–33)
MCHC RBC AUTO-ENTMCNC: 32.4 G/DL (ref 31.5–35.7)
MCV RBC AUTO: 93.3 FL (ref 79–97)
PLATELET # BLD AUTO: 211 10*3/MM3 (ref 140–450)
PMV BLD AUTO: 7.7 FL (ref 6–12)
POTASSIUM SERPL-SCNC: 4.1 MMOL/L (ref 3.5–5.2)
RBC # BLD AUTO: 2.82 10*6/MM3 (ref 4.14–5.8)
SODIUM SERPL-SCNC: 141 MMOL/L (ref 136–145)
WBC # BLD AUTO: 6 10*3/MM3 (ref 3.4–10.8)

## 2021-11-12 PROCEDURE — 97530 THERAPEUTIC ACTIVITIES: CPT

## 2021-11-12 PROCEDURE — 25010000002 HEPARIN LOCK FLUSH PER 10 UNITS: Performed by: INTERNAL MEDICINE

## 2021-11-12 PROCEDURE — 25010000002 NA FERRIC GLUC CPLX PER 12.5 MG: Performed by: NURSE PRACTITIONER

## 2021-11-12 PROCEDURE — 99232 SBSQ HOSP IP/OBS MODERATE 35: CPT | Performed by: INTERNAL MEDICINE

## 2021-11-12 PROCEDURE — 85027 COMPLETE CBC AUTOMATED: CPT | Performed by: FAMILY MEDICINE

## 2021-11-12 PROCEDURE — 25010000002 CEFEPIME PER 500 MG: Performed by: FAMILY MEDICINE

## 2021-11-12 PROCEDURE — 80048 BASIC METABOLIC PNL TOTAL CA: CPT | Performed by: FAMILY MEDICINE

## 2021-11-12 PROCEDURE — 83735 ASSAY OF MAGNESIUM: CPT | Performed by: FAMILY MEDICINE

## 2021-11-12 PROCEDURE — 25010000002 ENOXAPARIN PER 10 MG: Performed by: INTERNAL MEDICINE

## 2021-11-12 PROCEDURE — 97116 GAIT TRAINING THERAPY: CPT

## 2021-11-12 RX ORDER — HEPARIN SODIUM (PORCINE) LOCK FLUSH IV SOLN 100 UNIT/ML 100 UNIT/ML
500 SOLUTION INTRAVENOUS ONCE
Status: COMPLETED | OUTPATIENT
Start: 2021-11-12 | End: 2021-11-12

## 2021-11-12 RX ORDER — ACETAMINOPHEN 325 MG/1
650 TABLET ORAL EVERY 6 HOURS PRN
Start: 2021-11-12

## 2021-11-12 RX ORDER — FERROUS SULFATE TAB EC 324 MG (65 MG FE EQUIVALENT) 324 (65 FE) MG
324 TABLET DELAYED RESPONSE ORAL
Qty: 30 TABLET | Refills: 1 | Status: SHIPPED | OUTPATIENT
Start: 2021-11-13

## 2021-11-12 RX ORDER — AMOXICILLIN AND CLAVULANATE POTASSIUM 875; 125 MG/1; MG/1
1 TABLET, FILM COATED ORAL 2 TIMES DAILY
Qty: 14 TABLET | Refills: 0 | OUTPATIENT
Start: 2021-11-12 | End: 2023-03-14

## 2021-11-12 RX ADMIN — METOPROLOL SUCCINATE 25 MG: 25 TABLET, EXTENDED RELEASE ORAL at 09:58

## 2021-11-12 RX ADMIN — SODIUM BICARBONATE 650 MG TABLET 650 MG: at 09:58

## 2021-11-12 RX ADMIN — ATORVASTATIN CALCIUM 40 MG: 40 TABLET, FILM COATED ORAL at 09:58

## 2021-11-12 RX ADMIN — THEOPHYLLINE 300 MG: 300 TABLET, EXTENDED RELEASE ORAL at 09:58

## 2021-11-12 RX ADMIN — CEFEPIME 2 G: 2 INJECTION, POWDER, FOR SOLUTION INTRAVENOUS at 12:12

## 2021-11-12 RX ADMIN — Medication 250 MG: at 12:09

## 2021-11-12 RX ADMIN — SODIUM CHLORIDE 250 MG: 9 INJECTION, SOLUTION INTRAVENOUS at 09:58

## 2021-11-12 RX ADMIN — FERROUS SULFATE TAB EC 324 MG (65 MG FE EQUIVALENT) 324 MG: 324 (65 FE) TABLET DELAYED RESPONSE at 08:00

## 2021-11-12 RX ADMIN — PANTOPRAZOLE SODIUM 40 MG: 40 TABLET, DELAYED RELEASE ORAL at 09:58

## 2021-11-12 RX ADMIN — ASPIRIN 81 MG: 81 TABLET, COATED ORAL at 12:09

## 2021-11-12 RX ADMIN — ENOXAPARIN SODIUM 70 MG: 100 INJECTION SUBCUTANEOUS at 04:40

## 2021-11-12 RX ADMIN — CLOPIDOGREL BISULFATE 75 MG: 75 TABLET ORAL at 09:58

## 2021-11-12 RX ADMIN — Medication 500 UNITS: at 15:49

## 2021-11-12 NOTE — DISCHARGE PLACEMENT REQUEST
"JolissaintReji (76 y.o. Male)             Date of Birth Social Security Number Address Home Phone MRN    1945  [address] [place] [address] [phone] [id number]    [employer] Status             None        Admission Date Admission Type Admitting Provider Attending Provider Department, Room/Bed    11/9/21 Emergency She Thakur MD Lowney, Kay, MD [facility] 3A MEDICAL INPATIENT, 312/1    Discharge Date Discharge Disposition Discharge Destination           Home-Health Care [facility]              Attending Provider: She Thakur MD    Allergies: No Known Allergies    Isolation: None   Infection: None   Code Status: CPR   Advance Care Planning Activity    Ht: 177.8 cm (70\")   Wt: 70.3 kg (155 lb)    Admission Cmt: None   Principal Problem: None                Active Insurance as of 11/9/2021     Primary Coverage     Payor Plan Insurance Group Employer/Plan Group    MEDICARE MEDICARE A & B      Payor Plan Address Payor Plan Phone Number Payor Plan Fax Number Effective Dates    [address] [phone]  4/1/2009 - None Entered    [facility] [id number]       Subscriber Name Subscriber Birth Date Member ID       JOLISSAINT,DAVID A 1945 [id number]           Secondary Coverage     Payor Plan Insurance Group Employer/Plan Group    [facility] SUP [facility] HEALTH CARE OPTIONS PLAN N     Payor Plan Address Payor Plan Phone Number Payor Plan Fax Number Effective Dates    [facility] [phone]  1/1/2017 - None Entered    [address]       [facility] [id number]       Subscriber Name Subscriber Birth Date Member ID       JOLISSAINT,DAVID A 1945 [id number]                 Emergency Contacts      (Rel.) Home Phone Work Phone Mobile Phone    Mary Robertson (Significant Other) -- -- [phone]              "

## 2021-11-12 NOTE — PLAN OF CARE
Goal Outcome Evaluation:        Bed mobility - Independent pt dons left BKA independently.   Transfers - Supervision and with rolling walker  Ambulation - 140 feet Supervision and with rolling walker    Pt would benefit from Home with family assist and and Home Health at discharge from facility   Pt desires Home with family assist and and Home Health at discharge. Pt cooperative; agreeable to therapeutic recommendations and plan of care.

## 2021-11-12 NOTE — PROGRESS NOTES
Hematology/Oncology Inpatient Progress Note    PATIENT NAME: David A Jolissaint  : 1945  MRN: [id number]    CHIEF COMPLAINT: Limited stage small cell lung cancer and iron deficiency    HISTORY OF PRESENT ILLNESS:  76 y.o. male presented to [facility] emergency department on 2021 after his knees buckled leading to a partial fall and then hitting his head on a table when he tried to get up. His wife was not able to get him up. He denied any loss of consciousness. Per his wife, he was not coherent at home. Patient had increased weakness and felt lightheaded for 2 days prior to coming to the hospital. He reported malaise ongoing for 24 hours prior to coming to the hospital.  He complained of an intermittent headache and occasional cough with yellow sputum production. A few days ago, he had some jerking movements of his bilateral upper extremities. He has chronic tingling of his fingers.  Labs in the ED were was follows: WBC 10.1, hemoglobin 8.8, MCV 93.8, platelets 208,000, CMP significant for creatinine 1.81, BUN 46, magnesium 1.9 (1.6-2.4), CRP 6.66 (0-0.5), troponin 0.026 (0.0-0.03), pro-BNP 1930 (0.0-1800), UA 1+ protein, 0-2 RBC, 0-2 WBC. Covid-19 was not detected. Blood cultures were drawn.  A chest x-ray showed no acute chest findings and chronic right pleural thickening and pleural calcification with chronic right subpleural scarring, without significant change. MRI brain without contrast on 2021 showed mild volume loss secondary to cerebral atrophy and persistent nonspecific subcortical and periventricular white matter signal felt to be secondary to chronic microvascular ischemia. He was admitted for further evaluation and work-up. Cardiology was consulted due to near syncope and a 2D echocardiogram was ordered. D-dimer was 2.49 (0.00-0.59).  Iron studies on 11/10/2021, showed iron 28 () , iron saturation percent 12 (20-50), TIBC 231 (298-536), transferrin 155 (200-360) and  ferritin 474.6 (). Iron deficit was 799 mg.      11/10/21  Hematology/Oncology was consulted by Dr. 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  CT CAP on 5/19/2021 showed a large mediastinal subcarinal azygoesophageal recess mass and pathologically enlarged right hilar lymphadenopathy, a left lower lobe opacity and a new 11 mm right upper lobe pulmonary nodule all suspicious for metastatic disease.  He was diagnosed by bronchoscopy on 5/20/2021 during his admission to [facility] with fine-needle aspiration of the bronchus consistent with small cell lung cancer. PET/CT scan showed hypermetabolic activity in mediastinal and right hilar lymphadenopathy as well as uptake in a subcutaneous nodule on the posterior upper right chest wall posterior to the right shoulder and immediately deep to the skin surface.  The lesion was felt to be an inflamed epidermal inclusion cyst but metastatic disease was not entirely excluded.  A native right upper lobe lung nodule showed no FDG activity but felt to be too small for PET detection.  There was no abnormal FDG activity in the neck, abdomen, pelvis or bones. He underwent six cycles of chemotherapy with carboplatin+etoposide on 6/16/2021 - 9/30/2021. He underwent radiation to right lung from 7/26/2021 to 9/3/2021 with Dr. Herrera at [facility]. Prophylactic cranial radiation was completed the last week of 10/2021.  CT chest, abdomen, and pelvis at [facility] on 10/27/2021 showed interval decrease in size of mediastinal and right hilar adenopathy. Pulmonary nodes within the right upper lobe along the right minor fissure appeared stable. There was no metastatic disease in the abdomen or pelvis. Labs on 9/28/2021, revealed iron deficiency anemia: iron 25 (), ferritin 364 (), iron percent saturation 12 (20-55), TIBC 213 (228-428).  Injectafer was ordered but patient  had not yet received. Hemoglobin was 7.91 on 11/1/2021.      PCP: Walter Valero MD    INTERVAL HISTORY:  • 11/11/2021-initiated on daily Ferrlecit 250 mg IV x3.  Hemoglobin 8.8.  EEG normal.  • 11/12/21-chest x-ray stable.  VQ scan negative for PE and right lower extremity Doppler negative for DVT.  Bilateral carotid Doppler showed bilateral moderate ICA stenosis.  Echocardiogram showed an EF of 55%.    History of present illness reviewed since last visit and changes noted on 11/12/21.    Subjective   Patient reports shortness of air.    ROS:  Review of Systems   Constitutional: Negative for activity change, chills, fatigue, fever and unexpected weight change.   HENT: Negative for congestion, dental problem, hearing loss, mouth sores, nosebleeds, sore throat and trouble swallowing.    Eyes: Negative for photophobia, discharge and visual disturbance.   Respiratory: Positive for shortness of breath. Negative for cough and chest tightness.    Cardiovascular: Negative for chest pain, palpitations and leg swelling.   Gastrointestinal: Negative for abdominal distention, abdominal pain, blood in stool, constipation, diarrhea, nausea and vomiting.   Endocrine: Negative for cold intolerance and heat intolerance.   Genitourinary: Negative for decreased urine volume, difficulty urinating, frequency, hematuria and urgency.   Musculoskeletal: Negative for arthralgias and gait problem.   Skin: Negative for rash and wound.   Neurological: Negative for dizziness, tremors, seizures, weakness, light-headedness, numbness and headaches.   Hematological: Negative for adenopathy. Does not bruise/bleed easily.   Psychiatric/Behavioral: Negative for confusion and hallucinations. The patient is not nervous/anxious.    All other systems reviewed and are negative.       MEDICATIONS:    Scheduled Meds:  arformoterol, 15 mcg, Nebulization, BID - RT  aspirin, 81 mg, Oral, Daily With Lunch  atorvastatin, 40 mg, Oral, Daily  budesonide, 0.5 mg,  "Nebulization, BID - RT  cefepime, 2 g, Intravenous, Q24H  clopidogrel, 75 mg, Oral, Daily  enoxaparin, 1 mg/kg, Subcutaneous, Q12H  ferric gluconate, 250 mg, Intravenous, Daily  ferrous sulfate, 324 mg, Oral, Daily With Breakfast  metoprolol succinate XL, 25 mg, Oral, Daily  montelukast, 10 mg, Oral, Nightly  pantoprazole, 40 mg, Oral, Daily  saccharomyces boulardii, 250 mg, Oral, Daily With Lunch  sodium bicarbonate, 650 mg, Oral, BID  theophylline, 300 mg, Oral, Q12H       Continuous Infusions:  Pharmacy to Dose enoxaparin (LOVENOX),   sodium chloride, 100 mL/hr, Last Rate: 100 mL/hr (11/11/21 1504)       PRN Meds:  •  acetaminophen  •  HYDROcodone-acetaminophen  •  ondansetron  •  Pharmacy to Dose enoxaparin (LOVENOX)     ALLERGIES:  No Known Allergies    Objective    VITALS:   /85 (BP Location: Right arm, Patient Position: Lying)   Pulse 89   Temp 97.8 °F (36.6 °C) (Oral)   Resp 22   Ht 177.8 cm (70\")   Wt 70.3 kg (155 lb)   SpO2 91%   BMI 22.24 kg/m²     PHYSICAL EXAM:  Physical Exam  Vitals and nursing note reviewed.   Constitutional:       General: He is not in acute distress.     Appearance: Normal appearance. He is well-developed and normal weight. He is not diaphoretic.   HENT:      Head: Normocephalic and atraumatic.      Comments: Alopecia.     Right Ear: External ear normal.      Left Ear: External ear normal.      Nose: Nose normal.      Comments: Oxygen per nasal cannula.     Mouth/Throat:      Mouth: Mucous membranes are moist.      Pharynx: Oropharynx is clear. No oropharyngeal exudate or posterior oropharyngeal erythema.      Comments: Edentulous.  Eyes:      General: No scleral icterus.     Extraocular Movements: Extraocular movements intact.      Conjunctiva/sclera: Conjunctivae normal.      Pupils: Pupils are equal, round, and reactive to light.   Cardiovascular:      Rate and Rhythm: Normal rate and regular rhythm.      Heart sounds: Normal heart sounds. No murmur " heard.      Pulmonary:      Effort: Pulmonary effort is normal. No respiratory distress.      Breath sounds: No stridor. Rhonchi (in right lung. ) present. No wheezing or rales.      Comments: Right chest wall [id number].  Chest:   Breasts:      Right: No supraclavicular adenopathy.      Left: No supraclavicular adenopathy.       Abdominal:      General: Bowel sounds are normal. There is no distension.      Palpations: Abdomen is soft. There is no mass.      Tenderness: There is no abdominal tenderness. There is no guarding.   Genitourinary:     Comments: Deferred   Musculoskeletal:         General: No swelling, tenderness or deformity. Normal range of motion.      Cervical back: Normal range of motion and neck supple.      Right lower leg: No edema.      Left lower leg: No edema.      Comments: Left BKA.   Lymphadenopathy:      Cervical: No cervical adenopathy.      Upper Body:      Right upper body: No supraclavicular adenopathy.      Left upper body: No supraclavicular adenopathy.   Skin:     General: Skin is warm and dry.      Coloration: Skin is not pale.      Findings: Rash (Stasis changes upper extremities.) present. No bruising or erythema.   Neurological:      General: No focal deficit present.      Mental Status: He is alert and oriented to person, place, and time.      Coordination: Coordination normal.   Psychiatric:         Mood and Affect: Mood normal.         Behavior: Behavior normal.         Thought Content: Thought content normal.         Judgment: Judgment normal.           RECENT LABS:  Lab Results (last 24 hours)     Procedure Component Value Units Date/Time    Basic Metabolic Panel [[id number]]  (Abnormal) Collected: 11/12/21 0159    Specimen: Blood Updated: 11/12/21 0233     Glucose 83 mg/dL      BUN 35 mg/dL      Creatinine 1.83 mg/dL      Sodium 141 mmol/L      Potassium 4.1 mmol/L      Chloride 108 mmol/L      CO2 20.0 mmol/L      Calcium 8.2 mg/dL      eGFR Non  Amer 36  mL/min/1.73      BUN/Creatinine Ratio 19.1     Anion Gap 13.0 mmol/L     Narrative:      GFR Normal >60  Chronic Kidney Disease <60  Kidney Failure <15      Magnesium [[id number]]  (Normal) Collected: 11/12/21 0159    Specimen: Blood Updated: 11/12/21 0233     Magnesium 1.8 mg/dL     CBC (No Diff) [[id number]]  (Abnormal) Collected: 11/12/21 0159    Specimen: Blood Updated: 11/12/21 0216     WBC 6.00 10*3/mm3      RBC 2.82 10*6/mm3      Hemoglobin 8.5 g/dL      Hematocrit 26.3 %      MCV 93.3 fL      MCH 30.2 pg      MCHC 32.4 g/dL      RDW 21.4 %      RDW-SD 67.8 fl      MPV 7.7 fL      Platelets 211 10*3/mm3     Blood Culture - Blood, Blood, Venous Line [[id number]]  (Normal) Collected: 11/09/21 1614    Specimen: Blood, Venous Line Updated: 11/11/21 1631     Blood Culture No growth at 2 days    Blood Culture - Blood, Chest, Right [[id number]]  (Normal) Collected: 11/10/21 1156    Specimen: Blood from Chest, Right Updated: 11/11/21 1245     Blood Culture No growth at 24 hours          PENDING RESULTS:  NSE.    IMAGING REVIEWED:  XR Chest 2 View    Result Date: 11/11/2021  No interval change from the study performed 2 days ago with abnormalities as discussed above.  Electronically Signed By-Nj Hernández MD On:11/11/2021 5:43 PM This report was finalized on [id number] by  Nj Hernández MD.    NM Lung Ventilation Perfusion Aerosol    Result Date: 11/12/2021  No convincing evidence of acute pulmonary embolus.  Electronically Signed By-Brian Cast MD On:11/12/2021 9:22 AM This report was finalized on [id number] by  Brian Cast MD.      I have reviewed the patient's labs, imaging, reports, and other clinician documentation.    Assessment/Plan   ASSESSMENT:  1. Limited stage small cell lung carcinoma right hilum: S/p 6 cycles etoposide+carboplatin 6/16/2021 - 9/30/2021 and s/p lung radiation.  Prophylactic cranial radiation completed in 10/2021.  MRI brain negative for metastatic disease.  NSE  pending.  2. Anemia/Iron deficiency anemia: On oral iron and PPI. Orders were written to receive outpatient IV iron on 11/1/2021, but not given. Repeat iron studies show continued iron deficiency anemia.  Started Ferrlecit 250 mg IV x 3 days on 11/11/2021.   3. Near syncope/Elevated d-dimer/CAD/NSTEMI with two-vessel CABG 2016/HLD: Cardiology consulted.  On ASA, Plavix and prophylactic Lovenox.  EEG negative.  2D echocardiogram, right lower extremity venous Doppler, VQ scan and bilateral carotid ultrasound showed no DVT, PE, cardiac thrombosis, cardiomyopathy or significant ICA stenosis.  4. COPD: CXR showed no acute finding. On cefepime.    5. CKD Stage IV:  follows with Dr. Mendez as an outpatient.   Stable.     6. PAD/S/p LLE amputation and RLE stent: Followed by vascular surgery as an outpatient.  On aspirin and Plavix.   7. Constipation/BPH with LUTS:  management per Primary team.     PLAN:  1. Follow CBC.   2. Continue ferrous sulfate 325 mg po once daily.   3. Continue Ferrlecit 250 mg IV x 3 days, day 2/3.  4. NSE pending.  5. Continue prophylactic Lovenox.   6. Outpatient GI work-up.    Patient seen and evaluated by Dr. Hedrick.  Electronically signed by TERE Santana, 11/12/21, 10:27 AM EST.        I have personally performed a face-to-face diagnostic evaluation on this patient.  I have discussed the case with Emily Parrish NP, have edited/reviewed the note, and agree with the care plan.  The patient is complaining of baseline shortness of air.  On examination, he has alopecia and is edentulous.  Left BKA is present.  Labs are significant for an anemia.  He is on IV iron supplementation and an ACE level is pending.  We will continue prophylactic Lovenox while in the hospital.    I discussed the patients findings and my recommendations with patient.    Electronically signed by Riki Hedrick MD, 11/12/21, 11:08 AM EST.

## 2021-11-12 NOTE — CASE MANAGEMENT/SOCIAL WORK
Continued Stay Note  FERNANDEZ Walls     Patient Name: David A Jolissaint  MRN: [id number]  Today's Date: 11/12/2021    Admit Date: 11/9/2021     Discharge Plan     Row Name 11/12/21 1410       Plan    Plan Home with [facility]  pending acceptance.  Order place    Provided Post Acute Provider List? N/A    N/A Provider List Comment for Home health patient requested [facility]    Plan Comments Spoke with patient at bedside regarding home health.  Patient requested [facility], stating he has had them before.  Pending acceptance.                       Expected Discharge Date and Time     Expected Discharge Date Expected Discharge Time    Nov 12, 2021             Daisha Lima RN   Met with patient in room wearing PPE: mask,goggles  Maintained distance greater than six feet and spent less than 15 minutes in the room.

## 2021-11-12 NOTE — THERAPY TREATMENT NOTE
Subjective: Pt agreeable to therapeutic plan of care.    Objective:     Bed mobility - CGA supine to sit   Transfers - CGA and Min-A sit to stand first stance pt unsteady, 2nd stance pt steadier   Ambulation - 75 feet CGA and with rolling walker    Pain: 0 VAS  Education: Provided education on importance of mobility and skilled verbal / tactile cueing throughout intervention.     Assessment: David A Jolissaint presents with functional mobility impairments which indicate the need for skilled intervention. Tolerating session today without incident. Pt steadier ambulating with roll walker. Pt is independent donning his left bka prosthesis.  Will continue to follow and progress as tolerated.     Plan/Recommendations:   Pt would benefit from Inpatient Rehabilitation placement at discharge from facility   Pt desires Inpatient Rehabilitation placement at discharge. Pt cooperative; agreeable to therapeutic recommendations and plan of care.     Basic Mobility 6-click:  Rollin = Total, A lot = 2, A little = 3; 4 = None  Supine>Sit:   1 = Total, A lot = 2, A little = 3; 4 = None   Sit>Stand with arms:  1 = Total, A lot = 2, A little = 3; 4 = None  Bed>Chair:   1 = Total, A lot = 2, A little = 3; 4 = None  Ambulate in room:  1 = Total, A lot = 2, A little = 3; 4 = None  3-5 Steps with railin = Total, A lot = 2, A little = 3; 4 = None  Score: 17    Modified [place]: 4 = Moderately severe disability (Unable to attend to own bodily needs without assistance, and unable to walk unassisted)     Post-Tx Position: Call light and personal items within reach pt sitting at eob  PPE: gloves, surgical mask, eyewear protection    
Subjective: Pt agreeable to therapeutic plan of care.    Objective:     Bed mobility - Independent pt dons left BKA independently.   Transfers - Supervision and with rolling walker  Ambulation - 140 feet Supervision and with rolling walker    Pain: 0 VAS  Education: Provided education on importance of mobility and skilled verbal / tactile cueing throughout intervention.     Assessment: David A Jolissaint presents with functional mobility impairments which indicate the need for skilled intervention. Pt reported feeling much better today.  pt also much more independent with his mobility.Tolerating session today without incident. Will change recommendation to home with HHPT. Will continue to follow and progress as tolerated.     Plan/Recommendations:   Pt would benefit from Home with family assist and and Home Health at discharge from facility   Pt desires Home with family assist and and Home Health at discharge. Pt cooperative; agreeable to therapeutic recommendations and plan of care.     Basic Mobility 6-click:  Rollin = Total, A lot = 2, A little = 3; 4 = None  Supine>Sit:   1 = Total, A lot = 2, A little = 3; 4 = None   Sit>Stand with arms:  1 = Total, A lot = 2, A little = 3; 4 = None  Bed>Chair:   1 = Total, A lot = 2, A little = 3; 4 = None  Ambulate in room:  1 = Total, A lot = 2, A little = 3; 4 = None  3-5 Steps with railin = Total, A lot = 2, A little = 3; 4 = None  Score: 20    Modified [place]: N/A = No pre-op stroke/TIA    Post-Tx Position: Call light and personal items within reach pt sitting at eob  PPE: gloves, surgical mask, eyewear protection    
rolling walker

## 2021-11-12 NOTE — PLAN OF CARE
Goal Outcome Evaluation:  Plan of Care Reviewed With: patient           Outcome Summary: pt upset that he had so many test done today, stating that they are trying to take all my medicare money.

## 2021-11-12 NOTE — PROGRESS NOTES
Cardiology Progress Note    Patient Identification:  Name: David A Jolissaint  Age: 76 y.o.  Sex: male  :  1945  MRN: [id number]                 Follow Up / Chief Complaint: Fall, possible near syncope  Chief Complaint   Patient presents with   • Fall       Interval History: Patient presented with legs giving out and collapsing. Is severely orthostatic.       Subjective: Patient seen and examined. Chart reviewed. Labs reviewed. Discussed with RN taking care of patient.      Objective: Glucose elevated, BUN/creatinine of 43/1.7, hemoglobin of 8.8.  2021: BUN/creatinine are 35/1.83, hemoglobin 8.5      History of Present Illness:           This is a 76-year-old  male with  PMH of     #  CAD, NSTEMI, 2 vessel CABG with SVG to OM and PDA 3/9/16  #   RBBB  #  PAD, left lower extremity amputation  #  Carotid artery disease, Doppler 6/15/2018 with 50-74% right and left  #  diabetes,   #. hypertension,   #  COPD, small cell lung cancer, chemotherapy  #  CKD 4  #. BPH with LUTS  #  Cellulitis    #  left BKA, cholecystectomy  #  Positive family history of MI and CAD in father  #. Former smoker quit 10/2015     Patient presented to the emergency room 11/10/2021 with complaint of syncope.  Patient has lung cancer and recently finished chemotherapy and had XRT to the chest and had prophylactic XRT to the brain.  And is saying he is not feeling well since then has history of amputation and prosthetic limb on the left lower extremity.  Could not stand and collapsed/slumped to ground and could not get up denies any loss of consciousness.  Denies any bowel bladder incontinence.  Patient thinks it is because of his legs getting weak.  Review of records reveal :  Had an echocardiogram done 10/1/2020 which revealed normal LV systolic function with mild concentric LVH and left atrial enlargement.  Patient  had carotid Doppler 06/15/2018 which showed bilateral 50-70% worse compared to  which was less than 50%  bilat.      Work-up here 11/9/21-11/10/2021 revealed proBNP of 1930.  Normal troponin.  Normal CMP except BUN/creatinine 46/1.81.  D-dimer elevated at 2.49, hemoglobin of 8.7.  Chest x-ray 11/9/2021 with no acute chest findings chronic right pleural thickening and pleural calcification on the right subpleural area  EKG done 11/9/2021 reviewed/interpreted by me reveals sinus rhythm with rate of 92 bpm with PACs, right bundle branch block     ASSESSMENT:     #Near syncope  #Weakness  #Non-small cell lung CA, chemotherapy, XRT, XRT of the brain  #. Bilateral carotid stenosis  # PAD with absent right radial pulse, left BKA  # dyspnea on exertion, chronic  #  bradycardia, RBBB  #  CAD, CABG, history of NSTEMI  #  prediabetes,   #  hypertension,   # CKD,   # COPD,  # dyslipidemia        PLAN:  Monitor rhythm on telemetry  Serial cardiac enzymes are negative for MI.   We will check orthostatics, patient is orthostatic.  Will check a.m. cortisol.  Give him knee-high stockings.  Patient has only 1 limb advised him to wear knee-high stockings and that.  IV fluids  Will consider Florinef if it does not improve  Continue medical management with aspirin, atorvastatin, metoprolol, to help with CAD, MI, hypertension, dyslipidemia, CAD, PAD as tolerated.  Follow-up CKD with nephrology for CKD.  Patient has carotid disease,  repeat carotid Dopplers 11/11/2021 revealed moderate bilateral disease  Patient's blood pressure is doing well we will continue to monitor.  Monitor sugars and  Last A1c was 5.9.  Patient underwent Lexiscan Cardiolite 5/21/2021 which was negative for ischemia.    Echocardiogram 11/10/2021 reveals normal LV systolic function EF of 55% with paradoxical septal motion  We will follow up and consider further evaluation and treatment.        MRI of the brain 11/9/2021:  IMPRESSION:     1. Some of the images are motion degraded.  2. No acute findings.  3. Mild volume loss secondary to cerebral atrophy.  4. Persistent  nonspecific subcortical and periventricular white matter  signal felt to be secondary to chronic microvascular ischemia.      Electronically Signed By-Nj Hernández MD On:11/9/2021 6:11 PM       Past Medical History:  Past Medical History:   Diagnosis Date   • Anemia    • Cellulitis    • CKD (chronic kidney disease), stage III (CMS/HCC)    • COPD (chronic obstructive pulmonary disease) (CMS/HCC)    • Diverticulitis    • Dyslipidemia    • Hypertension    • Myocardial infarction (CMS/HCC)    • PVD (peripheral vascular disease) (CMS/HCC)      Past Surgical History:  Past Surgical History:   Procedure Laterality Date   • BRONCHOSCOPY N/A 5/20/2021    Procedure: BRONCHOSCOPY WITH ENDOBRONCHIAL ULTRASOUND WITH FINE NEEDLE ASPIRATION. BRONCHOALVEOLAR LAVAGE;  Surgeon: Jackson Quezada MD;  Location[facility] ENDOSCOPY;  Service: Pulmonary;  Laterality: N/A;  subcarinal mass   • CARDIAC SURGERY     • CHOLECYSTECTOMY     • GALLBLADDER SURGERY     • LEG AMPUTATION Left         Social History:   Social History     Tobacco Use   • Smoking status: Former Smoker     Packs/day: 2.00     Years: 50.00     Pack years: 100.00   • Smokeless tobacco: Never Used   • Tobacco comment: Says he quit approximately in 2005.   Substance Use Topics   • Alcohol use: Not Currently      Family History:  Family History   Problem Relation Age of Onset   • Heart attack Father    • Heart disease Father           Allergies:  No Known Allergies  Scheduled Meds:  arformoterol, 15 mcg, BID - RT  aspirin, 81 mg, Daily With Lunch  atorvastatin, 40 mg, Daily  budesonide, 0.5 mg, BID - RT  cefepime, 2 g, Q24H  clopidogrel, 75 mg, Daily  enoxaparin, 1 mg/kg, Q12H  ferric gluconate, 250 mg, Daily  ferrous sulfate, 324 mg, Daily With Breakfast  metoprolol succinate XL, 25 mg, Daily  montelukast, 10 mg, Nightly  pantoprazole, 40 mg, Daily  saccharomyces boulardii, 250 mg, Daily With Lunch  sodium bicarbonate, 650 mg, BID  theophylline, 300 mg, Q12H          Review of  "Systems:   ROS  Review of Systems   Constitution: Negative for chills and fever.   Cardiovascular: Negative for chest pain and palpitations.   Respiratory: Negative for cough and hemoptysis.    Gastrointestinal: Negative for nausea.        Constitutional:  Temp:  [97.6 °F (36.4 °C)-98.3 °F (36.8 °C)] 97.8 °F (36.6 °C)  Heart Rate:  [] 89  Resp:  [18-22] 22  BP: (110-150)/(71-85) 130/85    Physical Exam   /85 (BP Location: Right arm, Patient Position: Lying)   Pulse 89   Temp 97.8 °F (36.6 °C) (Oral)   Resp 22   Ht 177.8 cm (70\")   Wt 70.3 kg (155 lb)   SpO2 91%   BMI 22.24 kg/m²   General:  Appears in no acute distress  Eyes: Sclera is anicteric,  conjunctiva is clear   HEENT:  No JVD. Thyroid not visibly enlarged. No mucosal pallor or cyanosis  Respiratory: Respirations regular and unlabored at rest.  Bilaterally good breath sounds, with good air entry in all fields. No crackles, rubs or wheezes auscultated  Cardiovascular: S1,S2 Regular rate and rhythm. No murmur, rub or gallop auscultated.  . No pretibial pitting edema  Gastrointestinal: Abdomen nondistended, soft  Musculoskeletal:  No abnormal movements  Extremities: No digital clubbing or cyanosis  Skin: Color pink. Skin warm and dry to touch. No rashes  No xanthoma  Neuro: Alert and awake, no lateralizing deficits appreciated    INTAKE AND OUTPUT:    Intake/Output Summary (Last 24 hours) at 11/12/2021 0931  Last data filed at 11/12/2021 0855  Gross per 24 hour   Intake 1460 ml   Output --   Net 1460 ml       Cardiographics  Telemetry: Sinus rhythm    ECG:   ECG 12 Lead   Final Result   HEART RATE= 92  bpm   RR Interval= 668  ms   MD Interval= 127  ms   P Horizontal Axis= -32  deg   P Front Axis= 60  deg   QRSD Interval= 148  ms   QT Interval= 392  ms   QRS Axis= 79  deg   T Wave Axis= 61  deg   - ABNORMAL ECG -   Sinus rhythm   Atrial premature complex   Right bundle branch block   When compared with ECG of 26-Jun-2021 8:33:26,   No " significant change   Electronically Signed By: Luis Antonio Bruno (Last) 10-Nov-2021 10:39:29   Date and Time of Study: 2021-11-09 19:28:33        I have personally reviewed EKG    Echocardiogram: Results for orders placed during the hospital encounter of 11/09/21    Adult Transthoracic Echo Complete W/ Cont if Necessary Per Protocol    Interpretation Summary  Normal LV size  Paradoxical septal motion, rest of the LV is giovani well. Estimated LV ejection fraction of 55%  Normal RV size  Normal atrial size, lipomatous hypertrophy of interatrial septum  Aortic valve, mitral valve, tricuspid valve appears structurally normal, mild mitral regurgitation seen.  No pericardial effusion seen.  Proximal aorta appears normal in size.      Lab Review   I have reviewed the labs  Results from last 7 days   Lab Units 11/09/21  1614   TROPONIN T ng/mL 0.026     Results from last 7 days   Lab Units 11/12/21  0159   MAGNESIUM mg/dL 1.8     Results from last 7 days   Lab Units 11/12/21  0159   SODIUM mmol/L 141   POTASSIUM mmol/L 4.1   BUN mg/dL 35*   CREATININE mg/dL 1.83*   CALCIUM mg/dL 8.2*         Results from last 7 days   Lab Units 11/12/21  0159 11/11/21  0745 11/10/21  1150   WBC 10*3/mm3 6.00 9.30 8.20   HEMOGLOBIN g/dL 8.5* 8.8* 8.7*   HEMATOCRIT % 26.3* 26.2* 26.1*   PLATELETS 10*3/mm3 211 216 222           RADIOLOGY:  Imaging Results (Last 24 Hours)     Procedure Component Value Units Date/Time    NM Lung Ventilation Perfusion Aerosol [[id number]] Collected: 11/12/21 0920     Updated: 11/12/21 0924    Narrative:      DATE OF EXAM:  11/11/2021 11:04 AM     PROCEDURE:  NM LUNG VENTILATION PERFUSION AEROSOL-     INDICATIONS:  Chest pain, acute, PE suspected, intermed prob, positive D-dimer;  R55-Syncope and collapse; E86.0-Dehydration; C34.90-Malignant neoplasm  of unspecified part of unspecified bronchus or lung; D64.9-Anemia,  unspecified     COMPARISON:  Chest radiograph from today     TECHNIQUE:   The patient was  ventilated with 46.0 mCi of technetium 99m pertechnetate  aerosol and ventilation images were acquired in multiple obliquities.  The patient then received 5.2 mCi of technetium 99m MAA intravenously  and perfusion images were acquired in multiple obliquities.     FINDINGS:  There is heterogeneous tracer uptake within the lungs more so on the  ventilation than the perfusion images. However, no definite concerning  wedge-shaped V/Q mismatch is identified.        Impression:      No convincing evidence of acute pulmonary embolus.     Electronically Signed By-Brian Cast MD On:11/12/2021 9:22 AM  This report was finalized on [id number] by  Brian Cast MD.    XR Chest 2 View [[id number]] Collected: 11/11/21 1740     Updated: 11/11/21 1745    Narrative:      DATE OF EXAM:  11/11/2021 4:46 PM     PROCEDURE:  XR CHEST 2 VW-     INDICATIONS:  Shortness of breath, syncope and collapse, history of small cell lung  cancer.       COMPARISON:  11/09/2021.     TECHNIQUE:   Two radiologic views of the chest.     FINDINGS:  The heart size is normal. The patient has had a previous CABG procedure.  There is a stable right-sided port in place. There is underlying  emphysema. There is diffuse prominence of interstitial markings in a  reticular pattern. This is most prominent in the lung bases and felt to  be due to fibrosis. There are calcified pleural plaques on the right  side which are unchanged. There is bilateral basilar pleural thickening  versus small effusions. There is a linear scar versus subsegmental  atelectasis following the inferior major fissure on the lateral view.   There is no interval change from the study from 2 days ago.       Impression:      No interval change from the study performed 2 days ago with  abnormalities as discussed above.     Electronically Signed By-Nj Hernández MD On:11/11/2021 5:43 PM  This report was finalized on [id number] by  Nj Hernández MD.                )11/12/2021  Kali  "MD MIKIE Portillo/Transcription:   \"Dictated utilizing Dragon dictation\".   "

## 2021-11-13 NOTE — OUTREACH NOTE
Prep Survey      Responses   [facility] patient discharged from? [facility]   Is LACE score < 7 ? No   Emergency Room discharge w/ pulse ox? No   Eligibility Readm Mgmt   Discharge diagnosis Dehydration Acute bronchitis Near syncope   Does the patient have one of the following disease processes/diagnoses(primary or secondary)? Other   Does the patient have Home health ordered? Yes   What is the Home health agency?  [facility]     Is there a DME ordered? No   Prep survey completed? Yes          Sharon Armstrong RN

## 2021-11-14 LAB — BACTERIA SPEC AEROBE CULT: NORMAL

## 2021-11-15 ENCOUNTER — READMISSION MANAGEMENT (OUTPATIENT)
Dept: CALL CENTER | Facility: HOSPITAL | Age: 76
End: 2021-11-15

## 2021-11-15 LAB — BACTERIA SPEC AEROBE CULT: NORMAL

## 2021-11-15 NOTE — OUTREACH NOTE
Medical Week 1 Survey      Responses   [facility] patient discharged from? [facility]   Does the patient have one of the following disease processes/diagnoses(primary or secondary)? Other   Week 1 attempt successful? Yes   Call start time 0840   Call end time 0842   Discharge diagnosis Dehydration Acute bronchitis Near syncope   Meds reviewed with patient/caregiver? Yes   Is the patient having any side effects they believe may be caused by any medication additions or changes? No   Does the patient have all medications ordered at discharge? Yes   Is the patient taking all medications as directed (includes completed medication regime)? Yes   Does the patient have a primary care provider?  Yes   Does the patient have an appointment with their PCP within 7 days of discharge? Greater than 7 days   Comments regarding PCP PCP APPOINTMENT IS 11/22/21   What is preventing the patient from scheduling follow up appointments within 7 days of discharge? Earlier appointment not available   Nursing Interventions Verified appointment date/time/provider   Has the patient kept scheduled appointments due by today? N/A   What is the Home health agency?  [facility]     Has home health visited the patient within 72 hours of discharge? Yes   Psychosocial issues? No   Did the patient receive a copy of their discharge instructions? Yes   Nursing interventions Reviewed instructions with patient   What is the patient's perception of their health status since discharge? Improving   Is the patient/caregiver able to teach back signs and symptoms related to disease process for when to call PCP? Yes   Is the patient/caregiver able to teach back signs and symptoms related to disease process for when to call 911? Yes   Is the patient/caregiver able to teach back the hierarchy of who to call/visit for symptoms/problems? PCP, Specialist, Home health nurse, Urgent Care, ED, 911 Yes   If the patient is a current smoker, are they able to teach  back resources for cessation? Not a smoker   Week 1 call completed? Yes          Linda Julian LPN

## 2021-11-15 NOTE — CASE MANAGEMENT/SOCIAL WORK
Case Management Discharge Note      Final Note: [facility]- accepted    Provided Post Acute Provider List?: N/A  Post Acute Provider List: Inpatient Rehab  N/A Provider List Comment: for Home health patient requested [facility]  Delivered To: Patient  Method of Delivery: In person    Selected Continued Care - Discharged on 11/12/2021 Admission date: 11/9/2021 - Discharge disposition: Home-Health Care [facility]        Home Medical Care Coordination complete.    Service Provider Selected Services Address Phone Fax Patient Preferred    [facility] [place]  Home Health Services [address] [place] [phone] --                       Final Discharge Disposition Code: 06 - home with home health care

## 2021-11-16 NOTE — DISCHARGE SUMMARY
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          Hospital Course    77y/o WM presented to the ER complaining of increased weakness.   patient states he slumped to the floor after he stood up at the refrigerator.   States he was weak and lightheaded/dizzy.  He states that he has had no vomiting or diarrhea.  He states that he had chemotherapy recently.  He reports no hemoptysis.  He denies melena, hematemesis hematochezia.  He states he has not felt good for about 24 hours  (weak and tired).  Reports no focal neurologic defects or lateralizing neurologic signs. he states he intermittently has a headache since his radiation.  he gets like this when he needs to drink more fluids.  In the ER, creatine was slightly elevated at 1.8, UA with ketones, hgb 8.8 (stable), CXR without pneumonia, MRI brain without acute changes.  He was started on IVF and admitted for dehydration and near syncope.  cardiology was consulted, as well as heme/onc.  He was monitored on telemetry and had negative serial cardiac enzymes.  Carotid dopplers showed moderate stenosis bilaterally.  Echo was done and unremarkable with EF of 55%.  His d-dimer was elevated.  V/Q scan was done because his renal function precluded a CT PE protocol.  It was negative for PE.  His creatine and symptoms improved with IVF.  Pt was found to be orthostatic.  He will wear knee high stocking on the right leg. He will f/u with cardiology and if he is no better, he may  benefit from florinef.    For his anemia, he will continue oral iron.  Pt will be scheduled for IV iron as an outpatient.  He did receive IV Ferrlecit while in the hospital.   He had a negative EEG while in the hospital as well.  Pt improved overall during his hospitalization.  He will be discharged with home health.  He will need f/u with cardiology, nephrology, and heme/onc; in addition to his PCP    Procedures Performed         Consults:   Consults     Date and Time Order Name Status Description    11/10/2021  1:21 PM Inpatient Cardiology Consult Completed     11/10/2021  1:21 PM Hematology & Oncology Inpatient Consult Completed           Pertinent Test Results:    Lab Results (most recent)     Procedure Component Value Units Date/Time    Blood Culture - Blood, Chest, Right [[id number]]  (Normal) Collected: 11/10/21 1156    Specimen: Blood from Chest, Right Updated: 11/15/21 1245     Blood Culture No growth at 5 days    Blood Culture - Blood, Blood, Venous Line [[id number]]  (Normal) Collected: 11/09/21 1614    Specimen: Blood, Venous Line Updated: 11/14/21 1632     Blood Culture No growth at 5 days    Basic Metabolic Panel [[id number]]  (Abnormal) Collected: 11/12/21 0159    Specimen: Blood Updated: 11/12/21 0233     Glucose 83 mg/dL      BUN 35 mg/dL      Creatinine 1.83 mg/dL      Sodium 141 mmol/L      Potassium 4.1 mmol/L      Chloride 108 mmol/L      CO2 20.0 mmol/L      Calcium 8.2 mg/dL      eGFR Non African Amer 36 mL/min/1.73      BUN/Creatinine Ratio 19.1     Anion Gap 13.0 mmol/L     Narrative:      GFR Normal >60  Chronic Kidney Disease <60  Kidney Failure <15      Magnesium [[id number]]  (Normal) Collected: 11/12/21 0159    Specimen: Blood Updated: 11/12/21 0233     Magnesium 1.8 mg/dL     CBC (No Diff) [[id number]]  (Abnormal) Collected: 11/12/21 0159    Specimen: Blood Updated: 11/12/21 0216     WBC 6.00 10*3/mm3      RBC 2.82 10*6/mm3      Hemoglobin 8.5 g/dL      Hematocrit 26.3 %      MCV 93.3 fL       MCH 30.2 pg      MCHC 32.4 g/dL      RDW 21.4 %      RDW-SD 67.8 fl      MPV 7.7 fL      Platelets 211 10*3/mm3     Basic Metabolic Panel [[id number]]  (Abnormal) Collected: 11/11/21 0745    Specimen: Blood Updated: 11/11/21 0843     Glucose 111 mg/dL      BUN 43 mg/dL      Creatinine 1.70 mg/dL      Sodium 141 mmol/L      Potassium 4.1 mmol/L      Chloride 107 mmol/L      CO2 21.0 mmol/L      Calcium 8.4 mg/dL      eGFR Non African Amer 39 mL/min/1.73      BUN/Creatinine Ratio 25.3     Anion Gap 13.0 mmol/L     Narrative:      GFR Normal >60  Chronic Kidney Disease <60  Kidney Failure <15      Magnesium [[id number]]  (Normal) Collected: 11/11/21 0745    Specimen: Blood Updated: 11/11/21 0843     Magnesium 1.9 mg/dL     CBC & Differential [[id number]]  (Abnormal) Collected: 11/11/21 0745    Specimen: Blood Updated: 11/11/21 0816    Narrative:      The following orders were created for panel order CBC & Differential.  Procedure                               Abnormality         Status                     ---------                               -----------         ------                     CBC Auto Differential[[id number]]        Abnormal            Final result                 Please view results for these tests on the individual orders.    CBC Auto Differential [[id number]]  (Abnormal) Collected: 11/11/21 0745    Specimen: Blood Updated: 11/11/21 0816     WBC 9.30 10*3/mm3      RBC 2.82 10*6/mm3      Hemoglobin 8.8 g/dL      Hematocrit 26.2 %      MCV 92.9 fL      MCH 31.0 pg      MCHC 33.4 g/dL      RDW 21.9 %      RDW-SD 71.3 fl      MPV 7.7 fL      Platelets 216 10*3/mm3      Neutrophil % 83.9 %      Lymphocyte % 2.1 %      Monocyte % 7.2 %      Eosinophil % 6.4 %      Basophil % 0.4 %      Neutrophils, Absolute 7.80 10*3/mm3      Lymphocytes, Absolute 0.20 10*3/mm3      Monocytes, Absolute 0.70 10*3/mm3      Eosinophils, Absolute 0.60 10*3/mm3      Basophils, Absolute 0.00 10*3/mm3      nRBC 0.1 /100 WBC      Neuron-Specific Enolase [[id number]] Collected: 11/11/21 0745    Specimen: Blood Updated: 11/11/21 0809    D-dimer, Quantitative [[id number]]  (Abnormal) Collected: 11/10/21 1519    Specimen: Blood Updated: 11/10/21 1605     D-Dimer, Quantitative 2.49 mg/L (FEU)     Narrative:      Reference Range  --------------------------------------------------------------------     < 0.50   Negative Predictive Value  0.50-0.59   Indeterminate    >= 0.60   Probable VTE             A very low percentage of patients with DVT may yield D-Dimer results   below the cut-off of 0.50 mg/L FEU.  This is known to be more   prevalent in patients with distal DVT.             Results of this test should always be interpreted in conjunction with   the patient's medical history, clinical presentation and other   findings.  Clinical diagnosis should not be based on the result of   INNOVANCE D-Dimer alone.    Ferritin [[id number]]  (Abnormal) Collected: 11/10/21 1150    Specimen: Blood Updated: 11/10/21 1601     Ferritin 474.60 ng/mL     Narrative:      Results may be falsely decreased if patient taking Biotin.      Iron Profile [[id number]]  (Abnormal) Collected: 11/10/21 1150    Specimen: Blood Updated: 11/10/21 1554     Iron 28 mcg/dL      Iron Saturation 12 %      Transferrin 155 mg/dL      TIBC 231 mcg/dL     BNP [[id number]]  (Abnormal) Collected: 11/10/21 1150    Specimen: Blood Updated: 11/10/21 1401     proBNP 1,930.0 pg/mL     Narrative:      Among patients with dyspnea, NT-proBNP is highly sensitive for the detection of acute congestive heart failure. In addition NT-proBNP of <300 pg/ml effectively rules out acute congestive heart failure with 99% negative predictive value.    Results may be falsely decreased if patient taking Biotin.      CBC (No Diff) [[id number]]  (Abnormal) Collected: 11/10/21 1150    Specimen: Blood Updated: 11/10/21 1243     WBC 8.20 10*3/mm3      RBC 2.80 10*6/mm3      Hemoglobin 8.7 g/dL      Hematocrit 26.1 %       MCV 93.3 fL      MCH 31.1 pg      MCHC 33.3 g/dL      RDW 21.2 %      RDW-SD 69.1 fl      MPV 7.6 fL      Platelets 222 10*3/mm3     COVID PRE-OP / PRE-PROCEDURE SCREENING ORDER (NO ISOLATION) - Swab, Nasopharynx [[id number]]  (Normal) Collected: 11/09/21 1939    Specimen: Swab from Nasopharynx Updated: 11/09/21 2042    Narrative:      The following orders were created for panel order COVID PRE-OP / PRE-PROCEDURE SCREENING ORDER (NO ISOLATION) - Swab, Nasopharynx.  Procedure                               Abnormality         Status                     ---------                               -----------         ------                     COVID-19,CEPHEID/CIRA/BD...[[id number]]  Normal              Final result                 Please view results for these tests on the individual orders.    COVID-19,CEPHEID/CIRA/BDMAX,COR/ALONA/PAD/ANA CRISTINA IN-HOUSE(OR EMERGENT/ADD-ON),NP SWAB IN TRANSPORT MEDIA 3-4 HR TAT, RT-PCR - Swab, Nasopharynx [[id number]]  (Normal) Collected: 11/09/21 1939    Specimen: Swab from Nasopharynx Updated: 11/09/21 2042     COVID19 Not Detected    Narrative:      Fact sheet for providers: https://www.fda.gov/media/166508/download     Fact sheet for patients: https://www.fda.gov/media/199454/download  Fact sheet for providers: https://www.fda.gov/media/488642/download     Fact sheet for patients: https://www.fda.gov/media/884933/download    Urinalysis, Microscopic Only - Urine, Clean Catch [[id number]]  (Abnormal) Collected: 11/09/21 1853    Specimen: Urine, Clean Catch Updated: 11/09/21 1903     RBC, UA 0-2 /HPF      WBC, UA 0-2 /HPF      Bacteria, UA None Seen /HPF      Squamous Epithelial Cells, UA None Seen /HPF      Hyaline Casts, UA 0-2 /LPF      Methodology Automated Microscopy    Urinalysis With Culture If Indicated - Urine, Clean Catch [[id number]]  (Abnormal) Collected: 11/09/21 1853    Specimen: Urine, Clean Catch Updated: 11/09/21 1903     Color, UA Yellow     Appearance, UA Clear     pH, UA 6.0      Specific Gravity, UA 1.014     Glucose, UA Negative     Ketones, UA Negative     Bilirubin, UA Negative     Blood, UA Negative     Protein,  mg/dL (2+)     Leuk Esterase, UA Negative     Nitrite, UA Negative     Urobilinogen, UA 0.2 E.U./dL    Theophylline Level [[id number]]  (Normal) Collected: 11/09/21 1614    Specimen: Blood Updated: 11/09/21 1721     Theophylline Level 12.3 mcg/mL     C-reactive Protein [[id number]]  (Abnormal) Collected: 11/09/21 1614    Specimen: Blood Updated: 11/09/21 1721     C-Reactive Protein 6.66 mg/dL     Comprehensive Metabolic Panel [[id number]]  (Abnormal) Collected: 11/09/21 1614    Specimen: Blood Updated: 11/09/21 1648     Glucose 106 mg/dL      BUN 46 mg/dL      Creatinine 1.81 mg/dL      Sodium 136 mmol/L      Potassium 4.2 mmol/L      Chloride 100 mmol/L      CO2 21.0 mmol/L      Calcium 8.7 mg/dL      Total Protein 6.1 g/dL      Albumin 3.50 g/dL      ALT (SGPT) 11 U/L      AST (SGOT) 14 U/L      Alkaline Phosphatase 108 U/L      Total Bilirubin 0.5 mg/dL      eGFR Non African Amer 37 mL/min/1.73      Globulin 2.6 gm/dL      A/G Ratio 1.3 g/dL      BUN/Creatinine Ratio 25.4     Anion Gap 15.0 mmol/L     Narrative:      GFR Normal >60  Chronic Kidney Disease <60  Kidney Failure <15      Troponin [[id number]]  (Normal) Collected: 11/09/21 1614    Specimen: Blood Updated: 11/09/21 1648     Troponin T 0.026 ng/mL     Narrative:      Troponin T Reference Range:  <= 0.03 ng/mL-   Negative for AMI  >0.03 ng/mL-     Abnormal for myocardial necrosis.  Clinicians would have to utilize clinical acumen, EKG, Troponin and serial changes to determine if it is an Acute Myocardial Infarction or myocardial injury due to an underlying chronic condition.       Results may be falsely decreased if patient taking Biotin.      CBC & Differential [[id number]]  (Abnormal) Collected: 11/09/21 1614    Specimen: Blood Updated: 11/09/21 1620    Narrative:      The following orders were created for panel  order CBC & Differential.  Procedure                               Abnormality         Status                     ---------                               -----------         ------                     CBC Auto Differential[[id number]]        Abnormal            Final result                 Please view results for these tests on the individual orders.    CBC Auto Differential [[id number]]  (Abnormal) Collected: 11/09/21 1614    Specimen: Blood Updated: 11/09/21 1620     WBC 10.10 10*3/mm3      RBC 2.79 10*6/mm3      Hemoglobin 8.8 g/dL      Hematocrit 26.2 %      MCV 93.8 fL      MCH 31.6 pg      MCHC 33.7 g/dL      RDW 22.1 %      RDW-SD 71.3 fl      MPV 7.1 fL      Platelets 208 10*3/mm3      Neutrophil % 87.6 %      Lymphocyte % 1.6 %      Monocyte % 7.8 %      Eosinophil % 2.6 %      Basophil % 0.4 %      Neutrophils, Absolute 8.90 10*3/mm3      Lymphocytes, Absolute 0.20 10*3/mm3      Monocytes, Absolute 0.80 10*3/mm3      Eosinophils, Absolute 0.30 10*3/mm3      Basophils, Absolute 0.00 10*3/mm3      nRBC 0.1 /100 WBC            Results for orders placed during the hospital encounter of 11/09/21    Adult Transthoracic Echo Complete W/ Cont if Necessary Per Protocol    Interpretation Summary  Normal LV size  Paradoxical septal motion, rest of the LV is giovani well. Estimated LV ejection fraction of 55%  Normal RV size  Normal atrial size, lipomatous hypertrophy of interatrial septum  Aortic valve, mitral valve, tricuspid valve appears structurally normal, mild mitral regurgitation seen.  No pericardial effusion seen.  Proximal aorta appears normal in size.            Condition on Discharge:  stable    Vital Signs       Physical Exam:     General Appearance:    Alert, cooperative, in no acute distress   Head:    Normocephalic, without obvious abnormality, atraumatic   Eyes:            Lids and lashes normal, conjunctivae and sclerae normal, no   icterus, no pallor, corneas clear, PERRLA   Ears:    Ears appear  intact with no abnormalities noted   Throat:   No oral lesions, no thrush, oral mucosa moist   Neck:   No adenopathy, supple, trachea midline, no thyromegaly, no   carotid bruit, no JVD   Lungs:     Clear to auscultation,respirations regular, even and                  unlabored    Heart:    Regular rhythm and normal rate, normal S1 and S2, no            murmur, no gallop, no rub, no click   Chest Wall:    No abnormalities observed   Abdomen:     Normal bowel sounds, no masses, no organomegaly, soft        non-tender, non-distended, no guarding, no rebound                tenderness   Extremities: S/p left BKA   Pulses:   Decreased pulse on the right   Skin:   No bleeding, bruising or rash   Lymph nodes:   No palpable adenopathy   Neurologic:   Cranial nerves 2 - 12 grossly intact, sensation intact, DTR       present and equal bilaterally       Discharge Disposition  Home-Health Care [facility]    Discharge Medications     Discharge Medications      New Medications      Instructions Start Date   acetaminophen 325 MG tablet  Commonly known as: TYLENOL   650 mg, Oral, Every 6 Hours PRN      amoxicillin-clavulanate 875-125 MG per tablet  Commonly known as: Augmentin   1 tablet, Oral, 2 Times Daily      ferrous sulfate 324 (65 Fe) MG tablet delayed-release EC tablet  Replaces: Ferrex 150 150 MG capsule   324 mg, Oral, Daily With Breakfast         Continue These Medications      Instructions Start Date   aspirin 81 MG EC tablet   81 mg, Oral, Daily With Lunch      atorvastatin 40 MG tablet  Commonly known as: LIPITOR   40 mg, Oral, Every Morning      budesonide 0.5 MG/2ML nebulizer solution  Commonly known as: PULMICORT   0.5 mg, Nebulization, 2 times daily      clopidogrel 75 MG tablet  Commonly known as: PLAVIX   75 mg, Oral, Every Morning      dutasteride 0.5 MG capsule  Commonly known as: AVODART   0.5 mg, Oral, Nightly      HYDROcodone-acetaminophen 5-325 MG per tablet  Commonly known as: NORCO   1 tablet, Oral, Nightly  PRN      iron polysacch complex-B12-VitC-FA-Succ  MG capsule capsule   1 capsule, Oral, Daily With Breakfast      metoprolol succinate XL 25 MG 24 hr tablet  Commonly known as: TOPROL-XL   25 mg, Oral, Daily      montelukast 10 MG tablet  Commonly known as: SINGULAIR   10 mg, Oral, Nightly      multivitamin with minerals tablet tablet   1 tablet, Daily With Lunch      ondansetron 4 MG tablet  Commonly known as: ZOFRAN   4 mg, Oral, Every 6 Hours PRN      pantoprazole 40 MG EC tablet  Commonly known as: PROTONIX   40 mg, Oral, 2 Times Daily      Perforomist 20 MCG/2ML nebulizer solution  Generic drug: formoterol   Nebulization, 2 Times Daily - RT      revefenacin 175 MCG/3ML nebulizer solution  Commonly known as: YUPELRI   175 mcg, Nebulization, Daily - RT      saccharomyces boulardii 250 MG capsule  Commonly known as: FLORASTOR   250 mg, Oral, Daily With Lunch      sodium bicarbonate 650 MG tablet   650 mg, Oral, 2 Times Daily      theophylline 300 MG 12 hr tablet  Commonly known as: THEODUR   300 mg, Oral, 2 Times Daily         Stop These Medications    Ferrex 150 150 MG capsule  Generic drug: iron polysaccharides  Replaced by: ferrous sulfate 324 (65 Fe) MG tablet delayed-release EC tablet     tamsulosin 0.4 MG capsule 24 hr capsule  Commonly known as: FLOMAX            Discharge Diet: diabetic healthy heart    Activity at Discharge:  as tolerated; home PT    Follow-up Appointments  Future Appointments   Date Time Provider Department Center   2/15/2022  9:40 AM Kali Mistry MD [facility]     Additional Instructions for the Follow-ups that You Need to Schedule     Ambulatory Referral to Home Health   As directed      Face to Face Visit Date: 11/12/2021    Follow-up provider for Plan of Care?: I treated the patient in an acute care facility and will not continue treatment after discharge.    Follow-up provider: TIFFANIE EDDY [[id number]]    Reason/Clinical Findings: syncope, htn    Describe  mobility limitations that make leaving home difficult: weakness    Nursing/Therapeutic Services Requested: Physical Therapy (eval and treat )    Frequency: 1 Week 1         Discharge Follow-up with PCP   As directed       Currently Documented PCP:    Walter Valero MD    PCP Phone Number:    [phone]     Follow Up Details: 2 weeks         Discharge Follow-up with Specified Provider: Dr Hedrick   As directed      To: Dr Hedrick    Follow Up Details: per Heme/onc recommendations         Discharge Follow-up with Specified Provider: Dr Mistry; 1 Month   As directed      To: Dr Mistry    Follow Up: 1 Month               Test Results Pending at Discharge  Pending Labs     Order Current Status    Neuron-Specific Enolase In process           Kusum Tesfaye MD  11/16/21  12:30 EST

## 2021-11-17 LAB — NSE SERPL IA-MCNC: 10.1 NG/ML (ref 0–17.6)

## 2021-11-22 ENCOUNTER — READMISSION MANAGEMENT (OUTPATIENT)
Dept: CALL CENTER | Facility: HOSPITAL | Age: 76
End: 2021-11-22

## 2021-11-22 NOTE — OUTREACH NOTE
"Medical Week 2 Survey      Responses   [facility] patient discharged from? [facility]   Does the patient have one of the following disease processes/diagnoses(primary or secondary)? Other   Week 2 attempt successful? Yes   Call start time 1541   Discharge diagnosis Dehydration Acute bronchitis Near syncope   Call end time 1541   Is the patient taking all medications as directed (includes completed medication regime)? Yes   Medication comments Completed antibiotics   Does the patient have a primary care provider?  Yes   Has the patient kept scheduled appointments due by today? Yes   Comments Saw PCP this morning   What is the patient's perception of their health status since discharge? Improving   If the patient is a current smoker, are they able to teach back resources for cessation? Not a smoker   Additional teach back comments Call was brief and states he is doing \"ok\"   Week 2 Call Completed? Yes   Graduated Yes   Graduated/Revoked comments Denies questions or needs at this time          Tiffanie Rubin LPN  "

## 2022-02-23 ENCOUNTER — OFFICE VISIT (OUTPATIENT)
Dept: CARDIOLOGY | Facility: CLINIC | Age: 77
End: 2022-02-23

## 2022-02-23 VITALS
WEIGHT: 158 LBS | OXYGEN SATURATION: 97 % | SYSTOLIC BLOOD PRESSURE: 147 MMHG | HEIGHT: 70 IN | DIASTOLIC BLOOD PRESSURE: 69 MMHG | HEART RATE: 98 BPM | BODY MASS INDEX: 22.62 KG/M2

## 2022-02-23 DIAGNOSIS — E78.5 DYSLIPIDEMIA: ICD-10-CM

## 2022-02-23 DIAGNOSIS — J43.1 PANLOBULAR EMPHYSEMA: ICD-10-CM

## 2022-02-23 DIAGNOSIS — I25.810 CORONARY ARTERY DISEASE INVOLVING CORONARY BYPASS GRAFT OF NATIVE HEART WITHOUT ANGINA PECTORIS: Primary | ICD-10-CM

## 2022-02-23 DIAGNOSIS — I45.10 RBBB: ICD-10-CM

## 2022-02-23 DIAGNOSIS — N18.4 CKD (CHRONIC KIDNEY DISEASE) STAGE 4, GFR 15-29 ML/MIN: ICD-10-CM

## 2022-02-23 DIAGNOSIS — I65.23 BILATERAL CAROTID ARTERY STENOSIS: ICD-10-CM

## 2022-02-23 DIAGNOSIS — I10 ESSENTIAL HYPERTENSION: ICD-10-CM

## 2022-02-23 PROCEDURE — 99214 OFFICE O/P EST MOD 30 MIN: CPT | Performed by: INTERNAL MEDICINE

## 2022-02-23 RX ORDER — METOPROLOL SUCCINATE 25 MG/1
25 TABLET, EXTENDED RELEASE ORAL DAILY
Qty: 90 TABLET | Refills: 3 | Status: SHIPPED | OUTPATIENT
Start: 2022-02-23 | End: 2022-12-12

## 2022-02-23 NOTE — PROGRESS NOTES
Subjective:     Encounter Date:02/23/2022      Patient ID: David A Jolissaint is a 76 y.o. male.    Chief Complaint : Follow-up for CAD, CABG, PAD, hypertension, dyslipidemia, right bundle branch block  History of Present Illness      Mr. David A Jolissaint  has PMH of    #  CAD, NSTEMI, 2 vessel CABG with SVG to OM and PDA 3/9/16  #  PAD, left lower extremity amputation  #   RBBB  #  diabetes, hypertension, COPD, tobacco abuse quit in October 2015  #  CKD  #  left BKA, cholecystectomy  #  carotid disease  #Former smoker     here for  follow-up.  Patient  denies any chest pain, headache, palpitation,  loss of conscious..  Has chronic shortness of breath and dyspnea on exertion , which is unchanged.  Patient was in the hospital during chemotherapy where he developed hypotension and beta-blockers were held.  Patient's arterial blood pressure is 147/69, heart rate of 98 bpm, O2 sat of 97% on room air.    Patient  had carotid Doppler 06/15/2018 which showed bilateral 50-70% worse compared to 2016 which was less than 50% bilat. carotid Dopplers 11/11/2021 revealed moderate bilateral carotid disease.   Labs from 10/05/2017 showed cholesterol 98 triglycerides 87 HDL 41 LDL 32 BUN 34 creatinine 2.4 hemoglobin A1c 5.7.  Repeat labs 5/18/2019 revealed BUN 32 creatinine 2.5 follows with Dr. Mendez. proBNP was elevated at 3362.  Labs from 11/19/2019 reveal cholesterol 104 HDL 73 LDL 46, A1c 5.8, CMP with a creatinine of 46/2.34.Labs from 5/26/2020 reveal normal CBC, BMP with BUN of 36 creatinine 2.23 GFR of 29.  Labs from 11/23/2020 revealed normal PSA, creatinine on CMP of 2.09, GFR 33, triglycerides 98 LDL 49 HDL 38.  Total cholesterol is not seen on this labs.,  CBC is normal.  Hemoglobin A1c of 5.9.  Labs from 11/12/2021 reveal BUN/creatinine of 35/1.83, calcium 8.3, GFR 36, hemoglobin 8.5 with MCV of 93.  Chest x-ray 11/11/2021 - for any acute current cardiopulmonary abnormalities.  Echo 11/10/2021 revealed normal LV  function   5/21/21:   Lexiscan was negative for ischemia.   Echocardiogram done 10/1/2020 which revealed normal LV systolic function with mild concentric LVH and left atrial enlargement.      ASSESSMENT:    #. Bilateral carotid stenosis  # PAD with absent right radial pulse, left BKA  # dyspnea on exertion, chronic  #  bradycardia, RBBB  #  CAD, CABG, history of NSTEMI  #  diabetes,   #  hypertension,   # CKD,   # COPD,  # dyslipidemia      PLAN:  Reviewed EKG results and lab results with patient  Patient's blood pressure is improved we will restart metoprolol succinate at 25 mg daily.  Reviewed home blood pressure readings were running good.  Continue medical management with aspirin, clopidogrel, atorvastatin, metoprolol, as tolerated.  Patient is asymptomatic from right bundle and bradycardia at the current time.  Advised patient to follow-up with nephrology Dr. Mendez.   Labs from 11/23/2020 reviewed revealing low HDL at 38 counseled on walking exercise.  Patient has carotid disease we will check carotid Dopplers.  Patient's blood pressure is doing well we will continue to monitor.  Follow-up with PMD for diabetes care.   Follow-up with nephrology for CKD.  Patient has an appointment with Dr. Mendez  Discussed about COVID-19 vaccination.  Patient took all three doses.  We will check follow-up and check carotid Dopplers CMP and lipid profile before next visit.      Procedures EKG done 11/9/2021 reviewed/interpreted by me reveals sinus rhythm with rate of 92 bpm with PACs    The following portions of the patient's history were reviewed and updated as appropriate: allergies, current medications, past family history, past medical history, past social history, past surgical history and problem list.    Assessment:         MDM     Diagnosis Plan   1. Coronary artery disease involving coronary bypass graft of native heart without angina pectoris  Duplex Carotid Ultrasound CAR    Comprehensive Metabolic Panel    Lipid Panel    2. Essential hypertension  Duplex Carotid Ultrasound CAR    Comprehensive Metabolic Panel    Lipid Panel   3. Dyslipidemia  Duplex Carotid Ultrasound CAR    Comprehensive Metabolic Panel    Lipid Panel   4. RBBB  Duplex Carotid Ultrasound CAR    Comprehensive Metabolic Panel    Lipid Panel   5. Panlobular emphysema (HCC)  Duplex Carotid Ultrasound CAR    Comprehensive Metabolic Panel    Lipid Panel   6. CKD (chronic kidney disease) stage 4, GFR 15-29 ml/min (HCC)  Duplex Carotid Ultrasound CAR    Comprehensive Metabolic Panel    Lipid Panel   7. Bilateral carotid artery stenosis  Duplex Carotid Ultrasound CAR    Comprehensive Metabolic Panel    Lipid Panel          Plan:               Past Medical History:  Past Medical History:   Diagnosis Date   • Anemia    • Cancer (HCC)    • Cellulitis    • CKD (chronic kidney disease), stage III (HCC)    • COPD (chronic obstructive pulmonary disease) (HCC)    • Diverticulitis    • Dyslipidemia    • Hypertension    • Myocardial infarction (HCC)    • PVD (peripheral vascular disease) (HCC)      Past Surgical History:  Past Surgical History:   Procedure Laterality Date   • BRONCHOSCOPY N/A 5/20/2021    Procedure: BRONCHOSCOPY WITH ENDOBRONCHIAL ULTRASOUND WITH FINE NEEDLE ASPIRATION. BRONCHOALVEOLAR LAVAGE;  Surgeon: Jackson Quezada MD;  Location: [facility] ENDOSCOPY;  Service: Pulmonary;  Laterality: N/A;  subcarinal mass   • CARDIAC SURGERY     • CHOLECYSTECTOMY     • GALLBLADDER SURGERY     • LEG AMPUTATION Left       Allergies:  No Known Allergies  Home Meds:  Current Meds:     Current Outpatient Medications:   •  aspirin 81 MG EC tablet, Take 81 mg by mouth Daily With Lunch., Disp: , Rfl: 0  •  atorvastatin (LIPITOR) 40 MG tablet, Take 40 mg by mouth Every Morning., Disp: , Rfl:   •  budesonide (PULMICORT) 0.5 MG/2ML nebulizer solution, Take 0.5 mg by nebulization 2 (two) times a day., Disp: , Rfl:   •  clopidogrel (PLAVIX) 75 MG tablet, Take 75 mg by mouth Every Morning., Disp: ,  Rfl:   •  dutasteride (AVODART) 0.5 MG capsule, Take 0.5 mg by mouth Every Night., Disp: , Rfl:   •  ferrous sulfate 324 (65 Fe) MG tablet delayed-release EC tablet, Take 1 tablet by mouth Daily With Breakfast., Disp: 30 tablet, Rfl: 1  •  formoterol (Perforomist) 20 MCG/2ML nebulizer solution, Take  by nebulization 2 (Two) Times a Day., Disp: , Rfl:   •  HYDROcodone-acetaminophen (NORCO) 5-325 MG per tablet, Take 1 tablet by mouth At Night As Needed for Moderate Pain ., Disp: , Rfl:   •  iron polysacch complex-B12-VitC-FA-Succ (Ferrex 150 Forte Plus)  MG capsule capsule, Take 1 capsule by mouth Daily With Breakfast., Disp: , Rfl:   •  montelukast (SINGULAIR) 10 MG tablet, Take 10 mg by mouth Every Night., Disp: , Rfl:   •  Multiple Vitamins-Minerals (MULTI VITAMIN/MINERALS) tablet, 1 tablet Daily With Lunch., Disp: , Rfl:   •  ondansetron (ZOFRAN) 4 MG tablet, Take 1 tablet by mouth Every 6 (Six) Hours As Needed for Nausea or Vomiting., Disp: 11 tablet, Rfl: 0  •  pantoprazole (PROTONIX) 40 MG EC tablet, Take 40 mg by mouth 2 (Two) Times a Day., Disp: , Rfl:   •  revefenacin (YUPELRI) 175 MCG/3ML nebulizer solution, Take 175 mcg by nebulization Daily., Disp: , Rfl:   •  saccharomyces boulardii (FLORASTOR) 250 MG capsule, Take 250 mg by mouth Daily With Lunch., Disp: , Rfl:   •  sodium bicarbonate 650 MG tablet, Take 650 mg by mouth 2 (Two) Times a Day., Disp: , Rfl:   •  theophylline (THEODUR) 300 MG 12 hr tablet, Take 300 mg by mouth 2 (Two) Times a Day., Disp: , Rfl:   •  acetaminophen (TYLENOL) 325 MG tablet, Take 2 tablets by mouth Every 6 (Six) Hours As Needed for Mild Pain ., Disp: , Rfl:   •  amoxicillin-clavulanate (Augmentin) 875-125 MG per tablet, Take 1 tablet by mouth 2 (Two) Times a Day., Disp: 14 tablet, Rfl: 0  •  metoprolol succinate XL (TOPROL-XL) 25 MG 24 hr tablet, Take 1 tablet by mouth Daily., Disp: 90 tablet, Rfl: 3  Social History:   Social History     Tobacco Use   • Smoking status:  "Former Smoker     Packs/day: 2.00     Years: 50.00     Pack years: 100.00   • Smokeless tobacco: Never Used   • Tobacco comment: Says he quit approximately in 2005.   Substance Use Topics   • Alcohol use: Not Currently      Family History:  Family History   Problem Relation Age of Onset   • Heart attack Father    • Heart disease Father               Review of Systems   Cardiovascular: Negative for chest pain, leg swelling and palpitations.   Respiratory: Positive for shortness of breath.    Neurological: Positive for numbness. Negative for dizziness.     All other systems are negative         Objective:     Physical Exam  /69 (BP Location: Right arm, Patient Position: Sitting, Cuff Size: Adult)   Pulse 98   Ht 177.8 cm (70\")   Wt 71.7 kg (158 lb)   SpO2 97%   BMI 22.67 kg/m²   General:  Appears in no acute distress  Eyes: Sclera is anicteric,  conjunctiva is clear   HEENT:  No JVD.  No carotid bruits  Respiratory: Respirations regular and unlabored at rest.  Clear to auscultation  Cardiovascular: S1,S2 Regular rate and rhythm. No murmur, rub or gallop auscultated.   Extremities: Left BKA seen.  Skin: Color pink. Skin warm and dry to touch. No rashes  No xanthoma  Neuro: Alert and awake.    Lab Reviewed:         Kali Mistry MD  2/23/2022 19:57 EST      Much of the above report is an electronic transcription/translation of the spoken language to printed text using Dragon Software. As such, the subtleties and finesse of the spoken language may permit erroneous, or at times, nonsensical words or phrases to be inadvertently transcribed; thus changes may be made at a later date to rectify these errors.         "

## 2022-06-02 ENCOUNTER — APPOINTMENT (OUTPATIENT)
Dept: GENERAL RADIOLOGY | Facility: HOSPITAL | Age: 77
End: 2022-06-02

## 2022-06-02 ENCOUNTER — APPOINTMENT (OUTPATIENT)
Dept: CT IMAGING | Facility: HOSPITAL | Age: 77
End: 2022-06-02

## 2022-06-02 ENCOUNTER — HOSPITAL ENCOUNTER (EMERGENCY)
Facility: HOSPITAL | Age: 77
Discharge: HOME | End: 2022-06-02
Attending: EMERGENCY MEDICINE | Admitting: EMERGENCY MEDICINE

## 2022-06-02 VITALS
TEMPERATURE: 98 F | WEIGHT: 156 LBS | OXYGEN SATURATION: 100 % | SYSTOLIC BLOOD PRESSURE: 144 MMHG | HEIGHT: 71 IN | HEART RATE: 83 BPM | BODY MASS INDEX: 21.84 KG/M2 | DIASTOLIC BLOOD PRESSURE: 80 MMHG | RESPIRATION RATE: 15 BRPM

## 2022-06-02 DIAGNOSIS — R06.00 DYSPNEA, UNSPECIFIED TYPE: Primary | ICD-10-CM

## 2022-06-02 DIAGNOSIS — Z20.822 COVID-19 RULED OUT BY LABORATORY TESTING: ICD-10-CM

## 2022-06-02 DIAGNOSIS — J44.1 COPD EXACERBATION: ICD-10-CM

## 2022-06-02 LAB
ALBUMIN SERPL-MCNC: 4.1 G/DL (ref 3.5–5.2)
ALBUMIN/GLOB SERPL: 1.3 G/DL
ALP SERPL-CCNC: 164 U/L (ref 39–117)
ALT SERPL W P-5'-P-CCNC: 16 U/L (ref 1–41)
ANION GAP SERPL CALCULATED.3IONS-SCNC: 12 MMOL/L (ref 5–15)
AST SERPL-CCNC: 19 U/L (ref 1–40)
BASOPHILS # BLD AUTO: 0.1 10*3/MM3 (ref 0–0.2)
BASOPHILS NFR BLD AUTO: 0.9 % (ref 0–1.5)
BILIRUB SERPL-MCNC: 0.5 MG/DL (ref 0–1.2)
BUN SERPL-MCNC: 32 MG/DL (ref 8–23)
BUN/CREAT SERPL: 19.3 (ref 7–25)
CALCIUM SPEC-SCNC: 9.2 MG/DL (ref 8.6–10.5)
CHLORIDE SERPL-SCNC: 102 MMOL/L (ref 98–107)
CO2 SERPL-SCNC: 24 MMOL/L (ref 22–29)
CREAT SERPL-MCNC: 1.66 MG/DL (ref 0.76–1.27)
D DIMER PPP FEU-MCNC: 2.19 MG/L (FEU) (ref 0–0.59)
DEPRECATED RDW RBC AUTO: 49 FL (ref 37–54)
EGFRCR SERPLBLD CKD-EPI 2021: 41.9 ML/MIN/1.73
EOSINOPHIL # BLD AUTO: 0.2 10*3/MM3 (ref 0–0.4)
EOSINOPHIL NFR BLD AUTO: 2.5 % (ref 0.3–6.2)
ERYTHROCYTE [DISTWIDTH] IN BLOOD BY AUTOMATED COUNT: 14.5 % (ref 12.3–15.4)
GLOBULIN UR ELPH-MCNC: 3.1 GM/DL
GLUCOSE SERPL-MCNC: 121 MG/DL (ref 65–99)
HCT VFR BLD AUTO: 40.6 % (ref 37.5–51)
HGB BLD-MCNC: 13.3 G/DL (ref 13–17.7)
LYMPHOCYTES # BLD AUTO: 0.6 10*3/MM3 (ref 0.7–3.1)
LYMPHOCYTES NFR BLD AUTO: 6.6 % (ref 19.6–45.3)
MCH RBC QN AUTO: 31.9 PG (ref 26.6–33)
MCHC RBC AUTO-ENTMCNC: 32.9 G/DL (ref 31.5–35.7)
MCV RBC AUTO: 97 FL (ref 79–97)
MONOCYTES # BLD AUTO: 0.7 10*3/MM3 (ref 0.1–0.9)
MONOCYTES NFR BLD AUTO: 7.2 % (ref 5–12)
NEUTROPHILS NFR BLD AUTO: 8 10*3/MM3 (ref 1.7–7)
NEUTROPHILS NFR BLD AUTO: 82.8 % (ref 42.7–76)
NRBC BLD AUTO-RTO: 0 /100 WBC (ref 0–0.2)
NT-PROBNP SERPL-MCNC: 546.9 PG/ML (ref 0–1800)
PLATELET # BLD AUTO: 196 10*3/MM3 (ref 140–450)
PMV BLD AUTO: 7.8 FL (ref 6–12)
POTASSIUM SERPL-SCNC: 4 MMOL/L (ref 3.5–5.2)
PROT SERPL-MCNC: 7.2 G/DL (ref 6–8.5)
RBC # BLD AUTO: 4.19 10*6/MM3 (ref 4.14–5.8)
SARS-COV-2 RNA PNL SPEC NAA+PROBE: NOT DETECTED
SODIUM SERPL-SCNC: 138 MMOL/L (ref 136–145)
TROPONIN T SERPL-MCNC: <0.01 NG/ML (ref 0–0.03)
WBC NRBC COR # BLD: 9.7 10*3/MM3 (ref 3.4–10.8)

## 2022-06-02 PROCEDURE — 36415 COLL VENOUS BLD VENIPUNCTURE: CPT

## 2022-06-02 PROCEDURE — 83880 ASSAY OF NATRIURETIC PEPTIDE: CPT | Performed by: NURSE PRACTITIONER

## 2022-06-02 PROCEDURE — 71275 CT ANGIOGRAPHY CHEST: CPT

## 2022-06-02 PROCEDURE — 85379 FIBRIN DEGRADATION QUANT: CPT | Performed by: NURSE PRACTITIONER

## 2022-06-02 PROCEDURE — 87635 SARS-COV-2 COVID-19 AMP PRB: CPT | Performed by: NURSE PRACTITIONER

## 2022-06-02 PROCEDURE — 99284 EMERGENCY DEPT VISIT MOD MDM: CPT

## 2022-06-02 PROCEDURE — 84484 ASSAY OF TROPONIN QUANT: CPT | Performed by: NURSE PRACTITIONER

## 2022-06-02 PROCEDURE — 93005 ELECTROCARDIOGRAM TRACING: CPT | Performed by: EMERGENCY MEDICINE

## 2022-06-02 PROCEDURE — 0 IOPAMIDOL PER 1 ML: Performed by: EMERGENCY MEDICINE

## 2022-06-02 PROCEDURE — 85025 COMPLETE CBC W/AUTO DIFF WBC: CPT | Performed by: NURSE PRACTITIONER

## 2022-06-02 PROCEDURE — 71045 X-RAY EXAM CHEST 1 VIEW: CPT

## 2022-06-02 PROCEDURE — 80053 COMPREHEN METABOLIC PANEL: CPT | Performed by: NURSE PRACTITIONER

## 2022-06-02 PROCEDURE — 25010000002 HEPARIN LOCK FLUSH PER 10 UNITS: Performed by: EMERGENCY MEDICINE

## 2022-06-02 PROCEDURE — 93005 ELECTROCARDIOGRAM TRACING: CPT

## 2022-06-02 PROCEDURE — 99283 EMERGENCY DEPT VISIT LOW MDM: CPT

## 2022-06-02 RX ORDER — SODIUM CHLORIDE 0.9 % (FLUSH) 0.9 %
10 SYRINGE (ML) INJECTION AS NEEDED
Status: DISCONTINUED | OUTPATIENT
Start: 2022-06-02 | End: 2022-06-03 | Stop reason: HOSPADM

## 2022-06-02 RX ORDER — HEPARIN SODIUM (PORCINE) LOCK FLUSH IV SOLN 100 UNIT/ML 100 UNIT/ML
500 SOLUTION INTRAVENOUS ONCE
Status: COMPLETED | OUTPATIENT
Start: 2022-06-02 | End: 2022-06-02

## 2022-06-02 RX ADMIN — HEPARIN SODIUM (PORCINE) LOCK FLUSH IV SOLN 100 UNIT/ML 500 UNITS: 100 SOLUTION at 23:38

## 2022-06-02 RX ADMIN — SODIUM CHLORIDE 500 ML: 9 INJECTION, SOLUTION INTRAVENOUS at 21:45

## 2022-06-02 RX ADMIN — IOPAMIDOL 66 ML: 755 INJECTION, SOLUTION INTRAVENOUS at 22:20

## 2022-06-02 NOTE — ED PROVIDER NOTES
"Subjective   Patient is a 77-year-old  male presenting with chest tightness and shortness of air that started overnight.  He states he was diagnosed with pneumonia last week and was prescribed antibiotics.  He says the antibiotics gave him \"explosive diarrhea\" and he stopped taking them before finishing the prescription then recently started the antibiotics again.  The patient states he has been having difficulty coughing up phlegm and has had a headache.  He denies swelling, bloating, and fever.  He has a history of hypertension, myocardial infarction, COPD, CKD, and lung cancer.          Review of Systems   Constitutional: Negative for chills and fever.   Eyes: Negative for visual disturbance.   Respiratory: Positive for chest tightness and shortness of breath. Negative for cough.    Cardiovascular: Negative for leg swelling.   Gastrointestinal: Positive for constipation. Negative for abdominal distention, blood in stool, diarrhea and vomiting.   Endocrine: Negative for polyuria.   Genitourinary: Negative for dysuria.   Musculoskeletal: Negative for joint swelling.   Neurological: Positive for headaches. Negative for syncope.       Past Medical History:   Diagnosis Date   • Anemia    • Cancer (HCC)    • Cellulitis    • CKD (chronic kidney disease), stage III (HCC)    • COPD (chronic obstructive pulmonary disease) (HCC)    • Diverticulitis    • Dyslipidemia    • Hypertension    • Myocardial infarction (HCC)    • PVD (peripheral vascular disease) (HCC)        No Known Allergies    Past Surgical History:   Procedure Laterality Date   • BRONCHOSCOPY N/A 5/20/2021    Procedure: BRONCHOSCOPY WITH ENDOBRONCHIAL ULTRASOUND WITH FINE NEEDLE ASPIRATION. BRONCHOALVEOLAR LAVAGE;  Surgeon: Jackson Quezada MD;  Location: [facility];  Service: Pulmonary;  Laterality: N/A;  subcarinal mass   • CARDIAC SURGERY     • CHOLECYSTECTOMY     • GALLBLADDER SURGERY     • LEG AMPUTATION Left        Family History   Problem " Relation Age of Onset   • Heart attack Father    • Heart disease Father        Social History     Socioeconomic History   • Marital status:    Tobacco Use   • Smoking status: Former Smoker     Packs/day: 2.00     Years: 50.00     Pack years: 100.00   • Smokeless tobacco: Never Used   • Tobacco comment: Says he quit approximately in 2005.   Substance and Sexual Activity   • Alcohol use: Not Currently   • Drug use: Never   • Sexual activity: Defer           Objective   Physical Exam  Vitals reviewed.   Constitutional:       Appearance: He is well-developed.   HENT:      Head: Normocephalic and atraumatic.   Eyes:      Conjunctiva/sclera: Conjunctivae normal.      Pupils: Pupils are equal, round, and reactive to light.   Cardiovascular:      Rate and Rhythm: Normal rate and regular rhythm.      Heart sounds: Normal heart sounds.   Pulmonary:      Effort: Pulmonary effort is normal.      Breath sounds: Examination of the right-lower field reveals decreased breath sounds. Examination of the left-lower field reveals decreased breath sounds. Decreased breath sounds present.   Abdominal:      General: Bowel sounds are normal.      Palpations: Abdomen is soft.   Musculoskeletal:         General: Normal range of motion.      Cervical back: Normal range of motion and neck supple.      Comments: Left BKA noted   Skin:     General: Skin is warm and dry.   Neurological:      Mental Status: He is alert and oriented to person, place, and time.      GCS: GCS eye subscore is 4. GCS verbal subscore is 5. GCS motor subscore is 6.      Cranial Nerves: Cranial nerves are intact.      Motor: Motor function is intact.      Coordination: Coordination is intact.   Psychiatric:         Attention and Perception: Attention normal.         Mood and Affect: Mood normal.         Speech: Speech normal.         Behavior: Behavior normal. Behavior is cooperative.         Thought Content: Thought content normal.         Cognition and Memory:  "Cognition and memory normal.         Judgment: Judgment normal.         Procedures         EKG shows sinus rhythm with a right bundle branch block with a rate of 92 there is no ectopy no ST elevation it was compared to 11/9/2021 when he also had sinus rhythm and the bundle branch block was present.  Reviewed by myself read by the ER physician  ED Course  ED Course as of 06/02/22 5666   Thu Jun 02, 2022 2129 Spoke with the patient at length concerning his BUN and creatinine and GFR and the need for a pulmonary emboli study and the use of IV contrast he is concerned because of his kidney function I explained to him that we will hydrate him I explained to him that his D-dimer is elevated and that this could be a life-threatening event in the study is needed.-The patient and his caretaker at bedside are agreeable to this plan of care at this time. [KW]   2236 Waiting for the CT pulmonary emboli to be read [KW]      ED Course User Index  [KW] Dariana Perdue, APRN      /80   Pulse 83   Temp 98.2 °F (36.8 °C) (Oral)   Resp 20   Ht 179.1 cm (70.5\")   Wt 70.8 kg (156 lb)   SpO2 100%   BMI 22.07 kg/m²   Labs Reviewed   COMPREHENSIVE METABOLIC PANEL - Abnormal; Notable for the following components:       Result Value    Glucose 121 (*)     BUN 32 (*)     Creatinine 1.66 (*)     Alkaline Phosphatase 164 (*)     eGFR 41.9 (*)     All other components within normal limits    Narrative:     GFR Normal >60  Chronic Kidney Disease <60  Kidney Failure <15     CBC WITH AUTO DIFFERENTIAL - Abnormal; Notable for the following components:    Neutrophil % 82.8 (*)     Lymphocyte % 6.6 (*)     Neutrophils, Absolute 8.00 (*)     Lymphocytes, Absolute 0.60 (*)     All other components within normal limits   D-DIMER, QUANTITATIVE - Abnormal; Notable for the following components:    D-Dimer, Quantitative 2.19 (*)     All other components within normal limits    Narrative:     Reference " Range  --------------------------------------------------------------------     < 0.50   Negative Predictive Value  0.50-0.59   Indeterminate    >= 0.60   Probable VTE             A very low percentage of patients with DVT may yield D-Dimer results   below the cut-off of 0.50 mg/L FEU.  This is known to be more   prevalent in patients with distal DVT.             Results of this test should always be interpreted in conjunction with   the patient's medical history, clinical presentation and other   findings.  Clinical diagnosis should not be based on the result of   INNOVANCE D-Dimer alone.   COVID-19,CEPHEID/CIRA,COR/ALONA/PAD/ANA CRISTINA IN-HOUSE,NP SWAB IN TRANSPORT MEDIA 3-4 HR TAT, RT-PCR - Normal    Narrative:     Fact sheet for providers: https://www.fda.gov/media/662469/download     Fact sheet for patients: https://www.fda.gov/media/909769/download  Fact sheet for providers: https://www.fda.gov/media/726298/download    Fact sheet for patients: https://www.fda.gov/media/834653/download    Test performed by PCR.   BNP (IN-HOUSE) - Normal    Narrative:     Among patients with dyspnea, NT-proBNP is highly sensitive for the detection of acute congestive heart failure. In addition NT-proBNP of <300 pg/ml effectively rules out acute congestive heart failure with 99% negative predictive value.    Results may be falsely decreased if patient taking Biotin.     TROPONIN (IN-HOUSE) - Normal    Narrative:     Troponin T Reference Range:  <= 0.03 ng/mL-   Negative for AMI  >0.03 ng/mL-     Abnormal for myocardial necrosis.  Clinicians would have to utilize clinical acumen, EKG, Troponin and serial changes to determine if it is an Acute Myocardial Infarction or myocardial injury due to an underlying chronic condition.       Results may be falsely decreased if patient taking Biotin.     CBC AND DIFFERENTIAL    Narrative:     The following orders were created for panel order CBC & Differential.  Procedure                                Abnormality         Status                     ---------                               -----------         ------                     CBC Auto Differential[[id number]]        Abnormal            Final result                 Please view results for these tests on the individual orders.     Medications   sodium chloride 0.9 % flush 10 mL (has no administration in time range)   heparin injection 500 Units (has no administration in time range)   sodium chloride 0.9 % bolus 500 mL (500 mL Intravenous New Bag 6/2/22 2145)   iopamidol (ISOVUE-370) 76 % injection 100 mL (66 mL Intravenous Given 6/2/22 2220)     XR Chest 1 View    Result Date: 6/2/2022  Stable chronic and posttreatment changes noted and stable right chest port.  Electronically Signed By-Lyle Hall MD On:6/2/2022 8:06 PM This report was finalized on [id number] by  Lyle Hall MD.    CT Angiogram Chest Pulmonary Embolism    Result Date: 6/2/2022   1. New 18 mm spiculated nodule in the right upper lobe concerning for recurrent/progressive lung malignancy. 2. New consolidation in the right lower lobe and a bronchovascular distribution centered around the right infrahilar region. This is associated with cicatricial bronchiectasis and could be related to posttreatment change or pneumonia. 3. There is obstructive endobronchial debris in the posterior basal right lower lobe segment. 4. Chronic findings including emphysema, lung scarring, calcified right-sided pleural plaques, rounded atelectasis in the left lower lobe, aortic atherosclerosis with descending thoracic aortic aneurysm, small hiatal hernia, cardiomegaly and coronary artery disease.  Electronically Signed By-Lyle Hall MD On:6/2/2022 10:44 PM This report was finalized on [id number] by  Lyle Hall MD.    CT Angiogram Chest Pulmonary Embolism   Final Result       1. New 18 mm spiculated nodule in the right upper lobe concerning for   recurrent/progressive lung malignancy.   2. New  consolidation in the right lower lobe and a bronchovascular   distribution centered around the right infrahilar region. This is   associated with cicatricial bronchiectasis and could be related to   posttreatment change or pneumonia.   3. There is obstructive endobronchial debris in the posterior basal   right lower lobe segment.   4. Chronic findings including emphysema, lung scarring, calcified   right-sided pleural plaques, rounded atelectasis in the left lower lobe,   aortic atherosclerosis with descending thoracic aortic aneurysm, small   hiatal hernia, cardiomegaly and coronary artery disease.       Electronically Signed By-Lyle Hall MD On:6/2/2022 10:44 PM   This report was finalized on [id number] by  Lyle Hall MD.      XR Chest 1 View   Final Result   Stable chronic and posttreatment changes noted and stable right chest   port.       Electronically Signed By-Lyle Hall MD On:6/2/2022 8:06 PM   This report was finalized on [id number] by  Lyle Hall MD.                                                   MDM  Number of Diagnoses or Management Options  COPD exacerbation (HCC)  COVID-19 ruled out by laboratory testing  Dyspnea, unspecified type  Diagnosis management comments: ECHO 9/2021  Left Ventricle Calculated left ventricular EF = 67% Estimated left ventricular EF was in agreement with the calculated left ventricular EF. Left ventricular ejection fraction appears to be 61 - 65%. Left ventricular systolic function is normal.   Septal wall motion is normal. Normal left ventricular cavity size and wall thickness noted. All left ventricular wall segments contract normally. Left ventricular diastolic function is consistent with (grade Ia w/high LAP) impaired relaxation.  Right Ventricle Normal right ventricular cavity size, wall thickness, systolic function and septal motion noted.  Left Atrium Normal left atrial size and volume noted.  Right Atrium Normal right atrial cavity size  noted.  Aortic Valve The aortic valve is grossly normal in structure. No significant aortic valve regurgitation is present. No hemodynamically significant aortic valve stenosis is present.  Mitral Valve The mitral valve is grossly normal in structure. Trace mitral valve regurgitation is present with a posteriorly-directed jet noted. No significant mitral valve stenosis is present.  Tricuspid Valve Trace tricuspid valve regurgitation is present. Estimated right ventricular systolic pressure from tricuspid regurgitation is normal (<35 mmHg). No evidence of significant tricuspid valve stenosis is present.  Pulmonic Valve The pulmonic valve is grossly normal in structure. There is no significant pulmonic valve regurgitation present. There is no pulmonic valve stenosis present.  Greater Vessels No dilation of the aortic root is present. No dilation of the sinuses of Valsalva is present.  Pericardium There is no evidence of pericardial effusion. .    Stress Test May 2021  CONCLUSION:  1.  Lexiscan Cardiolite with normal perfusion, negative for ischemia.  New 18 mm right upper lobe nodule concerning for progressive lung malignancy2.  Normal wall motion.  LVEF of 57%.     Patient had above exam and chart review was performed patient was found to have a stress test that was normal in May 2021 today he shows no cardiac enzymes elevated.  CBC chemistry troponin COVID all negative patient's D-dimer was elevated-patient's GFR was evaluated and felt to be safe for IV contrast the patient was given a 500 cc normal saline bolus CT was performed pulmonary emboli study patient was found to have no pulmonary embolihe does show new 18 mm right upper lobe nodule concerning for malignancy recurrence-this was discussed with the patient and his caregiver at bedside he states that he had it CAT scan and a PET scan about 2 weeks ago and he has an appointment with Dr. urbano on Monday for further evaluation as Dr. Urbano is aware of this  nodule and its growth.  Patient is stable he is on his 2 L of nasal cannula oxygen that he uses at home he is not hypoxic his oxygen saturation is 100% he is not tachypneic or in any respiratory distress he was offered admission for observation and hydration and fluids overnight he does not wish to be admitted we discussed the need to push her liquids follow-up with oncology on Monday as scheduled and to return if worse they verbalized understood discharge instructions       Amount and/or Complexity of Data Reviewed  Clinical lab tests: reviewed  Tests in the radiology section of CPT®: reviewed    Risk of Complications, Morbidity, and/or Mortality  Presenting problems: high  Diagnostic procedures: high  Management options: high    Patient Progress  Patient progress: improved      Final diagnoses:   Dyspnea, unspecified type   COVID-19 ruled out by laboratory testing   COPD exacerbation (HCC)       ED Disposition  ED Disposition     ED Disposition   Discharge    Condition   Stable    Comment   --             Walter Valero MD  [address] [facility] [place] [address]  [phone]    In 3 days  If symptoms worsen, As needed         Medication List      No changes were made to your prescriptions during this visit.          Dariana Perdue, APRN  06/02/22 9336

## 2022-06-03 NOTE — ED NOTES
Pt presents to ED with c/o SOA that began yesterday. 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 Pt was Rx'd abx last week and states that he had to stop taking the abx due to severe diarrhea. Pt states that he began feeling a chest pressure and having increased soa that began yesterday. Pt states he wears O2 via NC at home and had to increase his O2 to 3L. Pt reports that he is currently feeling better than when he first arrived at the hospital

## 2022-06-03 NOTE — DISCHARGE INSTRUCTIONS
Push clear liquids to flush kidneys from-IV contrast-    Follow-up with Dr. Hedrick on Monday for reevaluation and continued care of pulmonary nodule    Return if worse

## 2022-06-05 LAB — QT INTERVAL: 394 MS

## 2022-12-12 RX ORDER — METOPROLOL SUCCINATE 25 MG/1
TABLET, EXTENDED RELEASE ORAL
Qty: 90 TABLET | Refills: 0 | Status: SHIPPED | OUTPATIENT
Start: 2022-12-12

## 2022-12-12 NOTE — TELEPHONE ENCOUNTER
Rx Refill Note  Requested Prescriptions     Pending Prescriptions Disp Refills   • metoprolol succinate XL (TOPROL-XL) 25 MG 24 hr tablet [Pharmacy Med Name: METOPROLOL SUCCINATE ER 25 MG Tablet Extended Release 24 Hour] 90 tablet 3     Sig: TAKE 1 TABLET EVERY DAY      Last office visit with prescribing clinician: 2/23/2022   Last telemedicine visit with prescribing clinician: 2/28/2023   Next office visit with prescribing clinician: 2/28/2023                         Would you like a call back once the refill request has been completed: [] Yes [] No    If the office needs to give you a call back, can they leave a voicemail: [] Yes [] No    Yuliya Wright RN  12/12/22, 09:54 EST

## 2023-02-23 ENCOUNTER — LAB (OUTPATIENT)
Dept: LAB | Facility: HOSPITAL | Age: 78
End: 2023-02-23
Payer: MEDICARE

## 2023-02-23 ENCOUNTER — TELEPHONE (OUTPATIENT)
Dept: CARDIOLOGY | Facility: CLINIC | Age: 78
End: 2023-02-23
Payer: MEDICARE

## 2023-02-23 ENCOUNTER — HOSPITAL ENCOUNTER (OUTPATIENT)
Dept: CARDIOLOGY | Facility: HOSPITAL | Age: 78
Discharge: HOME | End: 2023-02-23
Payer: MEDICARE

## 2023-02-23 ENCOUNTER — TRANSCRIBE ORDERS (OUTPATIENT)
Dept: ADMINISTRATIVE | Facility: HOSPITAL | Age: 78
End: 2023-02-23
Payer: MEDICARE

## 2023-02-23 DIAGNOSIS — I10 ESSENTIAL HYPERTENSION: ICD-10-CM

## 2023-02-23 DIAGNOSIS — I10 HYPERTENSION, ESSENTIAL: ICD-10-CM

## 2023-02-23 DIAGNOSIS — N18.32 CHRONIC KIDNEY DISEASE (CKD) STAGE G3B/A1, MODERATELY DECREASED GLOMERULAR FILTRATION RATE (GFR) BETWEEN 30-44 ML/MIN/1.73 SQUARE METER AND ALBUMINURIA CREATININE RATIO LESS THAN 30 MG/G (CMS/H*: Primary | ICD-10-CM

## 2023-02-23 DIAGNOSIS — N18.32 CHRONIC KIDNEY DISEASE (CKD) STAGE G3B/A1, MODERATELY DECREASED GLOMERULAR FILTRATION RATE (GFR) BETWEEN 30-44 ML/MIN/1.73 SQUARE METER AND ALBUMINURIA CREATININE RATIO LESS THAN 30 MG/G (CMS/H*: ICD-10-CM

## 2023-02-23 DIAGNOSIS — N40.0 ENLARGED PROSTATE: ICD-10-CM

## 2023-02-23 DIAGNOSIS — N18.4 CKD (CHRONIC KIDNEY DISEASE) STAGE 4, GFR 15-29 ML/MIN: ICD-10-CM

## 2023-02-23 DIAGNOSIS — E11.69 MIXED HYPERLIPIDEMIA DUE TO TYPE 2 DIABETES MELLITUS: ICD-10-CM

## 2023-02-23 DIAGNOSIS — E78.5 DYSLIPIDEMIA: ICD-10-CM

## 2023-02-23 DIAGNOSIS — I25.810 CORONARY ARTERY DISEASE INVOLVING CORONARY BYPASS GRAFT OF NATIVE HEART WITHOUT ANGINA PECTORIS: ICD-10-CM

## 2023-02-23 DIAGNOSIS — I45.10 RBBB: ICD-10-CM

## 2023-02-23 DIAGNOSIS — D63.1 ERYTHROPOIETIN DEFICIENCY ANEMIA: ICD-10-CM

## 2023-02-23 DIAGNOSIS — I10 ESSENTIAL HYPERTENSION, MALIGNANT: ICD-10-CM

## 2023-02-23 DIAGNOSIS — E78.2 MIXED HYPERLIPIDEMIA DUE TO TYPE 2 DIABETES MELLITUS: ICD-10-CM

## 2023-02-23 DIAGNOSIS — N18.6 ANEMIA IN END-STAGE RENAL DISEASE: ICD-10-CM

## 2023-02-23 DIAGNOSIS — I65.23 BILATERAL CAROTID ARTERY STENOSIS: ICD-10-CM

## 2023-02-23 DIAGNOSIS — D63.1 ANEMIA IN END-STAGE RENAL DISEASE: ICD-10-CM

## 2023-02-23 DIAGNOSIS — D63.1 ANEMIA IN CHRONIC KIDNEY DISEASE (CODE): Primary | ICD-10-CM

## 2023-02-23 DIAGNOSIS — J43.1 PANLOBULAR EMPHYSEMA: ICD-10-CM

## 2023-02-23 DIAGNOSIS — D63.1 ANEMIA IN CHRONIC KIDNEY DISEASE (CODE): ICD-10-CM

## 2023-02-23 LAB
ALBUMIN SERPL-MCNC: 4.2 G/DL (ref 3.5–5.2)
ALBUMIN/GLOB SERPL: 1.6 G/DL
ALP SERPL-CCNC: 153 U/L (ref 39–117)
ALT SERPL W P-5'-P-CCNC: 17 U/L (ref 1–41)
ANION GAP SERPL CALCULATED.3IONS-SCNC: 9 MMOL/L (ref 5–15)
AST SERPL-CCNC: 21 U/L (ref 1–40)
BASOPHILS # BLD AUTO: 0.08 10*3/MM3 (ref 0–0.2)
BASOPHILS NFR BLD AUTO: 0.9 % (ref 0–1.5)
BH CV XLRA MEAS LEFT DIST CCA EDV: 14.3 CM/SEC
BH CV XLRA MEAS LEFT DIST CCA PSV: 67.7 CM/SEC
BH CV XLRA MEAS LEFT DIST ICA EDV: -27.4 CM/SEC
BH CV XLRA MEAS LEFT DIST ICA PSV: -105 CM/SEC
BH CV XLRA MEAS LEFT ICA/CCA RATIO: 1.4
BH CV XLRA MEAS LEFT PROX CCA EDV: 14.9 CM/SEC
BH CV XLRA MEAS LEFT PROX CCA PSV: 83.9 CM/SEC
BH CV XLRA MEAS LEFT PROX ECA PSV: -106 CM/SEC
BH CV XLRA MEAS LEFT PROX ICA EDV: -32.9 CM/SEC
BH CV XLRA MEAS LEFT PROX ICA PSV: -121 CM/SEC
BH CV XLRA MEAS LEFT PROX SCLA PSV: 251 CM/SEC
BH CV XLRA MEAS LEFT VERTEBRAL A PSV: 41.5 CM/SEC
BH CV XLRA MEAS RIGHT DIST CCA EDV: 14.9 CM/SEC
BH CV XLRA MEAS RIGHT DIST CCA PSV: 68.3 CM/SEC
BH CV XLRA MEAS RIGHT DIST ICA EDV: -16.5 CM/SEC
BH CV XLRA MEAS RIGHT DIST ICA PSV: -87 CM/SEC
BH CV XLRA MEAS RIGHT ICA/CCA RATIO: 2.71
BH CV XLRA MEAS RIGHT PROX CCA EDV: 10.6 CM/SEC
BH CV XLRA MEAS RIGHT PROX CCA PSV: 68.3 CM/SEC
BH CV XLRA MEAS RIGHT PROX ECA PSV: -138 CM/SEC
BH CV XLRA MEAS RIGHT PROX ICA EDV: 28.5 CM/SEC
BH CV XLRA MEAS RIGHT PROX ICA PSV: 185 CM/SEC
BH CV XLRA MEAS RIGHT PROX SCLA PSV: 105 CM/SEC
BH CV XLRA MEAS RIGHT VERTEBRAL A PSV: 54.9 CM/SEC
BILIRUB SERPL-MCNC: 0.5 MG/DL (ref 0–1.2)
BUN SERPL-MCNC: 30 MG/DL (ref 8–23)
BUN/CREAT SERPL: 15.2 (ref 7–25)
CALCIUM SPEC-SCNC: 10.2 MG/DL (ref 8.6–10.5)
CHLORIDE SERPL-SCNC: 100 MMOL/L (ref 98–107)
CHOLEST SERPL-MCNC: 117 MG/DL (ref 0–200)
CO2 SERPL-SCNC: 28 MMOL/L (ref 22–29)
CREAT SERPL-MCNC: 1.97 MG/DL (ref 0.76–1.27)
DEPRECATED RDW RBC AUTO: 44.1 FL (ref 37–54)
EGFRCR SERPLBLD CKD-EPI 2021: 34.1 ML/MIN/1.73
EOSINOPHIL # BLD AUTO: 0.36 10*3/MM3 (ref 0–0.4)
EOSINOPHIL NFR BLD AUTO: 4.3 % (ref 0.3–6.2)
ERYTHROCYTE [DISTWIDTH] IN BLOOD BY AUTOMATED COUNT: 12.9 % (ref 12.3–15.4)
GLOBULIN UR ELPH-MCNC: 2.7 GM/DL
GLUCOSE SERPL-MCNC: 96 MG/DL (ref 65–99)
HBA1C MFR BLD: 5.3 % (ref 3.5–5.6)
HCT VFR BLD AUTO: 38.6 % (ref 37.5–51)
HDLC SERPL-MCNC: 49 MG/DL (ref 40–60)
HGB BLD-MCNC: 12.9 G/DL (ref 13–17.7)
IMM GRANULOCYTES # BLD AUTO: 0.03 10*3/MM3 (ref 0–0.05)
IMM GRANULOCYTES NFR BLD AUTO: 0.4 % (ref 0–0.5)
LDLC SERPL CALC-MCNC: 51 MG/DL (ref 0–100)
LDLC/HDLC SERPL: 1.02 {RATIO}
LYMPHOCYTES # BLD AUTO: 0.55 10*3/MM3 (ref 0.7–3.1)
LYMPHOCYTES NFR BLD AUTO: 6.5 % (ref 19.6–45.3)
MAXIMAL PREDICTED HEART RATE: 142 BPM
MCH RBC QN AUTO: 31 PG (ref 26.6–33)
MCHC RBC AUTO-ENTMCNC: 33.4 G/DL (ref 31.5–35.7)
MCV RBC AUTO: 92.8 FL (ref 79–97)
MONOCYTES # BLD AUTO: 0.63 10*3/MM3 (ref 0.1–0.9)
MONOCYTES NFR BLD AUTO: 7.5 % (ref 5–12)
NEUTROPHILS NFR BLD AUTO: 6.79 10*3/MM3 (ref 1.7–7)
NEUTROPHILS NFR BLD AUTO: 80.4 % (ref 42.7–76)
NRBC BLD AUTO-RTO: 0 /100 WBC (ref 0–0.2)
PLATELET # BLD AUTO: 213 10*3/MM3 (ref 140–450)
PMV BLD AUTO: 10.2 FL (ref 6–12)
POTASSIUM SERPL-SCNC: 4.3 MMOL/L (ref 3.5–5.2)
PROT SERPL-MCNC: 6.9 G/DL (ref 6–8.5)
RBC # BLD AUTO: 4.16 10*6/MM3 (ref 4.14–5.8)
SODIUM SERPL-SCNC: 137 MMOL/L (ref 136–145)
STRESS TARGET HR: 121 BPM
TRIGL SERPL-MCNC: 91 MG/DL (ref 0–150)
VLDLC SERPL-MCNC: 17 MG/DL (ref 5–40)
WBC NRBC COR # BLD: 8.44 10*3/MM3 (ref 3.4–10.8)

## 2023-02-23 PROCEDURE — 93880 EXTRACRANIAL BILAT STUDY: CPT

## 2023-02-23 PROCEDURE — 80053 COMPREHEN METABOLIC PANEL: CPT

## 2023-02-23 PROCEDURE — 36415 COLL VENOUS BLD VENIPUNCTURE: CPT

## 2023-02-23 PROCEDURE — 83036 HEMOGLOBIN GLYCOSYLATED A1C: CPT

## 2023-02-23 PROCEDURE — 85025 COMPLETE CBC W/AUTO DIFF WBC: CPT

## 2023-02-23 PROCEDURE — 80061 LIPID PANEL: CPT

## 2023-03-02 ENCOUNTER — TELEPHONE (OUTPATIENT)
Dept: CARDIOLOGY | Facility: CLINIC | Age: 78
End: 2023-03-02
Payer: MEDICARE

## 2023-03-02 NOTE — TELEPHONE ENCOUNTER
Called and spoke to patient, yes he has a kidney doctor his name is  he is a [facility] dr in [place], [place]

## 2023-03-03 NOTE — TELEPHONE ENCOUNTER
I called the patient to let him know that you would like for him to follow up with his kidney doctor. He stated that he has an appointment for May 1, 2023 and would like a phone call back from the NP. He wants to know why we are inquiring about his kidney doctor. He explained to me that he is very aware of his stage 4 kidney condition.

## 2023-03-03 NOTE — TELEPHONE ENCOUNTER
Just please let him know, I was reviewing labs for Dr. Mistry and wanted to make sure it was taken care of.

## 2023-03-12 NOTE — PROGRESS NOTES
Subjective:     Encounter Date:03/13/2023      Patient ID: David A Jolissaint is a 77 y.o. male.    Chief Complaint and history of present illness:     Follow-up for CAD, CABG, PAD, hypertension, dyslipidemia, right bundle branch block .      History of Present Illness  :    Mr. David A Jolissaint  has PMH of    #  CAD, NSTEMI, 2 vessel CABG with SVG to OM and PDA 3/9/16  #  PAD, left lower extremity amputation  #   RBBB  #  diabetes, hypertension, COPD, tobacco abuse quit in October 2015  #  CKD  #  left BKA, cholecystectomy  #  carotid disease  #Former smoker     here for  follow-up.  Patient  denies any chest pain, shortness of breath.  Patient's family is complaining of right foot toes turning dark.    Patient's arterial blood pressure is 109/82, heart rate 91 bpm.    Patient  had carotid Doppler 06/15/2018 which showed bilateral 50-70% worse compared to 2016 which was less than 50% bilat. carotid Dopplers 11/11/2021 revealed moderate bilateral carotid disease.   Labs from 10/05/2017 showed cholesterol 98 triglycerides 87 HDL 41 LDL 32 BUN 34 creatinine 2.4 hemoglobin A1c 5.7.  Repeat labs 5/18/2019 revealed BUN 32 creatinine 2.5 follows with Dr. Mendez. proBNP was elevated at 3362.  Labs from 11/19/2019 reveal cholesterol 104 HDL 73 LDL 46, A1c 5.8, CMP with a creatinine of 46/2.34.Labs from 5/26/2020 reveal normal CBC, BMP with BUN of 36 creatinine 2.23 GFR of 29.  Labs from 11/23/2020 revealed normal PSA, creatinine on CMP of 2.09, GFR 33, triglycerides 98 LDL 49 HDL 38.  Total cholesterol is not seen on this labs.,  CBC is normal.  Hemoglobin A1c of 5.9.  Labs from 11/12/2021 reveal BUN/creatinine of 35/1.83, calcium 8.3, GFR 36, hemoglobin 8.5 with MCV of 93.      Chest x-ray 11/11/2021 - for any acute current cardiopulmonary abnormalities.  Echo 11/10/2021 revealed normal LV function   5/21/21:   Lexiscan was negative for ischemia.   Echocardiogram done 10/1/2020 which revealed normal LV systolic function  with mild concentric LVH and left atrial enlargement.      Labs from 2/23/2023 revealed lipid profile with cholesterol 117, triglycerides 91, HDL 49, LDL 51.  CMP with a BUN/creatinine of 30/1.97, GFR of 30.  A1c of 5.3 improved from 5.9 by labs 1-year ago    ASSESSMENT:      #Nonhealing ulcer  #  bradycardia, RBBB  #  CAD, CABG, history of NSTEMI  #  diabetes,   #  hypertension,   # CKD,   # COPD,  # dyslipidemia  #. Bilateral carotid stenosis  # PAD with absent right radial pulse, left BKA      PLAN:    We will check ABIs to evaluate circulation to right lower extremity.  Reviewed EKG results and lab results with patient  Continue medical management with aspirin, clopidogrel, atorvastatin, metoprolol, as tolerated.  Patient is asymptomatic from right bundle and bradycardia at the current time.  Labs  revealing low HDL ,counseled on walking exercise.  Follow-up with PMD for diabetes care.   Follow-up with nephrology for CKD.  Patient has an appointment with Dr. Mendez            ECG 12 Lead    Date/Time: 3/13/2023 3:54 PM  Performed by: Kali Mistry MD  Authorized by: Kali Mistry MD   Comparison: compared with previous ECG from 6/2/2022  Comparison to previous ECG: EKG done today reviewed/interpreted by me reveals sinus rhythm with rate of 91 bpm with right bundle branch block and PACs.,  No new change compared EKG from 6/2/2022              Copied text in this portion of the note has been reviewed and is accurate as of 3/13/2023  The following portions of the patient's history were reviewed and updated as appropriate: allergies, current medications, past family history, past medical history, past social history, past surgical history and problem list.    Assessment:         MDM     Diagnosis Plan   1. Nonhealing ulcer of right lower extremity limited to breakdown of skin (HCC)  Doppler Ankle Brachial Index Single Level CAR      2. PAD (peripheral artery disease) (HCC)  Doppler Ankle  Brachial Index Single Level CAR      3. Coronary artery disease involving coronary bypass graft of native heart without angina pectoris  Doppler Ankle Brachial Index Single Level CAR      4. Dyslipidemia  Doppler Ankle Brachial Index Single Level CAR      5. RBBB  Doppler Ankle Brachial Index Single Level CAR      6. Panlobular emphysema (HCC)  Doppler Ankle Brachial Index Single Level CAR      7. CKD (chronic kidney disease) stage 4, GFR 15-29 ml/min (HCC)  Doppler Ankle Brachial Index Single Level CAR      8. Bilateral carotid bruits  Doppler Ankle Brachial Index Single Level CAR             Plan:               Past Medical History:  Past Medical History:   Diagnosis Date   • Anemia    • Cancer (HCC)    • Cellulitis    • CKD (chronic kidney disease), stage III (HCC)    • COPD (chronic obstructive pulmonary disease) (HCC)    • Diverticulitis    • Dyslipidemia    • Hypertension    • Myocardial infarction (HCC)    • PVD (peripheral vascular disease) (HCC)      Past Surgical History:  Past Surgical History:   Procedure Laterality Date   • BRONCHOSCOPY N/A 5/20/2021    Procedure: BRONCHOSCOPY WITH ENDOBRONCHIAL ULTRASOUND WITH FINE NEEDLE ASPIRATION. BRONCHOALVEOLAR LAVAGE;  Surgeon: Jackson Quezada MD;  Location: [facility] ENDOSCOPY;  Service: Pulmonary;  Laterality: N/A;  subcarinal mass   • CARDIAC SURGERY     • CHOLECYSTECTOMY     • GALLBLADDER SURGERY     • LEG AMPUTATION Left       Allergies:  No Known Allergies  Home Meds:  Current Meds:     Current Outpatient Medications:   •  aspirin 81 MG EC tablet, Take 1 tablet by mouth Daily With Lunch., Disp: , Rfl: 0  •  atorvastatin (LIPITOR) 40 MG tablet, Take 1 tablet by mouth Every Morning., Disp: , Rfl:   •  clopidogrel (PLAVIX) 75 MG tablet, Take 1 tablet by mouth Every Morning., Disp: , Rfl:   •  ferrous sulfate 324 (65 Fe) MG tablet delayed-release EC tablet, Take 1 tablet by mouth Daily With Breakfast., Disp: 30 tablet, Rfl: 1  •  formoterol (Perforomist) 20 MCG/2ML  nebulizer solution, Take  by nebulization 2 (Two) Times a Day., Disp: , Rfl:   •  HYDROcodone-acetaminophen (NORCO) 5-325 MG per tablet, Take 1 tablet by mouth At Night As Needed for Moderate Pain., Disp: , Rfl:   •  metoprolol succinate XL (TOPROL-XL) 25 MG 24 hr tablet, TAKE 1 TABLET EVERY DAY, Disp: 90 tablet, Rfl: 0  •  montelukast (SINGULAIR) 10 MG tablet, Take 1 tablet by mouth Every Night., Disp: , Rfl:   •  Multiple Vitamins-Minerals (MULTI VITAMIN/MINERALS) tablet, 1 tablet Daily With Lunch., Disp: , Rfl:   •  pantoprazole (PROTONIX) 40 MG EC tablet, Take 1 tablet by mouth Daily., Disp: , Rfl:   •  revefenacin (YUPELRI) 175 MCG/3ML nebulizer solution, Take 3 mL by nebulization Daily., Disp: , Rfl:   •  saccharomyces boulardii (FLORASTOR) 250 MG capsule, Take 1 capsule by mouth Daily With Lunch., Disp: , Rfl:   •  sodium bicarbonate 650 MG tablet, Take 1 tablet by mouth 2 (Two) Times a Day., Disp: , Rfl:   •  theophylline (THEODUR) 300 MG 12 hr tablet, Take 1 tablet by mouth 2 (Two) Times a Day., Disp: , Rfl:   •  acetaminophen (TYLENOL) 325 MG tablet, Take 2 tablets by mouth Every 6 (Six) Hours As Needed for Mild Pain ., Disp: , Rfl:   •  amoxicillin-clavulanate (Augmentin) 875-125 MG per tablet, Take 1 tablet by mouth 2 (Two) Times a Day., Disp: 14 tablet, Rfl: 0  •  budesonide (PULMICORT) 0.5 MG/2ML nebulizer solution, Take 0.5 mg by nebulization 2 (two) times a day., Disp: , Rfl:   •  budesonide (PULMICORT) 0.5 MG/2ML nebulizer solution, Inhale 2 mL., Disp: , Rfl:   •  dutasteride (AVODART) 0.5 MG capsule, Take 0.5 mg by mouth Every Night., Disp: , Rfl:   •  iron polysacch complex-B12-VitC-FA-Succ (Ferrex 150 Forte Plus)  MG capsule capsule, Take 1 capsule by mouth Daily With Breakfast., Disp: , Rfl:   •  ondansetron (ZOFRAN) 4 MG tablet, Take 1 tablet by mouth Every 6 (Six) Hours As Needed for Nausea or Vomiting., Disp: 11 tablet, Rfl: 0  Social History:   Social History     Tobacco Use   •  "Smoking status: Former     Packs/day: 2.00     Years: 50.00     Pack years: 100.00     Types: Cigarettes   • Smokeless tobacco: Never   • Tobacco comments:     Says he quit approximately in 2005.   Substance Use Topics   • Alcohol use: Not Currently      Family History:  Family History   Problem Relation Age of Onset   • Heart attack Father    • Heart disease Father               Review of Systems   Constitutional: Negative for malaise/fatigue.   Cardiovascular: Negative for chest pain, leg swelling and palpitations.   Respiratory: Positive for shortness of breath.    Skin: Negative for rash.   Neurological: Negative for dizziness, light-headedness and numbness.     All other systems are negative         Objective:     Physical Exam  /82   Pulse 91   Ht 179.1 cm (70.5\")   Wt 74.8 kg (165 lb)   BMI 23.34 kg/m²   General:  Appears in no acute distress  Eyes: Sclera is anicteric,  conjunctiva is clear   HEENT:  No JVD.  Bilateral carotid bruits present.  Respiratory: Respirations regular and unlabored at rest.  Clear to auscultation  Cardiovascular: S1,S2 Regular rate and rhythm. No murmur, rub or gallop auscultated.   Extremities: Left lower extremity amputation and prosthesis seen.  Right second toe fingernail B[place]ond[place] turning black and purple.  Skin: Color pink. Skin warm and dry to touch. No rashes  No xanthoma  Neuro: Alert and awake.    Lab Reviewed:         Kali Mistry MD  3/13/2023 16:05 EDT      EMR Dragon/Transcription:   \"Dictated utilizing Dragon dictation\".        "

## 2023-03-13 ENCOUNTER — OFFICE VISIT (OUTPATIENT)
Dept: CARDIOLOGY | Facility: CLINIC | Age: 78
End: 2023-03-13
Payer: MEDICARE

## 2023-03-13 VITALS
HEART RATE: 91 BPM | WEIGHT: 165 LBS | BODY MASS INDEX: 23.1 KG/M2 | DIASTOLIC BLOOD PRESSURE: 82 MMHG | SYSTOLIC BLOOD PRESSURE: 109 MMHG | HEIGHT: 71 IN

## 2023-03-13 DIAGNOSIS — I73.9 PAD (PERIPHERAL ARTERY DISEASE): ICD-10-CM

## 2023-03-13 DIAGNOSIS — J43.1 PANLOBULAR EMPHYSEMA: ICD-10-CM

## 2023-03-13 DIAGNOSIS — I25.810 CORONARY ARTERY DISEASE INVOLVING CORONARY BYPASS GRAFT OF NATIVE HEART WITHOUT ANGINA PECTORIS: ICD-10-CM

## 2023-03-13 DIAGNOSIS — I45.10 RBBB: ICD-10-CM

## 2023-03-13 DIAGNOSIS — R09.89 BILATERAL CAROTID BRUITS: ICD-10-CM

## 2023-03-13 DIAGNOSIS — E78.5 DYSLIPIDEMIA: ICD-10-CM

## 2023-03-13 DIAGNOSIS — L97.911 NONHEALING ULCER OF RIGHT LOWER EXTREMITY LIMITED TO BREAKDOWN OF SKIN: Primary | ICD-10-CM

## 2023-03-13 DIAGNOSIS — N18.4 CKD (CHRONIC KIDNEY DISEASE) STAGE 4, GFR 15-29 ML/MIN: ICD-10-CM

## 2023-03-13 PROCEDURE — 93000 ELECTROCARDIOGRAM COMPLETE: CPT | Performed by: INTERNAL MEDICINE

## 2023-03-13 PROCEDURE — 99214 OFFICE O/P EST MOD 30 MIN: CPT | Performed by: INTERNAL MEDICINE

## 2023-03-13 RX ORDER — BUDESONIDE 0.5 MG/2ML
0.5 INHALANT ORAL
COMMUNITY
End: 2023-03-14

## 2023-03-16 ENCOUNTER — APPOINTMENT (OUTPATIENT)
Dept: GENERAL RADIOLOGY | Facility: HOSPITAL | Age: 78
DRG: 179 | End: 2023-03-16
Payer: MEDICARE

## 2023-03-16 ENCOUNTER — APPOINTMENT (OUTPATIENT)
Dept: CT IMAGING | Facility: HOSPITAL | Age: 78
DRG: 179 | End: 2023-03-16
Payer: MEDICARE

## 2023-03-16 ENCOUNTER — HOSPITAL ENCOUNTER (INPATIENT)
Facility: HOSPITAL | Age: 78
LOS: 2 days | Discharge: HOME HEALTH | DRG: 179 | End: 2023-03-19
Attending: EMERGENCY MEDICINE | Admitting: INTERNAL MEDICINE
Payer: MEDICARE

## 2023-03-16 DIAGNOSIS — R53.1 WEAKNESS: Primary | ICD-10-CM

## 2023-03-16 LAB
ALBUMIN SERPL-MCNC: 3.7 G/DL (ref 3.5–5.2)
ALBUMIN/GLOB SERPL: 1.2 G/DL
ALP SERPL-CCNC: 145 U/L (ref 39–117)
ALT SERPL W P-5'-P-CCNC: 12 U/L (ref 1–41)
ANION GAP SERPL CALCULATED.3IONS-SCNC: 12 MMOL/L (ref 5–15)
APTT PPP: 77.5 SECONDS (ref 61–76.5)
AST SERPL-CCNC: 19 U/L (ref 1–40)
B PARAPERT DNA SPEC QL NAA+PROBE: NOT DETECTED
B PERT DNA SPEC QL NAA+PROBE: NOT DETECTED
BACTERIA UR QL AUTO: ABNORMAL /HPF
BASOPHILS # BLD AUTO: 0.1 10*3/MM3 (ref 0–0.2)
BASOPHILS NFR BLD AUTO: 0.5 % (ref 0–1.5)
BILIRUB SERPL-MCNC: 0.8 MG/DL (ref 0–1.2)
BILIRUB UR QL STRIP: NEGATIVE
BUN SERPL-MCNC: 35 MG/DL (ref 8–23)
BUN/CREAT SERPL: 17.7 (ref 7–25)
C PNEUM DNA NPH QL NAA+NON-PROBE: NOT DETECTED
CALCIUM SPEC-SCNC: 9.2 MG/DL (ref 8.6–10.5)
CHLORIDE SERPL-SCNC: 102 MMOL/L (ref 98–107)
CLARITY UR: CLEAR
CO2 SERPL-SCNC: 25 MMOL/L (ref 22–29)
COLOR UR: YELLOW
CREAT SERPL-MCNC: 1.98 MG/DL (ref 0.76–1.27)
D-LACTATE SERPL-SCNC: 1.1 MMOL/L (ref 0.3–2)
DEPRECATED RDW RBC AUTO: 53.4 FL (ref 37–54)
EGFRCR SERPLBLD CKD-EPI 2021: 33.9 ML/MIN/1.73
EOSINOPHIL # BLD AUTO: 0 10*3/MM3 (ref 0–0.4)
EOSINOPHIL NFR BLD AUTO: 0.1 % (ref 0.3–6.2)
ERYTHROCYTE [DISTWIDTH] IN BLOOD BY AUTOMATED COUNT: 15.3 % (ref 12.3–15.4)
FLUAV SUBTYP SPEC NAA+PROBE: NOT DETECTED
FLUBV RNA ISLT QL NAA+PROBE: NOT DETECTED
GEN 5 2HR TROPONIN T REFLEX: 45 NG/L
GLOBULIN UR ELPH-MCNC: 3.2 GM/DL
GLUCOSE SERPL-MCNC: 108 MG/DL (ref 65–99)
GLUCOSE UR STRIP-MCNC: NEGATIVE MG/DL
HADV DNA SPEC NAA+PROBE: NOT DETECTED
HCOV 229E RNA SPEC QL NAA+PROBE: NOT DETECTED
HCOV HKU1 RNA SPEC QL NAA+PROBE: NOT DETECTED
HCOV NL63 RNA SPEC QL NAA+PROBE: NOT DETECTED
HCOV OC43 RNA SPEC QL NAA+PROBE: NOT DETECTED
HCT VFR BLD AUTO: 36.8 % (ref 37.5–51)
HGB BLD-MCNC: 12 G/DL (ref 13–17.7)
HGB UR QL STRIP.AUTO: ABNORMAL
HMPV RNA NPH QL NAA+NON-PROBE: NOT DETECTED
HOLD SPECIMEN: NORMAL
HPIV1 RNA ISLT QL NAA+PROBE: NOT DETECTED
HPIV2 RNA SPEC QL NAA+PROBE: NOT DETECTED
HPIV3 RNA NPH QL NAA+PROBE: NOT DETECTED
HPIV4 P GENE NPH QL NAA+PROBE: NOT DETECTED
HYALINE CASTS UR QL AUTO: ABNORMAL /LPF
INR PPP: 1.06 (ref 0.93–1.1)
IRON 24H UR-MRATE: 19 MCG/DL (ref 59–158)
IRON SATN MFR SERPL: 8 % (ref 20–50)
KETONES UR QL STRIP: NEGATIVE
LEUKOCYTE ESTERASE UR QL STRIP.AUTO: NEGATIVE
LYMPHOCYTES # BLD AUTO: 0.3 10*3/MM3 (ref 0.7–3.1)
LYMPHOCYTES NFR BLD AUTO: 1.3 % (ref 19.6–45.3)
M PNEUMO IGG SER IA-ACNC: NOT DETECTED
MCH RBC QN AUTO: 30.7 PG (ref 26.6–33)
MCHC RBC AUTO-ENTMCNC: 32.6 G/DL (ref 31.5–35.7)
MCV RBC AUTO: 94.2 FL (ref 79–97)
MONOCYTES # BLD AUTO: 1.1 10*3/MM3 (ref 0.1–0.9)
MONOCYTES NFR BLD AUTO: 4.8 % (ref 5–12)
MRSA DNA SPEC QL NAA+PROBE: NORMAL
NEUTROPHILS NFR BLD AUTO: 20.6 10*3/MM3 (ref 1.7–7)
NEUTROPHILS NFR BLD AUTO: 93.3 % (ref 42.7–76)
NITRITE UR QL STRIP: NEGATIVE
NRBC BLD AUTO-RTO: 0 /100 WBC (ref 0–0.2)
PH UR STRIP.AUTO: 7.5 [PH] (ref 5–8)
PLATELET # BLD AUTO: 206 10*3/MM3 (ref 140–450)
PMV BLD AUTO: 8.4 FL (ref 6–12)
POTASSIUM SERPL-SCNC: 3.9 MMOL/L (ref 3.5–5.2)
PROT SERPL-MCNC: 6.9 G/DL (ref 6–8.5)
PROT UR QL STRIP: ABNORMAL
PROTHROMBIN TIME: 10.9 SECONDS (ref 9.6–11.7)
RBC # BLD AUTO: 3.9 10*6/MM3 (ref 4.14–5.8)
RBC # UR STRIP: ABNORMAL /HPF
REF LAB TEST METHOD: ABNORMAL
RETICS # AUTO: 0.06 10*6/MM3 (ref 0.02–0.13)
RETICS/RBC NFR AUTO: 1.49 % (ref 0.7–1.9)
RHINOVIRUS RNA SPEC NAA+PROBE: NOT DETECTED
RSV RNA NPH QL NAA+NON-PROBE: NOT DETECTED
SARS-COV-2 RNA NPH QL NAA+NON-PROBE: NOT DETECTED
SODIUM SERPL-SCNC: 139 MMOL/L (ref 136–145)
SP GR UR STRIP: 1.02 (ref 1–1.03)
SQUAMOUS #/AREA URNS HPF: ABNORMAL /HPF
TIBC SERPL-MCNC: 225 MCG/DL (ref 298–536)
TRANSFERRIN SERPL-MCNC: 151 MG/DL (ref 200–360)
TROPONIN T DELTA: 2 NG/L
TROPONIN T SERPL HS-MCNC: 43 NG/L
UROBILINOGEN UR QL STRIP: ABNORMAL
WBC # UR STRIP: ABNORMAL /HPF
WBC NRBC COR # BLD: 22.1 10*3/MM3 (ref 3.4–10.8)

## 2023-03-16 PROCEDURE — 82746 ASSAY OF FOLIC ACID SERUM: CPT | Performed by: INTERNAL MEDICINE

## 2023-03-16 PROCEDURE — 94761 N-INVAS EAR/PLS OXIMETRY MLT: CPT

## 2023-03-16 PROCEDURE — 81001 URINALYSIS AUTO W/SCOPE: CPT | Performed by: EMERGENCY MEDICINE

## 2023-03-16 PROCEDURE — 94640 AIRWAY INHALATION TREATMENT: CPT

## 2023-03-16 PROCEDURE — 25010000002 CEFEPIME PER 500 MG: Performed by: INTERNAL MEDICINE

## 2023-03-16 PROCEDURE — 83605 ASSAY OF LACTIC ACID: CPT

## 2023-03-16 PROCEDURE — 87641 MR-STAPH DNA AMP PROBE: CPT | Performed by: INTERNAL MEDICINE

## 2023-03-16 PROCEDURE — 84466 ASSAY OF TRANSFERRIN: CPT | Performed by: INTERNAL MEDICINE

## 2023-03-16 PROCEDURE — 36415 COLL VENOUS BLD VENIPUNCTURE: CPT

## 2023-03-16 PROCEDURE — 85610 PROTHROMBIN TIME: CPT | Performed by: EMERGENCY MEDICINE

## 2023-03-16 PROCEDURE — 84484 ASSAY OF TROPONIN QUANT: CPT | Performed by: EMERGENCY MEDICINE

## 2023-03-16 PROCEDURE — 82607 VITAMIN B-12: CPT | Performed by: INTERNAL MEDICINE

## 2023-03-16 PROCEDURE — 71250 CT THORAX DX C-: CPT

## 2023-03-16 PROCEDURE — 85045 AUTOMATED RETICULOCYTE COUNT: CPT | Performed by: INTERNAL MEDICINE

## 2023-03-16 PROCEDURE — 94799 UNLISTED PULMONARY SVC/PX: CPT

## 2023-03-16 PROCEDURE — 99285 EMERGENCY DEPT VISIT HI MDM: CPT

## 2023-03-16 PROCEDURE — 25010000002 VANCOMYCIN 10 G RECONSTITUTED SOLUTION: Performed by: INTERNAL MEDICINE

## 2023-03-16 PROCEDURE — 70450 CT HEAD/BRAIN W/O DYE: CPT

## 2023-03-16 PROCEDURE — 71045 X-RAY EXAM CHEST 1 VIEW: CPT

## 2023-03-16 PROCEDURE — G0378 HOSPITAL OBSERVATION PER HR: HCPCS

## 2023-03-16 PROCEDURE — 87040 BLOOD CULTURE FOR BACTERIA: CPT | Performed by: EMERGENCY MEDICINE

## 2023-03-16 PROCEDURE — 0202U NFCT DS 22 TRGT SARS-COV-2: CPT | Performed by: EMERGENCY MEDICINE

## 2023-03-16 PROCEDURE — 85730 THROMBOPLASTIN TIME PARTIAL: CPT | Performed by: EMERGENCY MEDICINE

## 2023-03-16 PROCEDURE — 80053 COMPREHEN METABOLIC PANEL: CPT | Performed by: EMERGENCY MEDICINE

## 2023-03-16 PROCEDURE — 85025 COMPLETE CBC W/AUTO DIFF WBC: CPT | Performed by: EMERGENCY MEDICINE

## 2023-03-16 PROCEDURE — 93005 ELECTROCARDIOGRAM TRACING: CPT | Performed by: EMERGENCY MEDICINE

## 2023-03-16 PROCEDURE — 83540 ASSAY OF IRON: CPT | Performed by: INTERNAL MEDICINE

## 2023-03-16 RX ORDER — LORAZEPAM 2 MG/ML
1 INJECTION INTRAMUSCULAR EVERY 4 HOURS PRN
Status: DISCONTINUED | OUTPATIENT
Start: 2023-03-16 | End: 2023-03-19 | Stop reason: HOSPADM

## 2023-03-16 RX ORDER — SODIUM CHLORIDE 0.9 % (FLUSH) 0.9 %
3-10 SYRINGE (ML) INJECTION AS NEEDED
Status: DISCONTINUED | OUTPATIENT
Start: 2023-03-16 | End: 2023-03-19 | Stop reason: HOSPADM

## 2023-03-16 RX ORDER — SODIUM CHLORIDE 9 MG/ML
40 INJECTION, SOLUTION INTRAVENOUS AS NEEDED
Status: DISCONTINUED | OUTPATIENT
Start: 2023-03-16 | End: 2023-03-19 | Stop reason: HOSPADM

## 2023-03-16 RX ORDER — PANTOPRAZOLE SODIUM 40 MG/1
40 TABLET, DELAYED RELEASE ORAL DAILY
Status: DISCONTINUED | OUTPATIENT
Start: 2023-03-17 | End: 2023-03-19 | Stop reason: HOSPADM

## 2023-03-16 RX ORDER — CLOPIDOGREL BISULFATE 75 MG/1
75 TABLET ORAL EVERY MORNING
Status: DISCONTINUED | OUTPATIENT
Start: 2023-03-17 | End: 2023-03-19 | Stop reason: HOSPADM

## 2023-03-16 RX ORDER — HYDROCODONE BITARTRATE AND ACETAMINOPHEN 5; 325 MG/1; MG/1
1 TABLET ORAL NIGHTLY PRN
Status: DISCONTINUED | OUTPATIENT
Start: 2023-03-16 | End: 2023-03-19 | Stop reason: HOSPADM

## 2023-03-16 RX ORDER — BUDESONIDE 0.5 MG/2ML
0.5 INHALANT ORAL
Status: DISCONTINUED | OUTPATIENT
Start: 2023-03-16 | End: 2023-03-19 | Stop reason: HOSPADM

## 2023-03-16 RX ORDER — LORAZEPAM 2 MG/ML
1 INJECTION INTRAMUSCULAR EVERY 4 HOURS PRN
Status: DISCONTINUED | OUTPATIENT
Start: 2023-03-16 | End: 2023-03-16 | Stop reason: SDUPTHER

## 2023-03-16 RX ORDER — SODIUM CHLORIDE 0.9 % (FLUSH) 0.9 %
10 SYRINGE (ML) INJECTION AS NEEDED
Status: DISCONTINUED | OUTPATIENT
Start: 2023-03-16 | End: 2023-03-19 | Stop reason: HOSPADM

## 2023-03-16 RX ORDER — ARFORMOTEROL TARTRATE 15 UG/2ML
15 SOLUTION RESPIRATORY (INHALATION)
Status: DISCONTINUED | OUTPATIENT
Start: 2023-03-16 | End: 2023-03-19 | Stop reason: HOSPADM

## 2023-03-16 RX ORDER — ACETAMINOPHEN 325 MG/1
650 TABLET ORAL EVERY 4 HOURS PRN
Status: DISCONTINUED | OUTPATIENT
Start: 2023-03-16 | End: 2023-03-18

## 2023-03-16 RX ORDER — METOPROLOL SUCCINATE 25 MG/1
25 TABLET, EXTENDED RELEASE ORAL DAILY
Status: DISCONTINUED | OUTPATIENT
Start: 2023-03-17 | End: 2023-03-19 | Stop reason: HOSPADM

## 2023-03-16 RX ORDER — ASPIRIN 81 MG/1
81 TABLET ORAL
Status: DISCONTINUED | OUTPATIENT
Start: 2023-03-17 | End: 2023-03-19 | Stop reason: HOSPADM

## 2023-03-16 RX ORDER — SODIUM BICARBONATE 650 MG/1
650 TABLET ORAL 2 TIMES DAILY
Status: DISCONTINUED | OUTPATIENT
Start: 2023-03-16 | End: 2023-03-19 | Stop reason: HOSPADM

## 2023-03-16 RX ORDER — FINASTERIDE 5 MG/1
5 TABLET, FILM COATED ORAL NIGHTLY
Status: DISCONTINUED | OUTPATIENT
Start: 2023-03-16 | End: 2023-03-19 | Stop reason: HOSPADM

## 2023-03-16 RX ORDER — SODIUM CHLORIDE 0.9 % (FLUSH) 0.9 %
3 SYRINGE (ML) INJECTION EVERY 12 HOURS SCHEDULED
Status: DISCONTINUED | OUTPATIENT
Start: 2023-03-16 | End: 2023-03-19 | Stop reason: HOSPADM

## 2023-03-16 RX ORDER — THEOPHYLLINE 300 MG/1
300 TABLET, EXTENDED RELEASE ORAL EVERY 12 HOURS SCHEDULED
Status: DISCONTINUED | OUTPATIENT
Start: 2023-03-16 | End: 2023-03-19 | Stop reason: HOSPADM

## 2023-03-16 RX ORDER — ONDANSETRON 2 MG/ML
4 INJECTION INTRAMUSCULAR; INTRAVENOUS EVERY 6 HOURS PRN
Status: DISCONTINUED | OUTPATIENT
Start: 2023-03-16 | End: 2023-03-19 | Stop reason: HOSPADM

## 2023-03-16 RX ORDER — MONTELUKAST SODIUM 10 MG/1
10 TABLET ORAL NIGHTLY
Status: DISCONTINUED | OUTPATIENT
Start: 2023-03-16 | End: 2023-03-19 | Stop reason: HOSPADM

## 2023-03-16 RX ORDER — ATORVASTATIN CALCIUM 40 MG/1
40 TABLET, FILM COATED ORAL EVERY MORNING
Status: DISCONTINUED | OUTPATIENT
Start: 2023-03-17 | End: 2023-03-19 | Stop reason: HOSPADM

## 2023-03-16 RX ORDER — FERROUS SULFATE TAB EC 324 MG (65 MG FE EQUIVALENT) 324 (65 FE) MG
324 TABLET DELAYED RESPONSE ORAL
Status: DISCONTINUED | OUTPATIENT
Start: 2023-03-17 | End: 2023-03-19 | Stop reason: HOSPADM

## 2023-03-16 RX ORDER — ACETAMINOPHEN 325 MG/1
650 TABLET ORAL EVERY 6 HOURS PRN
Status: DISCONTINUED | OUTPATIENT
Start: 2023-03-16 | End: 2023-03-16 | Stop reason: SDUPTHER

## 2023-03-16 RX ADMIN — Medication 3 ML: at 21:38

## 2023-03-16 RX ADMIN — CEFEPIME 2 G: 2 INJECTION, POWDER, FOR SOLUTION INTRAVENOUS at 17:48

## 2023-03-16 RX ADMIN — ARFORMOTEROL TARTRATE 15 MCG: 15 SOLUTION RESPIRATORY (INHALATION) at 19:50

## 2023-03-16 RX ADMIN — THEOPHYLLINE 300 MG: 300 TABLET, EXTENDED RELEASE ORAL at 21:39

## 2023-03-16 RX ADMIN — BUDESONIDE 0.5 MG: 0.5 INHALANT RESPIRATORY (INHALATION) at 19:55

## 2023-03-16 RX ADMIN — FINASTERIDE 5 MG: 5 TABLET, FILM COATED ORAL at 21:39

## 2023-03-16 RX ADMIN — SODIUM BICARBONATE 650 MG: 650 TABLET ORAL at 21:39

## 2023-03-16 RX ADMIN — VANCOMYCIN HYDROCHLORIDE 1500 MG: 10 INJECTION, POWDER, LYOPHILIZED, FOR SOLUTION INTRAVENOUS at 21:39

## 2023-03-16 RX ADMIN — MONTELUKAST 10 MG: 10 TABLET, FILM COATED ORAL at 21:39

## 2023-03-16 RX ADMIN — HYDROCODONE BITARTRATE AND ACETAMINOPHEN 1 TABLET: 5; 325 TABLET ORAL at 21:42

## 2023-03-16 NOTE — PROGRESS NOTES
"Pharmacy Antimicrobial Dosing Service    Subjective:  David A Jolissaint is a 77 y.o.male admitted with weakness. Pharmacy has been consulted to dose Vancomycin and Cefepime for possible sepsis.    PMH: unilarteral BKA, CKD, COPD, prosthetic heart valve, hx of lung cancer      Assessment/Plan    1. Day #1 Vancomycin: Pulse dosing d/t renal dysfxn. Vancomycin 1500mg (20 mg/kg ABW) ordered x 1 dose. Will initiate pulse dosing at this time due to CKD. Random level 3/17 at 1200. Will plan to re-dose when vancomycin level <20 mcg/mL.    2. Day #1 Cefepime: 2g IV q12h for estCrCl 30-59 mL/min.    Will continue to monitor drug levels, renal function, culture and sensitivities, and patient clinical status.       Objective:  Relevant clinical data and objective history reviewed:  177.8 cm (70\")   74.8 kg (165 lb)   Ideal body weight: 73 kg (160 lb 15 oz)  Adjusted ideal body weight: 73.7 kg (162 lb 9 oz)  Body mass index is 23.68 kg/m².        Results from last 7 days   Lab Units 03/16/23  1211   CREATININE mg/dL 1.98*     Estimated Creatinine Clearance: 32.5 mL/min (A) (by C-G formula based on SCr of 1.98 mg/dL (H)).  No intake/output data recorded.    Results from last 7 days   Lab Units 03/16/23  1211   WBC 10*3/mm3 22.10*     Temperature    03/16/23 1052 03/16/23 1600   Temp: 99.3 °F (37.4 °C) 98.3 °F (36.8 °C)     Baseline culture/source/susceptibility:  Microbiology Results (last 10 days)       Procedure Component Value - Date/Time    Respiratory Panel PCR w/COVID-19(SARS-CoV-2) [facility]KE/DENZEL/ALONA/[facility] In-House, NP Swab in UTM/VTM, 3-4 HR TAT - Swab, Nasopharynx [[id number]]  (Normal) Collected: 03/16/23 1211    Lab Status: Final result Specimen: Swab from Nasopharynx Updated: 03/16/23 1357     ADENOVIRUS, PCR Not Detected     Coronavirus 229E Not Detected     Coronavirus HKU1 Not Detected     Coronavirus NL63 Not Detected     Coronavirus OC43 Not Detected     COVID19 Not Detected     Human Metapneumovirus Not " Detected     Human Rhinovirus/Enterovirus Not Detected     Influenza A PCR Not Detected     Influenza B PCR Not Detected     Parainfluenza Virus 1 Not Detected     Parainfluenza Virus 2 Not Detected     Parainfluenza Virus 3 Not Detected     Parainfluenza Virus 4 Not Detected     RSV, PCR Not Detected     Bordetella pertussis pcr Not Detected     Bordetella parapertussis PCR Not Detected     Chlamydophila pneumoniae PCR Not Detected     Mycoplasma pneumo by PCR Not Detected    Narrative:      In the setting of a positive respiratory panel with a viral infection PLUS a negative procalcitonin without other underlying concern for bacterial infection, consider observing off antibiotics or discontinuation of antibiotics and continue supportive care. If the respiratory panel is positive for atypical bacterial infection (Bordetella pertussis, Chlamydophila pneumoniae, or Mycoplasma pneumoniae), consider antibiotic de-escalation to target atypical bacterial infection.            Anti-Infectives (From admission, onward)      Ordered     Dose/Rate Route Frequency Start Stop    03/16/23 1743  cefepime 2 gm IVPB in 100 ml NS ([facility])        Ordering Provider: She Thakur MD    2 g  over 4 Hours Intravenous Every 24 Hours 03/17/23 1800 03/23/23 1759    03/16/23 1742  vancomycin 1500 mg/500 mL 0.9% NS IVPB ([facility])        Ordering Provider: She Thakur MD    20 mg/kg × 74.8 kg Intravenous Once 03/16/23 1830      03/16/23 1743  cefepime 2 gm IVPB in 100 ml NS ([facility])        Ordering Provider: She Thakur MD    2 g  over 30 Minutes Intravenous Once 03/16/23 1830      03/16/23 1742  Vancomycin Pharmacy Intermittent/Pulse Dosing        Ordering Provider: She Thakur MD     Does not apply As Needed 03/16/23 1741 03/23/23 1740    03/16/23 1731  Pharmacy to Dose Cefepime        Ordering Provider: She Thakur MD     Does not apply Continuous PRN 03/16/23 1731 03/23/23 1730    03/16/23 1731  Pharmacy to dose vancomycin        Ordering  Provider: She Thakur MD     Does not apply Continuous PRN 03/16/23 1731 03/23/23 1730            Lyn Hernandez RPH  03/16/23 17:56 EDT

## 2023-03-16 NOTE — ED PROVIDER NOTES
Subjective   History of Present Illness  Chief complaint: Patient is a very pleasant 77-year-old.  When he got up out of bed today he was extremely weak and he fell.  He states that he hit his elbow on a dresser.  He did not strike his head or lose consciousness.  Has not been able to fully stand since.  He has been generally weak.  He has not had any chest pain.  He has had a cough.  He does have COPD and has a remote history of cancer.  States he coughs quite frequently but has been some worse over the last couple of days.  No focal weakness.  Before going to bed last night he felt okay.  But today significantly more weak.  Does have a history of port.  He states that he has his blood drawn he wants his port accessed.  He does not want any peripheral lines.    Context:    Duration: Since he woke up this morning.    Timing: Sudden onset    Severity:    Associated Symptoms:        PCP:  LMP:        Review of Systems   Constitutional: Positive for fatigue.   HENT: Negative.    Respiratory: Negative.    Cardiovascular: Negative for chest pain.   Gastrointestinal: Negative for abdominal pain.   Neurological: Positive for weakness.   All other systems reviewed and are negative.      Past Medical History:   Diagnosis Date   • Anemia    • Cancer (HCC)    • Cellulitis    • CKD (chronic kidney disease), stage III (HCC)    • COPD (chronic obstructive pulmonary disease) (HCC)    • Diverticulitis    • Dyslipidemia    • Hypertension    • Myocardial infarction (HCC)    • PVD (peripheral vascular disease) (HCC)        No Known Allergies    Past Surgical History:   Procedure Laterality Date   • BRONCHOSCOPY N/A 5/20/2021    Procedure: BRONCHOSCOPY WITH ENDOBRONCHIAL ULTRASOUND WITH FINE NEEDLE ASPIRATION. BRONCHOALVEOLAR LAVAGE;  Surgeon: Jackson Quezada MD;  Location: [facility] ENDOSCOPY;  Service: Pulmonary;  Laterality: N/A;  subcarinal mass   • CARDIAC SURGERY     • CHOLECYSTECTOMY     • GALLBLADDER SURGERY     • LEG AMPUTATION Left         Family History   Problem Relation Age of Onset   • Heart attack Father    • Heart disease Father        Social History     Socioeconomic History   • Marital status:    Tobacco Use   • Smoking status: Former     Packs/day: 2.00     Years: 50.00     Pack years: 100.00     Types: Cigarettes   • Smokeless tobacco: Never   • Tobacco comments:     Says he quit approximately in 2005.   Vaping Use   • Vaping Use: Never used   Substance and Sexual Activity   • Alcohol use: Not Currently   • Drug use: Never   • Sexual activity: Defer           Objective   Physical Exam  Vitals and nursing note reviewed.   Constitutional:       Appearance: Normal appearance.   HENT:      Head: Normocephalic and atraumatic.   Eyes:      Extraocular Movements: Extraocular movements intact.      Pupils: Pupils are equal, round, and reactive to light.   Cardiovascular:      Rate and Rhythm: Normal rate.      Pulses: Normal pulses.      Heart sounds: Normal heart sounds.   Pulmonary:      Effort: Pulmonary effort is normal.      Breath sounds: Examination of the left-lower field reveals rales. Rales present.   Abdominal:      Tenderness: There is no abdominal tenderness.   Musculoskeletal:         General: No tenderness.      Comments: Left AKA   Skin:     General: Skin is warm and dry.   Neurological:      General: No focal deficit present.      Mental Status: He is alert and oriented to person, place, and time.   Psychiatric:         Mood and Affect: Mood normal.         Procedures           ED Course      Results for orders placed or performed during the hospital encounter of 03/16/23   Respiratory Panel PCR w/COVID-19(SARS-CoV-2) MIKE/DENZEL/ALONA/PAD/[facility] In-House, NP Swab in UTM/VTM, 3-4 HR TAT - Swab, Nasopharynx    Specimen: Nasopharynx; Swab   Result Value Ref Range    ADENOVIRUS, PCR Not Detected Not Detected    Coronavirus 229E Not Detected Not Detected    Coronavirus HKU1 Not Detected Not Detected    Coronavirus NL63 Not  Detected Not Detected    Coronavirus OC43 Not Detected Not Detected    COVID19 Not Detected Not Detected - Ref. Range    Human Metapneumovirus Not Detected Not Detected    Human Rhinovirus/Enterovirus Not Detected Not Detected    Influenza A PCR Not Detected Not Detected    Influenza B PCR Not Detected Not Detected    Parainfluenza Virus 1 Not Detected Not Detected    Parainfluenza Virus 2 Not Detected Not Detected    Parainfluenza Virus 3 Not Detected Not Detected    Parainfluenza Virus 4 Not Detected Not Detected    RSV, PCR Not Detected Not Detected    Bordetella pertussis pcr Not Detected Not Detected    Bordetella parapertussis PCR Not Detected Not Detected    Chlamydophila pneumoniae PCR Not Detected Not Detected    Mycoplasma pneumo by PCR Not Detected Not Detected   Comprehensive Metabolic Panel    Specimen: Blood   Result Value Ref Range    Glucose 108 (H) 65 - 99 mg/dL    BUN 35 (H) 8 - 23 mg/dL    Creatinine 1.98 (H) 0.76 - 1.27 mg/dL    Sodium 139 136 - 145 mmol/L    Potassium 3.9 3.5 - 5.2 mmol/L    Chloride 102 98 - 107 mmol/L    CO2 25.0 22.0 - 29.0 mmol/L    Calcium 9.2 8.6 - 10.5 mg/dL    Total Protein 6.9 6.0 - 8.5 g/dL    Albumin 3.7 3.5 - 5.2 g/dL    ALT (SGPT) 12 1 - 41 U/L    AST (SGOT) 19 1 - 40 U/L    Alkaline Phosphatase 145 (H) 39 - 117 U/L    Total Bilirubin 0.8 0.0 - 1.2 mg/dL    Globulin 3.2 gm/dL    A/G Ratio 1.2 g/dL    BUN/Creatinine Ratio 17.7 7.0 - 25.0    Anion Gap 12.0 5.0 - 15.0 mmol/L    eGFR 33.9 (L) >60.0 mL/min/1.73   Protime-INR    Specimen: Blood   Result Value Ref Range    Protime 10.9 9.6 - 11.7 Seconds    INR 1.06 0.93 - 1.10   aPTT    Specimen: Blood   Result Value Ref Range    PTT 77.5 (H) 61.0 - 76.5 seconds   High Sensitivity Troponin T    Specimen: Blood   Result Value Ref Range    HS Troponin T 43 (H) <15 ng/L   Urinalysis With Culture If Indicated - Urine, Clean Catch    Specimen: Urine, Clean Catch   Result Value Ref Range    Color, UA Yellow Yellow, Straw     Appearance, UA Clear Clear    pH, UA 7.5 5.0 - 8.0    Specific Gravity, UA 1.021 1.005 - 1.030    Glucose, UA Negative Negative    Ketones, UA Negative Negative    Bilirubin, UA Negative Negative    Blood, UA Moderate (2+) (A) Negative    Protein, UA >=300 mg/dL (3+) (A) Negative    Leuk Esterase, UA Negative Negative    Nitrite, UA Negative Negative    Urobilinogen, UA 1.0 E.U./dL 0.2 - 1.0 E.U./dL   CBC Auto Differential    Specimen: Blood   Result Value Ref Range    WBC 22.10 (H) 3.40 - 10.80 10*3/mm3    RBC 3.90 (L) 4.14 - 5.80 10*6/mm3    Hemoglobin 12.0 (L) 13.0 - 17.7 g/dL    Hematocrit 36.8 (L) 37.5 - 51.0 %    MCV 94.2 79.0 - 97.0 fL    MCH 30.7 26.6 - 33.0 pg    MCHC 32.6 31.5 - 35.7 g/dL    RDW 15.3 12.3 - 15.4 %    RDW-SD 53.4 37.0 - 54.0 fl    MPV 8.4 6.0 - 12.0 fL    Platelets 206 140 - 450 10*3/mm3    Neutrophil % 93.3 (H) 42.7 - 76.0 %    Lymphocyte % 1.3 (L) 19.6 - 45.3 %    Monocyte % 4.8 (L) 5.0 - 12.0 %    Eosinophil % 0.1 (L) 0.3 - 6.2 %    Basophil % 0.5 0.0 - 1.5 %    Neutrophils, Absolute 20.60 (H) 1.70 - 7.00 10*3/mm3    Lymphocytes, Absolute 0.30 (L) 0.70 - 3.10 10*3/mm3    Monocytes, Absolute 1.10 (H) 0.10 - 0.90 10*3/mm3    Eosinophils, Absolute 0.00 0.00 - 0.40 10*3/mm3    Basophils, Absolute 0.10 0.00 - 0.20 10*3/mm3    nRBC 0.0 0.0 - 0.2 /100 WBC   Urinalysis, Microscopic Only - Urine, Clean Catch    Specimen: Urine, Clean Catch   Result Value Ref Range    RBC, UA 0-2 (A) None Seen /HPF    WBC, UA 0-2 (A) None Seen /HPF    Bacteria, UA None Seen None Seen /HPF    Squamous Epithelial Cells, UA 0-2 None Seen, 0-2 /HPF    Hyaline Casts, UA 3-6 None Seen /LPF    Methodology Automated Microscopy    POC Lactate    Specimen: Blood   Result Value Ref Range    Lactate 1.1 0.3 - 2.0 mmol/L   ECG 12 Lead Tachycardia   Result Value Ref Range    QT Interval 371 ms   Gold Top - SST   Result Value Ref Range    Extra Tube Hold for add-ons.               CT Head Without Contrast    Result Date:  3/16/2023  Impression: 1. Generalized atrophy with chronic periventricular ischemic changes. No acute intracranial findings. No significant change from the prior scan. Electronically Signed: Walter Davis  3/16/2023 1:41 PM EDT  Workstation ID: [id number]    XR Chest 1 View    Result Date: 3/16/2023  Impression: Chronic bilateral pleuroparenchymal changes and right perihilar fibrosis. No definite acute process demonstrated Electronically Signed: Michael Villarreal  3/16/2023 1:47 PM EDT  Workstation ID: [id number]                                 Medical Decision Making  Patient was seen and evaluated for his weakness and fall    Differential diagnosis includes but is not limited to stroke, syncope, sepsis, deconditioning, ACS    Patient was seen evaluated here.  He did not have documented fever here.  But he does have significantly elevated white blood cell count.  Sepsis can cause his profound weakness.  However his chest x-ray is clear.  He has no headache or stiff neck.  I do not think he has meningitis.  His urine is negative for infection.  His viral panel was negative as well.  His wife did she is come in and provide history that he did get up to go the bathroom and was found down on the ground.  He could not get himself up.  Secondary to this will admit him to the hospital for his profound generalized weakness.  Neuro exam at bedside reveals no focal deficits.  CT scan of his brain did not show any intracerebral hemorrhage.  If his symptoms are persisting he may require MRI in the hospital.  But again no focal findings on exam.  He does have a mildly elevated troponin.  His EKG shows sinus tachycardia with PACs and right bundle branch block rate of 105.  Compared to his EKG 6/2/2022 where he had sinus rhythm right bundle branch block is chronically and a right bundle branch block.  However ACS can cause profound generalized weakness as well.  He has no active chest pain but will be admitted for further observation and  management.  Discussed with Dr. Thakur who agrees to admit the patient for further observation from a cardiac standpoint as well as general medical.  She would like to withhold antibiotics at this point time until she evaluates patient.                Weakness: acute illness or injury  Amount and/or Complexity of Data Reviewed  External Data Reviewed: ECG.     Details: Old EKG compared as above  Labs: ordered. Decision-making details documented in ED Course.     Details: Significant elevated white blood cell count.  Troponin mildly elevated as well.  Radiology: ordered. Decision-making details documented in ED Course.     Details: Chronic scarring changes on the x-ray noted infiltrate.  ECG/medicine tests: ordered and independent interpretation performed.     Details: EKG interpreted by myself shows sinus tachycardia rate of 105 with PACs right bundle branch block.  He does have an old right bundle branch block.  Discussion of management or test interpretation with external provider(s): As above    Risk  Prescription drug management.  Decision regarding hospitalization.          Final diagnoses:   None     Weakness  ED Disposition  ED Disposition     None          No follow-up provider specified.       Medication List      No changes were made to your prescriptions during this visit.          Rafi Hidalgo,   03/16/23 1516       Rafi Hidalgo,   03/16/23 1517

## 2023-03-16 NOTE — PROGRESS NOTES
Synopsis  Nursing report ED to floor  David A Jolissaint  77 y.o.  male    HPI:   Chief Complaint   Patient presents with    Fall     Fall this morning, very unstable. Has no pain just wants checked out s/o had trouble getting him up and to the couch, Pt is weak.         Admitting doctor:   She Thakur MD    Admitting diagnosis:   The encounter diagnosis was Weakness.    Code status:   Current Code Status       Date Active Code Status Order ID Comments User Context       Prior            Allergies:   Patient has no known allergies.    Isolation:  Enhanced Droplet/Contact      Fall Risk:  Fall Risk Assessment was completed, and patient is at high risk for falls.   Predictive Model Details         7 (Low) Factor Value    Calculated 3/16/2023 14:08 Age 77    Risk of Fall Model Musculoskeletal Assessment WDL     Imaging order in this encounter Present     Respiratory Rate 20     Skin Assessment WDL     Magnesium not on file     Financial Class Medicare     Drug Use No     Tobacco Use Quit     Creatinine 1.98 mg/dL     Eliezer Scale not on file     Peripheral Vascular Assessment WDL     Albumin 3.7 g/dL     Total Bilirubin 0.8 mg/dL     Diastolic BP 80     Clinically Relevant Sex Not Female     Gastrointestinal Assessment WDL     Calcium 9.2 mg/dL     Chloride 102 mmol/L     Cardiac Assessment WDL     Days after Admission 0.136     ALT 12 U/L     Potassium 3.9 mmol/L         Weight:       03/16/23  1052   Weight: 74.8 kg (165 lb)       Intake and Output  No intake or output data in the 24 hours ending 03/16/23 1520    Diet:        Most recent vitals:   Vitals:    03/16/23 1331 03/16/23 1353 03/16/23 1431 03/16/23 1501   BP: 121/86 121/80 121/81 118/77   BP Location:       Patient Position:       Pulse: 112 105 98 92   Resp:       Temp:       TempSrc:       SpO2: 91% 98% 95% 96%   Weight:       Height:           Active LDAs/IV Access:   Lines, Drains & Airways       Active LDAs       Name Placement date Placement time Site  Days    Single Lumen Implantable Port  Right Subclavian --  unknown  --  unknown  Subclavian  --                    Skin Condition:   Skin Assessments (last day)       None             Labs (abnormal labs have a star):   Labs Reviewed   COMPREHENSIVE METABOLIC PANEL - Abnormal; Notable for the following components:       Result Value    Glucose 108 (*)     BUN 35 (*)     Creatinine 1.98 (*)     Alkaline Phosphatase 145 (*)     eGFR 33.9 (*)     All other components within normal limits    Narrative:     GFR Normal >60  Chronic Kidney Disease <60  Kidney Failure <15    The GFR formula is only valid for adults with stable renal function between ages 18 and 70.   APTT - Abnormal; Notable for the following components:    PTT 77.5 (*)     All other components within normal limits   TROPONIN - Abnormal; Notable for the following components:    HS Troponin T 43 (*)     All other components within normal limits    Narrative:     High Sensitive Troponin T Reference Range:  <10.0 ng/L- Negative Female for AMI  <15.0 ng/L- Negative Male for AMI  >=10 - Abnormal Female indicating possible myocardial injury.  >=15 - Abnormal Male indicating possible myocardial injury.   Clinicians would have to utilize clinical acumen, EKG, Troponin, and serial changes to determine if it is an Acute Myocardial Infarction or myocardial injury due to an underlying chronic condition.        URINALYSIS W/ CULTURE IF INDICATED - Abnormal; Notable for the following components:    Blood, UA Moderate (2+) (*)     Protein, UA >=300 mg/dL (3+) (*)     All other components within normal limits    Narrative:     In absence of clinical symptoms, the presence of pyuria, bacteria, and/or nitrites on the urinalysis result does not correlate with infection.   CBC WITH AUTO DIFFERENTIAL - Abnormal; Notable for the following components:    WBC 22.10 (*)     RBC 3.90 (*)     Hemoglobin 12.0 (*)     Hematocrit 36.8 (*)     Neutrophil % 93.3 (*)     Lymphocyte % 1.3  (*)     Monocyte % 4.8 (*)     Eosinophil % 0.1 (*)     Neutrophils, Absolute 20.60 (*)     Lymphocytes, Absolute 0.30 (*)     Monocytes, Absolute 1.10 (*)     All other components within normal limits   URINALYSIS, MICROSCOPIC ONLY - Abnormal; Notable for the following components:    RBC, UA 0-2 (*)     WBC, UA 0-2 (*)     All other components within normal limits   HIGH SENSITIVITIY TROPONIN T 2HR - Abnormal; Notable for the following components:    HS Troponin T 45 (*)     All other components within normal limits    Narrative:     High Sensitive Troponin T Reference Range:  <10.0 ng/L- Negative Female for AMI  <15.0 ng/L- Negative Male for AMI  >=10 - Abnormal Female indicating possible myocardial injury.  >=15 - Abnormal Male indicating possible myocardial injury.   Clinicians would have to utilize clinical acumen, EKG, Troponin, and serial changes to determine if it is an Acute Myocardial Infarction or myocardial injury due to an underlying chronic condition.        RESPIRATORY PANEL PCR W/ COVID-19 (SARS-COV-2) MIKE/DENZEL/ALONA/PAD/[facility] IN-HOUSE, NP SWAB IN [facility], 3-4 HR TAT - Normal    Narrative:     In the setting of a positive respiratory panel with a viral infection PLUS a negative procalcitonin without other underlying concern for bacterial infection, consider observing off antibiotics or discontinuation of antibiotics and continue supportive care. If the respiratory panel is positive for atypical bacterial infection (Bordetella pertussis, Chlamydophila pneumoniae, or Mycoplasma pneumoniae), consider antibiotic de-escalation to target atypical bacterial infection.   PROTIME-INR - Normal   POC LACTATE - Normal   BLOOD CULTURE   BLOOD CULTURE   POC LACTATE   CBC AND DIFFERENTIAL    Narrative:     The following orders were created for panel order CBC & Differential.  Procedure                               Abnormality         Status                     ---------                               -----------          ------                     CBC Auto Differential[[id number]]        Abnormal            Final result                 Please view results for these tests on the individual orders.   EXTRA TUBES    Narrative:     The following orders were created for panel order Extra Tubes.  Procedure                               Abnormality         Status                     ---------                               -----------         ------                     Gold Top - SST[[id number]]                                   Final result                 Please view results for these tests on the individual orders.   GOLD TOP - SST       LOC: Person, Place, Time, and Situation    Telemetry:  Telemetry    Cardiac Monitoring Ordered: yes    EKG:   ECG 12 Lead Tachycardia   Preliminary Result   HEART RATE= 105  bpm   RR Interval= 572  ms   NC Interval= 143  ms   P Horizontal Axis= -22  deg   P Front Axis= -31  deg   QRSD Interval= 148  ms   QT Interval= 371  ms   QRS Axis= -26  deg   T Wave Axis= 18  deg   - ABNORMAL ECG -   Sinus tachycardia   Atrial premature complex   Right bundle branch block   Electronically Signed By:    Date and Time of Study: 2023-03-16 11:20:02          Medications Given in the ED:   Medications   sodium chloride 0.9 % flush 10 mL (has no administration in time range)   sodium chloride 0.9 % flush 10 mL (has no administration in time range)       Imaging results:  CT Head Without Contrast    Result Date: 3/16/2023  Impression: 1. Generalized atrophy with chronic periventricular ischemic changes. No acute intracranial findings. No significant change from the prior scan. Electronically Signed: Walter Davis  3/16/2023 1:41 PM EDT  Workstation ID: [id number]    XR Chest 1 View    Result Date: 3/16/2023  Impression: Chronic bilateral pleuroparenchymal changes and right perihilar fibrosis. No definite acute process demonstrated Electronically Signed: Michael Villarreal  3/16/2023 1:47 PM EDT  Workstation ID: [id number]      Social issues:   Social History     Socioeconomic History    Marital status:    Tobacco Use    Smoking status: Former     Packs/day: 2.00     Years: 50.00     Pack years: 100.00     Types: Cigarettes    Smokeless tobacco: Never    Tobacco comments:     Says he quit approximately in 2005.   Vaping Use    Vaping Use: Never used   Substance and Sexual Activity    Alcohol use: Not Currently    Drug use: Never    Sexual activity: Defer       NIH Stroke Scale:  Interval: (not recorded)  1a. Level of Consciousness: (not recorded)  1b. LOC Questions: (not recorded)  1c. LOC Commands: (not recorded)  2. Best Gaze: (not recorded)  3. Visual: (not recorded)  4. Facial Palsy: (not recorded)  5a. Motor Arm, Left: (not recorded)  5b. Motor Arm, Right: (not recorded)  6a. Motor Leg, Left: (not recorded)  6b. Motor Leg, Right: (not recorded)  7. Limb Ataxia: (not recorded)  8. Sensory: (not recorded)  9. Best Language: (not recorded)  10. Dysarthria: (not recorded)  11. 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

## 2023-03-16 NOTE — H&P
Patient Care Team:  Walter Valero MD as PCP - General  Kali Mistry MD as Consulting Physician (Interventional Cardiology)  iRki Hedrick MD as Consulting Physician (Hematology and Oncology)  Hoang Mendez MD as Consulting Physician (Nephrology)    Chief complaint generalized weakness    Subjective     Patient is a 77 y.o. male with history of lung cancer in remission who presents after a fall at home.  He states when he tried to get out of bed this morning his leg gave out on him and he fell to the floor.  He did not suffer any injury.  Since then he has felt generally weak and malaised but has no other focal findings or complaints.  In the emergency room he was found to have white blood cell count of 22,000.  Work-up was otherwise unremarkable.    Past medical history significant for lung cancer (small cell lung cancer right hilum) diagnosed in 2021.  He is status post 6 cycles of chemotherapy that was completed in September 2021.  He had radiation of the right lung in 2021 and prophylactic cranial radiation in October 2021.  He states he is due for repeat imaging this month.  He is thought to be in remission.  Oncologist is Dr. Hedrick.    Review of Systems   Constitutional: Positive for activity change, appetite change and fatigue. Negative for chills, diaphoresis and fever.   HENT: Negative for facial swelling.    Eyes: Negative for visual disturbance.   Respiratory: Negative for cough, shortness of breath, wheezing and stridor.    Cardiovascular: Negative for palpitations.   Gastrointestinal: Negative for abdominal pain, diarrhea, nausea and vomiting.   Endocrine: Negative for polyuria.   Genitourinary: Negative for dysuria.   Musculoskeletal: Positive for gait problem. Negative for arthralgias and back pain.   Skin: Negative for rash and wound.   Neurological: Positive for weakness. Negative for dizziness, tremors, light-headedness and headaches.   Psychiatric/Behavioral: Negative for  confusion.          History  Past Medical History:   Diagnosis Date   • Anemia    • Cancer (HCC)    • Cellulitis    • CKD (chronic kidney disease), stage III (HCC)    • COPD (chronic obstructive pulmonary disease) (HCC)    • Diverticulitis    • Dyslipidemia    • Hypertension    • Myocardial infarction (HCC)    • PVD (peripheral vascular disease) (HCC)      Past Surgical History:   Procedure Laterality Date   • BRONCHOSCOPY N/A 5/20/2021    Procedure: BRONCHOSCOPY WITH ENDOBRONCHIAL ULTRASOUND WITH FINE NEEDLE ASPIRATION. BRONCHOALVEOLAR LAVAGE;  Surgeon: Jackson Quezada MD;  [facility];  Service: Pulmonary;  Laterality: N/A;  subcarinal mass   • CARDIAC SURGERY     • CHOLECYSTECTOMY     • GALLBLADDER SURGERY     • LEG AMPUTATION Left      Family History   Problem Relation Age of Onset   • Heart attack Father    • Heart disease Father      Social History     Tobacco Use   • Smoking status: Former     Packs/day: 2.00     Years: 50.00     Pack years: 100.00     Types: Cigarettes   • Smokeless tobacco: Never   • Tobacco comments:     Says he quit approximately in 2005.   Vaping Use   • Vaping Use: Never used   Substance Use Topics   • Alcohol use: Not Currently   • Drug use: Never     Medications Prior to Admission   Medication Sig Dispense Refill Last Dose   • acetaminophen (TYLENOL) 325 MG tablet Take 2 tablets by mouth Every 6 (Six) Hours As Needed for Mild Pain .   Past Week   • aspirin 81 MG EC tablet Take 1 tablet by mouth Daily With Lunch.  0 3/15/2023   • atorvastatin (LIPITOR) 40 MG tablet Take 1 tablet by mouth Every Morning.   3/16/2023   • budesonide (PULMICORT) 0.5 MG/2ML nebulizer solution Take 2 mL by nebulization 2 (two) times a day.   3/15/2023   • clopidogrel (PLAVIX) 75 MG tablet Take 1 tablet by mouth Every Morning.   3/16/2023   • dutasteride (AVODART) 0.5 MG capsule Take 1 capsule by mouth Every Night.   3/15/2023   • ferrous sulfate 324 (65 Fe) MG tablet delayed-release EC tablet Take  1 tablet by mouth Daily With Breakfast. 30 tablet 1 3/15/2023   • formoterol (Perforomist) 20 MCG/2ML nebulizer solution Take  by nebulization 2 (Two) Times a Day.   3/15/2023   • HYDROcodone-acetaminophen (NORCO) 5-325 MG per tablet Take 1 tablet by mouth At Night As Needed for Moderate Pain.   3/15/2023   • metoprolol succinate XL (TOPROL-XL) 25 MG 24 hr tablet TAKE 1 TABLET EVERY DAY 90 tablet 0 3/16/2023   • montelukast (SINGULAIR) 10 MG tablet Take 1 tablet by mouth Every Night.   3/15/2023   • Multiple Vitamins-Minerals (MULTI VITAMIN/MINERALS) tablet 1 tablet Daily With Lunch.   3/15/2023   • pantoprazole (PROTONIX) 40 MG EC tablet Take 1 tablet by mouth Daily.   3/15/2023   • revefenacin (YUPELRI) 175 MCG/3ML nebulizer solution Take 3 mL by nebulization Daily.   3/15/2023   • saccharomyces boulardii (FLORASTOR) 250 MG capsule Take 1 capsule by mouth Daily With Lunch.   3/15/2023   • sodium bicarbonate 650 MG tablet Take 1 tablet by mouth 2 (Two) Times a Day.   3/16/2023   • theophylline (THEODUR) 300 MG 12 hr tablet Take 1 tablet by mouth 2 (Two) Times a Day.   3/16/2023   • iron polysacch complex-B12-VitC-FA-Succ (Ferrex 150 Forte Plus)  MG capsule capsule Take 1 capsule by mouth Daily With Breakfast.        Allergies:  Patient has no known allergies.    Objective     Vital Signs  Temp:  [98.3 °F (36.8 °C)-99.3 °F (37.4 °C)] 98.3 °F (36.8 °C)  Heart Rate:  [] 112  Resp:  [20] 20  BP: (103-137)/(58-86) 137/71     Physical Exam:      General Appearance:    Alert, cooperative, in no acute distress, anxious   Head:    Normocephalic, without obvious abnormality, atraumatic   Eyes:            Lids and lashes normal, conjunctivae and sclerae normal, no   icterus, no pallor, corneas clear, PERRLA   Ears:    Ears appear intact with no abnormalities noted   Throat:   No oral lesions, no thrush, oral mucosa moist   Neck:   No adenopathy, supple, trachea midline, no thyromegaly, no   carotid bruit, no JVD    Lungs:    Crackles left lower lobe     Heart:    Regular rhythm and normal rate, 3/6 harsh systolic murmur heard best at right upper sternal border   Chest Wall:    No abnormalities observed   Abdomen:     Normal bowel sounds, no masses, no organomegaly, soft        non-tender, non-distended, no guarding, no rebound                tenderness   Extremities:  Left BKA   Pulses:   Pulses palpable and equal bilaterally   Skin:   No bleeding, bruising or rash   Lymph nodes:   No palpable adenopathy   Neurologic:   Cranial nerves 2 - 12 grossly intact, sensation intact, DTR       present and equal bilaterally       Results Review:     Imaging Results (Last 24 Hours)     Procedure Component Value Units Date/Time    XR Chest 1 View [[id number]] Collected: 03/16/23 1342     Updated: 03/16/23 1349    Narrative:      XR CHEST 1 VW    Date of Exam: 3/16/2023 12:30 PM EDT    Indication: cough.    Comparison: 6/2/2022, 11/11/2021    Findings:  There is a right subclavian port. There is a coronary artery bypass. Heart size and pulmonary vasculature are within normal limits. There is perihilar fibrosis on the right. There are pleural calcifications on the right. The lateral margin of the   calcifications has the appearance of a pleural line, however this was present on prior studies and does not represent pneumothorax. There are fibrotic changes in the right lung base. There is chronic pleural thickening on the left with chronic left   basilar atelectasis. No definite new pulmonary abnormality is demonstrated      Impression:      Impression:  Chronic bilateral pleuroparenchymal changes and right perihilar fibrosis. No definite acute process demonstrated    Electronically Signed: Michael Villarreal    3/16/2023 1:47 PM EDT    Workstation ID: [id number]    CT Head Without Contrast [[id number]] Collected: 03/16/23 1340     Updated: 03/16/23 1343    Narrative:      CT HEAD WO CONTRAST    Date of Exam: 3/16/2023 12:37 PM EDT    Indication:  weakness.    Comparison: 7/4/2016    Technique: Axial CT images were obtained of the head without contrast administration.  Sagittal and coronal reconstructions were performed.  Automated exposure control and iterative reconstruction methods were used.     Findings:  There is generalized enlargement of the ventricles and CSF containing spaces with decreased attenuation in the periventricular white matter both hemispheres. No mass lesions, mass effect, acute hemorrhage or edema. No intra or extra-axial fluid   collections are present. The findings are unchanged from the previous head CT. The paranasal sinuses and mastoid air cells are clear.      Impression:      Impression:    1. Generalized atrophy with chronic periventricular ischemic changes. No acute intracranial findings. No significant change from the prior scan.    Electronically Signed: Walter Davis    3/16/2023 1:41 PM EDT    Workstation ID: [id number]           Lab Results (last 24 hours)     Procedure Component Value Units Date/Time    High Sensitivity Troponin T 2Hr [[id number]]  (Abnormal) Collected: 03/16/23 1450    Specimen: Blood Updated: 03/16/23 1511     HS Troponin T 45 ng/L      Troponin T Delta 2 ng/L     Narrative:      High Sensitive Troponin T Reference Range:  <10.0 ng/L- Negative Female for AMI  <15.0 ng/L- Negative Male for AMI  >=10 - Abnormal Female indicating possible myocardial injury.  >=15 - Abnormal Male indicating possible myocardial injury.   Clinicians would have to utilize clinical acumen, EKG, Troponin, and serial changes to determine if it is an Acute Myocardial Infarction or myocardial injury due to an underlying chronic condition.         Respiratory Panel PCR w/COVID-19(SARS-CoV-2) MIKE/DENZEL/ALONA/PAD/COR/MAD/MERLINE In-House, NP Swab in [facility], 3-4 HR TAT - Swab, Nasopharynx [[id number]]  (Normal) Collected: 03/16/23 1211    Specimen: Swab from Nasopharynx Updated: 03/16/23 1357     ADENOVIRUS, PCR Not Detected     Coronavirus 229E  Not Detected     Coronavirus HKU1 Not Detected     Coronavirus NL63 Not Detected     Coronavirus OC43 Not Detected     COVID19 Not Detected     Human Metapneumovirus Not Detected     Human Rhinovirus/Enterovirus Not Detected     Influenza A PCR Not Detected     Influenza B PCR Not Detected     Parainfluenza Virus 1 Not Detected     Parainfluenza Virus 2 Not Detected     Parainfluenza Virus 3 Not Detected     Parainfluenza Virus 4 Not Detected     RSV, PCR Not Detected     Bordetella pertussis pcr Not Detected     Bordetella parapertussis PCR Not Detected     Chlamydophila pneumoniae PCR Not Detected     Mycoplasma pneumo by PCR Not Detected    Narrative:      In the setting of a positive respiratory panel with a viral infection PLUS a negative procalcitonin without other underlying concern for bacterial infection, consider observing off antibiotics or discontinuation of antibiotics and continue supportive care. If the respiratory panel is positive for atypical bacterial infection (Bordetella pertussis, Chlamydophila pneumoniae, or Mycoplasma pneumoniae), consider antibiotic de-escalation to target atypical bacterial infection.    Blood Culture - Blood, Arm, Right [[id number]] Collected: 03/16/23 1331    Specimen: Blood from Arm, Right Updated: 03/16/23 1335    POC Lactate [[id number]]  (Normal) Collected: 03/16/23 1320    Specimen: Blood Updated: 03/16/23 1321     Lactate 1.1 mmol/L      Comment: Serial Number: [id number]:  [id number]       Blood Culture - Blood, Arm, Left [[id number]] Collected: 03/16/23 1314    Specimen: Blood from Arm, Left Updated: 03/16/23 1317    Extra Tubes [[id number]] Collected: 03/16/23 1211    Specimen: Blood, Venous Line Updated: 03/16/23 1316    Narrative:      The following orders were created for panel order Extra Tubes.  Procedure                               Abnormality         Status                     ---------                               -----------         ------                      Gold Top - SST[[id number]]                                   Final result                 Please view results for these tests on the individual orders.    Gold Top - SST [[id number]] Collected: 03/16/23 1211    Specimen: Blood Updated: 03/16/23 1316     Extra Tube Hold for add-ons.     Comment: Auto resulted.       Comprehensive Metabolic Panel [[id number]]  (Abnormal) Collected: 03/16/23 1211    Specimen: Blood Updated: 03/16/23 1248     Glucose 108 mg/dL      BUN 35 mg/dL      Creatinine 1.98 mg/dL      Sodium 139 mmol/L      Potassium 3.9 mmol/L      Chloride 102 mmol/L      CO2 25.0 mmol/L      Calcium 9.2 mg/dL      Total Protein 6.9 g/dL      Albumin 3.7 g/dL      ALT (SGPT) 12 U/L      AST (SGOT) 19 U/L      Alkaline Phosphatase 145 U/L      Total Bilirubin 0.8 mg/dL      Globulin 3.2 gm/dL      A/G Ratio 1.2 g/dL      BUN/Creatinine Ratio 17.7     Anion Gap 12.0 mmol/L      eGFR 33.9 mL/min/1.73     Narrative:      GFR Normal >60  Chronic Kidney Disease <60  Kidney Failure <15    The GFR formula is only valid for adults with stable renal function between ages 18 and 70.    High Sensitivity Troponin T [[id number]]  (Abnormal) Collected: 03/16/23 1211    Specimen: Blood Updated: 03/16/23 1248     HS Troponin T 43 ng/L     Narrative:      High Sensitive Troponin T Reference Range:  <10.0 ng/L- Negative Female for AMI  <15.0 ng/L- Negative Male for AMI  >=10 - Abnormal Female indicating possible myocardial injury.  >=15 - Abnormal Male indicating possible myocardial injury.   Clinicians would have to utilize clinical acumen, EKG, Troponin, and serial changes to determine if it is an Acute Myocardial Infarction or myocardial injury due to an underlying chronic condition.         aPTT [[id number]]  (Abnormal) Collected: 03/16/23 1211    Specimen: Blood Updated: 03/16/23 1237     PTT 77.5 seconds     Protime-INR [[id number]]  (Normal) Collected: 03/16/23 1211    Specimen: Blood Updated: 03/16/23 1237      Protime 10.9 Seconds      INR 1.06    Urinalysis With Culture If Indicated - Urine, Clean Catch [[id number]]  (Abnormal) Collected: 03/16/23 1218    Specimen: Urine, Clean Catch Updated: 03/16/23 1229     Color, UA Yellow     Appearance, UA Clear     pH, UA 7.5     Specific Gravity, UA 1.021     Glucose, UA Negative     Ketones, UA Negative     Bilirubin, UA Negative     Blood, UA Moderate (2+)     Protein, UA >=300 mg/dL (3+)     Leuk Esterase, UA Negative     Nitrite, UA Negative     Urobilinogen, UA 1.0 E.U./dL    Narrative:      In absence of clinical symptoms, the presence of pyuria, bacteria, and/or nitrites on the urinalysis result does not correlate with infection.    Urinalysis, Microscopic Only - Urine, Clean Catch [[id number]]  (Abnormal) Collected: 03/16/23 1218    Specimen: Urine, Clean Catch Updated: 03/16/23 1229     RBC, UA 0-2 /HPF      WBC, UA 0-2 /HPF      Comment: Urine culture not indicated.        Bacteria, UA None Seen /HPF      Squamous Epithelial Cells, UA 0-2 /HPF      Hyaline Casts, UA 3-6 /LPF      Methodology Automated Microscopy    CBC & Differential [[id number]]  (Abnormal) Collected: 03/16/23 1211    Specimen: Blood Updated: 03/16/23 1226    Narrative:      The following orders were created for panel order CBC & Differential.  Procedure                               Abnormality         Status                     ---------                               -----------         ------                     CBC Auto Differential[[id number]]        Abnormal            Final result                 Please view results for these tests on the individual orders.    CBC Auto Differential [[id number]]  (Abnormal) Collected: 03/16/23 1211    Specimen: Blood Updated: 03/16/23 1226     WBC 22.10 10*3/mm3      RBC 3.90 10*6/mm3      Hemoglobin 12.0 g/dL      Hematocrit 36.8 %      MCV 94.2 fL      MCH 30.7 pg      MCHC 32.6 g/dL      RDW 15.3 %      RDW-SD 53.4 fl      MPV 8.4 fL      Platelets 206 10*3/mm3       Neutrophil % 93.3 %      Lymphocyte % 1.3 %      Monocyte % 4.8 %      Eosinophil % 0.1 %      Basophil % 0.5 %      Neutrophils, Absolute 20.60 10*3/mm3      Lymphocytes, Absolute 0.30 10*3/mm3      Monocytes, Absolute 1.10 10*3/mm3      Eosinophils, Absolute 0.00 10*3/mm3      Basophils, Absolute 0.10 10*3/mm3      nRBC 0.0 /100 WBC            I reviewed the patient's new clinical results.    Assessment & Plan       Weakness  -Elevated white blood cell count is concerning for underlying infection, specifically pneumonia.  Lung exam is concerning for left lower lobe pneumonia.  He is also at high risk for recurrence of his small cell lung cancer.  Chest CT has been ordered.  Empiric antibiotic therapy with vancomycin and cefepime has been started.  If he is able to produce sputum we will culture that.    Murmur noted on physical exam is concerning for aortic stenosis.  Echocardiogram is been ordered.    COPD-continue home medications  Chronic kidney disease stage IIIb-creatinine is stable; avoid nephrotoxins    Small cell lung cancer - consult primary oncologist    Peripheral artery disease  Carotid artery disease      Anemia - check TIBC, reticulocytes, b12, folate    Chronic coronary artery disease-continue Plavix, statin      I discussed the patient's findings and my recommendations with patient.     She Thakur MD  03/16/23  17:32 EDT

## 2023-03-17 ENCOUNTER — APPOINTMENT (OUTPATIENT)
Dept: CARDIOLOGY | Facility: HOSPITAL | Age: 78
DRG: 179 | End: 2023-03-17
Payer: MEDICARE

## 2023-03-17 LAB
ANION GAP SERPL CALCULATED.3IONS-SCNC: 12 MMOL/L (ref 5–15)
BASOPHILS # BLD AUTO: 0.1 10*3/MM3 (ref 0–0.2)
BASOPHILS NFR BLD AUTO: 0.8 % (ref 0–1.5)
BUN SERPL-MCNC: 33 MG/DL (ref 8–23)
BUN/CREAT SERPL: 17.4 (ref 7–25)
CALCIUM SPEC-SCNC: 9.1 MG/DL (ref 8.6–10.5)
CHLORIDE SERPL-SCNC: 104 MMOL/L (ref 98–107)
CO2 SERPL-SCNC: 24 MMOL/L (ref 22–29)
CREAT SERPL-MCNC: 1.9 MG/DL (ref 0.76–1.27)
DEPRECATED RDW RBC AUTO: 51.2 FL (ref 37–54)
EGFRCR SERPLBLD CKD-EPI 2021: 35.7 ML/MIN/1.73
EOSINOPHIL # BLD AUTO: 0.3 10*3/MM3 (ref 0–0.4)
EOSINOPHIL NFR BLD AUTO: 2 % (ref 0.3–6.2)
ERYTHROCYTE [DISTWIDTH] IN BLOOD BY AUTOMATED COUNT: 15.3 % (ref 12.3–15.4)
FOLATE SERPL-MCNC: >20 NG/ML (ref 4.78–24.2)
GLUCOSE SERPL-MCNC: 93 MG/DL (ref 65–99)
HCT VFR BLD AUTO: 35.5 % (ref 37.5–51)
HGB BLD-MCNC: 11.2 G/DL (ref 13–17.7)
LYMPHOCYTES # BLD AUTO: 0.4 10*3/MM3 (ref 0.7–3.1)
LYMPHOCYTES NFR BLD AUTO: 3.2 % (ref 19.6–45.3)
MCH RBC QN AUTO: 30.4 PG (ref 26.6–33)
MCHC RBC AUTO-ENTMCNC: 31.6 G/DL (ref 31.5–35.7)
MCV RBC AUTO: 96.2 FL (ref 79–97)
MONOCYTES # BLD AUTO: 0.7 10*3/MM3 (ref 0.1–0.9)
MONOCYTES NFR BLD AUTO: 5.8 % (ref 5–12)
NEUTROPHILS NFR BLD AUTO: 11.2 10*3/MM3 (ref 1.7–7)
NEUTROPHILS NFR BLD AUTO: 88.2 % (ref 42.7–76)
NRBC BLD AUTO-RTO: 0 /100 WBC (ref 0–0.2)
PLATELET # BLD AUTO: 197 10*3/MM3 (ref 140–450)
PMV BLD AUTO: 8.4 FL (ref 6–12)
POTASSIUM SERPL-SCNC: 3.6 MMOL/L (ref 3.5–5.2)
QT INTERVAL: 371 MS
RBC # BLD AUTO: 3.69 10*6/MM3 (ref 4.14–5.8)
SODIUM SERPL-SCNC: 140 MMOL/L (ref 136–145)
VANCOMYCIN SERPL-MCNC: 27.6 MCG/ML (ref 5–40)
VIT B12 BLD-MCNC: 894 PG/ML (ref 211–946)
WBC NRBC COR # BLD: 12.7 10*3/MM3 (ref 3.4–10.8)

## 2023-03-17 PROCEDURE — 80202 ASSAY OF VANCOMYCIN: CPT | Performed by: INTERNAL MEDICINE

## 2023-03-17 PROCEDURE — 25010000002 CEFTRIAXONE PER 250 MG: Performed by: INTERNAL MEDICINE

## 2023-03-17 PROCEDURE — 85025 COMPLETE CBC W/AUTO DIFF WBC: CPT | Performed by: INTERNAL MEDICINE

## 2023-03-17 PROCEDURE — 93325 DOPPLER ECHO COLOR FLOW MAPG: CPT

## 2023-03-17 PROCEDURE — 93308 TTE F-UP OR LMTD: CPT | Performed by: INTERNAL MEDICINE

## 2023-03-17 PROCEDURE — 25010000002 CEFEPIME PER 500 MG: Performed by: INTERNAL MEDICINE

## 2023-03-17 PROCEDURE — 94761 N-INVAS EAR/PLS OXIMETRY MLT: CPT

## 2023-03-17 PROCEDURE — 80048 BASIC METABOLIC PNL TOTAL CA: CPT | Performed by: INTERNAL MEDICINE

## 2023-03-17 PROCEDURE — 94799 UNLISTED PULMONARY SVC/PX: CPT

## 2023-03-17 PROCEDURE — 97162 PT EVAL MOD COMPLEX 30 MIN: CPT

## 2023-03-17 PROCEDURE — 94664 DEMO&/EVAL PT USE INHALER: CPT

## 2023-03-17 PROCEDURE — 93325 DOPPLER ECHO COLOR FLOW MAPG: CPT | Performed by: INTERNAL MEDICINE

## 2023-03-17 PROCEDURE — 93308 TTE F-UP OR LMTD: CPT

## 2023-03-17 RX ADMIN — ATORVASTATIN CALCIUM 40 MG: 40 TABLET, FILM COATED ORAL at 08:01

## 2023-03-17 RX ADMIN — THEOPHYLLINE 300 MG: 300 TABLET, EXTENDED RELEASE ORAL at 20:31

## 2023-03-17 RX ADMIN — PANTOPRAZOLE SODIUM 40 MG: 40 TABLET, DELAYED RELEASE ORAL at 08:01

## 2023-03-17 RX ADMIN — CLOPIDOGREL BISULFATE 75 MG: 75 TABLET ORAL at 08:01

## 2023-03-17 RX ADMIN — METOPROLOL SUCCINATE 25 MG: 25 TABLET, EXTENDED RELEASE ORAL at 08:00

## 2023-03-17 RX ADMIN — THEOPHYLLINE 300 MG: 300 TABLET, EXTENDED RELEASE ORAL at 08:01

## 2023-03-17 RX ADMIN — SODIUM BICARBONATE 650 MG: 650 TABLET ORAL at 08:01

## 2023-03-17 RX ADMIN — Medication 3 ML: at 08:01

## 2023-03-17 RX ADMIN — MONTELUKAST 10 MG: 10 TABLET, FILM COATED ORAL at 20:31

## 2023-03-17 RX ADMIN — Medication 3 ML: at 20:32

## 2023-03-17 RX ADMIN — ASPIRIN 81 MG: 81 TABLET, COATED ORAL at 12:25

## 2023-03-17 RX ADMIN — CEFTRIAXONE 1 G: 1 INJECTION, POWDER, FOR SOLUTION INTRAMUSCULAR; INTRAVENOUS at 17:10

## 2023-03-17 RX ADMIN — ARFORMOTEROL TARTRATE 15 MCG: 15 SOLUTION RESPIRATORY (INHALATION) at 08:59

## 2023-03-17 RX ADMIN — BUDESONIDE 0.5 MG: 0.5 INHALANT RESPIRATORY (INHALATION) at 09:03

## 2023-03-17 RX ADMIN — CEFEPIME 2 G: 2 INJECTION, POWDER, FOR SOLUTION INTRAVENOUS at 05:38

## 2023-03-17 RX ADMIN — HYDROCODONE BITARTRATE AND ACETAMINOPHEN 1 TABLET: 5; 325 TABLET ORAL at 20:34

## 2023-03-17 RX ADMIN — ARFORMOTEROL TARTRATE 15 MCG: 15 SOLUTION RESPIRATORY (INHALATION) at 18:35

## 2023-03-17 RX ADMIN — FERROUS SULFATE TAB EC 324 MG (65 MG FE EQUIVALENT) 324 MG: 324 (65 FE) TABLET DELAYED RESPONSE at 08:01

## 2023-03-17 RX ADMIN — FINASTERIDE 5 MG: 5 TABLET, FILM COATED ORAL at 20:31

## 2023-03-17 RX ADMIN — SODIUM BICARBONATE 650 MG: 650 TABLET ORAL at 20:31

## 2023-03-17 RX ADMIN — BUDESONIDE 0.5 MG: 0.5 INHALANT RESPIRATORY (INHALATION) at 18:41

## 2023-03-17 RX ADMIN — ACETAMINOPHEN 650 MG: 325 TABLET, FILM COATED ORAL at 12:25

## 2023-03-17 NOTE — NURSING NOTE
77-year-old male who presents to the hospital after falling out of bed.  Consult received to assess patient's right second toe.  Patient states that he was attempting to trim his nails and accidentally cut cut it.  He states that this happened a while back and that he thinks it is getting better.  Indeed the area is currently dry.  The nail is ecchymotic but it is intact it is not loose.  There is no drainage or exudate.  The area is clean and dry.  Would recommend to continue to leave it clean and dry.  He was supposed to see his podiatrist but wound up being in the hospital.  He is encouraged to follow-up with podiatry after discharge

## 2023-03-17 NOTE — PLAN OF CARE
Goal Outcome Evaluation:              Outcome Evaluation: Pt is a 76 y/o male presenting to [facility] on 3/16 after a fall at home with weakness and malaise following fall. Pt with known history of lung cancer, but thought to be in remission. CT chest 3/16: could reflect pneumonia, unchanged lung nodules, emphysema and chronic lung disease, descending thoracic aortic aneurysm. PMH also includes L BKA in 2016.  Pt currently requiring 3L NC.; same as PLOF. Pt reports PLOF of living with a friend in single story home with 5 stairs or ramp entry, PLOF Mod(I) with L BKA for all household/community mobility/ambulation. At time of PT eval, pt insistent on not using supplemental O2 with ambulation - pt completes bed mobility with supervision, transfers and gait x60 ft with CGA, but desats to 82% with room air; recovers to 94% within 1 min with 3L NC donned. Pt educated on importance of using O2 with ambulation and need for longer NC discussed with nurse and RT. Due to weakness, fall risk, and funcitonal decline, follow-up HHPT recommended at time of d/c from [facility].

## 2023-03-17 NOTE — THERAPY EVALUATION
Patient Name: David A Jolissaint  : 1945    MRN: [id number]                              Today's Date: 3/17/2023       Admit Date: 3/16/2023    Visit Dx:     ICD-10-CM ICD-9-CM   1. Weakness  R53.1 780.79     Patient Active Problem List   Diagnosis   • Coronary artery disease involving native coronary artery of native heart without angina pectoris   • Right bundle branch block   • Shortness of breath   • Chest pain, atypical   • Lower abdominal pain   • Acute constipation   • Chest pressure   • Benign hypertension with chronic kidney disease, stage IV (HCC)   • Prediabetes   • Mediastinal adenopathy   • Small cell lung cancer (HCC)   • Chest pain   • Bilateral carotid artery stenosis   • Chronic obstructive pulmonary disease (HCC)   • Chronic renal insufficiency, stage III (moderate) (HCC)   • Dehiscence of amputation stump (HCC)   • Dyslipidemia   • History of prosthetic heart valve   • Peripheral arterial occlusive disease (HCC)   • Status post unilateral below knee amputation   • Atypical chest pain   • Atherosclerosis of artery   • Atherosclerosis of coronary artery bypass graft   • Coronary atherosclerosis   • Mediastinal lymphadenopathy   • Small cell carcinoma of lung (HCC)   • Poor venous access   • Near syncope   • Weakness     Past Medical History:   Diagnosis Date   • Anemia    • Cancer (HCC)    • Cellulitis    • CKD (chronic kidney disease), stage III (HCC)    • COPD (chronic obstructive pulmonary disease) (HCC)    • Diverticulitis    • Dyslipidemia    • Hypertension    • Myocardial infarction (HCC)    • PVD (peripheral vascular disease) (HCC)      Past Surgical History:   Procedure Laterality Date   • BRONCHOSCOPY N/A 2021    Procedure: BRONCHOSCOPY WITH ENDOBRONCHIAL ULTRASOUND WITH FINE NEEDLE ASPIRATION. BRONCHOALVEOLAR LAVAGE;  Surgeon: Jackson Quezada MD;  Location: [facility] ENDOSCOPY;  Service: Pulmonary;  Laterality: N/A;  subcarinal mass   • CARDIAC SURGERY     • CHOLECYSTECTOMY     •  GALLBLADDER SURGERY     • LEG AMPUTATION Left       General Information     Row Name 03/17/23 1400          Physical Therapy Time and Intention    Document Type evaluation  -JV     Mode of Treatment physical therapy  -JV     Row Name 03/17/23 1400          General Information    Patient Profile Reviewed yes  -JV     Prior Level of Function independent:;all household mobility;community mobility;driving  -JV     Existing Precautions/Restrictions fall;oxygen therapy device and L/min  3L PLOF  -JV     Barriers to Rehab medically complex;previous functional deficit  -JV     Row Name 03/17/23 1400          Living Environment    People in Home friend(s)  -JV     Row Name 03/17/23 1400          Home Main Entrance    Number of Stairs, Main Entrance five  -JV     Stair Railings, Main Entrance railing on left side (ascending)  -[facility] Name 03/17/23 1400          Stairs Within Home, Primary    Stairs, Within Home, Primary also ramp entry with railings on both sides  -J     Number of Stairs, Within Home, Primary none  -[facility] Name 03/17/23 1400          Cognition    Orientation Status (Cognition) oriented x 4  -[facility] Name 03/17/23 1400          Safety Issues, Functional Mobility    Impairments Affecting Function (Mobility) balance;endurance/activity tolerance;pain;strength  -JV           User Key  (r) = Recorded By, (t) = Taken By, (c) = Cosigned By    Initials Name Provider Type    J Deirdre Flood Physical Therapist               Mobility     Row Name 03/17/23 1408          Bed Mobility    Bed Mobility supine-sit-supine  -J     Supine-Sit-Supine Brave (Bed Mobility) supervision;verbal cues;nonverbal cues (demo/gesture)  -J     Assistive Device (Bed Mobility) bed rails;head of bed elevated  -J     Row Name 03/17/23 1408          Bed-Chair Transfer    Bed-Chair Brave (Transfers) contact guard;verbal cues  -[facility] Name 03/17/23 1408          Sit-Stand Transfer    Sit-Stand Brave  (Transfers) contact guard;verbal cues  -JV     Row Name 03/17/23 1408          Gait/Stairs (Locomotion)    Datil Level (Gait) contact guard  -JV     Distance in Feet (Gait) 60 ft with L BKA prosthesis donned  -JV     Deviations/Abnormal Patterns (Gait) base of support, wide;kurt decreased;gait speed decreased  -JV     Bilateral Gait Deviations forward flexed posture  -JV           User Key  (r) = Recorded By, (t) = Taken By, (c) = Cosigned By    Initials Name Provider Type    Deirdre Quiros Physical Therapist               Obj/Interventions     Row Name 03/17/23 1402          Range of Motion Comprehensive    General Range of Motion bilateral lower extremity ROM WFL  -JV     Row Name 03/17/23 1402          Strength Comprehensive (MMT)    Comment, General Manual Muscle Testing (MMT) Assessment BLE grossly 4/5  -JV     Row Name 03/17/23 1530          Balance    Balance Assessment standing static balance;standing dynamic balance  -JV     Static Standing Balance supervision  -JV     Dynamic Standing Balance contact guard  -JV     Position/Device Used, Standing Balance unsupported  -JV     Row Name 03/17/23 1402          Sensory Assessment (Somatosensory)    Sensory Assessment (Somatosensory) LE sensation intact  -JV           User Key  (r) = Recorded By, (t) = Taken By, (c) = Cosigned By    Initials Name Provider Type    Deirdre Quiros Physical Therapist               Goals/Plan     Row Name 03/17/23 1539          Bed Mobility Goal 1 (PT)    Activity/Assistive Device (Bed Mobility Goal 1, PT) bed mobility activities, all  -JV     Datil Level/Cues Needed (Bed Mobility Goal 1, PT) independent  -JV     Time Frame (Bed Mobility Goal 1, PT) long term goal (LTG);2 weeks  -JV     Row Name 03/17/23 1534          Transfer Goal 1 (PT)    Activity/Assistive Device (Transfer Goal 1, PT) bed-to-chair/chair-to-bed  -JV     Datil Level/Cues Needed (Transfer Goal 1, PT) modified independence  -JV      Time Frame (Transfer Goal 1, PT) long term goal (LTG);2 weeks  -JV     Strategies/Barriers (Transfers Goal 1, PT) LLE prosthesis  -JV     Row Name 03/17/23 1539          Gait Training Goal 1 (PT)    Activity/Assistive Device (Gait Training Goal 1, PT) gait (walking locomotion)  -JV     Grover Level (Gait Training Goal 1, PT) modified independence  -JV     Distance (Gait Training Goal 1, PT) 200 ft with LLE prosthesis; SaO2 >=90% with 3L NC  -JV     Time Frame (Gait Training Goal 1, PT) long term goal (LTG);2 weeks  -JV     Row Name 03/17/23 1533          Therapy Assessment/Plan (PT)    Planned Therapy Interventions (PT) balance training;bed mobility training;gait training;home exercise program;patient/family education;transfer training;strengthening;stair training;prosthetic fitting/training  -JV           User Key  (r) = Recorded By, (t) = Taken By, (c) = Cosigned By    Initials Name Provider Type    Deirdre Quiros Physical Therapist               Clinical Impression     Row Name 03/17/23 1530          Pain    Pretreatment Pain Rating 0/10 - no pain  -JV     Posttreatment Pain Rating 0/10 - no pain  -JV     Row Name 03/17/23 1530          Plan of Care Review    Outcome Evaluation Pt is a 76 y/o male presenting to [facility] on 3/16 after a fall at home with weakness and malaise following fall. Pt with known history of lung cancer, but thought to be in remission. CT chest 3/16: could reflect pneumonia, unchanged lung nodules, emphysema and chronic lung disease, descending thoracic aortic aneurysm. PMH also includes L BKA in 2016.  Pt currently requiring 3L NC.; same as PLOF. Pt reports PLOF of living with a friend in single story home with 5 stairs or ramp entry, PLOF Mod(I) with L BKA for all household/community mobility/ambulation. At time of PT eval, pt insistent on not using supplemental O2 with ambulation - pt completes bed mobility with supervision, transfers and gait x60 ft with CGA, but desats to  82% with room air; recovers to 94% within 1 min with 3L NC donned. Pt educated on importance of using O2 with ambulation and need for longer NC discussed with nurse and RT. Due to weakness, fall risk, and funcitonal decline, follow-up HHPT recommended at time of d/c from [facility].  -JV     Row Name 03/17/23 1530          Therapy Assessment/Plan (PT)    Criteria for Skilled Interventions Met (PT) yes;meets criteria;skilled treatment is necessary  -JV     Therapy Frequency (PT) 5 times/wk  -JV     Predicted Duration of Therapy Intervention (PT) until d/c  -JV     Row Name 03/17/23 1530          Vital Signs    Pre SpO2 (%) 95  -JV     O2 Delivery Pre Treatment supplemental O2  3L  -JV     Intra SpO2 (%) 82  pt refusing to wear supplemental O2 with gait.  -JV     O2 Delivery Intra Treatment room air  -JV     Post SpO2 (%) 94  -JV     O2 Delivery Post Treatment supplemental O2  3L  -JV     Pre Patient Position Supine  -JV     Intra Patient Position Sitting  -JV     Post Patient Position Sitting  -JV     Recovery Time 1 min once 3L donned  -JV     Row Name 03/17/23 1530          Positioning and Restraints    Pre-Treatment Position in bed  -JV     Post Treatment Position bed  -JV     In Bed fowlers;call light within reach;notified nsg;encouraged to call for assist;exit alarm on  -JV           User Key  (r) = Recorded By, (t) = Taken By, (c) = Cosigned By    Initials Name Provider Type    Deirdre Quiros Physical Therapist               Outcome Measures     Row Name 03/17/23 0750          How much help from another person do you currently need...    Turning from your back to your side while in flat bed without using bedrails? 4  -ET     Moving from lying on back to sitting on the side of a flat bed without bedrails? 4  -ET     Moving to and from a bed to a chair (including a wheelchair)? 4  -ET     Standing up from a chair using your arms (e.g., wheelchair, bedside chair)? 3  -ET     Climbing 3-5 steps with a railing? 3   -ET     To walk in hospital room? 3  -ET     AM-PAC 6 Clicks Score (PT) 21  -ET     Highest level of mobility 6 --> Walked 10 steps or more  -ET           User Key  (r) = Recorded By, (t) = Taken By, (c) = Cosigned By    Initials Name Provider Type    ET Daisha Nixon, RN Registered Nurse                             Physical Therapy Education     Title: PT OT SLP Therapies (Done)     Topic: Physical Therapy (Done)     Point: Mobility training (Done)     Learning Progress Summary           Patient Acceptance, E,TB, VU by  at 3/17/2023 1540                   Point: Precautions (Done)     Learning Progress Summary           Patient Acceptance, E,TB, VU by  at 3/17/2023 1540                               User Key     Initials Effective Dates Name Provider Type Discipline     03/30/21 -  Deirdre Flood Physical Therapist PT              PT Recommendation and Plan  Planned Therapy Interventions (PT): balance training, bed mobility training, gait training, home exercise program, patient/family education, transfer training, strengthening, stair training, prosthetic fitting/training  Outcome Evaluation: Pt is a 78 y/o male presenting to [facility] on 3/16 after a fall at home with weakness and malaise following fall. Pt with known history of lung cancer, but thought to be in remission. CT chest 3/16: could reflect pneumonia, unchanged lung nodules, emphysema and chronic lung disease, descending thoracic aortic aneurysm. PMH also includes L BKA in 2016.  Pt currently requiring 3L NC.; same as PLOF. Pt reports PLOF of living with a friend in single story home with 5 stairs or ramp entry, PLOF Mod(I) with L BKA for all household/community mobility/ambulation. At time of PT eval, pt insistent on not using supplemental O2 with ambulation - pt completes bed mobility with supervision, transfers and gait x60 ft with CGA, but desats to 82% with room air; recovers to 94% within 1 min with 3L NC donned. Pt educated on importance  of using O2 with ambulation and need for longer NC discussed with nurse and RT. Due to weakness, fall risk, and funcitonal decline, follow-up HHPT recommended at time of d/c from [facility].     Time Calculation:    PT Charges     Row Name 03/17/23 1542             Time Calculation    Start Time 1350  -JV      Stop Time 1415  -JV      Time Calculation (min) 25 min  -JV      PT Received On 03/17/23  -JV      PT - Next Appointment 03/19/23  -JV      PT Goal Re-Cert Due Date 03/31/23  -JV         Time Calculation- PT    Total Timed Code Minutes- PT 0 minute(s)  -JV            User Key  (r) = Recorded By, (t) = Taken By, (c) = Cosigned By    Initials Name Provider Type    Deirdre Quiros Physical Therapist              Therapy Charges for Today     Code Description Service Date Service Provider Modifiers Qty    [id number] HC PT EVAL MOD COMPLEXITY 4 3/17/2023 Deirdre Flood GP 1          PT G-Codes  AM-PAC 6 Clicks Score (PT): 21  PT Discharge Summary  Anticipated Discharge Disposition (PT): home with home health    Deirdre Flood  3/17/2023

## 2023-03-17 NOTE — PROGRESS NOTES
LOS: 1 day   Patient Care Team:  Walter Valero MD as PCP - General  [facility] as Consulting Physician (Interventional Cardiology)  Riki Hedrick MD as Consulting Physician (Hematology and Oncology)  Hoang Mendez MD as Consulting Physician (Nephrology)    Subjective     Interval History: Improving    Patient Complaints: Nonproductive cough, overall feels better no new complaints    History taken from: patient    Review of Systems   Constitutional: Positive for activity change and fatigue. Negative for chills and fever.   HENT: Negative for facial swelling.    Eyes: Negative for visual disturbance.   Respiratory: Positive for cough. Negative for shortness of breath, wheezing and stridor.    Cardiovascular: Negative for chest pain and palpitations.   Gastrointestinal: Negative for abdominal pain, constipation, diarrhea and vomiting.   Genitourinary: Negative for dysuria.   Musculoskeletal: Negative for back pain.   Neurological: Positive for weakness. Negative for facial asymmetry and light-headedness.   Psychiatric/Behavioral: Negative for confusion.           Objective     Vital Signs  Temp:  [97.3 °F (36.3 °C)-98.8 °F (37.1 °C)] 97.3 °F (36.3 °C)  Heart Rate:  [] 90  Resp:  [19-30] 30  BP: (117-145)/(69-85) 118/78    Physical Exam:     General Appearance:    Alert, cooperative, in no acute distress,   Head:    Normocephalic, without obvious abnormality, atraumatic   Eyes:            Lids and lashes normal, conjunctivae and sclerae normal, no   icterus, no pallor, corneas clear, PERRLA   Ears:    Ears appear intact with no abnormalities noted   Throat:   No oral lesions, no thrush, oral mucosa moist   Neck:   No adenopathy, supple, trachea midline, no thyromegaly, no   carotid bruit, no JVD   Lungs:    Left basilar crackles, otherwise clear    Heart:    Regular rhythm and normal rate, normal S1 and S2, no            murmur, no gallop, no rub, no click   Chest Wall:    No  abnormalities observed   Abdomen:     Normal bowel sounds, no masses, no organomegaly, soft        Non-tender non-distended, no guarding,   Extremities:  Left BKA   Pulses:   Pulses palpable and equal bilaterally   Skin:   No bleeding, bruising or rash   Lymph nodes:   No palpable adenopathy   Neurologic:   Cranial nerves 2 - 12 grossly intact, sensation intact, DTR       present and equal bilaterally        Results Review:    Lab Results (last 24 hours)     Procedure Component Value Units Date/Time    Blood Culture - Blood, Arm, Right [[id number]]  (Normal) Collected: 03/16/23 1331    Specimen: Blood from Arm, Right Updated: 03/17/23 1345     Blood Culture No growth at 24 hours    Narrative:      Less than seven (7) mL's of blood was collected.  Insufficient quantity may yield false negative results.    Blood Culture - Blood, Arm, Left [[id number]]  (Normal) Collected: 03/16/23 1314    Specimen: Blood from Arm, Left Updated: 03/17/23 1330     Blood Culture No growth at 24 hours    Narrative:      Less than seven (7) mL's of blood was collected.  Insufficient quantity may yield false negative results.    Vitamin B12 [[id number]]  (Normal) Collected: 03/16/23 2018    Specimen: Blood Updated: 03/17/23 1044     Vitamin B-12 894 pg/mL     Narrative:      Results may be falsely increased if patient taking Biotin.      Folate [[id number]]  (Normal) Collected: 03/16/23 2018    Specimen: Blood Updated: 03/17/23 1044     Folate >20.00 ng/mL     Narrative:      Results may be falsely increased if patient taking Biotin.      Basic Metabolic Panel [[id number]]  (Abnormal) Collected: 03/17/23 0545    Specimen: Blood Updated: 03/17/23 0628     Glucose 93 mg/dL      BUN 33 mg/dL      Creatinine 1.90 mg/dL      Sodium 140 mmol/L      Potassium 3.6 mmol/L      Chloride 104 mmol/L      CO2 24.0 mmol/L      Calcium 9.1 mg/dL      BUN/Creatinine Ratio 17.4     Anion Gap 12.0 mmol/L      eGFR 35.7 mL/min/1.73     Narrative:      GFR Normal  >60  Chronic Kidney Disease <60  Kidney Failure <15    The GFR formula is only valid for adults with stable renal function between ages 18 and 70.    Vancomycin, Random [[id number]]  (Normal) Collected: 03/17/23 0545    Specimen: Blood Updated: 03/17/23 0628     Vancomycin Random 27.60 mcg/mL     Narrative:      Therapeutic Ranges for Vancomycin    Vancomycin Random   5.0-40.0 mcg/mL  Vancomycin Trough   5.0-20.0 mcg/mL  Vancomycin Peak     20.0-40.0 mcg/mL    CBC & Differential [[id number]]  (Abnormal) Collected: 03/17/23 0545    Specimen: Blood Updated: 03/17/23 0605    Narrative:      The following orders were created for panel order CBC & Differential.  Procedure                               Abnormality         Status                     ---------                               -----------         ------                     CBC Auto Differential[[id number]]        Abnormal            Final result                 Please view results for these tests on the individual orders.    CBC Auto Differential [[id number]]  (Abnormal) Collected: 03/17/23 0545    Specimen: Blood Updated: 03/17/23 0605     WBC 12.70 10*3/mm3      RBC 3.69 10*6/mm3      Hemoglobin 11.2 g/dL      Hematocrit 35.5 %      MCV 96.2 fL      MCH 30.4 pg      MCHC 31.6 g/dL      RDW 15.3 %      RDW-SD 51.2 fl      MPV 8.4 fL      Platelets 197 10*3/mm3      Neutrophil % 88.2 %      Lymphocyte % 3.2 %      Monocyte % 5.8 %      Eosinophil % 2.0 %      Basophil % 0.8 %      Neutrophils, Absolute 11.20 10*3/mm3      Lymphocytes, Absolute 0.40 10*3/mm3      Monocytes, Absolute 0.70 10*3/mm3      Eosinophils, Absolute 0.30 10*3/mm3      Basophils, Absolute 0.10 10*3/mm3      nRBC 0.0 /100 WBC     Iron Profile [[id number]]  (Abnormal) Collected: 03/16/23 2018    Specimen: Blood Updated: 03/16/23 2114     Iron 19 mcg/dL      Iron Saturation 8 %      Transferrin 151 mg/dL      TIBC 225 mcg/dL     MRSA Screen, PCR (Inpatient) - Swab, Nares [[id number]]  (Normal)  Collected: 03/16/23 1800    Specimen: Swab from Nares Updated: 03/16/23 2101     MRSA PCR No MRSA Detected    Narrative:      The negative predictive value of this diagnostic test is high and should only be used to consider de-escalating anti-MRSA therapy. A positive result may indicate colonization with MRSA and must be correlated clinically.    Reticulocytes [[id number]]  (Normal) Collected: 03/16/23 2018    Specimen: Blood Updated: 03/16/23 2059     Reticulocyte % 1.49 %      Reticulocyte Absolute 0.0579 10*6/mm3            Imaging Results (Last 24 Hours)     Procedure Component Value Units Date/Time    CT Chest Without Contrast Diagnostic [[id number]] Collected: 03/16/23 1920     Updated: 03/16/23 1928    Narrative:      CT CHEST WO CONTRAST DIAGNOSTIC    Date of Exam: 3/16/2023 6:26 PM EDT    Indication: Fever of unknown origin.    Comparison: 1/28/2023 and chest radiograph 3/16/2023.    Technique: Axial CT images were obtained of the chest without contrast administration.  Sagittal and coronal reconstructions were performed.  Automated exposure control and iterative reconstruction methods were used.     Findings:  There is moderate to severe emphysema. There are calcified right-sided pleural plaques. There is a stable large rounded region of scar consolidation and bronchiectasis in the right perihilar lung. There is redemonstration of irregular nodules in the   right upper lobe measuring 19 x 12 mm best seen on image 57 and in the right middle lobe measuring 11 x 17 mm on image 62. The nodule the right middle lobe appears smaller and less solid than before. There is evolving rounded atelectasis in the left   lower lobe with new patchy areas of airspace disease that could reflect a superimposed pneumonia. There is subpleural reticulation, multifocal pulmonary scarring and interstitial disease. No new lung nodules. No pneumothorax or pleural effusion.    The thyroid, trachea and esophagus appear within normal  limits. There is a small hiatal hernia. The heart size is normal. There are moderate coronary artery calcifications. No pericardial effusion. There is no mediastinal lymphadenopathy. There is   moderate aortic and aortic branch vessel atherosclerosis. Stable right-sided chest port. There is aneurysmal dilatation of the distal descending thoracic aorta up to 39 mm.    Superficial soft tissues and underlying musculature appear within normal limits. There are no acute osseous or normalities or destructive bone lesions. There are mild spinal degenerative changes. There is bilateral renal cortical thinning. No acute   findings in the upper abdomen.      Impression:      Impression:    1. There is new patchy airspace disease within the left lower lobe that could reflect pneumonia.  2. Stable rounded region of scarred consolidation and bronchiectasis in the right perihilar lung reflecting posttreatment change.  3. Solid-appearing right upper lobe nodule appears unchanged from the prior study and solid appearing right middle lobe nodule appears smaller than before. No new nodules.  4. Emphysema and chronic lung disease.  5. Descending thoracic aortic aneurysm.    Electronically Signed: Lyle Hall    3/16/2023 7:26 PM EDT    Workstation ID: [id number]               I reviewed the patient's new clinical results.    Medication Review:   Scheduled Meds:arformoterol, 15 mcg, Nebulization, BID - RT  aspirin, 81 mg, Oral, Daily With Lunch  atorvastatin, 40 mg, Oral, QAM  budesonide, 0.5 mg, Nebulization, BID - RT  cefTRIAXone, 1 g, Intravenous, Q24H  clopidogrel, 75 mg, Oral, QAM  ferrous sulfate, 324 mg, Oral, Daily With Breakfast  finasteride, 5 mg, Oral, Nightly  metoprolol succinate XL, 25 mg, Oral, Daily  montelukast, 10 mg, Oral, Nightly  pantoprazole, 40 mg, Oral, Daily  sodium bicarbonate, 650 mg, Oral, BID  sodium chloride, 3 mL, Intravenous, Q12H  theophylline, 300 mg, Oral, Q12H      Continuous Infusions:   PRN Meds:.•   acetaminophen  •  HYDROcodone-acetaminophen  •  LORazepam  •  ondansetron  •  [COMPLETED] Insert Peripheral IV **AND** sodium chloride  •  Access VAD **AND** sodium chloride  •  sodium chloride  •  sodium chloride     Assessment & Plan       Weakness  Community-acquired pneumonia -MRSA screen was negative; changing antibiotics to ceftriaxone  Leukocytosis-improving with antibiotic therapy  Generalized weakness- improving with treatment of underlying infection  COPD-well compensated; continue home medications  History of small cell lung cancer, in remission-CT scan shows no evidence of recurrence  Mixed hyperlipidemia-continue statin  BPH-continue finasteride  Essential hypertension-continue metoprolol  Chronic kidney disease stage III-creatinine appears to be at baseline; continue to avoid nephrotoxins; continue sodium bicarbonate  Chronic coronary artery disease-no anginal symptoms  Anemia-hemoglobin is stable; continue oral iron        Plan for disposition: Possible discharge home tomorrow    She Thakur MD  03/17/23  15:42 EDT

## 2023-03-17 NOTE — PLAN OF CARE
Goal Outcome Evaluation:               Patent resting in bed, vs stable, no nursing concerns. Patient on 3L nc

## 2023-03-17 NOTE — CASE MANAGEMENT/SOCIAL WORK
Discharge Planning Assessment   [facility]     Patient Name: David A Jolissaint  MRN: [id number]  Today's Date: 3/17/2023    Admit Date: 3/16/2023    Plan: Anticipate routine home w/ SO. Home O2 3L w/ [employer].   Discharge Needs Assessment     Row Name 03/17/23 1301       Living Environment    People in Home significant other    Name(s) of People in Home LuisfrNilda    Current Living Arrangements home    Potentially Unsafe Housing Conditions none    Primary Care Provided by self    Provides Primary Care For no one    Family Caregiver if Needed significant other    Family Caregiver Names Mary    Quality of Family Relationships helpful;involved;supportive    Able to Return to Prior Arrangements yes       Resource/Environmental Concerns    Resource/Environmental Concerns none    Transportation Concerns none       Transition Planning    Patient/Family Anticipates Transition to home with family;home with help/services    Patient/Family Anticipated Services at Transition home health care    Transportation Anticipated family or friend will provide       Discharge Needs Assessment    Readmission Within the Last 30 Days no previous admission in last 30 days    Equipment Currently Used at Home cane, straight;oxygen;prosthesis;shower chair;bp cuff;nebulizer    Concerns to be Addressed care coordination/care conferences;discharge planning    Anticipated Changes Related to Illness none    Equipment Needed After Discharge none    Provided Post Acute Provider List? N/A    Provided Post Acute Provider Quality & Resource List? N/A               Discharge Plan     Row Name 03/17/23 1302       Plan    Plan Anticipate routine home w/ SO. Home O2 3L w/ [employer].    Patient/Family in Agreement with Plan yes    Plan Comments CM met with patient at bedside. Pt lives at home with girlfrienterra Hernandez. He can normally drive and is independent with ADL's. PCP and pharmacy confirmed. DME includes cane, walker, prosthesis, nebulizer, BP cuff, and  3L O2 supplied by [employer]. Pt is not current with any HHC/PT services at this time but may need at discharge. Denies issues affording medications/food. Mary to provide transportation at discharge. Reviewed [facility] letter, obtained signature, and copy left at bedside.              Expected Discharge Date and Time     Expected Discharge Date Expected Discharge Time    Mar 19, 2023          Demographic Summary     Row Name 03/17/23 1300       General Information    Admission Type inpatient    Arrived From emergency department    Required Notices Provided Observation Status Notice    Referral Source admission list    Reason for Consult discharge planning;care coordination/care conference    Preferred Language English       Contact Information    Permission Granted to Share Info With                Functional Status     Row Name 03/17/23 1300       Functional Status    Usual Activity Tolerance moderate    Current Activity Tolerance moderate       Functional Status, IADL    Medications assistive person    Meal Preparation assistive person    Housekeeping assistive person    Laundry assistive person    Shopping assistive person               Patient Forms     Row Name 03/17/23 1303       Patient Forms    Important Message from Medicare ([facility]) Delivered  [facility] 3/17 per CM    Delivered to Patient    Method of delivery In person              Met with patient in room wearing PPE: mask, face shield/goggles.      Maintained distance greater than six feet and spent less than 15 minutes in the room.      Megan Naegele, RN      Office Phone: [phone]  Office Cell: [phone]

## 2023-03-18 PROBLEM — N18.32 STAGE 3B CHRONIC KIDNEY DISEASE (HCC): Status: ACTIVE | Noted: 2022-04-29

## 2023-03-18 PROBLEM — N40.0 BENIGN PROSTATIC HYPERPLASIA: Status: ACTIVE | Noted: 2022-04-29

## 2023-03-18 PROBLEM — D63.1 ANEMIA IN CHRONIC KIDNEY DISEASE: Status: ACTIVE | Noted: 2022-04-29

## 2023-03-18 PROBLEM — N18.9 ANEMIA IN CHRONIC KIDNEY DISEASE: Status: ACTIVE | Noted: 2022-04-29

## 2023-03-18 LAB
ANION GAP SERPL CALCULATED.3IONS-SCNC: 11 MMOL/L (ref 5–15)
BASOPHILS # BLD AUTO: 0.1 10*3/MM3 (ref 0–0.2)
BASOPHILS NFR BLD AUTO: 0.7 % (ref 0–1.5)
BUN SERPL-MCNC: 30 MG/DL (ref 8–23)
BUN/CREAT SERPL: 15.4 (ref 7–25)
CALCIUM SPEC-SCNC: 8.9 MG/DL (ref 8.6–10.5)
CHLORIDE SERPL-SCNC: 104 MMOL/L (ref 98–107)
CO2 SERPL-SCNC: 24 MMOL/L (ref 22–29)
CREAT SERPL-MCNC: 1.95 MG/DL (ref 0.76–1.27)
DEPRECATED RDW RBC AUTO: 49 FL (ref 37–54)
EGFRCR SERPLBLD CKD-EPI 2021: 34.6 ML/MIN/1.73
EOSINOPHIL # BLD AUTO: 0.4 10*3/MM3 (ref 0–0.4)
EOSINOPHIL NFR BLD AUTO: 3.8 % (ref 0.3–6.2)
ERYTHROCYTE [DISTWIDTH] IN BLOOD BY AUTOMATED COUNT: 14.6 % (ref 12.3–15.4)
GLUCOSE SERPL-MCNC: 97 MG/DL (ref 65–99)
HCT VFR BLD AUTO: 35.7 % (ref 37.5–51)
HGB BLD-MCNC: 11.4 G/DL (ref 13–17.7)
LYMPHOCYTES # BLD AUTO: 0.4 10*3/MM3 (ref 0.7–3.1)
LYMPHOCYTES NFR BLD AUTO: 4.6 % (ref 19.6–45.3)
MCH RBC QN AUTO: 30.6 PG (ref 26.6–33)
MCHC RBC AUTO-ENTMCNC: 32 G/DL (ref 31.5–35.7)
MCV RBC AUTO: 95.7 FL (ref 79–97)
MONOCYTES # BLD AUTO: 0.7 10*3/MM3 (ref 0.1–0.9)
MONOCYTES NFR BLD AUTO: 7.1 % (ref 5–12)
NEUTROPHILS NFR BLD AUTO: 8 10*3/MM3 (ref 1.7–7)
NEUTROPHILS NFR BLD AUTO: 83.8 % (ref 42.7–76)
NRBC BLD AUTO-RTO: 0 /100 WBC (ref 0–0.2)
PLATELET # BLD AUTO: 200 10*3/MM3 (ref 140–450)
PMV BLD AUTO: 8.1 FL (ref 6–12)
POTASSIUM SERPL-SCNC: 3.5 MMOL/L (ref 3.5–5.2)
RBC # BLD AUTO: 3.73 10*6/MM3 (ref 4.14–5.8)
SODIUM SERPL-SCNC: 139 MMOL/L (ref 136–145)
WBC NRBC COR # BLD: 9.5 10*3/MM3 (ref 3.4–10.8)

## 2023-03-18 PROCEDURE — 80048 BASIC METABOLIC PNL TOTAL CA: CPT | Performed by: INTERNAL MEDICINE

## 2023-03-18 PROCEDURE — 25010000002 CEFTRIAXONE PER 250 MG: Performed by: INTERNAL MEDICINE

## 2023-03-18 PROCEDURE — 25010000002 ONDANSETRON PER 1 MG: Performed by: INTERNAL MEDICINE

## 2023-03-18 PROCEDURE — 25010000002 METHYLPREDNISOLONE PER 40 MG: Performed by: FAMILY MEDICINE

## 2023-03-18 PROCEDURE — 94761 N-INVAS EAR/PLS OXIMETRY MLT: CPT

## 2023-03-18 PROCEDURE — 94664 DEMO&/EVAL PT USE INHALER: CPT

## 2023-03-18 PROCEDURE — 94799 UNLISTED PULMONARY SVC/PX: CPT

## 2023-03-18 PROCEDURE — 85025 COMPLETE CBC W/AUTO DIFF WBC: CPT | Performed by: INTERNAL MEDICINE

## 2023-03-18 RX ORDER — ACETAMINOPHEN 325 MG/1
650 TABLET ORAL EVERY 6 HOURS PRN
Status: DISCONTINUED | OUTPATIENT
Start: 2023-03-18 | End: 2023-03-19 | Stop reason: HOSPADM

## 2023-03-18 RX ORDER — POLYETHYLENE GLYCOL 3350 17 G/17G
17 POWDER, FOR SOLUTION ORAL DAILY
Status: DISCONTINUED | OUTPATIENT
Start: 2023-03-18 | End: 2023-03-19 | Stop reason: HOSPADM

## 2023-03-18 RX ORDER — METHYLPREDNISOLONE SODIUM SUCCINATE 40 MG/ML
20 INJECTION, POWDER, LYOPHILIZED, FOR SOLUTION INTRAMUSCULAR; INTRAVENOUS ONCE
Status: COMPLETED | OUTPATIENT
Start: 2023-03-18 | End: 2023-03-18

## 2023-03-18 RX ADMIN — CEFTRIAXONE 1 G: 1 INJECTION, POWDER, FOR SOLUTION INTRAMUSCULAR; INTRAVENOUS at 17:34

## 2023-03-18 RX ADMIN — ATORVASTATIN CALCIUM 40 MG: 40 TABLET, FILM COATED ORAL at 06:01

## 2023-03-18 RX ADMIN — THEOPHYLLINE 300 MG: 300 TABLET, EXTENDED RELEASE ORAL at 07:54

## 2023-03-18 RX ADMIN — METHYLPREDNISOLONE SODIUM SUCCINATE 20 MG: 40 INJECTION, POWDER, FOR SOLUTION INTRAMUSCULAR; INTRAVENOUS at 12:22

## 2023-03-18 RX ADMIN — Medication 3 ML: at 12:24

## 2023-03-18 RX ADMIN — CLOPIDOGREL BISULFATE 75 MG: 75 TABLET ORAL at 06:01

## 2023-03-18 RX ADMIN — Medication 3 ML: at 20:19

## 2023-03-18 RX ADMIN — FINASTERIDE 5 MG: 5 TABLET, FILM COATED ORAL at 20:18

## 2023-03-18 RX ADMIN — BUDESONIDE 0.5 MG: 0.5 INHALANT RESPIRATORY (INHALATION) at 08:11

## 2023-03-18 RX ADMIN — ONDANSETRON 4 MG: 2 INJECTION INTRAMUSCULAR; INTRAVENOUS at 08:00

## 2023-03-18 RX ADMIN — Medication 3 ML: at 07:55

## 2023-03-18 RX ADMIN — ARFORMOTEROL TARTRATE 15 MCG: 15 SOLUTION RESPIRATORY (INHALATION) at 08:07

## 2023-03-18 RX ADMIN — ACETAMINOPHEN 650 MG: 325 TABLET, FILM COATED ORAL at 12:22

## 2023-03-18 RX ADMIN — ACETAMINOPHEN 650 MG: 325 TABLET, FILM COATED ORAL at 20:25

## 2023-03-18 RX ADMIN — THEOPHYLLINE 300 MG: 300 TABLET, EXTENDED RELEASE ORAL at 20:18

## 2023-03-18 RX ADMIN — POLYETHYLENE GLYCOL 3350 17 G: 17 POWDER, FOR SOLUTION ORAL at 16:24

## 2023-03-18 RX ADMIN — HYDROCODONE BITARTRATE AND ACETAMINOPHEN 1 TABLET: 5; 325 TABLET ORAL at 20:18

## 2023-03-18 RX ADMIN — SODIUM BICARBONATE 650 MG: 650 TABLET ORAL at 20:18

## 2023-03-18 RX ADMIN — METOPROLOL SUCCINATE 25 MG: 25 TABLET, EXTENDED RELEASE ORAL at 07:54

## 2023-03-18 RX ADMIN — SODIUM BICARBONATE 650 MG: 650 TABLET ORAL at 07:54

## 2023-03-18 RX ADMIN — BUDESONIDE 0.5 MG: 0.5 INHALANT RESPIRATORY (INHALATION) at 19:10

## 2023-03-18 RX ADMIN — PANTOPRAZOLE SODIUM 40 MG: 40 TABLET, DELAYED RELEASE ORAL at 07:54

## 2023-03-18 RX ADMIN — ARFORMOTEROL TARTRATE 15 MCG: 15 SOLUTION RESPIRATORY (INHALATION) at 19:04

## 2023-03-18 RX ADMIN — MONTELUKAST 10 MG: 10 TABLET, FILM COATED ORAL at 20:18

## 2023-03-18 RX ADMIN — ASPIRIN 81 MG: 81 TABLET, COATED ORAL at 12:24

## 2023-03-18 RX ADMIN — FERROUS SULFATE TAB EC 324 MG (65 MG FE EQUIVALENT) 324 MG: 324 (65 FE) TABLET DELAYED RESPONSE at 07:54

## 2023-03-18 NOTE — PLAN OF CARE
Continue to monitor and assess.    Problem: Fall Injury Risk  Goal: Absence of Fall and Fall-Related Injury  Outcome: Ongoing, Progressing   Goal Outcome Evaluation:

## 2023-03-18 NOTE — PROGRESS NOTES
LOS: 2 days   Patient Care Team:  Walter Valero MD as PCP - General  Kali Mistry MD as Consulting Physician (Interventional Cardiology)  Riki Hedrick MD as Consulting Physician (Hematology and Oncology)  Hoang Mendez MD as Consulting Physician (Nephrology)    Subjective:  Improved overall    Objective:   Afebrile      Review of Systems:   Review of Systems   Constitutional: Positive for activity change.   Respiratory: Positive for cough and shortness of breath.    Neurological: Positive for weakness.           Vital Signs  Temp:  [97.3 °F (36.3 °C)-97.8 °F (36.6 °C)] 97.7 °F (36.5 °C)  Heart Rate:  [] 99  Resp:  [16-38] 24  BP: (118-141)/(75-93) 139/93    Physical Exam:  Physical Exam  Vitals and nursing note reviewed.   Cardiovascular:      Rate and Rhythm: Normal rate.      Heart sounds: Normal heart sounds.   Pulmonary:      Breath sounds: Normal breath sounds.   Skin:     General: Skin is warm.          Radiology:  CT Head Without Contrast    Result Date: 3/16/2023  Impression: 1. Generalized atrophy with chronic periventricular ischemic changes. No acute intracranial findings. No significant change from the prior scan. Electronically Signed: Walter Davis  3/16/2023 1:41 PM EDT  Workstation ID: [id number]    CT Chest Without Contrast Diagnostic    Result Date: 3/16/2023  Impression: 1. There is new patchy airspace disease within the left lower lobe that could reflect pneumonia. 2. Stable rounded region of scarred consolidation and bronchiectasis in the right perihilar lung reflecting posttreatment change. 3. Solid-appearing right upper lobe nodule appears unchanged from the prior study and solid appearing right middle lobe nodule appears smaller than before. No new nodules. 4. Emphysema and chronic lung disease. 5. Descending thoracic aortic aneurysm. Electronically Signed: Lyle Hall  3/16/2023 7:26 PM EDT  Workstation ID: [id number]    XR Chest 1 View    Result Date:  3/16/2023  Impression: Chronic bilateral pleuroparenchymal changes and right perihilar fibrosis. No definite acute process demonstrated Electronically Signed: Michael Villarreal  3/16/2023 1:47 PM EDT  Workstation ID: [id number]         Results Review:     I reviewed the patient's new clinical results.  I reviewed the patient's new imaging results and agree with the interpretation.    Medication Review:   Scheduled Meds:arformoterol, 15 mcg, Nebulization, BID - RT  aspirin, 81 mg, Oral, Daily With Lunch  atorvastatin, 40 mg, Oral, QAM  budesonide, 0.5 mg, Nebulization, BID - RT  cefTRIAXone, 1 g, Intravenous, Q24H  clopidogrel, 75 mg, Oral, QAM  ferrous sulfate, 324 mg, Oral, Daily With Breakfast  finasteride, 5 mg, Oral, Nightly  metoprolol succinate XL, 25 mg, Oral, Daily  montelukast, 10 mg, Oral, Nightly  pantoprazole, 40 mg, Oral, Daily  sodium bicarbonate, 650 mg, Oral, BID  sodium chloride, 3 mL, Intravenous, Q12H  theophylline, 300 mg, Oral, Q12H      Continuous Infusions:   PRN Meds:.•  acetaminophen  •  HYDROcodone-acetaminophen  •  LORazepam  •  ondansetron  •  [COMPLETED] Insert Peripheral IV **AND** sodium chloride  •  Access VAD **AND** sodium chloride  •  sodium chloride  •  sodium chloride    Labs:    CBC    Results from last 7 days   Lab Units 03/18/23  0452 03/17/23  0545 03/16/23  1211   WBC 10*3/mm3 9.50 12.70* 22.10*   HEMOGLOBIN g/dL 11.4* 11.2* 12.0*   PLATELETS 10*3/mm3 200 197 206     BMP   Results from last 7 days   Lab Units 03/18/23  0452 03/17/23  0545 03/16/23  1211   SODIUM mmol/L 139 140 139   POTASSIUM mmol/L 3.5 3.6 3.9   CHLORIDE mmol/L 104 104 102   CO2 mmol/L 24.0 24.0 25.0   BUN mg/dL 30* 33* 35*   CREATININE mg/dL 1.95* 1.90* 1.98*   GLUCOSE mg/dL 97 93 108*     Cr Clearance Estimated Creatinine Clearance: 34.7 mL/min (A) (by C-G formula based on SCr of 1.95 mg/dL (H)).  Coag   Results from last 7 days   Lab Units 03/16/23  1211   INR  1.06   APTT seconds 77.5*     HbA1C   Lab Results    Component Value Date    HGBA1C 5.3 02/23/2023    HGBA1C 5.9 (H) 05/20/2021    HGBA1C 5.8 (H) 11/19/2019     Blood Glucose No results found for: POCGLU  Infection   Results from last 7 days   Lab Units 03/16/23  1331 03/16/23  1314   BLOODCX  No growth at 24 hours No growth at 24 hours     CMP   Results from last 7 days   Lab Units 03/18/23  0452 03/17/23  0545 03/16/23  1211   SODIUM mmol/L 139 140 139   POTASSIUM mmol/L 3.5 3.6 3.9   CHLORIDE mmol/L 104 104 102   CO2 mmol/L 24.0 24.0 25.0   BUN mg/dL 30* 33* 35*   CREATININE mg/dL 1.95* 1.90* 1.98*   GLUCOSE mg/dL 97 93 108*   ALBUMIN g/dL  --   --  3.7   BILIRUBIN mg/dL  --   --  0.8   ALK PHOS U/L  --   --  145*   AST (SGOT) U/L  --   --  19   ALT (SGPT) U/L  --   --  12     UA    Results from last 7 days   Lab Units 03/16/23  1218   NITRITE UA  Negative   WBC UA /HPF 0-2*   BACTERIA UA /HPF None Seen   SQUAM EPITHEL UA /HPF 0-2     Radiology(recent) CT Head Without Contrast    Result Date: 3/16/2023  Impression: 1. Generalized atrophy with chronic periventricular ischemic changes. No acute intracranial findings. No significant change from the prior scan. Electronically Signed: Walter Davis  3/16/2023 1:41 PM EDT  Workstation ID: [id number]    CT Chest Without Contrast Diagnostic    Result Date: 3/16/2023  Impression: 1. There is new patchy airspace disease within the left lower lobe that could reflect pneumonia. 2. Stable rounded region of scarred consolidation and bronchiectasis in the right perihilar lung reflecting posttreatment change. 3. Solid-appearing right upper lobe nodule appears unchanged from the prior study and solid appearing right middle lobe nodule appears smaller than before. No new nodules. 4. Emphysema and chronic lung disease. 5. Descending thoracic aortic aneurysm. Electronically Signed: Lyle Hall  3/16/2023 7:26 PM EDT  Workstation ID: [id number]    XR Chest 1 View    Result Date: 3/16/2023  Impression: Chronic bilateral pleuroparenchymal  changes and right perihilar fibrosis. No definite acute process demonstrated Electronically Signed: Michael Phil  3/16/2023 1:47 PM EDT  Workstation ID: [id number]     Assessment:    Community-acquired pneumonia present upon admission  Physical deconditioning  Panlobular COPD with emphysema  Acute on chronic mucopurulent bronchitis  History of small cell lung CA  Dyslipidemia  BPH with LUTS  Chronic kidney disease stage IIIb  Hypertension associated with chronic disease stage IIIb  Anemia of chronic kidney disease  Atherosclerotic heart disease of native coronary arteries with angina pectoris  History of prosthetic heart valve  Peripheral arterial disease  Insulin resistance  History of left BKA      Plan:  Continue antimicrobial therapy//out of bed today/monitor renal function        Nj Morataya MD  03/18/23  10:47 EDT

## 2023-03-19 ENCOUNTER — READMISSION MANAGEMENT (OUTPATIENT)
Dept: CALL CENTER | Facility: HOSPITAL | Age: 78
End: 2023-03-19
Payer: MEDICARE

## 2023-03-19 VITALS
HEIGHT: 70 IN | HEART RATE: 91 BPM | TEMPERATURE: 97.9 F | WEIGHT: 173.06 LBS | BODY MASS INDEX: 24.78 KG/M2 | DIASTOLIC BLOOD PRESSURE: 103 MMHG | SYSTOLIC BLOOD PRESSURE: 146 MMHG | OXYGEN SATURATION: 98 % | RESPIRATION RATE: 26 BRPM

## 2023-03-19 LAB
ANION GAP SERPL CALCULATED.3IONS-SCNC: 13 MMOL/L (ref 5–15)
BASOPHILS # BLD AUTO: 0 10*3/MM3 (ref 0–0.2)
BASOPHILS NFR BLD AUTO: 0.1 % (ref 0–1.5)
BUN SERPL-MCNC: 34 MG/DL (ref 8–23)
BUN/CREAT SERPL: 18.6 (ref 7–25)
CALCIUM SPEC-SCNC: 9.4 MG/DL (ref 8.6–10.5)
CHLORIDE SERPL-SCNC: 105 MMOL/L (ref 98–107)
CO2 SERPL-SCNC: 24 MMOL/L (ref 22–29)
CREAT SERPL-MCNC: 1.83 MG/DL (ref 0.76–1.27)
DEPRECATED RDW RBC AUTO: 49 FL (ref 37–54)
EGFRCR SERPLBLD CKD-EPI 2021: 37.3 ML/MIN/1.73
EOSINOPHIL # BLD AUTO: 0 10*3/MM3 (ref 0–0.4)
EOSINOPHIL NFR BLD AUTO: 0.3 % (ref 0.3–6.2)
ERYTHROCYTE [DISTWIDTH] IN BLOOD BY AUTOMATED COUNT: 14.7 % (ref 12.3–15.4)
GLUCOSE SERPL-MCNC: 116 MG/DL (ref 65–99)
HCT VFR BLD AUTO: 39.5 % (ref 37.5–51)
HGB BLD-MCNC: 12.7 G/DL (ref 13–17.7)
LYMPHOCYTES # BLD AUTO: 0.6 10*3/MM3 (ref 0.7–3.1)
LYMPHOCYTES NFR BLD AUTO: 4.5 % (ref 19.6–45.3)
MCH RBC QN AUTO: 30.8 PG (ref 26.6–33)
MCHC RBC AUTO-ENTMCNC: 32.2 G/DL (ref 31.5–35.7)
MCV RBC AUTO: 95.5 FL (ref 79–97)
MONOCYTES # BLD AUTO: 0.9 10*3/MM3 (ref 0.1–0.9)
MONOCYTES NFR BLD AUTO: 6.7 % (ref 5–12)
NEUTROPHILS NFR BLD AUTO: 11.2 10*3/MM3 (ref 1.7–7)
NEUTROPHILS NFR BLD AUTO: 88.4 % (ref 42.7–76)
NRBC BLD AUTO-RTO: 0 /100 WBC (ref 0–0.2)
PLATELET # BLD AUTO: 256 10*3/MM3 (ref 140–450)
PMV BLD AUTO: 8.6 FL (ref 6–12)
POTASSIUM SERPL-SCNC: 4.2 MMOL/L (ref 3.5–5.2)
RBC # BLD AUTO: 4.14 10*6/MM3 (ref 4.14–5.8)
SODIUM SERPL-SCNC: 142 MMOL/L (ref 136–145)
WBC NRBC COR # BLD: 12.6 10*3/MM3 (ref 3.4–10.8)

## 2023-03-19 PROCEDURE — 97530 THERAPEUTIC ACTIVITIES: CPT

## 2023-03-19 PROCEDURE — 80048 BASIC METABOLIC PNL TOTAL CA: CPT | Performed by: INTERNAL MEDICINE

## 2023-03-19 PROCEDURE — 25010000002 HEPARIN LOCK FLUSH PER 10 UNITS: Performed by: INTERNAL MEDICINE

## 2023-03-19 PROCEDURE — 85025 COMPLETE CBC W/AUTO DIFF WBC: CPT | Performed by: INTERNAL MEDICINE

## 2023-03-19 RX ORDER — HEPARIN SODIUM (PORCINE) LOCK FLUSH IV SOLN 100 UNIT/ML 100 UNIT/ML
500 SOLUTION INTRAVENOUS ONCE
Status: COMPLETED | OUTPATIENT
Start: 2023-03-19 | End: 2023-03-19

## 2023-03-19 RX ORDER — CEFUROXIME AXETIL 500 MG/1
500 TABLET ORAL 2 TIMES DAILY
Qty: 14 TABLET | Refills: 0 | Status: SHIPPED | OUTPATIENT
Start: 2023-03-19

## 2023-03-19 RX ADMIN — FERROUS SULFATE TAB EC 324 MG (65 MG FE EQUIVALENT) 324 MG: 324 (65 FE) TABLET DELAYED RESPONSE at 07:39

## 2023-03-19 RX ADMIN — METOPROLOL SUCCINATE 25 MG: 25 TABLET, EXTENDED RELEASE ORAL at 07:41

## 2023-03-19 RX ADMIN — ATORVASTATIN CALCIUM 40 MG: 40 TABLET, FILM COATED ORAL at 07:41

## 2023-03-19 RX ADMIN — HEPARIN 500 UNITS: 100 SYRINGE at 12:02

## 2023-03-19 RX ADMIN — THEOPHYLLINE 300 MG: 300 TABLET, EXTENDED RELEASE ORAL at 07:40

## 2023-03-19 RX ADMIN — CLOPIDOGREL BISULFATE 75 MG: 75 TABLET ORAL at 07:41

## 2023-03-19 RX ADMIN — SODIUM BICARBONATE 650 MG: 650 TABLET ORAL at 07:39

## 2023-03-19 RX ADMIN — PANTOPRAZOLE SODIUM 40 MG: 40 TABLET, DELAYED RELEASE ORAL at 07:41

## 2023-03-19 RX ADMIN — Medication 3 ML: at 07:41

## 2023-03-19 NOTE — OUTREACH NOTE
Prep Survey    Flowsheet Row Responses   [facility] facility patient discharged from? [facility]   Is LACE score < 7 ? No   Eligibility Readm Mgmt   Discharge diagnosis Weakness   Does the patient have one of the following disease processes/diagnoses(primary or secondary)? Other   Does the patient have Home health ordered? Yes   What is the Home health agency?  [facility] [place]    Is there a DME ordered? No   Prep survey completed? Yes          Mary TEE - Registered Nurse

## 2023-03-19 NOTE — DISCHARGE PLACEMENT REQUEST
[address] MEDICAL INPATIENT  [address] [facility] [place] [address]  Phone:  [phone]  Fax:  [phone] Date: Mar 19, 2023      Ambulatory Referral to Home Health (Hospital)     Patient:  David A Jolissaint MRN:  [id number]   [address] [place] [address] :  1945  SSN:    Phone: [phone] Sex:  M      INSURANCE PAYOR PLAN GROUP # SUBSCRIBER ID   Primary:  Secondary:    MEDICARE  [employer] SUP [id number]  [id number]    PLAN N [id number]  [id number]      Referring Provider Information:  LILIANA ANDRES Phone: [phone] Fax: [phone]       Referral Information:   # Visits:  999 Referral Type: Home Health [42]   Urgency:  Routine Referral Reason: Specialty Services Required   Start Date: Mar 19, 2023 End Date:  To be determined by Insurer   Diagnosis: Weakness (R53.1 [ICD-10-CM] 780.79 [ICD-9-CM])      Refer to Dept:   Refer to Provider:   Refer to Provider Phone:   Refer to Facility:       Face to Face Visit Date: 3/19/2023  Follow-up provider for Plan of Care? I treated the patient in an acute care facility and will not continue treatment after discharge.  Follow-up provider: KATHLEEN DIGGS [[id number]]  Reason/Clinical Findings: Weakness//pneumonia//obstructive lung disease//multiple comorbidities  Describe mobility limitations that make leaving home difficult: Physical deconditioning//left BKA  Nursing/Therapeutic Services Requested: Skilled Nursing  Nursing/Therapeutic Services Requested: Physical Therapy  Skilled nursing orders: Medication education  Skilled nursing orders: COPD management  PT orders: Strengthening  Frequency: 1 Week 1     This document serves as a request of services and does not constitute Insurance authorization or approval of services.  To determine eligibility, please contact the members Insurance carrier to verify and review coverage.     If you have medical questions regarding this request for services. Please contact [address]  "INPATIENT at [phone] during normal business hours.        Authorizing Provider:Nj Morataya MD  Authorizing Provider's NPI: [id number]  Order Entered By: Nj Morataya MD 3/19/2023 10:41 AM     Electronically signed by: Nj Morataya MD 3/19/2023 10:41 AM        Jolissaint, David A (77 y.o. Male)     Date of Birth   1945    Social Security Number       Address   [address] [place] [address] [place] [facility]    Home Phone   [phone]    MRN   [id number]       Congregational   None    Marital Status                               Admission Date   3/16/23    Admission Type   Emergency    Admitting Provider   She Thakur MD    Attending Provider   She Thakur MD    Department, Room/Bed   [facility] 3C MEDICAL INPATIENT, 362/1       Discharge Date       Discharge Disposition   Home-Health Care [facility]    Discharge Destination                               Attending Provider: She Thakur MD    Allergies: No Known Allergies    Isolation: None   Infection: None   Code Status: CPR    Ht: 177.8 cm (70\")   Wt: 78.5 kg (173 lb 1 oz)    Admission Cmt: None   Principal Problem: Weakness [R53.1]                 Active Insurance as of 3/16/2023     Primary Coverage     Payor Plan Insurance Group Employer/Plan Group    MEDICARE MEDICARE A & B      Payor Plan Address Payor Plan Phone Number Payor Plan Fax Number Effective Dates    [address] [phone]  4/1/2009 - None Entered    [facility] [id number]       Subscriber Name Subscriber Birth Date Member ID       ANNETTEUMUREJI BEARD 1945 [id number]           Secondary Coverage     Payor Plan Insurance Group Employer/Plan Group    [facility] SUP [facility] HEALTH CARE OPTIONS PLAN N     Payor Plan Address Payor Plan Phone Number Payor Plan Fax Number Effective Dates    [facility] [phone]  1/1/2017 - None Entered    [address]       [facility] [address]       Subscriber Name Subscriber Birth Date Member ID       [address]ARC[address]LE[address]BREYCHIRAGREJI L[address] 1945 [id number] "                 Emergency Contacts      (Rel.) Home Phone Work Phone Mobile Phone    Mary Robertson (Significant Other) -- -- [phone]               History & Physical      She Thakur MD at 03/16/23 3578              Patient Care Team:  Walter Valero MD as PCP - Kali Machuca MD as Consulting Physician (Interventional Cardiology)  Riki Hedrick MD as Consulting Physician (Hematology and Oncology)  Hoang Mendez MD as Consulting Physician (Nephrology)    Chief complaint generalized weakness    Subjective      Patient is a 77 y.o. male with history of lung cancer in remission who presents after a fall at home.  He states when he tried to get out of bed this morning his leg gave out on him and he fell to the floor.  He did not suffer any injury.  Since then he has felt generally weak and malaised but has no other focal findings or complaints.  In the emergency room he was found to have white blood cell count of 22,000.  Work-up was otherwise unremarkable.    Past medical history significant for lung cancer (small cell lung cancer right hilum) diagnosed in 2021.  He is status post 6 cycles of chemotherapy that was completed in September 2021.  He had radiation of the right lung in 2021 and prophylactic cranial radiation in October 2021.  He states he is due for repeat imaging this month.  He is thought to be in remission.  Oncologist is Dr. Hedrick.    Review of Systems   Constitutional: Positive for activity change, appetite change and fatigue. Negative for chills, diaphoresis and fever.   HENT: Negative for facial swelling.    Eyes: Negative for visual disturbance.   Respiratory: Negative for cough, shortness of breath, wheezing and stridor.    Cardiovascular: Negative for palpitations.   Gastrointestinal: Negative for abdominal pain, diarrhea, nausea and vomiting.   Endocrine: Negative for polyuria.   Genitourinary: Negative for dysuria.   Musculoskeletal: Positive for gait  problem. Negative for arthralgias and back pain.   Skin: Negative for rash and wound.   Neurological: Positive for weakness. Negative for dizziness, tremors, light-headedness and headaches.   Psychiatric/Behavioral: Negative for confusion.          History  Past Medical History:   Diagnosis Date   • Anemia    • Cancer (HCC)    • Cellulitis    • CKD (chronic kidney disease), stage III (HCC)    • COPD (chronic obstructive pulmonary disease) (HCC)    • Diverticulitis    • Dyslipidemia    • Hypertension    • Myocardial infarction (HCC)    • PVD (peripheral vascular disease) (HCC)      Past Surgical History:   Procedure Laterality Date   • BRONCHOSCOPY N/A 5/20/2021    Procedure: BRONCHOSCOPY WITH ENDOBRONCHIAL ULTRASOUND WITH FINE NEEDLE ASPIRATION. BRONCHOALVEOLAR LAVAGE;  Surgeon: Jackson Quezada MD;  Location: [facility] ENDOSCOPY;  Service: Pulmonary;  Laterality: N/A;  subcarinal mass   • CARDIAC SURGERY     • CHOLECYSTECTOMY     • GALLBLADDER SURGERY     • LEG AMPUTATION Left      Family History   Problem Relation Age of Onset   • Heart attack Father    • Heart disease Father      Social History     Tobacco Use   • Smoking status: Former     Packs/day: 2.00     Years: 50.00     Pack years: 100.00     Types: Cigarettes   • Smokeless tobacco: Never   • Tobacco comments:     Says he quit approximately in 2005.   Vaping Use   • Vaping Use: Never used   Substance Use Topics   • Alcohol use: Not Currently   • Drug use: Never     Medications Prior to Admission   Medication Sig Dispense Refill Last Dose   • acetaminophen (TYLENOL) 325 MG tablet Take 2 tablets by mouth Every 6 (Six) Hours As Needed for Mild Pain .   Past Week   • aspirin 81 MG EC tablet Take 1 tablet by mouth Daily With Lunch.  0 3/15/2023   • atorvastatin (LIPITOR) 40 MG tablet Take 1 tablet by mouth Every Morning.   3/16/2023   • budesonide (PULMICORT) 0.5 MG/2ML nebulizer solution Take 2 mL by nebulization 2 (two) times a day.   3/15/2023   • clopidogrel  (PLAVIX) 75 MG tablet Take 1 tablet by mouth Every Morning.   3/16/2023   • dutasteride (AVODART) 0.5 MG capsule Take 1 capsule by mouth Every Night.   3/15/2023   • ferrous sulfate 324 (65 Fe) MG tablet delayed-release EC tablet Take 1 tablet by mouth Daily With Breakfast. 30 tablet 1 3/15/2023   • formoterol (Perforomist) 20 MCG/2ML nebulizer solution Take  by nebulization 2 (Two) Times a Day.   3/15/2023   • HYDROcodone-acetaminophen (NORCO) 5-325 MG per tablet Take 1 tablet by mouth At Night As Needed for Moderate Pain.   3/15/2023   • metoprolol succinate XL (TOPROL-XL) 25 MG 24 hr tablet TAKE 1 TABLET EVERY DAY 90 tablet 0 3/16/2023   • montelukast (SINGULAIR) 10 MG tablet Take 1 tablet by mouth Every Night.   3/15/2023   • Multiple Vitamins-Minerals (MULTI VITAMIN/MINERALS) tablet 1 tablet Daily With Lunch.   3/15/2023   • pantoprazole (PROTONIX) 40 MG EC tablet Take 1 tablet by mouth Daily.   3/15/2023   • revefenacin (YUPELRI) 175 MCG/3ML nebulizer solution Take 3 mL by nebulization Daily.   3/15/2023   • saccharomyces boulardii (FLORASTOR) 250 MG capsule Take 1 capsule by mouth Daily With Lunch.   3/15/2023   • sodium bicarbonate 650 MG tablet Take 1 tablet by mouth 2 (Two) Times a Day.   3/16/2023   • theophylline (THEODUR) 300 MG 12 hr tablet Take 1 tablet by mouth 2 (Two) Times a Day.   3/16/2023   • iron polysacch complex-B12-VitC-FA-Succ (Ferrex 150 Forte Plus)  MG capsule capsule Take 1 capsule by mouth Daily With Breakfast.        Allergies:  Patient has no known allergies.    Objective      Vital Signs  Temp:  [98.3 °F (36.8 °C)-99.3 °F (37.4 °C)] 98.3 °F (36.8 °C)  Heart Rate:  [] 112  Resp:  [20] 20  BP: (103-137)/(58-86) 137/71     Physical Exam:      General Appearance:    Alert, cooperative, in no acute distress, anxious   Head:    Normocephalic, without obvious abnormality, atraumatic   Eyes:            Lids and lashes normal, conjunctivae and sclerae normal, no   icterus, no  pallor, corneas clear, PERRLA   Ears:    Ears appear intact with no abnormalities noted   Throat:   No oral lesions, no thrush, oral mucosa moist   Neck:   No adenopathy, supple, trachea midline, no thyromegaly, no   carotid bruit, no JVD   Lungs:    Crackles left lower lobe     Heart:    Regular rhythm and normal rate, 3/6 harsh systolic murmur heard best at right upper sternal border   Chest Wall:    No abnormalities observed   Abdomen:     Normal bowel sounds, no masses, no organomegaly, soft        non-tender, non-distended, no guarding, no rebound                tenderness   Extremities:  Left BKA   Pulses:   Pulses palpable and equal bilaterally   Skin:   No bleeding, bruising or rash   Lymph nodes:   No palpable adenopathy   Neurologic:   Cranial nerves 2 - 12 grossly intact, sensation intact, DTR       present and equal bilaterally       Results Review:     Imaging Results (Last 24 Hours)     Procedure Component Value Units Date/Time    XR Chest 1 View [[id number]] Collected: 03/16/23 1342     Updated: 03/16/23 1349    Narrative:      XR CHEST 1 VW    Date of Exam: 3/16/2023 12:30 PM EDT    Indication: cough.    Comparison: 6/2/2022, 11/11/2021    Findings:  There is a right subclavian port. There is a coronary artery bypass. Heart size and pulmonary vasculature are within normal limits. There is perihilar fibrosis on the right. There are pleural calcifications on the right. The lateral margin of the   calcifications has the appearance of a pleural line, however this was present on prior studies and does not represent pneumothorax. There are fibrotic changes in the right lung base. There is chronic pleural thickening on the left with chronic left   basilar atelectasis. No definite new pulmonary abnormality is demonstrated      Impression:      Impression:  Chronic bilateral pleuroparenchymal changes and right perihilar fibrosis. No definite acute process demonstrated    Electronically Signed: Michael Villarreal     3/16/2023 1:47 PM EDT    Workstation ID: [id number]    CT Head Without Contrast [[id number]] Collected: 03/16/23 1340     Updated: 03/16/23 1343    Narrative:      CT HEAD WO CONTRAST    Date of Exam: 3/16/2023 12:37 PM EDT    Indication: weakness.    Comparison: 7/4/2016    Technique: Axial CT images were obtained of the head without contrast administration.  Sagittal and coronal reconstructions were performed.  Automated exposure control and iterative reconstruction methods were used.     Findings:  There is generalized enlargement of the ventricles and CSF containing spaces with decreased attenuation in the periventricular white matter both hemispheres. No mass lesions, mass effect, acute hemorrhage or edema. No intra or extra-axial fluid   collections are present. The findings are unchanged from the previous head CT. The paranasal sinuses and mastoid air cells are clear.      Impression:      Impression:    1. Generalized atrophy with chronic periventricular ischemic changes. No acute intracranial findings. No significant change from the prior scan.    Electronically Signed: Walter Davis    3/16/2023 1:41 PM EDT    Workstation ID: [id number]           Lab Results (last 24 hours)     Procedure Component Value Units Date/Time    High Sensitivity Troponin T 2Hr [[id number]]  (Abnormal) Collected: 03/16/23 1450    Specimen: Blood Updated: 03/16/23 1511     HS Troponin T 45 ng/L      Troponin T Delta 2 ng/L     Narrative:      High Sensitive Troponin T Reference Range:  <10.0 ng/L- Negative Female for AMI  <15.0 ng/L- Negative Male for AMI  >=10 - Abnormal Female indicating possible myocardial injury.  >=15 - Abnormal Male indicating possible myocardial injury.   Clinicians would have to utilize clinical acumen, EKG, Troponin, and serial changes to determine if it is an Acute Myocardial Infarction or myocardial injury due to an underlying chronic condition.         Respiratory Panel PCR w/COVID-19(SARS-CoV-2)  MIKE/DENZEL/ALONA/PAD/COR/MAD/MERLINE In-House, NP Swab in UTM/VTM, 3-4 HR TAT - Swab, Nasopharynx [[id number]]  (Normal) Collected: 03/16/23 1211    Specimen: Swab from Nasopharynx Updated: 03/16/23 1357     ADENOVIRUS, PCR Not Detected     Coronavirus 229E Not Detected     Coronavirus HKU1 Not Detected     Coronavirus NL63 Not Detected     Coronavirus OC43 Not Detected     COVID19 Not Detected     Human Metapneumovirus Not Detected     Human Rhinovirus/Enterovirus Not Detected     Influenza A PCR Not Detected     Influenza B PCR Not Detected     Parainfluenza Virus 1 Not Detected     Parainfluenza Virus 2 Not Detected     Parainfluenza Virus 3 Not Detected     Parainfluenza Virus 4 Not Detected     RSV, PCR Not Detected     Bordetella pertussis pcr Not Detected     Bordetella parapertussis PCR Not Detected     Chlamydophila pneumoniae PCR Not Detected     Mycoplasma pneumo by PCR Not Detected    Narrative:      In the setting of a positive respiratory panel with a viral infection PLUS a negative procalcitonin without other underlying concern for bacterial infection, consider observing off antibiotics or discontinuation of antibiotics and continue supportive care. If the respiratory panel is positive for atypical bacterial infection (Bordetella pertussis, Chlamydophila pneumoniae, or Mycoplasma pneumoniae), consider antibiotic de-escalation to target atypical bacterial infection.    Blood Culture - Blood, Arm, Right [[id number]] Collected: 03/16/23 1331    Specimen: Blood from Arm, Right Updated: 03/16/23 1335    POC Lactate [[id number]]  (Normal) Collected: 03/16/23 1320    Specimen: Blood Updated: 03/16/23 1321     Lactate 1.1 mmol/L      Comment: Serial Number: [id number]:  [id number]       Blood Culture - Blood, Arm, Left [[id number]] Collected: 03/16/23 1314    Specimen: Blood from Arm, Left Updated: 03/16/23 1317    Extra Tubes [[id number]] Collected: 03/16/23 1211    Specimen: Blood, Venous Line Updated: 03/16/23  7856    Narrative:      The following orders were created for panel order Extra Tubes.  Procedure                               Abnormality         Status                     ---------                               -----------         ------                     Gold Top - SST[[id number]]                                   Final result                 Please view results for these tests on the individual orders.    Gold Top - SST [[id number]] Collected: 03/16/23 1211    Specimen: Blood Updated: 03/16/23 1316     Extra Tube Hold for add-ons.     Comment: Auto resulted.       Comprehensive Metabolic Panel [[id number]]  (Abnormal) Collected: 03/16/23 1211    Specimen: Blood Updated: 03/16/23 1248     Glucose 108 mg/dL      BUN 35 mg/dL      Creatinine 1.98 mg/dL      Sodium 139 mmol/L      Potassium 3.9 mmol/L      Chloride 102 mmol/L      CO2 25.0 mmol/L      Calcium 9.2 mg/dL      Total Protein 6.9 g/dL      Albumin 3.7 g/dL      ALT (SGPT) 12 U/L      AST (SGOT) 19 U/L      Alkaline Phosphatase 145 U/L      Total Bilirubin 0.8 mg/dL      Globulin 3.2 gm/dL      A/G Ratio 1.2 g/dL      BUN/Creatinine Ratio 17.7     Anion Gap 12.0 mmol/L      eGFR 33.9 mL/min/1.73     Narrative:      GFR Normal >60  Chronic Kidney Disease <60  Kidney Failure <15    The GFR formula is only valid for adults with stable renal function between ages 18 and 70.    High Sensitivity Troponin T [[id number]]  (Abnormal) Collected: 03/16/23 1211    Specimen: Blood Updated: 03/16/23 1248     HS Troponin T 43 ng/L     Narrative:      High Sensitive Troponin T Reference Range:  <10.0 ng/L- Negative Female for AMI  <15.0 ng/L- Negative Male for AMI  >=10 - Abnormal Female indicating possible myocardial injury.  >=15 - Abnormal Male indicating possible myocardial injury.   Clinicians would have to utilize clinical acumen, EKG, Troponin, and serial changes to determine if it is an Acute Myocardial Infarction or myocardial injury due to an underlying chronic  condition.         aPTT [[id number]]  (Abnormal) Collected: 03/16/23 1211    Specimen: Blood Updated: 03/16/23 1237     PTT 77.5 seconds     Protime-INR [[id number]]  (Normal) Collected: 03/16/23 1211    Specimen: Blood Updated: 03/16/23 1237     Protime 10.9 Seconds      INR 1.06    Urinalysis With Culture If Indicated - Urine, Clean Catch [[id number]]  (Abnormal) Collected: 03/16/23 1218    Specimen: Urine, Clean Catch Updated: 03/16/23 1229     Color, UA Yellow     Appearance, UA Clear     pH, UA 7.5     Specific Gravity, UA 1.021     Glucose, UA Negative     Ketones, UA Negative     Bilirubin, UA Negative     Blood, UA Moderate (2+)     Protein, UA >=300 mg/dL (3+)     Leuk Esterase, UA Negative     Nitrite, UA Negative     Urobilinogen, UA 1.0 E.U./dL    Narrative:      In absence of clinical symptoms, the presence of pyuria, bacteria, and/or nitrites on the urinalysis result does not correlate with infection.    Urinalysis, Microscopic Only - Urine, Clean Catch [[id number]]  (Abnormal) Collected: 03/16/23 1218    Specimen: Urine, Clean Catch Updated: 03/16/23 1229     RBC, UA 0-2 /HPF      WBC, UA 0-2 /HPF      Comment: Urine culture not indicated.        Bacteria, UA None Seen /HPF      Squamous Epithelial Cells, UA 0-2 /HPF      Hyaline Casts, UA 3-6 /LPF      Methodology Automated Microscopy    CBC & Differential [[id number]]  (Abnormal) Collected: 03/16/23 1211    Specimen: Blood Updated: 03/16/23 1226    Narrative:      The following orders were created for panel order CBC & Differential.  Procedure                               Abnormality         Status                     ---------                               -----------         ------                     CBC Auto Differential[[id number]]        Abnormal            Final result                 Please view results for these tests on the individual orders.    CBC Auto Differential [[id number]]  (Abnormal) Collected: 03/16/23 1211    Specimen: Blood Updated:  03/16/23 1226     WBC 22.10 10*3/mm3      RBC 3.90 10*6/mm3      Hemoglobin 12.0 g/dL      Hematocrit 36.8 %      MCV 94.2 fL      MCH 30.7 pg      MCHC 32.6 g/dL      RDW 15.3 %      RDW-SD 53.4 fl      MPV 8.4 fL      Platelets 206 10*3/mm3      Neutrophil % 93.3 %      Lymphocyte % 1.3 %      Monocyte % 4.8 %      Eosinophil % 0.1 %      Basophil % 0.5 %      Neutrophils, Absolute 20.60 10*3/mm3      Lymphocytes, Absolute 0.30 10*3/mm3      Monocytes, Absolute 1.10 10*3/mm3      Eosinophils, Absolute 0.00 10*3/mm3      Basophils, Absolute 0.10 10*3/mm3      nRBC 0.0 /100 WBC            I reviewed the patient's new clinical results.    Assessment & Plan       Weakness  -Elevated white blood cell count is concerning for underlying infection, specifically pneumonia.  Lung exam is concerning for left lower lobe pneumonia.  He is also at high risk for recurrence of his small cell lung cancer.  Chest CT has been ordered.  Empiric antibiotic therapy with vancomycin and cefepime has been started.  If he is able to produce sputum we will culture that.    Murmur noted on physical exam is concerning for aortic stenosis.  Echocardiogram is been ordered.    COPD-continue home medications  Chronic kidney disease stage IIIb-creatinine is stable; avoid nephrotoxins    Small cell lung cancer - consult primary oncologist    Peripheral artery disease  Carotid artery disease      Anemia - check TIBC, reticulocytes, b12, folate    Chronic coronary artery disease-continue Plavix, statin      I discussed the patient's findings and my recommendations with patient.     She Thakur MD  03/16/23  17:32 EDT        Electronically signed by She Thakur MD at 03/16/23 1757       Physician Progress Notes (last 24 hours)  Notes from 03/18/23 1140 through 03/19/23 1140   No notes of this type exist for this encounter.            Physical Therapy Notes (last 48 hours)      Deirdre Flood at 03/17/23 1536  Version 1 of 1       Goal Outcome  Evaluation:              Outcome Evaluation: Pt is a 78 y/o male presenting to [facility] on 3/16 after a fall at home with weakness and malaise following fall. Pt with known history of lung cancer, but thought to be in remission. CT chest 3/16: could reflect pneumonia, unchanged lung nodules, emphysema and chronic lung disease, descending thoracic aortic aneurysm. PMH also includes L BKA in 2016.  Pt currently requiring 3L NC.; same as PLOF. Pt reports PLOF of living with a friend in single story home with 5 stairs or ramp entry, PLOF Mod(I) with L BKA for all household/community mobility/ambulation. At time of PT eval, pt insistent on not using supplemental O2 with ambulation - pt completes bed mobility with supervision, transfers and gait x60 ft with CGA, but desats to 82% with room air; recovers to 94% within 1 min with 3L NC donned. Pt educated on importance of using O2 with ambulation and need for longer NC discussed with nurse and RT. Due to weakness, fall risk, and funcitonal decline, follow-up HHPT recommended at time of d/c from [facility].    Electronically signed by Deirdre Flood at 23 1536     Deirdre Flood at 23 1542  Version 1 of 1         Patient Name: David A Jolissaint  : 1945    MRN: [id number]                              Today's Date: 3/17/2023       Admit Date: 3/16/2023    Visit Dx:     ICD-10-CM ICD-9-CM   1. Weakness  R53.1 780.79     Patient Active Problem List   Diagnosis   • Coronary artery disease involving native coronary artery of native heart without angina pectoris   • Right bundle branch block   • Shortness of breath   • Chest pain, atypical   • Lower abdominal pain   • Acute constipation   • Chest pressure   • Benign hypertension with chronic kidney disease, stage IV (HCC)   • Prediabetes   • Mediastinal adenopathy   • Small cell lung cancer (HCC)   • Chest pain   • Bilateral carotid artery stenosis   • Chronic obstructive pulmonary disease (HCC)   • Chronic renal  insufficiency, stage III (moderate) (HCC)   • Dehiscence of amputation stump (HCC)   • Dyslipidemia   • History of prosthetic heart valve   • Peripheral arterial occlusive disease (HCC)   • Status post unilateral below knee amputation   • Atypical chest pain   • Atherosclerosis of artery   • Atherosclerosis of coronary artery bypass graft   • Coronary atherosclerosis   • Mediastinal lymphadenopathy   • Small cell carcinoma of lung (HCC)   • Poor venous access   • Near syncope   • Weakness     Past Medical History:   Diagnosis Date   • Anemia    • Cancer (HCC)    • Cellulitis    • CKD (chronic kidney disease), stage III (HCC)    • COPD (chronic obstructive pulmonary disease) (HCC)    • Diverticulitis    • Dyslipidemia    • Hypertension    • Myocardial infarction (HCC)    • PVD (peripheral vascular disease) (HCC)      Past Surgical History:   Procedure Laterality Date   • BRONCHOSCOPY N/A 5/20/2021    Procedure: BRONCHOSCOPY WITH ENDOBRONCHIAL ULTRASOUND WITH FINE NEEDLE ASPIRATION. BRONCHOALVEOLAR LAVAGE;  Surgeon: Jackson Quezada MD;  Location: [facility] ENDOSCOPY;  Service: Pulmonary;  Laterality: N/A;  subcarinal mass   • CARDIAC SURGERY     • CHOLECYSTECTOMY     • GALLBLADDER SURGERY     • LEG AMPUTATION Left       General Information     Row Name 03/17/23 1400          Physical Therapy Time and Intention    Document Type evaluation  -JV     Mode of Treatment physical therapy  -JV     Row Name 03/17/23 1400          General Information    Patient Profile Reviewed yes  -JV     Prior Level of Function independent:;all household mobility;community mobility;driving  -JV     Existing Precautions/Restrictions fall;oxygen therapy device and L/min  3L PLOF  -JV     Barriers to Rehab medically complex;previous functional deficit  -JV     Row Name 03/17/23 1400          Living Environment    People in Home friend(s)  -JV     Row Name 03/17/23 1400          Home Main Entrance    Number of Stairs, Main Entrance five  -JV     Stair  Railings, Main Entrance railing on left side (ascending)  -JV     Row Name 03/17/23 1400          Stairs Within Home, Primary    Stairs, Within Home, Primary also ramp entry with railings on both sides  -JV     Number of Stairs, Within Home, Primary none  -JV     Row Name 03/17/23 1400          Cognition    Orientation Status (Cognition) oriented x 4  -JV     Row Name 03/17/23 1400          Safety Issues, Functional Mobility    Impairments Affecting Function (Mobility) balance;endurance/activity tolerance;pain;strength  -JV           User Key  (r) = Recorded By, (t) = Taken By, (c) = Cosigned By    Initials Name Provider Type    Deirdre Quiros Physical Therapist               Mobility     Row Name 03/17/23 1408          Bed Mobility    Bed Mobility supine-sit-supine  -JV     Supine-Sit-Supine [place] (Bed Mobility) supervision;verbal cues;nonverbal cues (demo/gesture)  -JV     Assistive Device (Bed Mobility) bed rails;head of bed elevated  -JV     Row Name 03/17/23 1408          Bed-Chair Transfer    Bed-Chair [place] (Transfers) contact guard;verbal cues  -JV     Row Name 03/17/23 1408          Sit-Stand Transfer    Sit-Stand [place] (Transfers) contact guard;verbal cues  -JV     Row Name 03/17/23 1408          Gait/Stairs (Locomotion)    [place] Level (Gait) contact guard  -JV     Distance in Feet (Gait) 60 ft with L BKA prosthesis donned  -JV     Deviations/Abnormal Patterns (Gait) base of support, wide;kurt decreased;gait speed decreased  -JV     Bilateral Gait Deviations forward flexed posture  -JV           User Key  (r) = Recorded By, (t) = Taken By, (c) = Cosigned By    Initials Name Provider Type    Deirdre Quiros Physical Therapist               Obj/Interventions     Row Name 03/17/23 1402          Range of Motion Comprehensive    General Range of Motion bilateral lower extremity ROM WFL  -JV     Row Name 03/17/23 1402          Strength Comprehensive (MMT)    Comment,  General Manual Muscle Testing (MMT) Assessment BLE grossly 4/5  -JV     Row Name 03/17/23 1530          Balance    Balance Assessment standing static balance;standing dynamic balance  -JV     Static Standing Balance supervision  -JV     Dynamic Standing Balance contact guard  -JV     Position/Device Used, Standing Balance unsupported  -JV     Row Name 03/17/23 1402          Sensory Assessment (Somatosensory)    Sensory Assessment (Somatosensory) LE sensation intact  -JV           User Key  (r) = Recorded By, (t) = Taken By, (c) = Cosigned By    Initials Name Provider Type    Deirdre Quiros Physical Therapist               Goals/Plan     Row Name 03/17/23 1539          Bed Mobility Goal 1 (PT)    Activity/Assistive Device (Bed Mobility Goal 1, PT) bed mobility activities, all  -JV     [place] Level/Cues Needed (Bed Mobility Goal 1, PT) independent  -JV     Time Frame (Bed Mobility Goal 1, PT) long term goal (LTG);2 weeks  -JV     Row Name 03/17/23 1534          Transfer Goal 1 (PT)    Activity/Assistive Device (Transfer Goal 1, PT) bed-to-chair/chair-to-bed  -JV     [place] Level/Cues Needed (Transfer Goal 1, PT) modified independence  -JV     Time Frame (Transfer Goal 1, PT) long term goal (LTG);2 weeks  -JV     Strategies/Barriers (Transfers Goal 1, PT) LLE prosthesis  -JV     Row Name 03/17/23 1539          Gait Training Goal 1 (PT)    Activity/Assistive Device (Gait Training Goal 1, PT) gait (walking locomotion)  -JV     [place] Level (Gait Training Goal 1, PT) modified independence  -JV     Distance (Gait Training Goal 1, PT) 200 ft with LLE prosthesis; SaO2 >=90% with 3L NC  -JV     Time Frame (Gait Training Goal 1, PT) long term goal (LTG);2 weeks  -JV     Row Name 03/17/23 1534          Therapy Assessment/Plan (PT)    Planned Therapy Interventions (PT) balance training;bed mobility training;gait training;home exercise program;patient/family education;transfer training;strengthening;stair  training;prosthetic fitting/training  -JV           User Key  (r) = Recorded By, (t) = Taken By, (c) = Cosigned By    Initials Name Provider Type    Deirdre Quiros Physical Therapist               Clinical Impression     Row Name 03/17/23 1530          Pain    Pretreatment Pain Rating 0/10 - no pain  -JV     Posttreatment Pain Rating 0/10 - no pain  -JV     Row Name 03/17/23 1530          Plan of Care Review    Outcome Evaluation Pt is a 76 y/o male presenting to [facility] on 3/16 after a fall at home with weakness and malaise following fall. Pt with known history of lung cancer, but thought to be in remission. CT chest 3/16: could reflect pneumonia, unchanged lung nodules, emphysema and chronic lung disease, descending thoracic aortic aneurysm. PMH also includes L BKA in 2016.  Pt currently requiring 3L NC.; same as PLOF. Pt reports PLOF of living with a friend in single story home with 5 stairs or ramp entry, PLOF Mod(I) with L BKA for all household/community mobility/ambulation. At time of PT eval, pt insistent on not using supplemental O2 with ambulation - pt completes bed mobility with supervision, transfers and gait x60 ft with CGA, but desats to 82% with room air; recovers to 94% within 1 min with 3L NC donned. Pt educated on importance of using O2 with ambulation and need for longer NC discussed with nurse and RT. Due to weakness, fall risk, and funcitonal decline, follow-up HHPT recommended at time of d/c from [facility].  -JV     Row Name 03/17/23 1530          Therapy Assessment/Plan (PT)    Criteria for Skilled Interventions Met (PT) yes;meets criteria;skilled treatment is necessary  -JV     Therapy Frequency (PT) 5 times/wk  -JV     Predicted Duration of Therapy Intervention (PT) until d/c  -JV     Row Name 03/17/23 1530          Vital Signs    Pre SpO2 (%) 95  -JV     O2 Delivery Pre Treatment supplemental O2  3L  -JV     Intra SpO2 (%) 82  pt refusing to wear supplemental O2 with gait.  -JV     O2 Delivery  Intra Treatment room air  -JV     Post SpO2 (%) 94  -JV     O2 Delivery Post Treatment supplemental O2  3L  -JV     Pre Patient Position Supine  -JV     Intra Patient Position Sitting  -JV     Post Patient Position Sitting  -JV     Recovery Time 1 min once 3L donned  -JV     Row Name 03/17/23 1530          Positioning and Restraints    Pre-Treatment Position in bed  -JV     Post Treatment Position bed  -JV     In Bed fowlers;call light within reach;notified nsg;encouraged to call for assist;exit alarm on  -JV           User Key  (r) = Recorded By, (t) = Taken By, (c) = Cosigned By    Initials Name Provider Type    Deirdre Quiros Physical Therapist               Outcome Measures     Row Name 03/17/23 0750          How much help from another person do you currently need...    Turning from your back to your side while in flat bed without using bedrails? 4  -ET     Moving from lying on back to sitting on the side of a flat bed without bedrails? 4  -ET     Moving to and from a bed to a chair (including a wheelchair)? 4  -ET     Standing up from a chair using your arms (e.g., wheelchair, bedside chair)? 3  -ET     Climbing 3-5 steps with a railing? 3  -ET     To walk in hospital room? 3  -ET     AM-PAC 6 Clicks Score (PT) 21  -ET     Highest level of mobility 6 --> Walked 10 steps or more  -ET           User Key  (r) = Recorded By, (t) = Taken By, (c) = Cosigned By    Initials Name Provider Type    ET Daisha Nixon RN Registered Nurse                             Physical Therapy Education     Title: PT OT SLP Therapies (Done)     Topic: Physical Therapy (Done)     Point: Mobility training (Done)     Learning Progress Summary           Patient Acceptance, E,TB, VU by VÍCTOR at 3/17/2023 1540                   Point: Precautions (Done)     Learning Progress Summary           Patient Acceptance, E,TB, VU by VÍCTOR at 3/17/2023 1540                               User Key     Initials Effective Dates Name Provider Type  Discipline    VÍCTOR 03/30/21 -  Deirdre Flood Physical Therapist PT              PT Recommendation and Plan  Planned Therapy Interventions (PT): balance training, bed mobility training, gait training, home exercise program, patient/family education, transfer training, strengthening, stair training, prosthetic fitting/training  Outcome Evaluation: Pt is a 76 y/o male presenting to [facility] on 3/16 after a fall at home with weakness and malaise following fall. Pt with known history of lung cancer, but thought to be in remission. CT chest 3/16: could reflect pneumonia, unchanged lung nodules, emphysema and chronic lung disease, descending thoracic aortic aneurysm. PMH also includes L BKA in 2016.  Pt currently requiring 3L NC.; same as PLOF. Pt reports PLOF of living with a friend in single story home with 5 stairs or ramp entry, PLOF Mod(I) with L BKA for all household/community mobility/ambulation. At time of PT eval, pt insistent on not using supplemental O2 with ambulation - pt completes bed mobility with supervision, transfers and gait x60 ft with CGA, but desats to 82% with room air; recovers to 94% within 1 min with 3L NC donned. Pt educated on importance of using O2 with ambulation and need for longer NC discussed with nurse and RT. Due to weakness, fall risk, and funcitonal decline, follow-up HHPT recommended at time of d/c from [facility].     Time Calculation:    PT Charges     Row Name 03/17/23 1542             Time Calculation    Start Time 1350  -JV      Stop Time 1415  -JV      Time Calculation (min) 25 min  -JV      PT Received On 03/17/23  -PAULAV      PT - Next Appointment 03/19/23  -VÍCTOR      PT Goal Re-Cert Due Date 03/31/23  -JHAMILTON         Time Calculation- PT    Total Timed Code Minutes- PT 0 minute(s)  -PAULAV            User Key  (r) = Recorded By, (t) = Taken By, (c) = Cosigned By    Initials Name Provider Type    Deirdre Quiros Physical Therapist              Therapy Charges for Today     Code Description  Service Date Service Provider Modifiers Qty    [id number] HC PT EVAL MOD COMPLEXITY 4 3/17/2023 Deirdre Flood GP 1          PT G-Codes  AM-PAC 6 Clicks Score (PT): 21  PT Discharge Summary  Anticipated Discharge Disposition (PT): home with home health    Deirdre Flood  3/17/2023      Electronically signed by Deirdre Flood at 03/17/23 1542       Occupational Therapy Notes (last 48 hours)  Notes from 03/17/23 1140 through 03/19/23 1140   No notes exist for this encounter.

## 2023-03-19 NOTE — DISCHARGE SUMMARY
Date of Discharge:  3/19/2023    Discharge Diagnosis:       Community-acquired pneumonia present upon admission  Physical deconditioning  Panlobular COPD with emphysema  Acute on chronic mucopurulent bronchitis  History of small cell lung CA  Dyslipidemia  BPH with LUTS  Chronic kidney disease stage IIIb  Hypertension associated with chronic disease stage IIIb  Anemia of chronic kidney disease  Atherosclerotic heart disease of native coronary arteries with angina pectoris  History of prosthetic heart valve  Peripheral arterial disease  Insulin resistance  History of left BKA    Presenting Problem/History of Present Illness  Active Hospital Problems    Diagnosis  POA   • **Weakness [R53.1]  Yes      Resolved Hospital Problems   No resolved problems to display.          Hospital Course  Patient is a 77 y.o. male who presented with shortness of breath with evidence of a community-acquired pneumonia.  He received aggressive antimicrobial therapy as well as needed treatments and supplemental oxygen and slowly improved throughout his hospital stay.  His hospital stay was compromised by history of previous small cell lung CA with some architectural distortion and possible postobstructive process.  He was deemed stable for discharge home today with medications per the discharge reconciliation inclusive of his home pulmonary regimen and Ceftin 500 mg twice daily for 7 days.  He will follow-up with us in the office within 1 to 2 weeks time.  We will enlist home health care and home physical therapy.  He will call with any decompensation.  Procedures Performed         Consults:   Consults     Date and Time Order Name Status Description    3/16/2023  2:20 PM Family Medicine Consult            Pertinent Test Results:CT Head Without Contrast    Result Date: 3/16/2023  Impression: 1. Generalized atrophy with chronic periventricular ischemic changes. No acute intracranial findings. No significant change from the prior scan.  Electronically Signed: Walter Davis  3/16/2023 1:41 PM EDT  Workstation ID: [id number]    CT Chest Without Contrast Diagnostic    Result Date: 3/16/2023  Impression: 1. There is new patchy airspace disease within the left lower lobe that could reflect pneumonia. 2. Stable rounded region of scarred consolidation and bronchiectasis in the right perihilar lung reflecting posttreatment change. 3. Solid-appearing right upper lobe nodule appears unchanged from the prior study and solid appearing right middle lobe nodule appears smaller than before. No new nodules. 4. Emphysema and chronic lung disease. 5. Descending thoracic aortic aneurysm. Electronically Signed: Lyle Hall  3/16/2023 7:26 PM EDT  Workstation ID: [id number]    XR Chest 1 View    Result Date: 3/16/2023  Impression: Chronic bilateral pleuroparenchymal changes and right perihilar fibrosis. No definite acute process demonstrated Electronically Signed: Michael Villarreal  3/16/2023 1:47 PM EDT  Workstation ID: [id number]      Imaging Results (Last 7 Days)     Procedure Component Value Units Date/Time    CT Chest Without Contrast Diagnostic [[id number]] Collected: 03/16/23 1920     Updated: 03/16/23 1928    Narrative:      CT CHEST WO CONTRAST DIAGNOSTIC    Date of Exam: 3/16/2023 6:26 PM EDT    Indication: Fever of unknown origin.    Comparison: 1/28/2023 and chest radiograph 3/16/2023.    Technique: Axial CT images were obtained of the chest without contrast administration.  Sagittal and coronal reconstructions were performed.  Automated exposure control and iterative reconstruction methods were used.     Findings:  There is moderate to severe emphysema. There are calcified right-sided pleural plaques. There is a stable large rounded region of scar consolidation and bronchiectasis in the right perihilar lung. There is redemonstration of irregular nodules in the   right upper lobe measuring 19 x 12 mm best seen on image 57 and in the right middle lobe measuring 11 x  17 mm on image 62. The nodule the right middle lobe appears smaller and less solid than before. There is evolving rounded atelectasis in the left   lower lobe with new patchy areas of airspace disease that could reflect a superimposed pneumonia. There is subpleural reticulation, multifocal pulmonary scarring and interstitial disease. No new lung nodules. No pneumothorax or pleural effusion.    The thyroid, trachea and esophagus appear within normal limits. There is a small hiatal hernia. The heart size is normal. There are moderate coronary artery calcifications. No pericardial effusion. There is no mediastinal lymphadenopathy. There is   moderate aortic and aortic branch vessel atherosclerosis. Stable right-sided chest port. There is aneurysmal dilatation of the distal descending thoracic aorta up to 39 mm.    Superficial soft tissues and underlying musculature appear within normal limits. There are no acute osseous or normalities or destructive bone lesions. There are mild spinal degenerative changes. There is bilateral renal cortical thinning. No acute   findings in the upper abdomen.      Impression:      Impression:    1. There is new patchy airspace disease within the left lower lobe that could reflect pneumonia.  2. Stable rounded region of scarred consolidation and bronchiectasis in the right perihilar lung reflecting posttreatment change.  3. Solid-appearing right upper lobe nodule appears unchanged from the prior study and solid appearing right middle lobe nodule appears smaller than before. No new nodules.  4. Emphysema and chronic lung disease.  5. Descending thoracic aortic aneurysm.    Electronically Signed: Lyle Hall    3/16/2023 7:26 PM EDT    Workstation ID: [id number]    XR Chest 1 View [[id number]] Collected: 03/16/23 1342     Updated: 03/16/23 1349    Narrative:      XR CHEST 1 VW    Date of Exam: 3/16/2023 12:30 PM EDT    Indication: cough.    Comparison: 6/2/2022,  11/11/2021    Findings:  There is a right subclavian port. There is a coronary artery bypass. Heart size and pulmonary vasculature are within normal limits. There is perihilar fibrosis on the right. There are pleural calcifications on the right. The lateral margin of the   calcifications has the appearance of a pleural line, however this was present on prior studies and does not represent pneumothorax. There are fibrotic changes in the right lung base. There is chronic pleural thickening on the left with chronic left   basilar atelectasis. No definite new pulmonary abnormality is demonstrated      Impression:      Impression:  Chronic bilateral pleuroparenchymal changes and right perihilar fibrosis. No definite acute process demonstrated    Electronically Signed: Michael Phil    3/16/2023 1:47 PM EDT    Workstation ID: [id number]    CT Head Without Contrast [[id number]] Collected: 03/16/23 1340     Updated: 03/16/23 1343    Narrative:      CT HEAD WO CONTRAST    Date of Exam: 3/16/2023 12:37 PM EDT    Indication: weakness.    Comparison: 7/4/2016    Technique: Axial CT images were obtained of the head without contrast administration.  Sagittal and coronal reconstructions were performed.  Automated exposure control and iterative reconstruction methods were used.     Findings:  There is generalized enlargement of the ventricles and CSF containing spaces with decreased attenuation in the periventricular white matter both hemispheres. No mass lesions, mass effect, acute hemorrhage or edema. No intra or extra-axial fluid   collections are present. The findings are unchanged from the previous head CT. The paranasal sinuses and mastoid air cells are clear.      Impression:      Impression:    1. Generalized atrophy with chronic periventricular ischemic changes. No acute intracranial findings. No significant change from the prior scan.    Electronically Signed: Walter Davis    3/16/2023 1:41 PM EDT    Workstation ID: [id number]               Condition on Discharge:  Fair    Vital Signs  Temp:  [97.3 °F (36.3 °C)-98.1 °F (36.7 °C)] 97.9 °F (36.6 °C)  Heart Rate:  [83-96] 91  Resp:  [16-28] 26  BP: (127-156)/() 146/103    Physical Exam:     General Appearance:    Alert, cooperative, in no acute distress   Head:    Normocephalic, without obvious abnormality, atraumatic   Eyes:           Conjunctivae and sclerae normal, no   icterus, no pallor, corneas clear, PERRLA   Throat:   No oral lesions, no thrush, oral mucosa moist   Neck:   No adenopathy, supple, trachea midline, no thyromegaly, no   carotid bruit, no JVD   Lungs:     Clear to auscultation,respirations regular, even and                  unlabored    Heart:    Regular rhythm and normal rate, normal S1 and S2, no            murmur, no gallop, no rub, no click   Chest Wall:    No abnormalities observed   Abdomen:     Normal bowel sounds, no masses, no organomegaly, soft        non-tender, non-distended, no guarding, no rebound                tenderness   Rectal:     Deferred   Extremities:   Moves all extremities well, no edema, no cyanosis, no             redness   Pulses:   Pulses palpable and equal bilaterally   Skin:   No bleeding, bruising or rash   Lymph nodes:   No palpable adenopathy   Neurologic:   Cranial nerves 2 - 12 grossly intact, sensation intact, DTR       present and equal bilaterally         Discharge Disposition  Home-Health Care Svc    Discharge Medications     Discharge Medications      New Medications      Instructions Start Date   cefuroxime 500 MG tablet  Commonly known as: CEFTIN   500 mg, Oral, 2 Times Daily         Continue These Medications      Instructions Start Date   acetaminophen 325 MG tablet  Commonly known as: TYLENOL   650 mg, Oral, Every 6 Hours PRN      aspirin 81 MG EC tablet   81 mg, Oral, Daily With Lunch      atorvastatin 40 MG tablet  Commonly known as: LIPITOR   40 mg, Oral, Every Morning      budesonide 0.5 MG/2ML nebulizer  solution  Commonly known as: PULMICORT   0.5 mg, Nebulization, 2 times daily      clopidogrel 75 MG tablet  Commonly known as: PLAVIX   75 mg, Oral, Every Morning      dutasteride 0.5 MG capsule  Commonly known as: AVODART   0.5 mg, Oral, Nightly      ferrous sulfate 324 (65 Fe) MG tablet delayed-release EC tablet   324 mg, Oral, Daily With Breakfast      HYDROcodone-acetaminophen 5-325 MG per tablet  Commonly known as: NORCO   1 tablet, Oral, Nightly PRN      iron polysacch complex-B12-VitC-FA-Succ  MG capsule capsule   1 capsule, Oral, Daily With Breakfast      metoprolol succinate XL 25 MG 24 hr tablet  Commonly known as: TOPROL-XL   TAKE 1 TABLET EVERY DAY      montelukast 10 MG tablet  Commonly known as: SINGULAIR   10 mg, Oral, Nightly      multivitamin with minerals tablet tablet   1 tablet, Daily With Lunch      pantoprazole 40 MG EC tablet  Commonly known as: PROTONIX   40 mg, Oral, Daily      Perforomist 20 MCG/2ML nebulizer solution  Generic drug: formoterol   Nebulization, 2 Times Daily - RT      revefenacin 175 MCG/3ML nebulizer solution  Commonly known as: YUPELRI   175 mcg, Nebulization, Daily - RT      saccharomyces boulardii 250 MG capsule  Commonly known as: FLORASTOR   250 mg, Oral, Daily With Lunch      sodium bicarbonate 650 MG tablet   650 mg, Oral, 2 Times Daily      theophylline 300 MG 12 hr tablet  Commonly known as: THEODUR   300 mg, Oral, 2 Times Daily             Discharge Diet:   Cardiac  Activity at Discharge:   As per home physical therapy  Follow-up Appointments  Future Appointments   Date Time Provider Department Center   4/24/2023 12:00 PM [facility]   3/14/2024  4:20 PM Kali Mistry MD [facility]     Additional Instructions for the Follow-ups that You Need to Schedule     Ambulatory Referral to Home Health (Hospital)   As directed      Face to Face Visit Date: 3/19/2023    Follow-up provider for Plan of Care?: I treated the  patient in an acute care facility and will not continue treatment after discharge.    Follow-up provider: KATHLEEN DIGGS [[id number]]    Reason/Clinical Findings: Weakness//pneumonia//obstructive lung disease//multiple comorbidities    Describe mobility limitations that make leaving home difficult: Physical deconditioning//left BKA    Nursing/Therapeutic Services Requested: Skilled Nursing Physical Therapy    Skilled nursing orders: Medication education COPD management    PT orders: Strengthening    Frequency: 1 Week 1               Test Results Pending at Discharge  Pending Labs     Order Current Status    Blood Culture - Blood, Arm, Left Preliminary result    Blood Culture - Blood, Arm, Right Preliminary result           Nj Morataya MD  03/19/23  10:42 EDT

## 2023-03-19 NOTE — THERAPY TREATMENT NOTE
"Subjective: Pt agreeable to therapeutic plan of care.    Objective:     Bed mobility - Supervision     Transfers - Supervision     Ambulation - 2x20 feet Supervision while on 3L O2. Pt ambulates into restroom with SUP and able to perform all ADLs in restroom, including pericare and oral care at sink level with distant SUP.     Vitals: Desaturates to 88% after ambulating and performing ADLs    Pain: 0 VAS   Location:   Intervention for pain: N/A    Education: Provided education on the importance of mobility in the acute care setting    Assessment: David A Jolissaint presents with functional mobility impairments which indicate the need for skilled intervention. Tolerating session today without incident. Will continue to follow and progress as tolerated.     Plan/Recommendations:   Low Intensity Therapy recommended post-acute care - This is recommended as therapy feels this patient would require 2-3 visits per week. OP or HH would be the best option depending on patient's home bound status. Consider, if the patient has other  \"skilled\" needs such as wounds, IV antibiotics, etc. Combined with \"low intensity\" could also equate to a SNF. If patient is medically complex, consider LTAC.. Pt requires no DME at discharge.     Pt desires Home with Home Health at discharge. Pt cooperative; agreeable to therapeutic recommendations and plan of care.         Basic Mobility 6-click:  Rollin = Total, A lot = 2, A little = 3; 4 = None  Supine>Sit:   1 = Total, A lot = 2, A little = 3; 4 = None   Sit>Stand with arms:  1 = Total, A lot = 2, A little = 3; 4 = None  Bed>Chair:   1 = Total, A lot = 2, A little = 3; 4 = None  Ambulate in room:  1 = Total, A lot = 2, A little = 3; 4 = None  3-5 Steps with railin = Total, A lot = 2, A little = 3; 4 = None  Score: 23    Modified Karol: N/A = No pre-op stroke/TIA    Post-Tx Position: Supine with HOB Elevated, Alarms activated and Call light and personal items within " reach  PPE: gloves and surgical mask

## 2023-03-19 NOTE — PLAN OF CARE
Continue to monitor and assess pain. Pt is alert and oriented and complaining of no pain.    Problem: Fall Injury Risk  Goal: Absence of Fall and Fall-Related Injury  Outcome: Ongoing, Progressing  Intervention: Identify and Manage Contributors  Recent Flowsheet Documentation  Taken 3/19/2023 0742 by Alysha Carrero RN  Medication Review/Management: medications reviewed  Intervention: Promote Injury-Free Environment  Recent Flowsheet Documentation  Taken 3/19/2023 0801 by Alysha Carrero, RN  Safety Promotion/Fall Prevention:   assistive device/personal items within reach   clutter free environment maintained   safety round/check completed   fall prevention program maintained  Taken 3/19/2023 0742 by Alysha Carrero, RN  Safety Promotion/Fall Prevention:   assistive device/personal items within reach   clutter free environment maintained   safety round/check completed   fall prevention program maintained   Goal Outcome Evaluation:

## 2023-03-19 NOTE — PLAN OF CARE
"Assessment: David A Jolissaint presents with functional mobility impairments which indicate the need for skilled intervention. Tolerating session today without incident. Will continue to follow and progress as tolerated.     Plan/Recommendations:   Low Intensity Therapy recommended post-acute care - This is recommended as therapy feels this patient would require 2-3 visits per week. OP or HH would be the best option depending on patient's home bound status. Consider, if the patient has other  \"skilled\" needs such as wounds, IV antibiotics, etc. Combined with \"low intensity\" could also equate to a SNF. If patient is medically complex, consider LTAC.. Pt requires no DME at discharge.     Pt desires Home with Home Health at discharge. Pt cooperative; agreeable to therapeutic recommendations and plan of care.   "

## 2023-03-19 NOTE — CASE MANAGEMENT/SOCIAL WORK
Continued Stay Note   [facility]     Patient Name: David A Jolissaint  MRN: [id number]  Today's Date: 3/19/2023    Admit Date: 3/16/2023    Plan: home with SO. current w/ [facility] for home o2 3L. [facility] pending acceptance- order in.   Discharge Plan     Row Name 03/19/23 1142       Plan    Plan home with SO. current w/ [facility] for home o2 3L. [facility] pending acceptance- order in.    Plan Comments CM received phone call from pt asking if his home health was set up.  Pt provided that he had used [facility] in past and was agreeable to using them again.  Referral sent in epic and notified Sonya singh.                Expected Discharge Date and Time     Expected Discharge Date Expected Discharge Time    Mar 19, 2023         Phone communication or documentation only - no physical contact with patient or family.      Lyn Mueller, RN

## 2023-03-20 LAB
BH CV ECHO MEAS - EDV(CUBED): 68.2 ML
BH CV ECHO MEAS - ESV(CUBED): 22.5 ML
BH CV ECHO MEAS - FS: 30.9 %
BH CV ECHO MEAS - IVS/LVPW: 1.24 CM
BH CV ECHO MEAS - IVSD: 1.33 CM
BH CV ECHO MEAS - LV MASS(C)D: 171.8 GRAMS
BH CV ECHO MEAS - LVIDD: 4.1 CM
BH CV ECHO MEAS - LVIDS: 2.8 CM
BH CV ECHO MEAS - LVPWD: 1.08 CM
BH CV ECHO MEAS - RVDD: 1.85 CM
MAXIMAL PREDICTED HEART RATE: 142 BPM
STRESS TARGET HR: 121 BPM

## 2023-03-21 LAB
BACTERIA SPEC AEROBE CULT: NORMAL
BACTERIA SPEC AEROBE CULT: NORMAL

## 2023-03-22 ENCOUNTER — READMISSION MANAGEMENT (OUTPATIENT)
Dept: CALL CENTER | Facility: HOSPITAL | Age: 78
End: 2023-03-22
Payer: MEDICARE

## 2023-03-22 NOTE — PROGRESS NOTES
"Enter Query Response Below      Query Response:     Gram negative pneumonia  Electronically signed by Nj Morataya MD, 23, 7:31 PM EDT.              If applicable, please update the problem list.       Patient: Jolissaint, David A        : 1945  Account: [id number]           Admit Date: 3/16/2023        How to Respond to this query:       a. Click New Note     b. Answer query within the yellow box.                c. Update the Problem List, if applicable.      If you have any questions about this query contact me at: [email or web address]    ,     Patient admitted 3/16/2023 with Pneumonia. Patient with history of chronic kidney disease and emphysema.  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 and possible postobstructive process.\" Patient was treated with:    IV Vancomycin 3/16  IV Cefepime 3/16-3/17  IV Ceftriaxone 3/17-3/18  Discharged home with Ceftin 500mg po twice daily for 7 days    Can this further be clarified as:    -Gram negative pneumonia (excluding Haemophilus influenzae)    -bacterial pneumonia    -other_________________________    -unable to determine      By submitting this query, we are merely seeking further clarification of documentation to accurately reflect all conditions that you are monitoring, evaluating, treating or that extend the hospitalization or utilize additional resources of care. Please utilize your independent clinical judgment when addressing the question(s) above.     This query and your response, once completed, will be entered into the legal medical record.    Sincerely,  Joy Hamilton RN  Clinical Documentation Integrity Program   "

## 2023-03-22 NOTE — OUTREACH NOTE
Medical Week 1 Survey    Flowsheet Row Responses   [facility] patient discharged from? [facility]   Does the patient have one of the following disease processes/diagnoses(primary or secondary)? Other   Week 1 attempt successful? Yes   Call start time 0911   Call end time 0912   Discharge diagnosis Weakness   Meds reviewed with patient/caregiver? Yes   Is the patient having any side effects they believe may be caused by any medication additions or changes? No   Does the patient have all medications ordered at discharge? Yes   Is the patient taking all medications as directed (includes completed medication regime)? Yes   Medication comments antibiotic   Does the patient have a primary care provider?  Yes   Does the patient have an appointment with their PCP within 7 days of discharge? No   What is preventing the patient from scheduling follow up appointments within 7 days of discharge? Haven't had time   Nursing Interventions Advised patient to make appointment   Has the patient kept scheduled appointments due by today? N/A   What is the Home health agency?  [facility]   Has home health visited the patient within 72 hours of discharge? Yes   Psychosocial issues? No   Did the patient receive a copy of their discharge instructions? Yes   Nursing interventions Reviewed instructions with patient   What is the patient's perception of their health status since discharge? Improving   Is the patient/caregiver able to teach back signs and symptoms related to disease process for when to call PCP? Yes   Is the patient/caregiver able to teach back signs and symptoms related to disease process for when to call 911? Yes   Is the patient/caregiver able to teach back the hierarchy of who to call/visit for symptoms/problems? PCP, Specialist, Home health nurse, Urgent Care, ED, 911 Yes   If the patient is a current smoker, are they able to teach back resources for cessation? Not a smoker   Week 1 call completed? Yes    Is the patient interested in additional calls from an ambulatory ?  NOTE:  applies to high risk patients requiring additional follow-up. No          Rhea G - Registered Nurse

## 2023-03-30 ENCOUNTER — READMISSION MANAGEMENT (OUTPATIENT)
Dept: CALL CENTER | Facility: HOSPITAL | Age: 78
End: 2023-03-30
Payer: MEDICARE

## 2023-03-30 NOTE — OUTREACH NOTE
Medical Week 2 Survey    Flowsheet Row Responses   [facility] patient discharged from? [facility]   Does the patient have one of the following disease processes/diagnoses(primary or secondary)? Other   Week 2 attempt successful? Yes   Call start time 1011   Discharge diagnosis Weakness   Call end time 1012   Meds reviewed with patient/caregiver? Yes   Is the patient taking all medications as directed (includes completed medication regime)? Yes   Does the patient have a primary care provider?  Yes   Does the patient have an appointment with their PCP within 7 days of discharge? No   What is preventing the patient from scheduling follow up appointments within 7 days of discharge? Haven't had time   Nursing Interventions Educated patient on importance of making appointment, Advised patient to make appointment   Has the patient kept scheduled appointments due by today? N/A   What is the Home health agency?  [facility]   Has home health visited the patient within 72 hours of discharge? Yes   Psychosocial issues? No   Did the patient receive a copy of their discharge instructions? Yes   Nursing interventions Reviewed instructions with patient   What is the patient's perception of their health status since discharge? Improving   Is the patient/caregiver able to teach back signs and symptoms related to disease process for when to call PCP? Yes   Is the patient/caregiver able to teach back signs and symptoms related to disease process for when to call 911? Yes   Is the patient/caregiver able to teach back the hierarchy of who to call/visit for symptoms/problems? PCP, Specialist, Home health nurse, Urgent Care, ED, 911 Yes   Week 2 Call Completed? Yes   Wrap up additional comments Pt reports he has not seen PCP yet and has not made an appt. Encouraged pt to make appt with PCP. Pt reports he is doing well.           GILDA PARADA - Registered Nurse   No

## 2023-04-11 ENCOUNTER — READMISSION MANAGEMENT (OUTPATIENT)
Dept: CALL CENTER | Facility: HOSPITAL | Age: 78
End: 2023-04-11
Payer: MEDICARE

## 2023-04-11 NOTE — OUTREACH NOTE
Medical Week 3 Survey    Flowsheet Row Responses   [facility] patient discharged from? [facility]   Does the patient have one of the following disease processes/diagnoses(primary or secondary)? Other   Week 3 attempt successful? Yes   Call start time 1237   Call end time 1238   Discharge diagnosis Weakness   Meds reviewed with patient/caregiver? Yes   Is the patient taking all medications as directed (includes completed medication regime)? Yes   Does the patient have a primary care provider?  Yes   Has the patient kept scheduled appointments due by today? N/A   What is the Home health agency?  [facility]   Home health comments  still visits    Psychosocial issues? No   What is the patient's perception of their health status since discharge? Improving   Is the patient/caregiver able to teach back signs and symptoms related to disease process for when to call PCP? Yes   Is the patient/caregiver able to teach back signs and symptoms related to disease process for when to call 911? Yes   Is the patient/caregiver able to teach back the hierarchy of who to call/visit for symptoms/problems? PCP, Specialist, Home health nurse, Urgent Care, ED, 911 Yes   If the patient is a current smoker, are they able to teach back resources for cessation? Not a smoker   Week 3 Call Completed? Yes   Graduated Yes   Did the patient feel the follow up calls were helpful during their recovery period? Yes   Graduated/Revoked comments Brief call - pt states he is doing well- no issues or concerns during call           Palmira H - Registered Nurse

## 2023-04-24 ENCOUNTER — LAB (OUTPATIENT)
Dept: LAB | Facility: HOSPITAL | Age: 78
End: 2023-04-24
Payer: MEDICARE

## 2023-04-24 ENCOUNTER — HOSPITAL ENCOUNTER (OUTPATIENT)
Dept: CARDIOLOGY | Facility: HOSPITAL | Age: 78
Discharge: HOME | End: 2023-04-24
Payer: MEDICARE

## 2023-04-24 ENCOUNTER — HOSPITAL ENCOUNTER (OUTPATIENT)
Dept: INFUSION THERAPY | Facility: HOSPITAL | Age: 78
Discharge: HOME | End: 2023-04-24
Admitting: NURSE PRACTITIONER
Payer: MEDICARE

## 2023-04-24 ENCOUNTER — TELEPHONE (OUTPATIENT)
Dept: CARDIOLOGY | Facility: CLINIC | Age: 78
End: 2023-04-24
Payer: MEDICARE

## 2023-04-24 ENCOUNTER — TRANSCRIBE ORDERS (OUTPATIENT)
Dept: ADMINISTRATIVE | Facility: HOSPITAL | Age: 78
End: 2023-04-24
Payer: MEDICARE

## 2023-04-24 DIAGNOSIS — L97.911 NONHEALING ULCER OF RIGHT LOWER EXTREMITY LIMITED TO BREAKDOWN OF SKIN: ICD-10-CM

## 2023-04-24 DIAGNOSIS — N18.32 CHRONIC KIDNEY DISEASE (CKD) STAGE G3B/A1, MODERATELY DECREASED GLOMERULAR FILTRATION RATE (GFR) BETWEEN 30-44 ML/MIN/1.73 SQUARE METER AND ALBUMINURIA CREATININE RATIO LESS THAN 30 MG/G (CMS/H*: ICD-10-CM

## 2023-04-24 DIAGNOSIS — D63.1 ANEMIA OF CHRONIC RENAL FAILURE, UNSPECIFIED CKD STAGE: Primary | ICD-10-CM

## 2023-04-24 DIAGNOSIS — E78.5 DYSLIPIDEMIA: ICD-10-CM

## 2023-04-24 DIAGNOSIS — R09.89 BILATERAL CAROTID BRUITS: ICD-10-CM

## 2023-04-24 DIAGNOSIS — N40.0 ENLARGED PROSTATE: ICD-10-CM

## 2023-04-24 DIAGNOSIS — I45.10 RBBB: ICD-10-CM

## 2023-04-24 DIAGNOSIS — N18.32 CHRONIC KIDNEY DISEASE (CKD) STAGE G3B/A1, MODERATELY DECREASED GLOMERULAR FILTRATION RATE (GFR) BETWEEN 30-44 ML/MIN/1.73 SQUARE METER AND ALBUMINURIA CREATININE RATIO LESS THAN 30 MG/G (CMS/H*: Primary | ICD-10-CM

## 2023-04-24 DIAGNOSIS — J43.1 PANLOBULAR EMPHYSEMA: ICD-10-CM

## 2023-04-24 DIAGNOSIS — I10 UNCOMPLICATED HYPERTENSION: ICD-10-CM

## 2023-04-24 DIAGNOSIS — I87.8 POOR VENOUS ACCESS: ICD-10-CM

## 2023-04-24 DIAGNOSIS — I73.9 PAD (PERIPHERAL ARTERY DISEASE): ICD-10-CM

## 2023-04-24 DIAGNOSIS — I25.810 CORONARY ARTERY DISEASE INVOLVING CORONARY BYPASS GRAFT OF NATIVE HEART WITHOUT ANGINA PECTORIS: ICD-10-CM

## 2023-04-24 DIAGNOSIS — N18.9 ANEMIA OF CHRONIC RENAL FAILURE, UNSPECIFIED CKD STAGE: Primary | ICD-10-CM

## 2023-04-24 DIAGNOSIS — N18.4 CKD (CHRONIC KIDNEY DISEASE) STAGE 4, GFR 15-29 ML/MIN: ICD-10-CM

## 2023-04-24 LAB
25(OH)D3 SERPL-MCNC: 72.5 NG/ML (ref 30–100)
ALBUMIN SERPL-MCNC: 4.3 G/DL (ref 3.5–5.2)
ALBUMIN UR-MCNC: 34.8 MG/DL
ANION GAP SERPL CALCULATED.3IONS-SCNC: 11 MMOL/L (ref 5–15)
BACTERIA UR QL AUTO: ABNORMAL /HPF
BH CV LOWER ARTERIAL RIGHT ABI RATIO: 1.26
BH CV LOWER ARTERIAL RIGHT DORSALIS PEDIS SYS MAX: 145
BH CV LOWER ARTERIAL RIGHT GREAT TOE SYS MAX: 140
BH CV LOWER ARTERIAL RIGHT POST TIBIAL SYS MAX: 0
BH CV LOWER ARTERIAL RIGHT TBI RATIO: 1.22
BILIRUB UR QL STRIP: NEGATIVE
BUN SERPL-MCNC: 32 MG/DL (ref 8–23)
BUN/CREAT SERPL: 18.6 (ref 7–25)
CALCIUM SPEC-SCNC: 9.7 MG/DL (ref 8.6–10.5)
CHLORIDE SERPL-SCNC: 101 MMOL/L (ref 98–107)
CLARITY UR: CLEAR
CO2 SERPL-SCNC: 27 MMOL/L (ref 22–29)
COLOR UR: YELLOW
CREAT SERPL-MCNC: 1.72 MG/DL (ref 0.76–1.27)
CREAT UR-MCNC: 60.9 MG/DL
EGFRCR SERPLBLD CKD-EPI 2021: 39.9 ML/MIN/1.73
GLUCOSE SERPL-MCNC: 100 MG/DL (ref 65–99)
GLUCOSE UR STRIP-MCNC: NEGATIVE MG/DL
HGB UR QL STRIP.AUTO: NEGATIVE
HYALINE CASTS UR QL AUTO: ABNORMAL /LPF
KETONES UR QL STRIP: NEGATIVE
LEUKOCYTE ESTERASE UR QL STRIP.AUTO: NEGATIVE
MAXIMAL PREDICTED HEART RATE: 141 BPM
MICROALBUMIN/CREAT UR: 571.4 MG/G
NITRITE UR QL STRIP: NEGATIVE
PH UR STRIP.AUTO: 7 [PH] (ref 5–8)
PHOSPHATE SERPL-MCNC: 2.9 MG/DL (ref 2.5–4.5)
POTASSIUM SERPL-SCNC: 4.5 MMOL/L (ref 3.5–5.2)
PROT UR QL STRIP: ABNORMAL
PTH-INTACT SERPL-MCNC: 56.4 PG/ML (ref 15–65)
RBC # UR STRIP: ABNORMAL /HPF
REF LAB TEST METHOD: ABNORMAL
SODIUM SERPL-SCNC: 139 MMOL/L (ref 136–145)
SP GR UR STRIP: 1.01 (ref 1–1.03)
SQUAMOUS #/AREA URNS HPF: ABNORMAL /HPF
STRESS TARGET HR: 120 BPM
UPPER ARTERIAL RIGHT ARM BRACHIAL SYS MAX: 115 MMHG
UROBILINOGEN UR QL STRIP: ABNORMAL
WBC # UR STRIP: ABNORMAL /HPF

## 2023-04-24 PROCEDURE — 25010000002 HEPARIN LOCK FLUSH PER 10 UNITS: Performed by: NURSE PRACTITIONER

## 2023-04-24 PROCEDURE — 82043 UR ALBUMIN QUANTITATIVE: CPT

## 2023-04-24 PROCEDURE — 93922 UPR/L XTREMITY ART 2 LEVELS: CPT

## 2023-04-24 PROCEDURE — 82570 ASSAY OF URINE CREATININE: CPT

## 2023-04-24 PROCEDURE — 83970 ASSAY OF PARATHORMONE: CPT

## 2023-04-24 PROCEDURE — 80069 RENAL FUNCTION PANEL: CPT

## 2023-04-24 PROCEDURE — 82306 VITAMIN D 25 HYDROXY: CPT

## 2023-04-24 PROCEDURE — 36591 DRAW BLOOD OFF VENOUS DEVICE: CPT

## 2023-04-24 PROCEDURE — 96374 THER/PROPH/DIAG INJ IV PUSH: CPT

## 2023-04-24 PROCEDURE — 81001 URINALYSIS AUTO W/SCOPE: CPT

## 2023-04-24 RX ORDER — HEPARIN SODIUM (PORCINE) LOCK FLUSH IV SOLN 100 UNIT/ML 100 UNIT/ML
500 SOLUTION INTRAVENOUS AS NEEDED
Status: DISCONTINUED | OUTPATIENT
Start: 2023-04-24 | End: 2023-04-26 | Stop reason: HOSPADM

## 2023-04-24 RX ORDER — SODIUM CHLORIDE 0.9 % (FLUSH) 0.9 %
20 SYRINGE (ML) INJECTION AS NEEDED
Status: DISCONTINUED | OUTPATIENT
Start: 2023-04-24 | End: 2023-04-26 | Stop reason: HOSPADM

## 2023-04-24 RX ORDER — HEPARIN SODIUM (PORCINE) LOCK FLUSH IV SOLN 100 UNIT/ML 100 UNIT/ML
500 SOLUTION INTRAVENOUS AS NEEDED
Status: CANCELLED | OUTPATIENT
Start: 2023-04-24

## 2023-04-24 RX ORDER — SODIUM CHLORIDE 0.9 % (FLUSH) 0.9 %
20 SYRINGE (ML) INJECTION AS NEEDED
Status: CANCELLED | OUTPATIENT
Start: 2023-04-24

## 2023-04-24 RX ADMIN — HEPARIN 500 UNITS: 100 SYRINGE at 12:13

## 2023-04-24 RX ADMIN — Medication 20 ML: at 12:13

## 2023-05-22 RX ORDER — METOPROLOL SUCCINATE 25 MG/1
TABLET, EXTENDED RELEASE ORAL
Qty: 90 TABLET | Refills: 1 | Status: SHIPPED | OUTPATIENT
Start: 2023-05-22

## 2023-05-22 NOTE — TELEPHONE ENCOUNTER
Rx Refill Note  Requested Prescriptions     Pending Prescriptions Disp Refills   • metoprolol succinate XL (TOPROL-XL) 25 MG 24 hr tablet [Pharmacy Med Name: METOPROLOL SUCCINATE ER 25 MG Tablet Extended Release 24 Hour] 90 tablet 0     Sig: TAKE 1 TABLET EVERY DAY      Last office visit with prescribing clinician: 3/13/2023   Last telemedicine visit with prescribing clinician: 4/24/2023   Next office visit with prescribing clinician: 3/14/2024                         Would you like a call back once the refill request has been completed: [] Yes [] No    If the office needs to give you a call back, can they leave a voicemail: [] Yes [] No    Gisel Hardin MA  05/22/23, 09:13 EDT

## 2023-10-14 ENCOUNTER — APPOINTMENT (OUTPATIENT)
Dept: GENERAL RADIOLOGY | Facility: HOSPITAL | Age: 78
End: 2023-10-14
Payer: MEDICARE

## 2023-10-14 ENCOUNTER — HOSPITAL ENCOUNTER (OUTPATIENT)
Facility: HOSPITAL | Age: 78
Discharge: HOME | End: 2023-10-14
Attending: EMERGENCY MEDICINE | Admitting: EMERGENCY MEDICINE
Payer: MEDICARE

## 2023-10-14 VITALS
TEMPERATURE: 98.1 F | HEIGHT: 70 IN | RESPIRATION RATE: 20 BRPM | DIASTOLIC BLOOD PRESSURE: 87 MMHG | HEART RATE: 89 BPM | SYSTOLIC BLOOD PRESSURE: 135 MMHG | WEIGHT: 156 LBS | OXYGEN SATURATION: 98 % | BODY MASS INDEX: 22.33 KG/M2

## 2023-10-14 DIAGNOSIS — L03.113 RIGHT ARM CELLULITIS: ICD-10-CM

## 2023-10-14 DIAGNOSIS — S80.211A ABRASION OF RIGHT KNEE, INITIAL ENCOUNTER: ICD-10-CM

## 2023-10-14 DIAGNOSIS — S40.011A CONTUSION OF RIGHT SHOULDER, INITIAL ENCOUNTER: Primary | ICD-10-CM

## 2023-10-14 DIAGNOSIS — S40.811A ABRASION OF RIGHT ARM, INITIAL ENCOUNTER: ICD-10-CM

## 2023-10-14 PROCEDURE — 73080 X-RAY EXAM OF ELBOW: CPT

## 2023-10-14 PROCEDURE — 25010000002 TETANUS-DIPHTH-ACELL PERTUSSIS 5-2.5-18.5 LF-MCG/0.5 SUSPENSION PREFILLED SYRINGE: Performed by: EMERGENCY MEDICINE

## 2023-10-14 PROCEDURE — 90471 IMMUNIZATION ADMIN: CPT | Performed by: EMERGENCY MEDICINE

## 2023-10-14 PROCEDURE — 73030 X-RAY EXAM OF SHOULDER: CPT

## 2023-10-14 PROCEDURE — 90715 TDAP VACCINE 7 YRS/> IM: CPT | Performed by: EMERGENCY MEDICINE

## 2023-10-14 PROCEDURE — G0463 HOSPITAL OUTPT CLINIC VISIT: HCPCS | Performed by: EMERGENCY MEDICINE

## 2023-10-14 RX ORDER — GINSENG 100 MG
1 CAPSULE ORAL 2 TIMES DAILY
Qty: 13 G | Refills: 0 | Status: SHIPPED | OUTPATIENT
Start: 2023-10-14 | End: 2023-10-21

## 2023-10-14 RX ORDER — DIAPER,BRIEF,INFANT-TODD,DISP
1 EACH MISCELLANEOUS ONCE
Status: COMPLETED | OUTPATIENT
Start: 2023-10-14 | End: 2023-10-14

## 2023-10-14 RX ORDER — CEPHALEXIN 500 MG/1
500 CAPSULE ORAL 3 TIMES DAILY
Qty: 15 CAPSULE | Refills: 0 | Status: SHIPPED | OUTPATIENT
Start: 2023-10-14 | End: 2023-10-19

## 2023-10-14 RX ORDER — CEPHALEXIN 500 MG/1
500 CAPSULE ORAL ONCE
Status: COMPLETED | OUTPATIENT
Start: 2023-10-14 | End: 2023-10-14

## 2023-10-14 RX ADMIN — BACITRACIN 0.9 G: 500 OINTMENT TOPICAL at 13:01

## 2023-10-14 RX ADMIN — TETANUS TOXOID, REDUCED DIPHTHERIA TOXOID AND ACELLULAR PERTUSSIS VACCINE, ADSORBED 0.5 ML: 5; 2.5; 8; 8; 2.5 SUSPENSION INTRAMUSCULAR at 12:15

## 2023-10-14 RX ADMIN — CEPHALEXIN 500 MG: 500 CAPSULE ORAL at 12:15

## 2023-10-14 NOTE — DISCHARGE INSTRUCTIONS
Use sling as needed for pain relief and support.  Apply antibiotic ointment to right arm wounds as prescribed.  Return to ER for any concerns.  Follow-up with orthopedic surgery for any continued issues.

## 2023-10-14 NOTE — FSED PROVIDER NOTE
Subjective   History of Present Illness  The patient is a 78-year-old  male with multiple medical issues including oxygen dependent COPD, who presents emergency room with complaints of right arm injury.  Patient injured his right arm when he fell 2 weeks ago.  He had multiple abrasions noted.  Since that time, the family has become concerned because the area is still swollen.  They called urgent care and they were told to come here because they were concerned about a blood clot.  Patient reports minimal pain.        Review of Systems   Constitutional: Negative.  Negative for chills, fatigue and fever.   Eyes: Negative.    Respiratory:  Negative for cough, chest tightness and shortness of breath.    Cardiovascular:  Negative for chest pain and palpitations.   Gastrointestinal:  Negative for abdominal pain, diarrhea, nausea and vomiting.   Genitourinary: Negative.    Musculoskeletal: Negative.  Positive for arthralgias and joint swelling.   Skin: Negative.  Positive for wound. Negative for rash.   Neurological: Negative.  Negative for syncope, weakness, numbness and headaches.   Psychiatric/Behavioral: Negative.     All other systems reviewed and are negative.      Past Medical History:   Diagnosis Date    Anemia     Cancer     Cellulitis     CKD (chronic kidney disease), stage III     COPD (chronic obstructive pulmonary disease)     Diverticulitis     Dyslipidemia     Hypertension     Myocardial infarction     PVD (peripheral vascular disease)        No Known Allergies    Past Surgical History:   Procedure Laterality Date    BRONCHOSCOPY N/A 5/20/2021    Procedure: BRONCHOSCOPY WITH ENDOBRONCHIAL ULTRASOUND WITH FINE NEEDLE ASPIRATION. BRONCHOALVEOLAR LAVAGE;  Surgeon: Jackson Quezada MD;  Location: [facility] ENDOSCOPY;  Service: Pulmonary;  Laterality: N/A;  subcarinal mass    CARDIAC SURGERY      CHOLECYSTECTOMY      GALLBLADDER SURGERY      LEG AMPUTATION Left        Family History   Problem Relation Age of  Onset    Heart attack Father     Heart disease Father        Social History     Socioeconomic History    Marital status:    Tobacco Use    Smoking status: Former     Packs/day: 2.00     Years: 50.00     Additional pack years: 0.00     Total pack years: 100.00     Types: Cigarettes    Smokeless tobacco: Never    Tobacco comments:     Says he quit approximately in 2005.   Vaping Use    Vaping Use: Never used   Substance and Sexual Activity    Alcohol use: Not Currently    Drug use: Never    Sexual activity: Defer           Objective   Physical Exam  Vitals and nursing note reviewed.   Constitutional:       General: He is not in acute distress.     Appearance: He is not ill-appearing.   HENT:      Head: Normocephalic.      Right Ear: External ear normal.      Left Ear: External ear normal.      Nose: Nose normal.      Mouth/Throat:      Mouth: Mucous membranes are moist.   Eyes:      Extraocular Movements: Extraocular movements intact.   Cardiovascular:      Rate and Rhythm: Normal rate and regular rhythm.      Pulses: Normal pulses.   Pulmonary:      Effort: Pulmonary effort is normal. No respiratory distress.   Abdominal:      General: Abdomen is flat.   Musculoskeletal:         General: Swelling, tenderness and signs of injury present. No deformity. Normal range of motion.      Right shoulder: Swelling, tenderness and bony tenderness present. Decreased range of motion.      Right elbow: Swelling present. No deformity. Normal range of motion. Tenderness present.      Cervical back: No tenderness.      Comments: Right arm injuries as pictured below.   Skin:     General: Skin is warm.      Capillary Refill: Capillary refill takes less than 2 seconds.   Neurological:      General: No focal deficit present.      Mental Status: He is alert and oriented to person, place, and time. Mental status is at baseline.   Psychiatric:         Mood and Affect: Mood normal.         Procedures           ED Course  ED Course as  of 10/15/23 1130   Sun Oct 15, 2023   1129 Shoulder and elbow x-ray do not show any acute fracture.  Patient provided with a sling for pain relief and support of the right upper extremity. [KZ]      ED Course User Index  [KZ] Ronn Villalpando MD                                           Medical Decision Making  The patient presents to the emergency room with joint discomfort after sustaining injury/trauma.  Differential diagnosis includes fracture, sprain, strain, dislocation, nonspecific joint pain.  X-ray has been ordered to further evaluate. The patient is otherwise well appearing, hemodynamically stable, and shows no evidence of neurovascular injury or compartment syndrome.    The patient was found to have no acute fracture on x-ray.  Patient was placed in an appropriate splint for immobilization and will follow up with orthopedic surgery as an outpatient.  Patient has been prescribed medication if appropriate.        Problems Addressed:  Abrasion of right arm, initial encounter: complicated acute illness or injury  Abrasion of right knee, initial encounter: complicated acute illness or injury  Contusion of right shoulder, initial encounter: complicated acute illness or injury  Right arm cellulitis: complicated acute illness or injury    Amount and/or Complexity of Data Reviewed  Radiology: ordered.    Risk  OTC drugs.  Prescription drug management.        Final diagnoses:   Contusion of right shoulder, initial encounter   Right arm cellulitis   Abrasion of right arm, initial encounter   Abrasion of right knee, initial encounter       ED Disposition  ED Disposition       ED Disposition   Discharge    Condition   Stable    Comment   --               Ori Brumfield MD  [address] [facility] [address]  [phone]    Schedule an appointment as soon as possible for a visit in 2 days  For repeat evaluation         Medication List        New Prescriptions      bacitracin 500 UNIT/GM ointment  Apply 1  application  topically to the appropriate area as directed 2 (Two) Times a Day for 7 days.     cephalexin 500 MG capsule  Commonly known as: KEFLEX  Take 1 capsule by mouth 3 (Three) Times a Day for 5 days.            Stop      cefuroxime 500 MG tablet  Commonly known as: CEFTIN               Where to Get Your Medications        These medications were sent to [id number] - [place], [place] - [address] AT SEC OF [place] [address] & [address] - [phone]  - [phone]   [address] [place] [address]      Phone: [phone]   bacitracin 500 UNIT/GM ointment  cephalexin 500 MG capsule

## 2024-03-11 RX ORDER — METOPROLOL SUCCINATE 25 MG/1
TABLET, EXTENDED RELEASE ORAL
Qty: 90 TABLET | Refills: 0 | Status: SHIPPED | OUTPATIENT
Start: 2024-03-11

## 2024-03-11 NOTE — TELEPHONE ENCOUNTER
Rx Refill Note  Requested Prescriptions     Pending Prescriptions Disp Refills    metoprolol succinate XL (TOPROL-XL) 25 MG 24 hr tablet [Pharmacy Med Name: METOPROLOL SUCCINATE ER 25 MG Tablet Extended Release 24 Hour] 90 tablet 0     Sig: TAKE 1 TABLET EVERY DAY      Last office visit with prescribing clinician: 3/13/2023   Last telemedicine visit with prescribing clinician: Visit date not found   Next office visit with prescribing clinician: Visit date not found                         Would you like a call back once the refill request has been completed: [] Yes [] No    If the office needs to give you a call back, can they leave a voicemail: [] Yes [] No    Venessa Jackson MA  03/11/24, 10:27 EDT

## 2024-04-11 ENCOUNTER — OFFICE VISIT (OUTPATIENT)
Dept: CARDIOLOGY | Facility: CLINIC | Age: 79
End: 2024-04-11
Payer: MEDICARE

## 2024-04-11 VITALS
WEIGHT: 152 LBS | HEART RATE: 81 BPM | DIASTOLIC BLOOD PRESSURE: 77 MMHG | SYSTOLIC BLOOD PRESSURE: 107 MMHG | BODY MASS INDEX: 21.76 KG/M2 | HEIGHT: 70 IN

## 2024-04-11 DIAGNOSIS — E11.9 TYPE 2 DIABETES MELLITUS WITHOUT COMPLICATION, WITHOUT LONG-TERM CURRENT USE OF INSULIN: ICD-10-CM

## 2024-04-11 DIAGNOSIS — I25.2 HISTORY OF NON-ST ELEVATION MYOCARDIAL INFARCTION (NSTEMI): ICD-10-CM

## 2024-04-11 DIAGNOSIS — Z89.519: ICD-10-CM

## 2024-04-11 DIAGNOSIS — I73.9 PAD (PERIPHERAL ARTERY DISEASE): ICD-10-CM

## 2024-04-11 DIAGNOSIS — E78.5 DYSLIPIDEMIA: ICD-10-CM

## 2024-04-11 DIAGNOSIS — N18.32 STAGE 3B CHRONIC KIDNEY DISEASE: ICD-10-CM

## 2024-04-11 DIAGNOSIS — I25.810 CORONARY ARTERY DISEASE INVOLVING CORONARY BYPASS GRAFT OF NATIVE HEART WITHOUT ANGINA PECTORIS: Primary | ICD-10-CM

## 2024-04-11 DIAGNOSIS — I10 ESSENTIAL HYPERTENSION: ICD-10-CM

## 2024-04-11 RX ORDER — ONDANSETRON 4 MG/1
4 TABLET, FILM COATED ORAL
COMMUNITY
Start: 2023-11-30

## 2024-04-11 NOTE — PROGRESS NOTES
Subjective:     Encounter Date:04/11/2024      Patient ID: David A Jolissaint is a 79 y.o. male.    Chief Complaint and history of present illness:     Follow-up for CAD, CABG, PAD, hypertension, dyslipidemia, right bundle branch block .     History of Present Illness  :     Mr. David A Jolissaint  has PMH of     #  CAD, NSTEMI, 2 vessel CABG with SVG to OM and PDA 3/9/16  #  PAD, left lower extremity amputation  #   RBBB  #  diabetes, hypertension, COPD, tobacco abuse quit in October 2015  #  CKD  #  left BKA, cholecystectomy  #  carotid disease  #Former smoker      here for  follow-up.  Patient  denies any chest pain, shortness of breath.  Patient is on round-the-clock oxygen     Patient's arterial blood pressure is 107/81, heart rate 81 bpm.     Patient  had carotid Doppler 06/15/2018 which showed bilateral 50-70% worse compared to 2016 which was less than 50% bilat. carotid Dopplers 11/11/2021 revealed moderate bilateral carotid disease.   Labs from 10/05/2017 showed cholesterol 98 triglycerides 87 HDL 41 LDL 32 BUN 34 creatinine 2.4 hemoglobin A1c 5.7.  Repeat labs 5/18/2019 revealed BUN 32 creatinine 2.5 follows with Dr. Mendez. proBNP was elevated at 3362.  Labs from 11/19/2019 reveal cholesterol 104 HDL 73 LDL 46, A1c 5.8, CMP with a creatinine of 46/2.34.Labs from 5/26/2020 reveal normal CBC, BMP with BUN of 36 creatinine 2.23 GFR of 29.  Labs from 11/23/2020 revealed normal PSA, creatinine on CMP of 2.09, GFR 33, triglycerides 98 LDL 49 HDL 38.  Total cholesterol is not seen on this labs.,  CBC is normal.  Hemoglobin A1c of 5.9.  Labs from 11/12/2021 reveal BUN/creatinine of 35/1.83, calcium 8.3, GFR 36, hemoglobin 8.5 with MCV of 93.       Chest x-ray 11/11/2021 - for any acute current cardiopulmonary abnormalities.  Echo 11/10/2021 revealed normal LV function   5/21/21:   Lexiscan was negative for ischemia.   Echocardiogram done 10/1/2020 which revealed normal LV systolic function with mild concentric  LVH and left atrial enlargement.        Labs from 2/23/2023 revealed lipid profile with cholesterol 117, triglycerides 91, HDL 49, LDL 51.  CMP with a BUN/creatinine of 30/1.97, GFR of 30.  A1c of 5.3 improved from 5.9 by labs 1-year ago     ASSESSMENT:        #  bradycardia, RBBB  #  CAD, CABG, history of NSTEMI  #  diabetes,   #  hypertension,   # CKD,   # COPD,  # dyslipidemia  #. Bilateral carotid stenosis  # PAD with absent right radial pulse, left BKA        PLAN:     Reviewed EKG results with patient.  Continue medical management with aspirin, clopidogrel, atorvastatin, metoprolol, as tolerated.  Patient is asymptomatic from right bundle and bradycardia at the current time.  Advised labs including renal function and cholesterol.  Patient had labs done with PMD Dr. Valero on 2/9/2024.  Will get labs from PMDs office  Follow-up with PMD for diabetes care and labs..   Follow-up with nephrology for CKD.  Patient sees Dr. Mendez           Procedures:  Echocardiogram 3/17/2023 reveals EF of 60 to 65%  ABIs done 4/24/2023 reveal normal right.  Left could not be assessed due to below-knee amputation.        ECG 12 Lead    Date/Time: 4/11/2024 4:55 PM  Performed by: Kali Mistry MD    Authorized by: Kali Mistry MD  Comparison: compared with previous ECG from 3/13/2023  Comparison to previous ECG: EKG done today reviewed/interpreted by me reveals sinus rhythm with a rate of 78 bpm with right bundle branch block and left posterior fascicular block, no new change compared EKG from 3/13/2023.          Copied text in this portion of the note has been reviewed and is accurate as of 4/11/2024  The following portions of the patient's history were reviewed and updated as appropriate: allergies, current medications, past family history, past medical history, past social history, past surgical history and problem list.    Assessment:         MDM       Diagnosis Plan   1. Coronary artery disease involving  coronary bypass graft of native heart without angina pectoris        2. History of non-ST elevation myocardial infarction (NSTEMI)        3. Essential hypertension        4. Dyslipidemia        5. Type 2 diabetes mellitus without complication, without long-term current use of insulin        6. PAD (peripheral artery disease)        7. Status post unilateral below knee amputation        8. Stage 3b chronic kidney disease               Plan:               Past Medical History:  Past Medical History:   Diagnosis Date    Anemia     Cancer     Cellulitis     CKD (chronic kidney disease), stage III     COPD (chronic obstructive pulmonary disease)     Diverticulitis     Dyslipidemia     Hypertension     Myocardial infarction     PVD (peripheral vascular disease)      Past Surgical History:  Past Surgical History:   Procedure Laterality Date    BRONCHOSCOPY N/A 5/20/2021    Procedure: BRONCHOSCOPY WITH ENDOBRONCHIAL ULTRASOUND WITH FINE NEEDLE ASPIRATION. BRONCHOALVEOLAR LAVAGE;  Surgeon: Jackson Quezada MD;  Location: [facility] ENDOSCOPY;  Service: Pulmonary;  Laterality: N/A;  subcarinal mass    CARDIAC SURGERY      CHOLECYSTECTOMY      GALLBLADDER SURGERY      LEG AMPUTATION Left       Allergies:  Allergies   Allergen Reactions    Diltiazem Unknown - High Severity     Swelling of lips     Home Meds:  Current Meds:     Current Outpatient Medications:     acetaminophen (TYLENOL) 325 MG tablet, Take 2 tablets by mouth Every 6 (Six) Hours As Needed for Mild Pain ., Disp: , Rfl:     aspirin 81 MG EC tablet, Take 1 tablet by mouth Daily With Lunch., Disp: , Rfl: 0    atorvastatin (LIPITOR) 40 MG tablet, Take 1 tablet by mouth Every Morning., Disp: , Rfl:     budesonide (PULMICORT) 0.5 MG/2ML nebulizer solution, Take 2 mL by nebulization 2 (two) times a day., Disp: , Rfl:     clopidogrel (PLAVIX) 75 MG tablet, Take 1 tablet by mouth Every Morning., Disp: , Rfl:     dutasteride (AVODART) 0.5 MG capsule, Take 1 capsule by mouth Every  Night., Disp: , Rfl:     ferrous sulfate 324 (65 Fe) MG tablet delayed-release EC tablet, Take 1 tablet by mouth Daily With Breakfast., Disp: 30 tablet, Rfl: 1    formoterol (Perforomist) 20 MCG/2ML nebulizer solution, Take  by nebulization 2 (Two) Times a Day., Disp: , Rfl:     HYDROcodone-acetaminophen (NORCO) 5-325 MG per tablet, Take 1 tablet by mouth At Night As Needed for Moderate Pain., Disp: , Rfl:     iron polysacch complex-B12-VitC-FA-Succ (Ferrex 150 Forte Plus)  MG capsule capsule, Take 1 capsule by mouth Daily With Breakfast., Disp: , Rfl:     metoprolol succinate XL (TOPROL-XL) 25 MG 24 hr tablet, TAKE 1 TABLET EVERY DAY, Disp: 90 tablet, Rfl: 0    montelukast (SINGULAIR) 10 MG tablet, Take 1 tablet by mouth Every Night., Disp: , Rfl:     Multiple Vitamins-Minerals (MULTI VITAMIN/MINERALS) tablet, 1 tablet Daily With Lunch., Disp: , Rfl:     ondansetron (ZOFRAN) 4 MG tablet, Take 1 tablet by mouth., Disp: , Rfl:     pantoprazole (PROTONIX) 40 MG EC tablet, Take 1 tablet by mouth Daily., Disp: , Rfl:     revefenacin (YUPELRI) 175 MCG/3ML nebulizer solution, Take 3 mL by nebulization Daily., Disp: , Rfl:     saccharomyces boulardii (FLORASTOR) 250 MG capsule, Take 1 capsule by mouth Daily With Lunch., Disp: , Rfl:     sodium bicarbonate 650 MG tablet, Take 1 tablet by mouth 2 (Two) Times a Day., Disp: , Rfl:     theophylline (THEODUR) 300 MG 12 hr tablet, Take 1 tablet by mouth 2 (Two) Times a Day., Disp: , Rfl:   Social History:   Social History     Tobacco Use    Smoking status: Former     Current packs/day: 2.00     Average packs/day: 2.0 packs/day for 50.0 years (100.0 ttl pk-yrs)     Types: Cigarettes    Smokeless tobacco: Never    Tobacco comments:     Says he quit approximately in 2005.   Substance Use Topics    Alcohol use: Not Currently      Family History:  Family History   Problem Relation Age of Onset    Heart attack Father     Heart disease Father               Review of Systems  "  Cardiovascular:  Negative for chest pain, leg swelling and palpitations.   Respiratory:  Positive for shortness of breath.    Neurological:  Negative for dizziness and numbness.     All other systems are negative         Objective:     Physical Exam  /77 (BP Location: Right arm, Patient Position: Sitting, Cuff Size: Adult)   Pulse 81   Ht 177.8 cm (70\")   Wt 68.9 kg (152 lb)   BMI 21.81 kg/m²   General:  Appears in no acute distress  Eyes: Sclera is anicteric,  conjunctiva is clear   HEENT:  No JVD.  No carotid bruits  Respiratory: Respirations regular and unlabored at rest.  Clear to auscultation  Cardiovascular: S1,S2 Regular rate and rhythm. No murmur, rub or gallop auscultated.   Extremities: Left BKA with prosthesis seen.  Skin: Color pink. Skin warm and dry to touch. No rashes  No xanthoma  Neuro: Alert and awake.    Lab Reviewed:         Kali Mistry MD  4/11/2024 16:58 EDT      EMR Dragon/Transcription:   \"Dictated utilizing Dragon dictation\".        "

## 2024-04-29 ENCOUNTER — LAB (OUTPATIENT)
Dept: LAB | Facility: HOSPITAL | Age: 79
End: 2024-04-29
Payer: MEDICARE

## 2024-04-29 ENCOUNTER — TRANSCRIBE ORDERS (OUTPATIENT)
Dept: ADMINISTRATIVE | Facility: HOSPITAL | Age: 79
End: 2024-04-29
Payer: MEDICARE

## 2024-04-29 DIAGNOSIS — N18.32 CHRONIC KIDNEY DISEASE (CKD) STAGE G3B/A1, MODERATELY DECREASED GLOMERULAR FILTRATION RATE (GFR) BETWEEN 30-44 ML/MIN/1.73 SQUARE METER AND ALBUMINURIA CREATININE RATIO LESS THAN 30 MG/G (CMS/H*: ICD-10-CM

## 2024-04-29 DIAGNOSIS — D63.1 ERYTHROPOIETIN-RESISTANT ANEMIA: ICD-10-CM

## 2024-04-29 DIAGNOSIS — N40.0 ENLARGED PROSTATE: ICD-10-CM

## 2024-04-29 DIAGNOSIS — I12.9 HYPERTENSIVE NEPHROPATHY: ICD-10-CM

## 2024-04-29 DIAGNOSIS — N18.32 CHRONIC KIDNEY DISEASE (CKD) STAGE G3B/A1, MODERATELY DECREASED GLOMERULAR FILTRATION RATE (GFR) BETWEEN 30-44 ML/MIN/1.73 SQUARE METER AND ALBUMINURIA CREATININE RATIO LESS THAN 30 MG/G (CMS/H*: Primary | ICD-10-CM

## 2024-04-29 LAB
ALBUMIN SERPL-MCNC: 4.1 G/DL (ref 3.5–5.2)
ANION GAP SERPL CALCULATED.3IONS-SCNC: 13 MMOL/L (ref 5–15)
BUN SERPL-MCNC: 39 MG/DL (ref 8–23)
BUN/CREAT SERPL: 18.3 (ref 7–25)
CALCIUM SPEC-SCNC: 9.9 MG/DL (ref 8.6–10.5)
CHLORIDE SERPL-SCNC: 101 MMOL/L (ref 98–107)
CO2 SERPL-SCNC: 27 MMOL/L (ref 22–29)
CREAT SERPL-MCNC: 2.13 MG/DL (ref 0.76–1.27)
EGFRCR SERPLBLD CKD-EPI 2021: 30.7 ML/MIN/1.73
GLUCOSE SERPL-MCNC: 103 MG/DL (ref 65–99)
PHOSPHATE SERPL-MCNC: 3.5 MG/DL (ref 2.5–4.5)
POTASSIUM SERPL-SCNC: 4.1 MMOL/L (ref 3.5–5.2)
SODIUM SERPL-SCNC: 141 MMOL/L (ref 136–145)

## 2024-04-29 PROCEDURE — 36415 COLL VENOUS BLD VENIPUNCTURE: CPT

## 2024-04-29 PROCEDURE — 80069 RENAL FUNCTION PANEL: CPT

## 2024-06-10 RX ORDER — METOPROLOL SUCCINATE 25 MG/1
TABLET, EXTENDED RELEASE ORAL
Qty: 90 TABLET | Refills: 3 | Status: SHIPPED | OUTPATIENT
Start: 2024-06-10

## 2024-06-10 NOTE — TELEPHONE ENCOUNTER
Rx Refill Note  Requested Prescriptions     Pending Prescriptions Disp Refills    metoprolol succinate XL (TOPROL-XL) 25 MG 24 hr tablet [Pharmacy Med Name: METOPROLOL SUCCINATE ER 25 MG Tablet Extended Release 24 Hour] 90 tablet 3     Sig: TAKE 1 TABLET EVERY DAY      Last office visit with prescribing clinician: 4/11/2024   Last telemedicine visit with prescribing clinician: Visit date not found   Next office visit with prescribing clinician: 4/17/2025                         Would you like a call back once the refill request has been completed: [] Yes [] No    If the office needs to give you a call back, can they leave a voicemail: [] Yes [] No    Gisel Hardin MA  06/10/24, 10:03 EDT

## 2024-09-25 ENCOUNTER — APPOINTMENT (OUTPATIENT)
Dept: GENERAL RADIOLOGY | Facility: HOSPITAL | Age: 79
End: 2024-09-25
Payer: MEDICARE

## 2024-09-25 ENCOUNTER — APPOINTMENT (OUTPATIENT)
Dept: CT IMAGING | Facility: HOSPITAL | Age: 79
End: 2024-09-25
Payer: MEDICARE

## 2024-09-25 ENCOUNTER — HOSPITAL ENCOUNTER (INPATIENT)
Facility: HOSPITAL | Age: 79
LOS: 1 days | Discharge: HOME | End: 2024-09-27
Attending: EMERGENCY MEDICINE | Admitting: INTERNAL MEDICINE
Payer: MEDICARE

## 2024-09-25 ENCOUNTER — APPOINTMENT (OUTPATIENT)
Dept: NUCLEAR MEDICINE | Facility: HOSPITAL | Age: 79
End: 2024-09-25
Payer: MEDICARE

## 2024-09-25 DIAGNOSIS — J18.9 PNEUMONIA DUE TO INFECTIOUS ORGANISM, UNSPECIFIED LATERALITY, UNSPECIFIED PART OF LUNG: ICD-10-CM

## 2024-09-25 DIAGNOSIS — R06.00 DYSPNEA, UNSPECIFIED TYPE: Primary | ICD-10-CM

## 2024-09-25 LAB
ALBUMIN SERPL-MCNC: 3.9 G/DL (ref 3.5–5.2)
ALBUMIN/GLOB SERPL: 1.3 G/DL
ALP SERPL-CCNC: 154 U/L (ref 39–117)
ALT SERPL W P-5'-P-CCNC: 12 U/L (ref 1–41)
ANION GAP SERPL CALCULATED.3IONS-SCNC: 11.9 MMOL/L (ref 5–15)
APTT PPP: 29.7 SECONDS (ref 61–76.5)
AST SERPL-CCNC: 21 U/L (ref 1–40)
B PARAPERT DNA SPEC QL NAA+PROBE: NOT DETECTED
B PERT DNA SPEC QL NAA+PROBE: NOT DETECTED
BASOPHILS # BLD AUTO: 0.1 10*3/MM3 (ref 0–0.2)
BASOPHILS NFR BLD AUTO: 0.5 % (ref 0–1.5)
BILIRUB SERPL-MCNC: 0.5 MG/DL (ref 0–1.2)
BUN SERPL-MCNC: 30 MG/DL (ref 8–23)
BUN/CREAT SERPL: 16.4 (ref 7–25)
C PNEUM DNA NPH QL NAA+NON-PROBE: NOT DETECTED
CALCIUM SPEC-SCNC: 9.2 MG/DL (ref 8.6–10.5)
CHLORIDE SERPL-SCNC: 102 MMOL/L (ref 98–107)
CO2 SERPL-SCNC: 25.1 MMOL/L (ref 22–29)
CREAT SERPL-MCNC: 1.83 MG/DL (ref 0.76–1.27)
D DIMER PPP FEU-MCNC: 2.75 MG/L (FEU) (ref 0–0.8)
D-LACTATE SERPL-SCNC: 1.6 MMOL/L (ref 0.3–2)
DEPRECATED RDW RBC AUTO: 52.5 FL (ref 37–54)
EGFRCR SERPLBLD CKD-EPI 2021: 36.8 ML/MIN/1.73
EOSINOPHIL # BLD AUTO: 0.08 10*3/MM3 (ref 0–0.4)
EOSINOPHIL NFR BLD AUTO: 0.4 % (ref 0.3–6.2)
ERYTHROCYTE [DISTWIDTH] IN BLOOD BY AUTOMATED COUNT: 14.9 % (ref 12.3–15.4)
FLUAV SUBTYP SPEC NAA+PROBE: NOT DETECTED
FLUAV SUBTYP SPEC NAA+PROBE: NOT DETECTED
FLUBV RNA ISLT QL NAA+PROBE: NOT DETECTED
FLUBV RNA ISLT QL NAA+PROBE: NOT DETECTED
GEN 5 2HR TROPONIN T REFLEX: 37 NG/L
GLOBULIN UR ELPH-MCNC: 3.1 GM/DL
GLUCOSE SERPL-MCNC: 133 MG/DL (ref 65–99)
HADV DNA SPEC NAA+PROBE: NOT DETECTED
HCOV 229E RNA SPEC QL NAA+PROBE: NOT DETECTED
HCOV HKU1 RNA SPEC QL NAA+PROBE: NOT DETECTED
HCOV NL63 RNA SPEC QL NAA+PROBE: NOT DETECTED
HCOV OC43 RNA SPEC QL NAA+PROBE: NOT DETECTED
HCT VFR BLD AUTO: 37.1 % (ref 37.5–51)
HGB BLD-MCNC: 11.5 G/DL (ref 13–17.7)
HMPV RNA NPH QL NAA+NON-PROBE: NOT DETECTED
HOLD SPECIMEN: NORMAL
HOLD SPECIMEN: NORMAL
HPIV1 RNA ISLT QL NAA+PROBE: NOT DETECTED
HPIV2 RNA SPEC QL NAA+PROBE: NOT DETECTED
HPIV3 RNA NPH QL NAA+PROBE: NOT DETECTED
HPIV4 P GENE NPH QL NAA+PROBE: NOT DETECTED
IMM GRANULOCYTES # BLD AUTO: 0.13 10*3/MM3 (ref 0–0.05)
IMM GRANULOCYTES NFR BLD AUTO: 0.6 % (ref 0–0.5)
INR PPP: 1.03 (ref 0.93–1.1)
L PNEUMO1 AG UR QL IA: NEGATIVE
LYMPHOCYTES # BLD AUTO: 0.26 10*3/MM3 (ref 0.7–3.1)
LYMPHOCYTES NFR BLD AUTO: 1.3 % (ref 19.6–45.3)
M PNEUMO IGG SER IA-ACNC: NOT DETECTED
MAGNESIUM SERPL-MCNC: 1.8 MG/DL (ref 1.6–2.4)
MCH RBC QN AUTO: 29.6 PG (ref 26.6–33)
MCHC RBC AUTO-ENTMCNC: 31 G/DL (ref 31.5–35.7)
MCV RBC AUTO: 95.6 FL (ref 79–97)
MONOCYTES # BLD AUTO: 1.06 10*3/MM3 (ref 0.1–0.9)
MONOCYTES NFR BLD AUTO: 5.2 % (ref 5–12)
NEUTROPHILS NFR BLD AUTO: 18.57 10*3/MM3 (ref 1.7–7)
NEUTROPHILS NFR BLD AUTO: 92 % (ref 42.7–76)
NRBC BLD AUTO-RTO: 0 /100 WBC (ref 0–0.2)
NT-PROBNP SERPL-MCNC: 1886 PG/ML (ref 0–1800)
PLATELET # BLD AUTO: 280 10*3/MM3 (ref 140–450)
PMV BLD AUTO: 10.1 FL (ref 6–12)
POTASSIUM SERPL-SCNC: 3.8 MMOL/L (ref 3.5–5.2)
PROT SERPL-MCNC: 7 G/DL (ref 6–8.5)
PROTHROMBIN TIME: 11.2 SECONDS (ref 9.6–11.7)
RBC # BLD AUTO: 3.88 10*6/MM3 (ref 4.14–5.8)
RHINOVIRUS RNA SPEC NAA+PROBE: NOT DETECTED
RSV RNA NPH QL NAA+NON-PROBE: NOT DETECTED
RSV RNA NPH QL NAA+NON-PROBE: NOT DETECTED
S PNEUM AG SPEC QL LA: NEGATIVE
SARS-COV-2 RNA NPH QL NAA+NON-PROBE: NOT DETECTED
SARS-COV-2 RNA NPH QL NAA+NON-PROBE: NOT DETECTED
SODIUM SERPL-SCNC: 139 MMOL/L (ref 136–145)
TROPONIN T DELTA: -5 NG/L
TROPONIN T SERPL HS-MCNC: 42 NG/L
WBC NRBC COR # BLD AUTO: 20.2 10*3/MM3 (ref 3.4–10.8)
WHOLE BLOOD HOLD COAG: NORMAL
WHOLE BLOOD HOLD SPECIMEN: NORMAL

## 2024-09-25 PROCEDURE — 85730 THROMBOPLASTIN TIME PARTIAL: CPT | Performed by: EMERGENCY MEDICINE

## 2024-09-25 PROCEDURE — G0378 HOSPITAL OBSERVATION PER HR: HCPCS

## 2024-09-25 PROCEDURE — 0202U NFCT DS 22 TRGT SARS-COV-2: CPT

## 2024-09-25 PROCEDURE — 25010000002 METHYLPREDNISOLONE PER 40 MG

## 2024-09-25 PROCEDURE — 25010000002 CEFTRIAXONE PER 250 MG: Performed by: EMERGENCY MEDICINE

## 2024-09-25 PROCEDURE — A9540 TC99M MAA: HCPCS | Performed by: INTERNAL MEDICINE

## 2024-09-25 PROCEDURE — A9538 TC99M PYROPHOSPHATE: HCPCS | Performed by: INTERNAL MEDICINE

## 2024-09-25 PROCEDURE — 99285 EMERGENCY DEPT VISIT HI MDM: CPT

## 2024-09-25 PROCEDURE — 85610 PROTHROMBIN TIME: CPT | Performed by: EMERGENCY MEDICINE

## 2024-09-25 PROCEDURE — 87040 BLOOD CULTURE FOR BACTERIA: CPT | Performed by: EMERGENCY MEDICINE

## 2024-09-25 PROCEDURE — 94761 N-INVAS EAR/PLS OXIMETRY MLT: CPT

## 2024-09-25 PROCEDURE — 94799 UNLISTED PULMONARY SVC/PX: CPT

## 2024-09-25 PROCEDURE — 93005 ELECTROCARDIOGRAM TRACING: CPT | Performed by: EMERGENCY MEDICINE

## 2024-09-25 PROCEDURE — 36415 COLL VENOUS BLD VENIPUNCTURE: CPT | Performed by: EMERGENCY MEDICINE

## 2024-09-25 PROCEDURE — 84484 ASSAY OF TROPONIN QUANT: CPT | Performed by: EMERGENCY MEDICINE

## 2024-09-25 PROCEDURE — 71045 X-RAY EXAM CHEST 1 VIEW: CPT

## 2024-09-25 PROCEDURE — 94664 DEMO&/EVAL PT USE INHALER: CPT

## 2024-09-25 PROCEDURE — 85025 COMPLETE CBC W/AUTO DIFF WBC: CPT | Performed by: EMERGENCY MEDICINE

## 2024-09-25 PROCEDURE — 83735 ASSAY OF MAGNESIUM: CPT | Performed by: EMERGENCY MEDICINE

## 2024-09-25 PROCEDURE — 85379 FIBRIN DEGRADATION QUANT: CPT | Performed by: EMERGENCY MEDICINE

## 2024-09-25 PROCEDURE — 94640 AIRWAY INHALATION TREATMENT: CPT

## 2024-09-25 PROCEDURE — 87070 CULTURE OTHR SPECIMN AEROBIC: CPT

## 2024-09-25 PROCEDURE — 0 TECHNETIUM ALBUMIN AGGREGATED: Performed by: INTERNAL MEDICINE

## 2024-09-25 PROCEDURE — 87449 NOS EACH ORGANISM AG IA: CPT

## 2024-09-25 PROCEDURE — 93005 ELECTROCARDIOGRAM TRACING: CPT

## 2024-09-25 PROCEDURE — 80053 COMPREHEN METABOLIC PANEL: CPT | Performed by: EMERGENCY MEDICINE

## 2024-09-25 PROCEDURE — 87205 SMEAR GRAM STAIN: CPT

## 2024-09-25 PROCEDURE — 25810000003 SODIUM CHLORIDE 0.9 % SOLUTION: Performed by: EMERGENCY MEDICINE

## 2024-09-25 PROCEDURE — 0 TECHNETIUM TC99M PYROPHOSPHATE: Performed by: INTERNAL MEDICINE

## 2024-09-25 PROCEDURE — 83605 ASSAY OF LACTIC ACID: CPT | Performed by: EMERGENCY MEDICINE

## 2024-09-25 PROCEDURE — 78582 LUNG VENTILAT&PERFUS IMAGING: CPT

## 2024-09-25 PROCEDURE — 25010000002 METHYLPREDNISOLONE PER 125 MG: Performed by: EMERGENCY MEDICINE

## 2024-09-25 PROCEDURE — 83880 ASSAY OF NATRIURETIC PEPTIDE: CPT | Performed by: EMERGENCY MEDICINE

## 2024-09-25 PROCEDURE — 85025 COMPLETE CBC W/AUTO DIFF WBC: CPT

## 2024-09-25 RX ORDER — CLOPIDOGREL BISULFATE 75 MG/1
75 TABLET ORAL EVERY MORNING
Status: DISCONTINUED | OUTPATIENT
Start: 2024-09-26 | End: 2024-09-26

## 2024-09-25 RX ORDER — POLYETHYLENE GLYCOL 3350 17 G/17G
17 POWDER, FOR SOLUTION ORAL DAILY PRN
Status: DISCONTINUED | OUTPATIENT
Start: 2024-09-25 | End: 2024-09-27 | Stop reason: HOSPADM

## 2024-09-25 RX ORDER — FERROUS SULFATE 324(65)MG
324 TABLET, DELAYED RELEASE (ENTERIC COATED) ORAL
Status: DISCONTINUED | OUTPATIENT
Start: 2024-09-26 | End: 2024-09-27 | Stop reason: HOSPADM

## 2024-09-25 RX ORDER — AMOXICILLIN 250 MG
2 CAPSULE ORAL 2 TIMES DAILY PRN
Status: DISCONTINUED | OUTPATIENT
Start: 2024-09-25 | End: 2024-09-27 | Stop reason: HOSPADM

## 2024-09-25 RX ORDER — SODIUM CHLORIDE 0.9 % (FLUSH) 0.9 %
10 SYRINGE (ML) INJECTION EVERY 12 HOURS SCHEDULED
Status: DISCONTINUED | OUTPATIENT
Start: 2024-09-25 | End: 2024-09-27 | Stop reason: HOSPADM

## 2024-09-25 RX ORDER — PANTOPRAZOLE SODIUM 40 MG/1
40 TABLET, DELAYED RELEASE ORAL DAILY
Status: DISCONTINUED | OUTPATIENT
Start: 2024-09-26 | End: 2024-09-27 | Stop reason: HOSPADM

## 2024-09-25 RX ORDER — HYDRALAZINE HYDROCHLORIDE 20 MG/ML
10 INJECTION INTRAMUSCULAR; INTRAVENOUS EVERY 6 HOURS PRN
Status: DISCONTINUED | OUTPATIENT
Start: 2024-09-25 | End: 2024-09-27 | Stop reason: HOSPADM

## 2024-09-25 RX ORDER — METHYLPREDNISOLONE SODIUM SUCCINATE 40 MG/ML
40 INJECTION, POWDER, LYOPHILIZED, FOR SOLUTION INTRAMUSCULAR; INTRAVENOUS EVERY 12 HOURS
Status: DISCONTINUED | OUTPATIENT
Start: 2024-09-25 | End: 2024-09-27 | Stop reason: HOSPADM

## 2024-09-25 RX ORDER — SODIUM CHLORIDE 0.9 % (FLUSH) 0.9 %
10 SYRINGE (ML) INJECTION AS NEEDED
Status: DISCONTINUED | OUTPATIENT
Start: 2024-09-25 | End: 2024-09-27 | Stop reason: HOSPADM

## 2024-09-25 RX ORDER — BISACODYL 5 MG/1
5 TABLET, DELAYED RELEASE ORAL DAILY PRN
Status: DISCONTINUED | OUTPATIENT
Start: 2024-09-25 | End: 2024-09-27 | Stop reason: HOSPADM

## 2024-09-25 RX ORDER — HYDROCODONE BITARTRATE AND ACETAMINOPHEN 5; 325 MG/1; MG/1
1 TABLET ORAL EVERY 6 HOURS PRN
Status: DISCONTINUED | OUTPATIENT
Start: 2024-09-25 | End: 2024-09-27 | Stop reason: HOSPADM

## 2024-09-25 RX ORDER — IPRATROPIUM BROMIDE AND ALBUTEROL SULFATE 2.5; .5 MG/3ML; MG/3ML
3 SOLUTION RESPIRATORY (INHALATION) ONCE
Status: COMPLETED | OUTPATIENT
Start: 2024-09-25 | End: 2024-09-25

## 2024-09-25 RX ORDER — METHYLPREDNISOLONE SODIUM SUCCINATE 125 MG/2ML
125 INJECTION, POWDER, LYOPHILIZED, FOR SOLUTION INTRAMUSCULAR; INTRAVENOUS ONCE
Status: COMPLETED | OUTPATIENT
Start: 2024-09-25 | End: 2024-09-25

## 2024-09-25 RX ORDER — FINASTERIDE 5 MG/1
5 TABLET, FILM COATED ORAL DAILY
Status: DISCONTINUED | OUTPATIENT
Start: 2024-09-25 | End: 2024-09-27 | Stop reason: HOSPADM

## 2024-09-25 RX ORDER — METOPROLOL SUCCINATE 25 MG/1
25 TABLET, EXTENDED RELEASE ORAL DAILY
Status: DISCONTINUED | OUTPATIENT
Start: 2024-09-26 | End: 2024-09-27 | Stop reason: HOSPADM

## 2024-09-25 RX ORDER — ONDANSETRON 2 MG/ML
4 INJECTION INTRAMUSCULAR; INTRAVENOUS EVERY 6 HOURS PRN
Status: DISCONTINUED | OUTPATIENT
Start: 2024-09-25 | End: 2024-09-27 | Stop reason: HOSPADM

## 2024-09-25 RX ORDER — BISACODYL 10 MG
10 SUPPOSITORY, RECTAL RECTAL DAILY PRN
Status: DISCONTINUED | OUTPATIENT
Start: 2024-09-25 | End: 2024-09-27 | Stop reason: HOSPADM

## 2024-09-25 RX ORDER — MONTELUKAST SODIUM 10 MG/1
10 TABLET ORAL NIGHTLY
Status: DISCONTINUED | OUTPATIENT
Start: 2024-09-25 | End: 2024-09-27 | Stop reason: HOSPADM

## 2024-09-25 RX ORDER — ATORVASTATIN CALCIUM 40 MG/1
40 TABLET, FILM COATED ORAL EVERY MORNING
Status: DISCONTINUED | OUTPATIENT
Start: 2024-09-26 | End: 2024-09-26

## 2024-09-25 RX ORDER — SODIUM CHLORIDE 9 MG/ML
40 INJECTION, SOLUTION INTRAVENOUS AS NEEDED
Status: DISCONTINUED | OUTPATIENT
Start: 2024-09-25 | End: 2024-09-27 | Stop reason: HOSPADM

## 2024-09-25 RX ORDER — THEOPHYLLINE 300 MG/1
300 TABLET, EXTENDED RELEASE ORAL DAILY
Status: DISCONTINUED | OUTPATIENT
Start: 2024-09-26 | End: 2024-09-27 | Stop reason: HOSPADM

## 2024-09-25 RX ORDER — SACCHAROMYCES BOULARDII 250 MG
250 CAPSULE ORAL
Status: DISCONTINUED | OUTPATIENT
Start: 2024-09-25 | End: 2024-09-27 | Stop reason: HOSPADM

## 2024-09-25 RX ORDER — SODIUM BICARBONATE 650 MG/1
650 TABLET ORAL 2 TIMES DAILY
Status: DISCONTINUED | OUTPATIENT
Start: 2024-09-25 | End: 2024-09-27 | Stop reason: HOSPADM

## 2024-09-25 RX ORDER — BUDESONIDE 0.5 MG/2ML
0.5 INHALANT ORAL
Status: DISCONTINUED | OUTPATIENT
Start: 2024-09-25 | End: 2024-09-27 | Stop reason: HOSPADM

## 2024-09-25 RX ADMIN — HYDROCODONE BITARTRATE AND ACETAMINOPHEN 1 TABLET: 5; 325 TABLET ORAL at 21:48

## 2024-09-25 RX ADMIN — Medication 5 MG: at 20:40

## 2024-09-25 RX ADMIN — METHYLPREDNISOLONE SODIUM SUCCINATE 125 MG: 125 INJECTION, POWDER, FOR SOLUTION INTRAMUSCULAR; INTRAVENOUS at 11:09

## 2024-09-25 RX ADMIN — KIT FOR THE PREPARATION OF TECHNETIUM TC 99M ALBUMIN AGGREGATED 1 DOSE: 2.5 INJECTION, POWDER, FOR SOLUTION INTRAVENOUS at 14:02

## 2024-09-25 RX ADMIN — CEFTRIAXONE 2000 MG: 2 INJECTION, POWDER, FOR SOLUTION INTRAMUSCULAR; INTRAVENOUS at 12:04

## 2024-09-25 RX ADMIN — BUDESONIDE 0.5 MG: 0.5 INHALANT RESPIRATORY (INHALATION) at 19:23

## 2024-09-25 RX ADMIN — FINASTERIDE 5 MG: 5 TABLET, FILM COATED ORAL at 20:40

## 2024-09-25 RX ADMIN — SODIUM BICARBONATE 650 MG: 650 TABLET ORAL at 20:40

## 2024-09-25 RX ADMIN — IPRATROPIUM BROMIDE AND ALBUTEROL SULFATE 3 ML: .5; 3 SOLUTION RESPIRATORY (INHALATION) at 12:24

## 2024-09-25 RX ADMIN — TECHNETIUM TC99M PYROPHOSPHATE 1 DOSE: 12 INJECTION INTRAVENOUS at 14:02

## 2024-09-25 RX ADMIN — MONTELUKAST 10 MG: 10 TABLET, FILM COATED ORAL at 20:40

## 2024-09-25 RX ADMIN — METHYLPREDNISOLONE SODIUM SUCCINATE 40 MG: 40 INJECTION, POWDER, FOR SOLUTION INTRAMUSCULAR; INTRAVENOUS at 20:41

## 2024-09-25 RX ADMIN — Medication 10 ML: at 20:41

## 2024-09-25 RX ADMIN — Medication 250 MG: at 18:11

## 2024-09-25 RX ADMIN — SODIUM CHLORIDE 500 ML: 9 INJECTION, SOLUTION INTRAVENOUS at 12:05

## 2024-09-25 NOTE — CASE MANAGEMENT/SOCIAL WORK
Discharge Planning Assessment   [facility]     Patient Name: David A Jolissaint  MRN: [id number]  Today's Date: 9/25/2024    Admit Date: 9/25/2024    Plan: From home with caregiver. Home O2 per [facility].   Discharge Needs Assessment       Row Name 09/25/24 1401       Living Environment    People in Home other (see comments)    Name(s) of People in Home Caregiver Mary    Current Living Arrangements home    Potentially Unsafe Housing Conditions none    In the past 12 months has the electric, gas, oil, or water company threatened to shut off services in your home? No    Primary Care Provided by self    Provides Primary Care For no one, unable/limited ability to care for self    Family Caregiver if Needed other (see comments)    Family Caregiver Names Caregiver Mary    Quality of Family Relationships helpful;involved;supportive    Able to Return to Prior Arrangements yes       Resource/Environmental Concerns    Resource/Environmental Concerns none    Transportation Concerns none       Transportation Needs    In the past 12 months, has lack of transportation kept you from medical appointments or from getting medications? no    In the past 12 months, has lack of transportation kept you from meetings, work, or from getting things needed for daily living? No       Food Insecurity    Within the past 12 months, you worried that your food would run out before you got the money to buy more. Never true    Within the past 12 months, the food you bought just didn't last and you didn't have money to get more. Never true       Transition Planning    Patient/Family Anticipates Transition to home with family    Patient/Family Anticipated Services at Transition none    Transportation Anticipated family or friend will provide       Discharge Needs Assessment    Readmission Within the Last 30 Days no previous admission in last 30 days    Equipment Currently Used at Home wheelchair;walker, standard;oxygen  O2 per Patricia    Concerns to be  Addressed no discharge needs identified;denies needs/concerns at this time    Anticipated Changes Related to Illness none    Equipment Needed After Discharge none    Provided Post Acute Provider List? N/A    Provided Post Acute Provider Quality & Resource List? N/A                   Discharge Plan       Row Name 09/25/24 1403       Plan    Plan From home with caregiver. Home O2 per [facility].    Patient/Family in Agreement with Plan yes    Provided Post Acute Provider List? N/A    Provided Post Acute Provider Quality & Resource List? N/A    Plan Comments CM met with caregiver at the bedside to review discharge planning as the patient had just left for testing. Patient lives at Caregivers home, is mostly dependent for IADLs and no longer drives. Caregiver, Mary, to transport patient at d/c. Confirmed PCP, insurance, and pharmacy. Mary declines [facility]. Patient denies any difficulty affording food, utilities, or medications. Patient is not current with any HHC/OPPT/OT services. Home O2 per [facility]. DC Barriers: IV abx, blood cultures pending, Lung Ventilation Perfusion pending                  Continued Care and Services - Admitted Since 9/25/2024       Durable Medical Equipment Coordination complete.      Service Provider Request Status Selected Services Address Phone Fax Patient Preferred    [facility]  Selected Durable Medical Equipment [address], [place] [address] [phone] --                     Demographic Summary       Row Name 09/25/24 1401       General Information    Admission Type other (see comments)  Emergency    Arrived From emergency department    Referral Source admission list    Reason for Consult discharge planning    Preferred Language English       Contact Information    Permission Granted to Share Info With     Contact Information Obtained for                    Functional Status       Row Name 09/25/24 1401       Functional Status    Usual  Activity Tolerance moderate    Current Activity Tolerance fair       Functional Status, IADL    Medications assistive equipment and person    Meal Preparation assistive equipment and person    Housekeeping assistive equipment and person    Laundry assistive equipment and person    Shopping assistive equipment and person       Mental Status    General Appearance WDL WDL       Mental Status Summary    Recent Changes in Mental Status/Cognitive Functioning no changes             Alba Graham RN    [phone]  [email or web address]

## 2024-09-25 NOTE — ED NOTES
Nursing report ED to floor  David A Jolissaint  79 y.o.  male    HPI:   Chief Complaint   Patient presents with    Shortness of Breath     Cough, Soa, vomiting,  has been feeling bad since last night       Admitting doctor:   She Thakur MD    Admitting diagnosis:   The primary encounter diagnosis was Dyspnea, unspecified type. A diagnosis of Pneumonia due to infectious organism, unspecified laterality, unspecified part of lung was also pertinent to this visit.    Code status:   Current Code Status       Date Active Code Status Order ID Comments User Context       9/25/2024 1359 CPR (Attempt to Resuscitate) [id number]  Hilda Desai APRN ED        Question Answer    Code Status (Patient has no pulse and is not breathing) CPR (Attempt to Resuscitate)    Medical Interventions (Patient has pulse or is breathing) Full Support                    Allergies:   Diltiazem    Isolation:  Enhanced Droplet/Contact      Fall Risk:  Fall Risk Assessment was completed, and patient is at moderate risk for falls.   Predictive Model Details         17 (Low) Factor Value    Calculated 9/25/2024 15:31 Age 79    Risk of Fall Model Active Peripheral IV Present     Imaging order in this encounter Present     Respiratory Rate 22     Magnesium 1.8 mg/dL     Number of Distinct Medication Classes administered 4     Creatinine 1.83 mg/dL     Eliezer Scale not on file     Diastolic BP 75     Clinically Relevant Sex Not Female     Albumin 3.9 g/dL     Calcium 9.2 mg/dL     Chloride 102 mmol/L     Total Bilirubin 0.5 mg/dL     Days after Admission 0.224     Tobacco Use Quit     ALT 12 U/L     Potassium 3.8 mmol/L         Weight:       09/25/24  1007   Weight: 65.8 kg (145 lb)       Intake and Output    Intake/Output Summary (Last 24 hours) at 9/25/2024 1533  Last data filed at 9/25/2024 1531  Gross per 24 hour   Intake 600 ml   Output --   Net 600 ml       Diet:        Most recent vitals:   Vitals:    09/25/24 1227 09/25/24 1301 09/25/24  1416 09/25/24 1446   BP:  111/63 129/68 128/75   BP Location:       Patient Position:       Pulse: 89 90 93 85   Resp: 22      Temp:       TempSrc:       SpO2: 98% 99% 94% 98%   Weight:       Height:           Active LDAs/IV Access:   Lines, Drains & Airways       Active LDAs       Name Placement date Placement time Site Days    Peripheral IV 09/25/24 1042 Right Antecubital 09/25/24  1042  Antecubital  less than 1    Single Lumen Implantable Port  Right Subclavian --  unknown  --  unknown  Subclavian  --                    Skin Condition:   Skin Assessments (last day)       None             Labs (abnormal labs have a star):   Labs Reviewed   COMPREHENSIVE METABOLIC PANEL - Abnormal; Notable for the following components:       Result Value    Glucose 133 (*)     BUN 30 (*)     Creatinine 1.83 (*)     Alkaline Phosphatase 154 (*)     eGFR 36.8 (*)     All other components within normal limits    Narrative:     GFR Normal >60  Chronic Kidney Disease <60  Kidney Failure <15    The GFR formula is only valid for adults with stable renal function between ages 18 and 70.   APTT - Abnormal; Notable for the following components:    PTT 29.7 (*)     All other components within normal limits   D-DIMER, QUANTITATIVE - Abnormal; Notable for the following components:    D-Dimer, Quantitative 2.75 (*)     All other components within normal limits    Narrative:     According to the assay 's published package insert, a normal (<0.50 mg/L (FEU)) D-dimer result in conjunction with a non-high clinical probability assessment, excludes deep vein thrombosis (DVT) and pulmonary embolism (PE) with high sensitivity.    D-dimer values increase with age and this can make VTE exclusion of an older population difficult. To address this, the [employer], based on best available evidence and recent guidelines, recommends that clinicians use age-adjusted D-dimer thresholds in patients greater than 50 years of age with:  "a) a low probability of PE who do not meet all Pulmonary Embolism Rule Out Criteria, or b) in those with intermediate probability of PE.   The formula for an age-adjusted D-dimer cut-off is \"age/100\".  For example, a 60 year old patient would have an age-adjusted cut-off of 0.60 mg/L (FEU) and an 80 year old 0.80 mg/L (FEU).   TROPONIN - Abnormal; Notable for the following components:    HS Troponin T 42 (*)     All other components within normal limits    Narrative:     High Sensitive Troponin T Reference Range:  <14.0 ng/L- Negative Female for AMI  <22.0 ng/L- Negative Male for AMI  >=14 - Abnormal Female indicating possible myocardial injury.  >=22 - Abnormal Male indicating possible myocardial injury.   Clinicians would have to utilize clinical acumen, EKG, Troponin, and serial changes to determine if it is an Acute Myocardial Infarction or myocardial injury due to an underlying chronic condition.        BNP (IN-HOUSE) - Abnormal; Notable for the following components:    proBNP 1,886.0 (*)     All other components within normal limits    Narrative:     This assay is used as an aid in the diagnosis of individuals suspected of having heart failure. It can be used as an aid in the diagnosis of acute decompensated heart failure (ADHF) in patients presenting with signs and symptoms of ADHF to the emergency department (ED). In addition, NT-proBNP of <300 pg/mL indicates ADHF is not likely.    Age Range Result Interpretation  NT-proBNP Concentration (pg/mL:      <50             Positive            >450                   Gray                 300-450                    Negative             <300    50-75           Positive            >900                  Gray                300-900                  Negative            <300      >75             Positive            >1800                  Gray                300-1800                  Negative            <300   CBC WITH AUTO DIFFERENTIAL - Abnormal; Notable for the following " components:    WBC 20.20 (*)     RBC 3.88 (*)     Hemoglobin 11.5 (*)     Hematocrit 37.1 (*)     MCHC 31.0 (*)     Neutrophil % 92.0 (*)     Lymphocyte % 1.3 (*)     Immature Grans % 0.6 (*)     Neutrophils, Absolute 18.57 (*)     Lymphocytes, Absolute 0.26 (*)     Monocytes, Absolute 1.06 (*)     Immature Grans, Absolute 0.13 (*)     All other components within normal limits   HIGH SENSITIVITIY TROPONIN T 2HR - Abnormal; Notable for the following components:    HS Troponin T 37 (*)     Troponin T Delta -5 (*)     All other components within normal limits    Narrative:     High Sensitive Troponin T Reference Range:  <14.0 ng/L- Negative Female for AMI  <22.0 ng/L- Negative Male for AMI  >=14 - Abnormal Female indicating possible myocardial injury.  >=22 - Abnormal Male indicating possible myocardial injury.   Clinicians would have to utilize clinical acumen, EKG, Troponin, and serial changes to determine if it is an Acute Myocardial Infarction or myocardial injury due to an underlying chronic condition.        COVID-19/FLUA&B/RSV, NP SWAB IN TRANSPORT MEDIA 1 HR TAT - Normal   PROTIME-INR - Normal   MAGNESIUM - Normal   POC LACTATE - Normal   BLOOD CULTURE   BLOOD CULTURE   RAINBOW DRAW    Narrative:     The following orders were created for panel order [place] Draw.  Procedure                               Abnormality         Status                     ---------                               -----------         ------                     Green Top (Gel)[[id number]]                                  Final result               Lavender Top[[id number]]                                     Final result               Gold Top - SST[[id number]]                                   Final result               Light Blue Top[[id number]]                                   Final result                 Please view results for these tests on the individual orders.   GREEN TOP   LAVENDER TOP   GOLD TOP - SST   LIGHT BLUE TOP   CBC AND  DIFFERENTIAL    Narrative:     The following orders were created for panel order CBC & Differential.  Procedure                               Abnormality         Status                     ---------                               -----------         ------                     CBC Auto Differential[[id number]]        Abnormal            Final result                 Please view results for these tests on the individual orders.       LOC: Person, Place, Time, and Situation    Telemetry:  Med/Surg    Cardiac Monitoring Ordered: no    EKG:   ECG 12 Lead Dyspnea   Preliminary Result   HEART EYWY=608  bpm   RR Zqzntcya=571  ms   VT Bjxugygl=435  ms   P Horizontal Axis=76  deg   P Front Axis=37  deg   QRSD Ytfdpdfq=315  ms   QT Zwmgyqce=461  ms   LBxT=260  ms   QRS Axis=-53  deg   T Wave Axis=35  deg   - ABNORMAL ECG -   Sinus tachycardia with irregular rate   RBBB and LAFB   Date and Time of Study:2024-09-25 10:16:24          Medications Given in the ED:   Medications   sodium chloride 0.9 % flush 10 mL (has no administration in time range)   azithromycin (ZITHROMAX) 500 mg in sodium chloride 0.9 % 250 mL IVPB-VTB (has no administration in time range)   methylPREDNISolone sodium succinate (SOLU-Medrol) injection 125 mg (125 mg Intravenous Given 9/25/24 1109)   ipratropium-albuterol (DUO-NEB) nebulizer solution 3 mL (3 mL Nebulization Given 9/25/24 1224)   cefTRIAXone (ROCEPHIN) 2,000 mg in sodium chloride 0.9 % 100 mL MBP (0 mg Intravenous Stopped 9/25/24 1531)   sodium chloride 0.9 % bolus 500 mL (0 mL Intravenous Stopped 9/25/24 1531)   technetium albumin aggregated (MAA) solution 1 dose (1 dose Intravenous Given 9/25/24 1402)   technetium tc99m pyrophosphate (PYP) injection 1 dose (1 dose Inhalation Given 9/25/24 1402)       Imaging results:  NM Lung Ventilation Perfusion    Result Date: 9/25/2024  Low probability study for pulmonary embolism. Electronically Signed: Jayda Tam MD  9/25/2024 2:37 PM EDT  Workstation  ID: [id number]    XR Chest 1 View    Result Date: 9/25/2024  Advanced emphysema Diffuse increased opacity along the right midlung region extending between the right ramesh and periphery of the lung could represent postobstructive atelectasis with underlying mass or consolidation. Right pleural effusion versus pleural thickening. Consider further evaluation with a CT chest Electronically Signed: Walter Ramirez MD  9/25/2024 11:28 AM EDT  Workstation ID: [id number]     Social issues:   Social History     Socioeconomic History    Marital status:    Tobacco Use    Smoking status: Former     Current packs/day: 2.00     Average packs/day: 2.0 packs/day for 50.0 years (100.0 ttl pk-yrs)     Types: Cigarettes    Smokeless tobacco: Never    Tobacco comments:     Says he quit approximately in 2005.   Vaping Use    Vaping status: Never Used   Substance and Sexual Activity    Alcohol use: Not Currently    Drug use: Never    Sexual activity: Defer       NIH Stroke Scale:  Interval: (not recorded)  1a. Level of Consciousness: (not recorded)  1b. LOC Questions: (not recorded)  1c. LOC Commands: (not recorded)  2. Best Gaze: (not recorded)  3. Visual: (not recorded)  4. Facial Palsy: (not recorded)  5a. Motor Arm, Left: (not recorded)  5b. Motor Arm, Right: (not recorded)  6a. Motor Leg, Left: (not recorded)  6b. Motor Leg, Right: (not recorded)  7. Limb Ataxia: (not recorded)  8. Sensory: (not recorded)  9. Best Language: (not recorded)  10. Dysarthria: (not recorded)  11. Extinction and Inattention (formerly Neglect): (not recorded)    Total (NIH Stroke Scale): (not recorded)     Additional notable assessment information:     Nursing report ED to floor:  Wendi Mcmullen RN   09/25/24 15:33 EDT

## 2024-09-25 NOTE — ED PROVIDER NOTES
"Subjective   History of Present Illness  Chief complaint: Patient is an 79-year-old who presents short of breath.  He states that yesterday is felt not well.  He has been coughing and coughing up mucus.  He typically wears 2 to 3 L of oxygen nasal cannula.  He has a history of small cell lung cancer.  He was treated in 2021.  He does have some \"spots in his lungs\".  His daughter provides some history.  They biopsied it and it was negative but he received a pneumothorax and had to have a chest tube.  His cancer doctor thinks there is still cancer there.  He has declined any further biopsy secondary to having required a chest tube prior.  States he feels lightheaded with shortness of breath.  He has not passed out.  Does not have some room spinning.  He has had no stroke deficits.  He does not have any chest pain.  But he does cough.  With mucus production.  He is not having chest pain.    Context:    Duration:    Timing:    Severity:    Associated Symptoms:        PCP:  LMP:      Review of Systems   Constitutional:  Positive for fatigue. Negative for fever.   HENT:  Positive for congestion.    Respiratory:  Positive for cough and shortness of breath.    Cardiovascular:  Negative for chest pain and leg swelling.   Gastrointestinal:  Negative for abdominal pain.   Genitourinary: Negative.    Musculoskeletal: Negative.    Neurological: Negative.        Past Medical History:   Diagnosis Date    Anemia     Cancer     Cellulitis     CKD (chronic kidney disease), stage III     COPD (chronic obstructive pulmonary disease)     Diverticulitis     Dyslipidemia     Hypertension     Myocardial infarction     PVD (peripheral vascular disease)        Allergies   Allergen Reactions    Diltiazem Unknown - High Severity     Swelling of lips       Past Surgical History:   Procedure Laterality Date    BRONCHOSCOPY N/A 5/20/2021    Procedure: BRONCHOSCOPY WITH ENDOBRONCHIAL ULTRASOUND WITH FINE NEEDLE ASPIRATION. BRONCHOALVEOLAR LAVAGE;  " Surgeon: Jackson Quezada MD;  Location: [facility] ENDOSCOPY;  Service: Pulmonary;  Laterality: N/A;  subcarinal mass    CARDIAC SURGERY      CHOLECYSTECTOMY      GALLBLADDER SURGERY      LEG AMPUTATION Left        Family History   Problem Relation Age of Onset    Heart attack Father     Heart disease Father        Social History     Socioeconomic History    Marital status:    Tobacco Use    Smoking status: Former     Current packs/day: 2.00     Average packs/day: 2.0 packs/day for 50.0 years (100.0 ttl pk-yrs)     Types: Cigarettes    Smokeless tobacco: Never    Tobacco comments:     Says he quit approximately in 2005.   Vaping Use    Vaping status: Never Used   Substance and Sexual Activity    Alcohol use: Not Currently    Drug use: Never    Sexual activity: Defer           Objective   Physical Exam  Vitals and nursing note reviewed.   Constitutional:       Appearance: He is well-developed.   HENT:      Head: Normocephalic and atraumatic.   Cardiovascular:      Rate and Rhythm: Normal rate and regular rhythm.   Pulmonary:      Effort: Pulmonary effort is normal.      Breath sounds: Examination of the left-lower field reveals rales. Decreased breath sounds and rales present.   Musculoskeletal:      Cervical back: Normal range of motion.      Right lower leg: No edema.   Skin:     General: Skin is warm and dry.   Neurological:      General: No focal deficit present.      Mental Status: He is alert and oriented to person, place, and time.         Procedures           ED Course                                   Results for orders placed or performed during the hospital encounter of 09/25/24   COVID-19, FLU A/B, RSV PCR 1 HR TAT - Swab, Nasopharynx    Specimen: Nasopharynx; Swab   Result Value Ref Range    COVID19 Not Detected Not Detected - Ref. Range    Influenza A PCR Not Detected Not Detected    Influenza B PCR Not Detected Not Detected    RSV, PCR Not Detected Not Detected   Comprehensive Metabolic Panel     Specimen: Blood   Result Value Ref Range    Glucose 133 (H) 65 - 99 mg/dL    BUN 30 (H) 8 - 23 mg/dL    Creatinine 1.83 (H) 0.76 - 1.27 mg/dL    Sodium 139 136 - 145 mmol/L    Potassium 3.8 3.5 - 5.2 mmol/L    Chloride 102 98 - 107 mmol/L    CO2 25.1 22.0 - 29.0 mmol/L    Calcium 9.2 8.6 - 10.5 mg/dL    Total Protein 7.0 6.0 - 8.5 g/dL    Albumin 3.9 3.5 - 5.2 g/dL    ALT (SGPT) 12 1 - 41 U/L    AST (SGOT) 21 1 - 40 U/L    Alkaline Phosphatase 154 (H) 39 - 117 U/L    Total Bilirubin 0.5 0.0 - 1.2 mg/dL    Globulin 3.1 gm/dL    A/G Ratio 1.3 g/dL    BUN/Creatinine Ratio 16.4 7.0 - 25.0    Anion Gap 11.9 5.0 - 15.0 mmol/L    eGFR 36.8 (L) >60.0 mL/min/1.73   Protime-INR    Specimen: Blood   Result Value Ref Range    Protime 11.2 9.6 - 11.7 Seconds    INR 1.03 0.93 - 1.10   aPTT    Specimen: Blood   Result Value Ref Range    PTT 29.7 (L) 61.0 - 76.5 seconds   D-dimer, Quantitative    Specimen: Blood   Result Value Ref Range    D-Dimer, Quantitative 2.75 (H) 0.00 - 0.80 mg/L (FEU)   High Sensitivity Troponin T    Specimen: Blood   Result Value Ref Range    HS Troponin T 42 (H) <22 ng/L   BNP    Specimen: Blood   Result Value Ref Range    proBNP 1,886.0 (H) 0.0 - 1,800.0 pg/mL   Magnesium    Specimen: Blood   Result Value Ref Range    Magnesium 1.8 1.6 - 2.4 mg/dL   CBC Auto Differential    Specimen: Blood   Result Value Ref Range    WBC 20.20 (H) 3.40 - 10.80 10*3/mm3    RBC 3.88 (L) 4.14 - 5.80 10*6/mm3    Hemoglobin 11.5 (L) 13.0 - 17.7 g/dL    Hematocrit 37.1 (L) 37.5 - 51.0 %    MCV 95.6 79.0 - 97.0 fL    MCH 29.6 26.6 - 33.0 pg    MCHC 31.0 (L) 31.5 - 35.7 g/dL    RDW 14.9 12.3 - 15.4 %    RDW-SD 52.5 37.0 - 54.0 fl    MPV 10.1 6.0 - 12.0 fL    Platelets 280 140 - 450 10*3/mm3    Neutrophil % 92.0 (H) 42.7 - 76.0 %    Lymphocyte % 1.3 (L) 19.6 - 45.3 %    Monocyte % 5.2 5.0 - 12.0 %    Eosinophil % 0.4 0.3 - 6.2 %    Basophil % 0.5 0.0 - 1.5 %    Immature Grans % 0.6 (H) 0.0 - 0.5 %    Neutrophils, Absolute 18.57  (H) 1.70 - 7.00 10*3/mm3    Lymphocytes, Absolute 0.26 (L) 0.70 - 3.10 10*3/mm3    Monocytes, Absolute 1.06 (H) 0.10 - 0.90 10*3/mm3    Eosinophils, Absolute 0.08 0.00 - 0.40 10*3/mm3    Basophils, Absolute 0.10 0.00 - 0.20 10*3/mm3    Immature Grans, Absolute 0.13 (H) 0.00 - 0.05 10*3/mm3    nRBC 0.0 0.0 - 0.2 /100 WBC   POC Lactate    Specimen: Blood   Result Value Ref Range    Lactate 1.6 0.3 - 2.0 mmol/L   ECG 12 Lead Dyspnea   Result Value Ref Range    QT Interval 372 ms    QTC Interval 512 ms   Green Top (Gel)   Result Value Ref Range    Extra Tube Hold for add-ons.    Lavender Top   Result Value Ref Range    Extra Tube hold for add-on    Gold Top - SST   Result Value Ref Range    Extra Tube Hold for add-ons.    Light Blue Top   Result Value Ref Range    Extra Tube Hold for add-ons.         XR Chest 1 View    Result Date: 9/25/2024  Advanced emphysema Diffuse increased opacity along the right midlung region extending between the right ramesh and periphery of the lung could represent postobstructive atelectasis with underlying mass or consolidation. Right pleural effusion versus pleural thickening. Consider further evaluation with a CT chest Electronically Signed: Walter Ramirez MD  9/25/2024 11:28 AM EDT  Workstation ID: [id number]          Medical Decision Making  Patient was seen and evaluated for the above problem    Differential diagnosis includes but is not limited to pneumonia, pneumothorax, PE,    Patient's checks x-ray shows potentially postobstructive pneumonia.  White count is 20.  He was treated with antibiotics.  He is mildly tachycardic.  His D-dimer is 2.75.  He declined the CT scan of his chest with contrast to rule out PE.  His GFR was adequate but they stated with his chronic kidney disease they did not want it.  He also declines heparin or Lovenox at this point time as he had a reaction heparin with a GI bleed.  After lengthy discussion he understands the risks up to including death.  He is  actually ready to go and they are taking him to VQ scan right now.  I spoke withHilda Desai and she verbalized understanding.  She will admit the patient to the hospital.    Amount and/or Complexity of Data Reviewed  Labs: ordered. Decision-making details documented in ED Course.     Details: Labs reviewed by myself  Radiology: ordered and independent interpretation performed.     Details: X-ray reviewed by myself as above  ECG/medicine tests: ordered and independent interpretation performed.     Details: EKG interpreted by myself    Risk  Prescription drug management.  Decision regarding hospitalization.        Final diagnoses:   None   Dyspnea  Pneumonia  Copd  Elevated ddimer      ED Disposition  ED Disposition       None            No follow-up provider specified.       Medication List      No changes were made to your prescriptions during this visit.            Rafi Hidalgo,   09/25/24 [id number]

## 2024-09-25 NOTE — PLAN OF CARE
Problem: Adult Inpatient Plan of Care  Goal: Readiness for Transition of Care  Intervention: Mutually Develop Transition Plan  Recent Flowsheet Documentation  Taken 9/25/2024 1556 by Gisel Lopez RN  Equipment Currently Used at Home:   walker, rolling   cane, straight   wheelchair   oxygen   Goal Outcome Evaluation:

## 2024-09-25 NOTE — H&P
Patient Care Team:  Walter Valero MD as PCP - General  Kali Mistry MD as Consulting Physician (Interventional Cardiology)  Riki Hedrick MD as Consulting Physician (Hematology and Oncology)  Hoang Mendez MD as Consulting Physician (Nephrology)    Chief complaint Shortness of breath    Subjective     Patient is a 79 y.o. male who presents with increased shortness of breath along with a productive cough and generalized weakness.  Reports yesterday he was in usual state of health.  Above symptoms started this morning.  He was very weak when trying to get out of bed and had more exertional shortness of breath.  He typically uses 2-3 L of oxygen nasal cannula at home. He has a history of small cell lung cancer that was treated in 2021. He states having some remaining spots on his lungs. He had a biopsy done that resulted in a pneumothorax that needed a chest tube, therefore is reluctant for a further biopsy. He is reluctant to use heparin due to a previous experience of hemorrhage.     Review of Systems   Constitutional:  Positive for fatigue. Negative for chills and fever.   HENT:  Positive for congestion.    Respiratory:  Positive for cough and shortness of breath.    Gastrointestinal:  Negative for abdominal pain.   Genitourinary:  Negative for difficulty urinating and frequency.   Neurological:  Positive for weakness and light-headedness.   Psychiatric/Behavioral:  Negative for confusion.           History  Past Medical History:   Diagnosis Date    Anemia     Cancer     Cellulitis     CKD (chronic kidney disease), stage III     COPD (chronic obstructive pulmonary disease)     Diverticulitis     Dyslipidemia     Hypertension     Myocardial infarction     PVD (peripheral vascular disease)      Past Surgical History:   Procedure Laterality Date    BRONCHOSCOPY N/A 5/20/2021    Procedure: BRONCHOSCOPY WITH ENDOBRONCHIAL ULTRASOUND WITH FINE NEEDLE ASPIRATION. BRONCHOALVEOLAR LAVAGE;  Surgeon: Damien  MD Jackson;  Location: [facility] ENDOSCOPY;  Service: Pulmonary;  Laterality: N/A;  subcarinal mass    CARDIAC SURGERY      CHOLECYSTECTOMY      GALLBLADDER SURGERY      LEG AMPUTATION Left      Family History   Problem Relation Age of Onset    Heart attack Father     Heart disease Father      Social History     Tobacco Use    Smoking status: Former     Current packs/day: 2.00     Average packs/day: 2.0 packs/day for 50.0 years (100.0 ttl pk-yrs)     Types: Cigarettes    Smokeless tobacco: Never    Tobacco comments:     Says he quit approximately in 2005.   Vaping Use    Vaping status: Never Used   Substance Use Topics    Alcohol use: Not Currently    Drug use: Never     (Not in a hospital admission)    Allergies:  Diltiazem    Objective     Vital Signs  Temp:  [98.3 °F (36.8 °C)] 98.3 °F (36.8 °C)  Heart Rate:  [] 85  Resp:  [18-22] 22  BP: ()/(60-75) 128/75     Physical Exam:      General Appearance:    Alert, cooperative, in no acute distress   Head:    Normocephalic, without obvious abnormality, atraumatic   Eyes:            Lids and lashes normal, conjunctivae and sclerae normal, no   icterus, no pallor, corneas clear, PERRLA   Ears:    Ears appear intact with no abnormalities noted   Throat:   No oral lesions, no thrush, oral mucosa moist   Neck:   No adenopathy, supple, trachea midline, no thyromegaly, no   carotid bruit, no JVD   Lungs:     Clear to auscultation,respirations regular, even and                  unlabored    Heart:    Regular rhythm and normal rate, normal S1 and S2, no            murmur, no gallop, no rub, no click   Chest Wall:    No abnormalities observed   Abdomen:     Normal bowel sounds, no masses, no organomegaly, soft        non-tender, non-distended, no guarding, no rebound                tenderness   Extremities:   +Left BKA   Pulses:   Pulses palpable and equal bilaterally   Skin:   No bleeding, bruising or rash   Lymph nodes:   No palpable adenopathy   Neurologic:   Cranial  nerves 2 - 12 grossly intact, sensation intact, DTR       present and equal bilaterally       Results Review:     Imaging Results (Last 24 Hours)       Procedure Component Value Units Date/Time    NM Lung Ventilation Perfusion [[id number]] Collected: 09/25/24 1430     Updated: 09/25/24 1439    Narrative:      NM LUNG VENTILATION PERFUSION    Date of Exam: 9/25/2024 2:01 PM EDT    Indication: elevated ddimer soa.    Comparison: AP chest 9/25/2024 at 1059, CT chest 6/1/2024    Technique:  The patient was ventilated with 45.6 mCi of technetium 99m pyrophosphate aerosol and ventilation images were acquired in multiple obliquities. The patient then received 6.0 mCi of technetium 99m MAA intravenously and perfusion images were   acquired in multiple obliquities.      Findings:  Triple matched nonsegmental defect within the right midlung posteriorly. The chest radiograph defect is thought to be greater than that of the perfusion defect. No ventilation/perfusion mismatch is seen.      Impression:      Low probability study for pulmonary embolism.      Electronically Signed: Jayda Tam MD    9/25/2024 2:37 PM EDT    Workstation ID: [id number]    XR Chest 1 View [[id number]] Collected: 09/25/24 1124     Updated: 09/25/24 1130    Narrative:      XR CHEST 1 VW    Date of Exam: 9/25/2024 11:02 AM EDT    Indication: soa    Comparison: X-ray dated 3/16/2023    Findings:    The trachea is midline. There is advanced emphysema. There is diffuse increased opacity along the right midlung region extending between the right ramesh and periphery of the lung could represent postobstructive atelectasis with underlying mass or   consolidation. Right pleural effusion versus pleural thickening. There is minimal blunting of the left costophrenic angle. The left lung otherwise appears clear. The heart is borderline in size with surgical changes. There is a right subclavian chest   port in place. No evidence of pneumothorax      Impression:         Advanced emphysema    Diffuse increased opacity along the right midlung region extending between the right ramesh and periphery of the lung could represent postobstructive atelectasis with underlying mass or consolidation. Right pleural effusion versus pleural thickening.   Consider further evaluation with a CT chest    Electronically Signed: Walter Ramirez MD    9/25/2024 11:28 AM EDT    Workstation ID: [id number]             Lab Results (last 24 hours)       Procedure Component Value Units Date/Time    High Sensitivity Troponin T 2Hr [[id number]]  (Abnormal) Collected: 09/25/24 1322    Specimen: Blood from Arm, Right Updated: 09/25/24 1344     HS Troponin T 37 ng/L      Troponin T Delta -5 ng/L     Narrative:      High Sensitive Troponin T Reference Range:  <14.0 ng/L- Negative Female for AMI  <22.0 ng/L- Negative Male for AMI  >=14 - Abnormal Female indicating possible myocardial injury.  >=22 - Abnormal Male indicating possible myocardial injury.   Clinicians would have to utilize clinical acumen, EKG, Troponin, and serial changes to determine if it is an Acute Myocardial Infarction or myocardial injury due to an underlying chronic condition.         COVID-19, FLU A/B, RSV PCR 1 HR TAT - Swab, Nasopharynx [[id number]]  (Normal) Collected: 09/25/24 1108    Specimen: Swab from Nasopharynx Updated: 09/25/24 1203     COVID19 Not Detected     Influenza A PCR Not Detected     Influenza B PCR Not Detected     RSV, PCR Not Detected    POC Lactate [[id number]]  (Normal) Collected: 09/25/24 1154    Specimen: Blood Updated: 09/25/24 1156     Lactate 1.6 mmol/L      Comment: Serial Number: [id number]:  [id number]       Blood Culture - Blood, Hand, Right [[id number]] Collected: 09/25/24 1151    Specimen: Blood from Hand, Right Updated: 09/25/24 1155    Blood Culture - Blood, Arm, Right [[id number]] Collected: 09/25/24 1137    Specimen: Blood from Arm, Right Updated: 09/25/24 1146    Protime-INR [[id number]]  (Normal)  "Collected: 09/25/24 1044    Specimen: Blood Updated: 09/25/24 1116     Protime 11.2 Seconds      INR 1.03    aPTT [[id number]]  (Abnormal) Collected: 09/25/24 1044    Specimen: Blood Updated: 09/25/24 1116     PTT 29.7 seconds     D-dimer, Quantitative [[id number]]  (Abnormal) Collected: 09/25/24 1044    Specimen: Blood Updated: 09/25/24 1116     D-Dimer, Quantitative 2.75 mg/L (FEU)     Narrative:      According to the assay 's published package insert, a normal (<0.50 mg/L (FEU)) D-dimer result in conjunction with a non-high clinical probability assessment, excludes deep vein thrombosis (DVT) and pulmonary embolism (PE) with high sensitivity.    D-dimer values increase with age and this can make VTE exclusion of an older population difficult. To address this, the [employer], based on best available evidence and recent guidelines, recommends that clinicians use age-adjusted D-dimer thresholds in patients greater than 50 years of age with: a) a low probability of PE who do not meet all Pulmonary Embolism Rule Out Criteria, or b) in those with intermediate probability of PE.   The formula for an age-adjusted D-dimer cut-off is \"age/100\".  For example, a 60 year old patient would have an age-adjusted cut-off of 0.60 mg/L (FEU) and an 80 year old 0.80 mg/L (FEU).    High Sensitivity Troponin T [[id number]]  (Abnormal) Collected: 09/25/24 1044    Specimen: Blood Updated: 09/25/24 1115     HS Troponin T 42 ng/L     Narrative:      High Sensitive Troponin T Reference Range:  <14.0 ng/L- Negative Female for AMI  <22.0 ng/L- Negative Male for AMI  >=14 - Abnormal Female indicating possible myocardial injury.  >=22 - Abnormal Male indicating possible myocardial injury.   Clinicians would have to utilize clinical acumen, EKG, Troponin, and serial changes to determine if it is an Acute Myocardial Infarction or myocardial injury due to an underlying chronic condition.         BNP [[id number]]  " (Abnormal) Collected: 09/25/24 1044    Specimen: Blood Updated: 09/25/24 1115     proBNP 1,886.0 pg/mL     Narrative:      This assay is used as an aid in the diagnosis of individuals suspected of having heart failure. It can be used as an aid in the diagnosis of acute decompensated heart failure (ADHF) in patients presenting with signs and symptoms of ADHF to the emergency department (ED). In addition, NT-proBNP of <300 pg/mL indicates ADHF is not likely.    Age Range Result Interpretation  NT-proBNP Concentration (pg/mL:      <50             Positive            >450                   Gray                 300-450                    Negative             <300    50-75           Positive            >900                  Gray                300-900                  Negative            <300      >75             Positive            >1800                  Gray                300-1800                  Negative            <300    Comprehensive Metabolic Panel [[id number]]  (Abnormal) Collected: 09/25/24 1044    Specimen: Blood Updated: 09/25/24 1115     Glucose 133 mg/dL      BUN 30 mg/dL      Creatinine 1.83 mg/dL      Sodium 139 mmol/L      Potassium 3.8 mmol/L      Chloride 102 mmol/L      CO2 25.1 mmol/L      Calcium 9.2 mg/dL      Total Protein 7.0 g/dL      Albumin 3.9 g/dL      ALT (SGPT) 12 U/L      AST (SGOT) 21 U/L      Alkaline Phosphatase 154 U/L      Total Bilirubin 0.5 mg/dL      Globulin 3.1 gm/dL      A/G Ratio 1.3 g/dL      BUN/Creatinine Ratio 16.4     Anion Gap 11.9 mmol/L      eGFR 36.8 mL/min/1.73     Narrative:      GFR Normal >60  Chronic Kidney Disease <60  Kidney Failure <15    The GFR formula is only valid for adults with stable renal function between ages 18 and 70.    Magnesium [[id number]]  (Normal) Collected: 09/25/24 1044    Specimen: Blood Updated: 09/25/24 1115     Magnesium 1.8 mg/dL     [place] Draw [[id number]] Collected: 09/25/24 1044    Specimen: Blood Updated: 09/25/24 1101    Narrative:       The following orders were created for panel order [place] Draw.  Procedure                               Abnormality         Status                     ---------                               -----------         ------                     Green Top (Gel)[[id number]]                                  Final result               Lavender Top[[id number]]                                     Final result               Gold Top - SST[[id number]]                                   Final result               Light Blue Top[[id number]]                                   Final result                 Please view results for these tests on the individual orders.    Lavender Top [[id number]] Collected: 09/25/24 1044    Specimen: Blood Updated: 09/25/24 1101     Extra Tube hold for add-on     Comment: Auto resulted       Light Blue Top [[id number]] Collected: 09/25/24 1044    Specimen: Blood Updated: 09/25/24 1101     Extra Tube Hold for add-ons.     Comment: Auto resulted       Green Top (Gel) [[id number]] Collected: 09/25/24 1044    Specimen: Blood Updated: 09/25/24 1101     Extra Tube Hold for add-ons.     Comment: Auto resulted.       Gold Top - SST [[id number]] Collected: 09/25/24 1044    Specimen: Blood Updated: 09/25/24 1101     Extra Tube Hold for add-ons.     Comment: Auto resulted.       CBC & Differential [[id number]]  (Abnormal) Collected: 09/25/24 1044    Specimen: Blood Updated: 09/25/24 1100    Narrative:      The following orders were created for panel order CBC & Differential.  Procedure                               Abnormality         Status                     ---------                               -----------         ------                     CBC Auto Differential[[id number]]        Abnormal            Final result                 Please view results for these tests on the individual orders.    CBC Auto Differential [[id number]]  (Abnormal) Collected: 09/25/24 1044    Specimen: Blood Updated: 09/25/24 1100     WBC  20.20 10*3/mm3      RBC 3.88 10*6/mm3      Hemoglobin 11.5 g/dL      Hematocrit 37.1 %      MCV 95.6 fL      MCH 29.6 pg      MCHC 31.0 g/dL      RDW 14.9 %      RDW-SD 52.5 fl      MPV 10.1 fL      Platelets 280 10*3/mm3      Neutrophil % 92.0 %      Lymphocyte % 1.3 %      Monocyte % 5.2 %      Eosinophil % 0.4 %      Basophil % 0.5 %      Immature Grans % 0.6 %      Neutrophils, Absolute 18.57 10*3/mm3      Lymphocytes, Absolute 0.26 10*3/mm3      Monocytes, Absolute 1.06 10*3/mm3      Eosinophils, Absolute 0.08 10*3/mm3      Basophils, Absolute 0.10 10*3/mm3      Immature Grans, Absolute 0.13 10*3/mm3      nRBC 0.0 /100 WBC              I reviewed the patient's new clinical results.    Assessment & Plan     Shortness of breath  History of lung cancer  Panlobular emphysema, on chronic O2  History of tobacco dependency  -trop elevated 37, proBNP elevated 1886, d-dimer elevated 2.75  -WBC elevated 20.20  -chest x-ray showed postobstructive atelectasis with underlying mass or consolidation; continue rocephin and azithromycin  -blood cultures pending  -NM lung ventilation perfusion stated low probability for PE, would benefit from contrast CT  -1 dose methylprednisolone 125mg, then 40mg BID   -pending respiratory panel and culture    CKDIII  -creatinine 1.83, GFR 36.8  -consult nephrology to consider contrast vs. non-contrast CT given patient's concerns/creatinine level (contrast CT will be preferred, but strongly suggest at least doing non-contrast CT)     COPD- duoneb    Dyslipidemia- atorvastatin    HTN- metoprolol    Anemia- ferrous sulfate    Stress ulcer prophy- pantoprazole    I discussed the patient's findings and my recommendations with patient.     Laquita Lo, TERE Student  09/25/24  15:23 EDT

## 2024-09-26 PROBLEM — J18.9 PNA (PNEUMONIA): Status: ACTIVE | Noted: 2024-09-26

## 2024-09-26 LAB
ANION GAP SERPL CALCULATED.3IONS-SCNC: 11.8 MMOL/L (ref 5–15)
BASOPHILS # BLD AUTO: 0.02 10*3/MM3 (ref 0–0.2)
BASOPHILS NFR BLD AUTO: 0.1 % (ref 0–1.5)
BUN SERPL-MCNC: 30 MG/DL (ref 8–23)
BUN/CREAT SERPL: 16.9 (ref 7–25)
CALCIUM SPEC-SCNC: 9.1 MG/DL (ref 8.6–10.5)
CHLORIDE SERPL-SCNC: 105 MMOL/L (ref 98–107)
CO2 SERPL-SCNC: 22.2 MMOL/L (ref 22–29)
CREAT SERPL-MCNC: 1.77 MG/DL (ref 0.76–1.27)
DEPRECATED RDW RBC AUTO: 53.2 FL (ref 37–54)
EGFRCR SERPLBLD CKD-EPI 2021: 38.3 ML/MIN/1.73
EOSINOPHIL # BLD AUTO: 0 10*3/MM3 (ref 0–0.4)
EOSINOPHIL NFR BLD AUTO: 0 % (ref 0.3–6.2)
ERYTHROCYTE [DISTWIDTH] IN BLOOD BY AUTOMATED COUNT: 14.8 % (ref 12.3–15.4)
GLUCOSE SERPL-MCNC: 139 MG/DL (ref 65–99)
HCT VFR BLD AUTO: 33.8 % (ref 37.5–51)
HGB BLD-MCNC: 10.4 G/DL (ref 13–17.7)
IMM GRANULOCYTES # BLD AUTO: 0.14 10*3/MM3 (ref 0–0.05)
IMM GRANULOCYTES NFR BLD AUTO: 0.9 % (ref 0–0.5)
LYMPHOCYTES # BLD AUTO: 0.32 10*3/MM3 (ref 0.7–3.1)
LYMPHOCYTES NFR BLD AUTO: 2 % (ref 19.6–45.3)
MCH RBC QN AUTO: 30.1 PG (ref 26.6–33)
MCHC RBC AUTO-ENTMCNC: 30.8 G/DL (ref 31.5–35.7)
MCV RBC AUTO: 97.7 FL (ref 79–97)
MONOCYTES # BLD AUTO: 0.31 10*3/MM3 (ref 0.1–0.9)
MONOCYTES NFR BLD AUTO: 1.9 % (ref 5–12)
NEUTROPHILS NFR BLD AUTO: 15.55 10*3/MM3 (ref 1.7–7)
NEUTROPHILS NFR BLD AUTO: 95.1 % (ref 42.7–76)
NRBC BLD AUTO-RTO: 0 /100 WBC (ref 0–0.2)
PLATELET # BLD AUTO: 261 10*3/MM3 (ref 140–450)
PMV BLD AUTO: 10.2 FL (ref 6–12)
POTASSIUM SERPL-SCNC: 4.2 MMOL/L (ref 3.5–5.2)
QT INTERVAL: 372 MS
QTC INTERVAL: 512 MS
RBC # BLD AUTO: 3.46 10*6/MM3 (ref 4.14–5.8)
SODIUM SERPL-SCNC: 139 MMOL/L (ref 136–145)
WBC NRBC COR # BLD AUTO: 16.34 10*3/MM3 (ref 3.4–10.8)

## 2024-09-26 PROCEDURE — 25010000002 METHYLPREDNISOLONE PER 40 MG

## 2024-09-26 PROCEDURE — 25010000002 ONDANSETRON PER 1 MG

## 2024-09-26 PROCEDURE — 94761 N-INVAS EAR/PLS OXIMETRY MLT: CPT

## 2024-09-26 PROCEDURE — 94664 DEMO&/EVAL PT USE INHALER: CPT

## 2024-09-26 PROCEDURE — 94799 UNLISTED PULMONARY SVC/PX: CPT

## 2024-09-26 PROCEDURE — 25010000002 CEFTRIAXONE PER 250 MG

## 2024-09-26 RX ORDER — ATORVASTATIN CALCIUM 40 MG/1
40 TABLET, FILM COATED ORAL DAILY
Status: DISCONTINUED | OUTPATIENT
Start: 2024-09-26 | End: 2024-09-27 | Stop reason: HOSPADM

## 2024-09-26 RX ORDER — CLOPIDOGREL BISULFATE 75 MG/1
75 TABLET ORAL DAILY
Status: DISCONTINUED | OUTPATIENT
Start: 2024-09-26 | End: 2024-09-27 | Stop reason: HOSPADM

## 2024-09-26 RX ADMIN — SODIUM BICARBONATE 650 MG: 650 TABLET ORAL at 21:08

## 2024-09-26 RX ADMIN — Medication 10 ML: at 21:09

## 2024-09-26 RX ADMIN — BUDESONIDE 0.5 MG: 0.5 INHALANT RESPIRATORY (INHALATION) at 19:39

## 2024-09-26 RX ADMIN — FINASTERIDE 5 MG: 5 TABLET, FILM COATED ORAL at 21:08

## 2024-09-26 RX ADMIN — MONTELUKAST 10 MG: 10 TABLET, FILM COATED ORAL at 21:08

## 2024-09-26 RX ADMIN — THEOPHYLLINE 300 MG: 300 TABLET, EXTENDED RELEASE ORAL at 08:29

## 2024-09-26 RX ADMIN — SODIUM BICARBONATE 650 MG: 650 TABLET ORAL at 08:29

## 2024-09-26 RX ADMIN — Medication 5 MG: at 21:08

## 2024-09-26 RX ADMIN — Medication 250 MG: at 12:10

## 2024-09-26 RX ADMIN — CEFTRIAXONE 2000 MG: 2 INJECTION, POWDER, FOR SOLUTION INTRAMUSCULAR; INTRAVENOUS at 12:10

## 2024-09-26 RX ADMIN — PANTOPRAZOLE SODIUM 40 MG: 40 TABLET, DELAYED RELEASE ORAL at 08:29

## 2024-09-26 RX ADMIN — ATORVASTATIN CALCIUM 40 MG: 40 TABLET, FILM COATED ORAL at 08:29

## 2024-09-26 RX ADMIN — ONDANSETRON 4 MG: 2 INJECTION INTRAMUSCULAR; INTRAVENOUS at 16:44

## 2024-09-26 RX ADMIN — BUDESONIDE 0.5 MG: 0.5 INHALANT RESPIRATORY (INHALATION) at 07:22

## 2024-09-26 RX ADMIN — Medication 10 ML: at 08:30

## 2024-09-26 RX ADMIN — HYDROCODONE BITARTRATE AND ACETAMINOPHEN 1 TABLET: 5; 325 TABLET ORAL at 21:08

## 2024-09-26 RX ADMIN — METOPROLOL SUCCINATE 25 MG: 25 TABLET, EXTENDED RELEASE ORAL at 08:29

## 2024-09-26 RX ADMIN — CLOPIDOGREL BISULFATE 75 MG: 75 TABLET ORAL at 08:29

## 2024-09-26 RX ADMIN — METHYLPREDNISOLONE SODIUM SUCCINATE 40 MG: 40 INJECTION, POWDER, FOR SOLUTION INTRAMUSCULAR; INTRAVENOUS at 21:09

## 2024-09-26 RX ADMIN — METHYLPREDNISOLONE SODIUM SUCCINATE 40 MG: 40 INJECTION, POWDER, FOR SOLUTION INTRAMUSCULAR; INTRAVENOUS at 08:29

## 2024-09-26 RX ADMIN — FERROUS SULFATE TAB EC 324 MG (65 MG FE EQUIVALENT) 324 MG: 324 (65 FE) TABLET DELAYED RESPONSE at 08:29

## 2024-09-26 NOTE — CASE MANAGEMENT/SOCIAL WORK
Discharge Planning Assessment  [facility]     Patient Name: David A Jolissaint  MRN: [id number]  Today's Date: 9/26/2024    Admit Date: 9/25/2024    Plan: Anticipate routine home with caregiver. Current home O2 2-3L through [employer].       Discharge Plan       Row Name 09/26/24 1533       Plan    Plan Comments CM met with patient at bedside. IMM letter reviewed with patient, consent obtained and copy left at bedside.                      Expected Discharge Date and Time       Expected Discharge Date Expected Discharge Time    Sep 27, 2024                Patient Forms       Row Name 09/26/24 1534       Patient Forms    Important Message from Medicare (IMM) Delivered  9/26/24    Delivered to Patient    Method of delivery In person                  Met with patient in room.  Maintained distance greater than six feet and spent less than 15 minutes in the room.       NANNETTE VarmaN, RN    [address] [address]    Office: [phone]  Fax: [phone]

## 2024-09-26 NOTE — PLAN OF CARE
Goal Outcome Evaluation:                 Pt receiving IV rocephin for PNA.   Problem: Adult Inpatient Plan of Care  Goal: Absence of Hospital-Acquired Illness or Injury  Intervention: Identify and Manage Fall Risk  Recent Flowsheet Documentation  Taken 9/26/2024 1436 by Gisel Lopez RN  Safety Promotion/Fall Prevention:   safety round/check completed   nonskid shoes/slippers when out of bed   fall prevention program maintained   activity supervised  Taken 9/26/2024 1230 by Gisel Lopez RN  Safety Promotion/Fall Prevention:   safety round/check completed   nonskid shoes/slippers when out of bed   fall prevention program maintained   activity supervised  Taken 9/26/2024 1026 by Gisel Lopez RN  Safety Promotion/Fall Prevention:   safety round/check completed   nonskid shoes/slippers when out of bed   fall prevention program maintained   activity supervised  Taken 9/26/2024 0829 by Gisel Lopez RN  Safety Promotion/Fall Prevention:   safety round/check completed   nonskid shoes/slippers when out of bed   fall prevention program maintained   activity supervised  Intervention: Prevent Skin Injury  Recent Flowsheet Documentation  Taken 9/26/2024 1436 by Gisel Lopez RN  Body Position: position changed independently  Taken 9/26/2024 1230 by Gisel Lopez RN  Body Position: position changed independently  Taken 9/26/2024 1026 by Gisel Lopez RN  Body Position: position changed independently  Taken 9/26/2024 0829 by Gisel Lopez RN  Body Position: position changed independently  Intervention: Prevent and Manage VTE (Venous Thromboembolism) Risk  Recent Flowsheet Documentation  Taken 9/26/2024 0829 by Gisel Lopez RN  Activity Management: activity encouraged  Range of Motion: active ROM (range of motion) encouraged     Problem: Fall Injury Risk  Goal: Absence of Fall and Fall-Related Injury  Intervention: Promote Injury-Free Environment  Recent Flowsheet Documentation  Taken 9/26/2024 1436  by Rehl, Gisel, RN  Safety Promotion/Fall Prevention:   safety round/check completed   nonskid shoes/slippers when out of bed   fall prevention program maintained   activity supervised  Taken 9/26/2024 1230 by Gisel Lopez RN  Safety Promotion/Fall Prevention:   safety round/check completed   nonskid shoes/slippers when out of bed   fall prevention program maintained   activity supervised  Taken 9/26/2024 1026 by Gisel Lopez RN  Safety Promotion/Fall Prevention:   safety round/check completed   nonskid shoes/slippers when out of bed   fall prevention program maintained   activity supervised  Taken 9/26/2024 0829 by Gisel Lopez RN  Safety Promotion/Fall Prevention:   safety round/check completed   nonskid shoes/slippers when out of bed   fall prevention program maintained   activity supervised     Problem: Infection Progression (Sepsis/Septic Shock)  Goal: Absence of Infection Signs and Symptoms  Intervention: Promote Recovery  Recent Flowsheet Documentation  Taken 9/26/2024 0829 by Gisel Lopez RN  Activity Management: activity encouraged

## 2024-09-26 NOTE — CONSULTS
[facility] CONSULT NOTE    Referring Provider: Hilda Desai APRN   Reason for Consultation: renal insufficiency    Chief complaint. Shortness of breath    History of present illness:  patient is 79 years old male chronic kidney disease stage IIIb, COPD,  Hypertension, hyperlipidemia, peripheral vascular disease, presented to the hospital shortness of breath along with productive cough.  Patient did not have any fever, chills, nausea, vomiting, hematuria or dysuria.    History  Past Medical History:   Diagnosis Date    Anemia     Cancer     Cellulitis     CKD (chronic kidney disease), stage III     COPD (chronic obstructive pulmonary disease)     Diverticulitis     Dyslipidemia     Hypertension     Myocardial infarction     PVD (peripheral vascular disease)      Past Surgical History:   Procedure Laterality Date    BRONCHOSCOPY N/A 5/20/2021    Procedure: BRONCHOSCOPY WITH ENDOBRONCHIAL ULTRASOUND WITH FINE NEEDLE ASPIRATION. BRONCHOALVEOLAR LAVAGE;  Surgeon: Jackson Quezada MD;  Location: [facility] ENDOSCOPY;  Service: Pulmonary;  Laterality: N/A;  subcarinal mass    CARDIAC SURGERY      CHOLECYSTECTOMY      GALLBLADDER SURGERY      LEG AMPUTATION Left      Social History     Tobacco Use    Smoking status: Former     Current packs/day: 2.00     Average packs/day: 2.0 packs/day for 50.0 years (100.0 ttl pk-yrs)     Types: Cigarettes    Smokeless tobacco: Never    Tobacco comments:     Says he quit approximately in 2005.   Vaping Use    Vaping status: Never Used   Substance Use Topics    Alcohol use: Not Currently    Drug use: Never     Family History   Problem Relation Age of Onset    Heart attack Father     Heart disease Father        Review of Systems  ROS    Objective     Vital Signs  Temp:  [97.5 °F (36.4 °C)-98.6 °F (37 °C)] 97.6 °F (36.4 °C)  Heart Rate:  [] 78  Resp:  [16-25] 16  BP: ()/(52-78) 120/66    I/O this shift:  In: 360 [P.O.:360]  Out: -   I/O last 3 completed shifts:  In: 1080  [P.O.:480; IV Piggyback:600]  Out: -     Physical Exam:  Physical Exam    General Appearance: alert, oriented x 3, no acute distress   Skin: warm and dry  HEENT: oral mucosa normal, nonicteric sclera  Neck: supple, no JVD  Lungs: bilateral wheezing  Heart: RRR, normal S1 and S2  Abdomen: soft, nontender, nondistended  : no palpable bladder  Extremities: left BKA.  No edema  Neuro: normal speech and mental status     Results Review:   I reviewed the patient's new clinical results.    Lab Results   Component Value Date    CALCIUM 9.1 09/25/2024    PHOS 3.5 04/29/2024     Results from last 7 days   Lab Units 09/25/24  2349 09/25/24  1044   MAGNESIUM mg/dL  --  1.8   SODIUM mmol/L 139 139   POTASSIUM mmol/L 4.2 3.8   CHLORIDE mmol/L 105 102   CO2 mmol/L 22.2 25.1   BUN mg/dL 30* 30*   CREATININE mg/dL 1.77* 1.83*   GLUCOSE mg/dL 139* 133*   CALCIUM mg/dL 9.1 9.2   WBC 10*3/mm3 16.34* 20.20*   HEMOGLOBIN g/dL 10.4* 11.5*   PLATELETS 10*3/mm3 261 280   ALT (SGPT) U/L  --  12   AST (SGOT) U/L  --  21     Lab Results   Component Value Date    TROPONINT 37 (H) 09/25/2024     Estimated Creatinine Clearance: 28.6 mL/min (A) (by C-G formula based on SCr of 1.77 mg/dL (H)).  Lab Results   Component Value Date    URICACID 7.3 04/04/2017       Brief Urine Lab Results       None            Prior to Admission medications    Medication Sig Start Date End Date Taking? Authorizing Provider   aspirin 81 MG EC tablet Take 1 tablet by mouth Daily With Lunch. 1/24/16  Yes ProviderCharu MD   atorvastatin (LIPITOR) 40 MG tablet Take 1 tablet by mouth Every Morning. 2/15/16  Yes ProviderCharu MD   clopidogrel (PLAVIX) 75 MG tablet Take 1 tablet by mouth Every Morning. 2/15/16  Yes ProviderCharu MD   dutasteride (AVODART) 0.5 MG capsule Take 1 capsule by mouth Every Night. 4/1/19  Yes ProviderCharu MD   ferrous sulfate 324 (65 Fe) MG tablet delayed-release EC tablet Take 1 tablet by mouth Daily With Breakfast.  11/13/21  Yes Kusum Tesfaye MD   metoprolol succinate XL (TOPROL-XL) 25 MG 24 hr tablet TAKE 1 TABLET EVERY DAY 6/10/24  Yes Kali Mistry MD   montelukast (SINGULAIR) 10 MG tablet Take 1 tablet by mouth Every Night.   Yes Charu Prince MD   Multiple Vitamins-Minerals (MULTI VITAMIN/MINERALS) tablet 1 tablet Daily With Lunch. 4/1/16  Yes Charu Prince MD   pantoprazole (PROTONIX) 40 MG EC tablet Take 1 tablet by mouth Daily. 9/7/21  Yes Charu Prince MD   saccharomyces boulardii (FLORASTOR) 250 MG capsule Take 1 capsule by mouth Daily With Lunch.   Yes Charu Prince MD   sodium bicarbonate 650 MG tablet Take 1 tablet by mouth 2 (Two) Times a Day. 4/1/19  Yes Charu Prince MD   theophylline (THEODUR) 300 MG 12 hr tablet Take 1 tablet by mouth 2 (Two) Times a Day.   Yes Charu Prince MD   budesonide (PULMICORT) 0.5 MG/2ML nebulizer solution Take 2 mL by nebulization 2 (two) times a day.    Charu Prince MD   formoterol (Perforomist) 20 MCG/2ML nebulizer solution Take  by nebulization 2 (Two) Times a Day.    Charu Prince MD   HYDROcodone-acetaminophen (NORCO) 5-325 MG per tablet Take 1 tablet by mouth As Needed for Moderate Pain. 3/2/16   Charu Prince MD   ondansetron (ZOFRAN) 4 MG tablet Take 1 tablet by mouth As Needed. 11/30/23   Charu Prince MD   revefenacin (YUPELRI) 175 MCG/3ML nebulizer solution Take 3 mL by nebulization Daily.    Charu Prince MD       atorvastatin, 40 mg, Oral, Daily  azithromycin, 500 mg, Intravenous, Once  budesonide, 0.5 mg, Nebulization, BID - RT  cefTRIAXone, 2,000 mg, Intravenous, Once  clopidogrel, 75 mg, Oral, Daily  ferrous sulfate, 324 mg, Oral, Daily With Breakfast  finasteride, 5 mg, Oral, Daily  methylPREDNISolone sodium succinate, 40 mg, Intravenous, Q12H  metoprolol succinate XL, 25 mg, Oral, Daily  montelukast, 10 mg, Oral, Nightly  pantoprazole, 40 mg, Oral,  Daily  saccharomyces boulardii, 250 mg, Oral, Daily With Lunch  sodium bicarbonate, 650 mg, Oral, BID  sodium chloride, 10 mL, Intravenous, Q12H  theophylline, 300 mg, Oral, Daily           Assessment & Plan       Chronic kidney disease stage IIIb.  Patient has known history of chronic kidney disease due to hypertensive nephrosclerosis and incompletely resolved ATN in the past.  Patient's creatinine is at baseline.  electrolytes, volume status okay  Hypertension with chronic kidney disease.  blood pressure has been fairly well controlled  Dyspnea.  Patient has underlying COPD and tobacco abuse.  He is on IV steroids.  Dyspnea improving  Anemia and chronic disease.  Hemoglobin acceptable    Plan:  Continue current treatment  I will discuss with the primary team and start patient on gentle IV hydration if CT scan with IV contrast is needed  Surveillance labs    I discussed the patients findings and my recommendations with patient and nursing staff    Hoang Mendez MD  09/26/24  09:32 EDT

## 2024-09-26 NOTE — CASE MANAGEMENT/SOCIAL WORK
Continued Stay Note  [facility]     Patient Name: David A Jolissaint  MRN: [id number]  Today's Date: 9/26/2024    Admit Date: 9/25/2024    Plan: Anticipate routine home with caregiver. Current home O2 2-3L through [employer].   Discharge Plan       Row Name 09/26/24 1207       Plan    Plan Anticipate routine home with caregiver. Current home O2 2-3L through [employer].    Plan Comments Barrier to D/C: IV abx, IV steroids, nephrology consult.                      Expected Discharge Date and Time       Expected Discharge Date Expected Discharge Time    Sep 27, 2024           Phone communication or documentation only - no physical contact with patient or family.     Palmira Wen, NANNETTEN, [place] [address]    Office: [phone]  Fax: [phone]

## 2024-09-26 NOTE — PROGRESS NOTES
LOS: 0 days   Patient Care Team:  Walter Valero MD as PCP - General  Kali Mistry MD as Consulting Physician (Interventional Cardiology)  Riki Hedrick MD as Consulting Physician (Hematology and Oncology)  Hoang Mendez MD as Consulting Physician (Nephrology)    Subjective     Interval History: No overnight changes or concerns.    Patient Complaints: Patient states shortness of breath and cough is improving. He would like to meet with PT to have them assess his mobility before discharge.      History taken from: patient    Review of Systems   Constitutional:  Positive for fatigue. Negative for chills and fever.   HENT:  Positive for congestion.    Respiratory:  Positive for cough and shortness of breath.    Cardiovascular:  Negative for chest pain and leg swelling.   Gastrointestinal:  Negative for abdominal pain, nausea and vomiting.   Neurological:  Negative for light-headedness and headaches.           Objective     Vital Signs  Temp:  [97.5 °F (36.4 °C)-98.6 °F (37 °C)] 97.7 °F (36.5 °C)  Heart Rate:  [71-93] 81  Resp:  [16-30] 30  BP: ()/(52-78) 114/60    Physical Exam:     General Appearance:    Alert, cooperative, in no acute distress,   Head:    Normocephalic, without obvious abnormality, atraumatic   Eyes:            Lids and lashes normal, conjunctivae and sclerae normal, no   icterus, no pallor, corneas clear, PERRLA   Ears:    Ears appear intact with no abnormalities noted   Throat:   No oral lesions, no thrush, oral mucosa moist   Neck:   No adenopathy, supple, trachea midline, no thyromegaly, no   carotid bruit, no JVD   Lungs:     Clear to auscultation,respirations regular, even and                  unlabored    Heart:    Regular rhythm and normal rate, normal S1 and S2, no            murmur, no gallop, no rub, no click   Chest Wall:    No abnormalities observed   Abdomen:     Normal bowel sounds, no masses, no organomegaly, soft        Non-tender non-distended, no guarding,    Extremities:   +Left BKA   Pulses:   Pulses palpable and equal bilaterally   Skin:   No bleeding, bruising or rash   Lymph nodes:   No palpable adenopathy   Neurologic:   Cranial nerves 2 - 12 grossly intact, sensation intact, DTR       present and equal bilaterally        Results Review:    Lab Results (last 24 hours)       Procedure Component Value Units Date/Time    Blood Culture - Blood, Hand, Right [[id number]]  (Normal) Collected: 09/25/24 1151    Specimen: Blood from Hand, Right Updated: 09/26/24 1200     Blood Culture No growth at 24 hours    Blood Culture - Blood, Arm, Right [[id number]]  (Normal) Collected: 09/25/24 1137    Specimen: Blood from Arm, Right Updated: 09/26/24 1200     Blood Culture No growth at 24 hours    Respiratory Culture - Sputum, Cough [[id number]] Collected: 09/25/24 1729    Specimen: Sputum from Cough Updated: 09/26/24 1151     Respiratory Culture Moderate growth (3+) The culture consists of normal respiratory varsha. This is a preliminary report; final report to follow.     Gram Stain Moderate (3+) WBCs seen      Few (2+) Mixed bacterial morphotypes seen on Gram Stain    Basic Metabolic Panel [[id number]]  (Abnormal) Collected: 09/25/24 2349    Specimen: Blood from Arm, Right Updated: 09/26/24 0030     Glucose 139 mg/dL      BUN 30 mg/dL      Creatinine 1.77 mg/dL      Sodium 139 mmol/L      Potassium 4.2 mmol/L      Chloride 105 mmol/L      CO2 22.2 mmol/L      Calcium 9.1 mg/dL      BUN/Creatinine Ratio 16.9     Anion Gap 11.8 mmol/L      eGFR 38.3 mL/min/1.73     Narrative:      GFR Normal >60  Chronic Kidney Disease <60  Kidney Failure <15    The GFR formula is only valid for adults with stable renal function between ages 18 and 70.    CBC & Differential [[id number]]  (Abnormal) Collected: 09/25/24 2349    Specimen: Blood from Arm, Right Updated: 09/26/24 0011    Narrative:      The following orders were created for panel order CBC & Differential.  Procedure                                Abnormality         Status                     ---------                               -----------         ------                     CBC Auto Differential[[id number]]        Abnormal            Final result                 Please view results for these tests on the individual orders.    CBC Auto Differential [[id number]]  (Abnormal) Collected: 09/25/24 2773    Specimen: Blood from Arm, Right Updated: 09/26/24 0011     WBC 16.34 10*3/mm3      RBC 3.46 10*6/mm3      Hemoglobin 10.4 g/dL      Hematocrit 33.8 %      MCV 97.7 fL      MCH 30.1 pg      MCHC 30.8 g/dL      RDW 14.8 %      RDW-SD 53.2 fl      MPV 10.2 fL      Platelets 261 10*3/mm3      Neutrophil % 95.1 %      Lymphocyte % 2.0 %      Monocyte % 1.9 %      Eosinophil % 0.0 %      Basophil % 0.1 %      Immature Grans % 0.9 %      Neutrophils, Absolute 15.55 10*3/mm3      Lymphocytes, Absolute 0.32 10*3/mm3      Monocytes, Absolute 0.31 10*3/mm3      Eosinophils, Absolute 0.00 10*3/mm3      Basophils, Absolute 0.02 10*3/mm3      Immature Grans, Absolute 0.14 10*3/mm3      nRBC 0.0 /100 WBC     Legionella Antigen, Urine - Urine, Urine, Clean Catch [[id number]]  (Normal) Collected: 09/25/24 1921    Specimen: Urine, Clean Catch Updated: 09/25/24 1947     LEGIONELLA ANTIGEN, URINE Negative    S. Pneumo Ag Urine or CSF - Urine, Urine, Clean Catch [[id number]]  (Normal) Collected: 09/25/24 1921    Specimen: Urine, Clean Catch Updated: 09/25/24 1947     Strep Pneumo Ag Negative    Respiratory Panel PCR w/COVID-19(SARS-CoV-2) MIKE/DENZEL/ALONA/PAD/COR/MERLINE In-House, NP Swab in UTM/VTM, 2 HR TAT - Swab, Nasopharynx [[id number]]  (Normal) Collected: 09/25/24 1813    Specimen: Swab from Nasopharynx Updated: 09/25/24 1915     ADENOVIRUS, PCR Not Detected     Coronavirus 229E Not Detected     Coronavirus HKU1 Not Detected     Coronavirus NL63 Not Detected     Coronavirus OC43 Not Detected     COVID19 Not Detected     Human Metapneumovirus Not Detected     Human  Rhinovirus/Enterovirus Not Detected     Influenza A PCR Not Detected     Influenza B PCR Not Detected     Parainfluenza Virus 1 Not Detected     Parainfluenza Virus 2 Not Detected     Parainfluenza Virus 3 Not Detected     Parainfluenza Virus 4 Not Detected     RSV, PCR Not Detected     Bordetella pertussis pcr Not Detected     Bordetella parapertussis PCR Not Detected     Chlamydophila pneumoniae PCR Not Detected     Mycoplasma pneumo by PCR Not Detected    Narrative:      In the setting of a positive respiratory panel with a viral infection PLUS a negative procalcitonin without other underlying concern for bacterial infection, consider observing off antibiotics or discontinuation of antibiotics and continue supportive care. If the respiratory panel is positive for atypical bacterial infection (Bordetella pertussis, Chlamydophila pneumoniae, or Mycoplasma pneumoniae), consider antibiotic de-escalation to target atypical bacterial infection.             Imaging Results (Last 24 Hours)       Procedure Component Value Units Date/Time    NM Lung Ventilation Perfusion [[id number]] Collected: 09/25/24 1430     Updated: 09/25/24 1439    Narrative:      NM LUNG VENTILATION PERFUSION    Date of Exam: 9/25/2024 2:01 PM EDT    Indication: elevated ddimer soa.    Comparison: AP chest 9/25/2024 at 1059, CT chest 6/1/2024    Technique:  The patient was ventilated with 45.6 mCi of technetium 99m pyrophosphate aerosol and ventilation images were acquired in multiple obliquities. The patient then received 6.0 mCi of technetium 99m MAA intravenously and perfusion images were   acquired in multiple obliquities.      Findings:  Triple matched nonsegmental defect within the right midlung posteriorly. The chest radiograph defect is thought to be greater than that of the perfusion defect. No ventilation/perfusion mismatch is seen.      Impression:      Low probability study for pulmonary embolism.      Electronically Signed: Jayda  MD Yonatan    9/25/2024 2:37 PM EDT    Workstation ID: [id number]                 I reviewed the patient's new clinical results.    Medication Review:   Scheduled Meds:atorvastatin, 40 mg, Oral, Daily  azithromycin, 500 mg, Intravenous, Once  budesonide, 0.5 mg, Nebulization, BID - RT  clopidogrel, 75 mg, Oral, Daily  ferrous sulfate, 324 mg, Oral, Daily With Breakfast  finasteride, 5 mg, Oral, Daily  methylPREDNISolone sodium succinate, 40 mg, Intravenous, Q12H  metoprolol succinate XL, 25 mg, Oral, Daily  montelukast, 10 mg, Oral, Nightly  pantoprazole, 40 mg, Oral, Daily  saccharomyces boulardii, 250 mg, Oral, Daily With Lunch  sodium bicarbonate, 650 mg, Oral, BID  sodium chloride, 10 mL, Intravenous, Q12H  theophylline, 300 mg, Oral, Daily      Continuous Infusions:   PRN Meds:.  senna-docusate sodium **AND** polyethylene glycol **AND** bisacodyl **AND** bisacodyl    Calcium Replacement - Follow Nurse / BPA Driven Protocol    hydrALAZINE    HYDROcodone-acetaminophen    Magnesium Standard Dose Replacement - Follow Nurse / BPA Driven Protocol    melatonin    ondansetron    Phosphorus Replacement - Follow Nurse / BPA Driven Protocol    Potassium Replacement - Follow Nurse / BPA Driven Protocol    [COMPLETED] Insert Peripheral IV **AND** sodium chloride    sodium chloride    sodium chloride     Assessment & Plan       Shortness of breath  History of lung cancer  Panlobular emphysema, on chronic O2  History of tobacco dependency  -WBC is trending down  -chest x-ray showed postobstructive atelectasis with underlying mass or consolidation; continue azithromycin   -blood cultures negative  -NM lung ventilation perfusion stated low probability for PE  -continue methylprednisolone  -respiratory panel negative  -will meet with PT for mobility assessment prior to discharge    CKDIII- creatinine and GFR at baseline    COPD- duoneb    Dyslipidemia- atorvastatin    HTN- metoprolol    Stress ulcer prophy-  pantoprazole    Anemia on chronic disease- hemoglobin acceptable; ferrous sulfate        Plan for disposition:Discharge to home, likely tomorrow on oral antibiotics, steroids and bronchodilators.    TERE Valdivia Student  09/26/24  13:49 EDT    I have interviewed and examined patient and provided 100% of the medical decision-making.  She Thakur MD

## 2024-09-26 NOTE — PLAN OF CARE
Goal Outcome Evaluation:         New order for home dose of pain control obtained from on-call provider per patient request, administered/effective

## 2024-09-27 ENCOUNTER — READMISSION MANAGEMENT (OUTPATIENT)
Dept: CALL CENTER | Facility: HOSPITAL | Age: 79
End: 2024-09-27
Payer: MEDICARE

## 2024-09-27 VITALS
SYSTOLIC BLOOD PRESSURE: 135 MMHG | WEIGHT: 139.77 LBS | RESPIRATION RATE: 20 BRPM | TEMPERATURE: 97.5 F | BODY MASS INDEX: 20.01 KG/M2 | HEIGHT: 70 IN | DIASTOLIC BLOOD PRESSURE: 71 MMHG | HEART RATE: 80 BPM | OXYGEN SATURATION: 99 %

## 2024-09-27 LAB
ANION GAP SERPL CALCULATED.3IONS-SCNC: 12 MMOL/L (ref 5–15)
BACTERIA SPEC RESP CULT: NORMAL
BASOPHILS # BLD AUTO: 0.02 10*3/MM3 (ref 0–0.2)
BASOPHILS NFR BLD AUTO: 0.1 % (ref 0–1.5)
BUN SERPL-MCNC: 37 MG/DL (ref 8–23)
BUN/CREAT SERPL: 18.7 (ref 7–25)
CALCIUM SPEC-SCNC: 9.3 MG/DL (ref 8.6–10.5)
CHLORIDE SERPL-SCNC: 104 MMOL/L (ref 98–107)
CO2 SERPL-SCNC: 24 MMOL/L (ref 22–29)
CREAT SERPL-MCNC: 1.98 MG/DL (ref 0.76–1.27)
DEPRECATED RDW RBC AUTO: 52.8 FL (ref 37–54)
EGFRCR SERPLBLD CKD-EPI 2021: 33.5 ML/MIN/1.73
EOSINOPHIL # BLD AUTO: 0.01 10*3/MM3 (ref 0–0.4)
EOSINOPHIL NFR BLD AUTO: 0.1 % (ref 0.3–6.2)
ERYTHROCYTE [DISTWIDTH] IN BLOOD BY AUTOMATED COUNT: 15 % (ref 12.3–15.4)
GLUCOSE SERPL-MCNC: 140 MG/DL (ref 65–99)
GRAM STN SPEC: NORMAL
GRAM STN SPEC: NORMAL
HCT VFR BLD AUTO: 35 % (ref 37.5–51)
HGB BLD-MCNC: 10.9 G/DL (ref 13–17.7)
IMM GRANULOCYTES # BLD AUTO: 0.17 10*3/MM3 (ref 0–0.05)
IMM GRANULOCYTES NFR BLD AUTO: 0.9 % (ref 0–0.5)
LYMPHOCYTES # BLD AUTO: 0.31 10*3/MM3 (ref 0.7–3.1)
LYMPHOCYTES NFR BLD AUTO: 1.7 % (ref 19.6–45.3)
MCH RBC QN AUTO: 29.7 PG (ref 26.6–33)
MCHC RBC AUTO-ENTMCNC: 31.1 G/DL (ref 31.5–35.7)
MCV RBC AUTO: 95.4 FL (ref 79–97)
MONOCYTES # BLD AUTO: 0.34 10*3/MM3 (ref 0.1–0.9)
MONOCYTES NFR BLD AUTO: 1.8 % (ref 5–12)
NEUTROPHILS NFR BLD AUTO: 17.65 10*3/MM3 (ref 1.7–7)
NEUTROPHILS NFR BLD AUTO: 95.4 % (ref 42.7–76)
NRBC BLD AUTO-RTO: 0 /100 WBC (ref 0–0.2)
PLATELET # BLD AUTO: 311 10*3/MM3 (ref 140–450)
PMV BLD AUTO: 10.3 FL (ref 6–12)
POTASSIUM SERPL-SCNC: 4.5 MMOL/L (ref 3.5–5.2)
RBC # BLD AUTO: 3.67 10*6/MM3 (ref 4.14–5.8)
SODIUM SERPL-SCNC: 140 MMOL/L (ref 136–145)
WBC NRBC COR # BLD AUTO: 18.5 10*3/MM3 (ref 3.4–10.8)

## 2024-09-27 PROCEDURE — 94664 DEMO&/EVAL PT USE INHALER: CPT

## 2024-09-27 PROCEDURE — 94799 UNLISTED PULMONARY SVC/PX: CPT

## 2024-09-27 PROCEDURE — 85025 COMPLETE CBC W/AUTO DIFF WBC: CPT

## 2024-09-27 PROCEDURE — 25010000002 METHYLPREDNISOLONE PER 40 MG

## 2024-09-27 PROCEDURE — 94761 N-INVAS EAR/PLS OXIMETRY MLT: CPT

## 2024-09-27 PROCEDURE — 80048 BASIC METABOLIC PNL TOTAL CA: CPT

## 2024-09-27 RX ORDER — DOXYCYCLINE 100 MG/1
100 CAPSULE ORAL 2 TIMES DAILY
Qty: 10 CAPSULE | Refills: 0 | Status: SHIPPED | OUTPATIENT
Start: 2024-09-27

## 2024-09-27 RX ORDER — PREDNISONE 10 MG/1
TABLET ORAL
Qty: 30 TABLET | Refills: 0 | Status: SHIPPED | OUTPATIENT
Start: 2024-09-28

## 2024-09-27 RX ADMIN — FERROUS SULFATE TAB EC 324 MG (65 MG FE EQUIVALENT) 324 MG: 324 (65 FE) TABLET DELAYED RESPONSE at 09:30

## 2024-09-27 RX ADMIN — THEOPHYLLINE 300 MG: 300 TABLET, EXTENDED RELEASE ORAL at 09:31

## 2024-09-27 RX ADMIN — BUDESONIDE 0.5 MG: 0.5 INHALANT RESPIRATORY (INHALATION) at 07:12

## 2024-09-27 RX ADMIN — PANTOPRAZOLE SODIUM 40 MG: 40 TABLET, DELAYED RELEASE ORAL at 09:31

## 2024-09-27 RX ADMIN — CLOPIDOGREL BISULFATE 75 MG: 75 TABLET ORAL at 09:31

## 2024-09-27 RX ADMIN — Medication 10 ML: at 09:34

## 2024-09-27 RX ADMIN — Medication 250 MG: at 12:39

## 2024-09-27 RX ADMIN — METOPROLOL SUCCINATE 25 MG: 25 TABLET, EXTENDED RELEASE ORAL at 09:31

## 2024-09-27 RX ADMIN — SODIUM BICARBONATE 650 MG: 650 TABLET ORAL at 09:31

## 2024-09-27 RX ADMIN — ATORVASTATIN CALCIUM 40 MG: 40 TABLET, FILM COATED ORAL at 09:30

## 2024-09-27 RX ADMIN — METHYLPREDNISOLONE SODIUM SUCCINATE 40 MG: 40 INJECTION, POWDER, FOR SOLUTION INTRAMUSCULAR; INTRAVENOUS at 09:30

## 2024-09-27 NOTE — CASE MANAGEMENT/SOCIAL WORK
Continued Stay Note  [facility]     Patient Name: David A Jolissaint  MRN: [id number]  Today's Date: 9/27/2024    Admit Date: 9/25/2024    Plan: Anticipate routine home with caregiver. Current home O2 2-3L through [employer].   Discharge Plan       Row Name 09/27/24 1439       Plan    Plan Comments CM notified by RN that patient interested in assisted living information. CM provided [facility] list to patient at bedside. Patient states his caregiver can assist him in making calls to the facilites. Patient states he can return home with caregiver at d/c. Patient denies any further d/c needs at this time. D/C orders noted.    Final Discharge Disposition Code 01 - home or self-care    Final Note Home with caregiver                      Expected Discharge Date and Time       Expected Discharge Date Expected Discharge Time    Sep 27, 2024           Case Management Discharge Note      Final Note: Home with caregiver    Provided Post Acute Provider List?: N/A  Provided Post Acute Provider Quality & Resource List?: N/A    Selected Continued Care - Admitted Since 9/25/2024         Durable Medical Equipment Coordination complete.      Service Provider Selected Services Address Phone Fax Patient Preferred    [facility] [address] [facility] [address] [phone] --                 Transportation Services  Private: Car    Final Discharge Disposition Code: 01 - home or self-care    Met with patient in room.  Maintained distance greater than six feet and spent less than 15 minutes in the room.   NANNETTE VarmaN, RN    [address] [address]    Office: [phone]  Fax: [phone]

## 2024-09-27 NOTE — SIGNIFICANT NOTE
"   09/27/24 6080   OTHER   Discipline physical therapist   Rehab Time/Intention   Session Not Performed patient/family declined evaluation;other (see comments)  (Pt reports he had just donned his prosthetic and ambulated around the room with the hospitalist to show his independence. Pt denies concerns for mobility/safety at home, reports for PT to \"just go.\" Will complete orders at this time, re-consult if needed)   Therapy Assessment/Plan (PT)   Criteria for Skilled Interventions Met (PT) no;patient/family refuse skilled intervention at this time       "

## 2024-09-27 NOTE — PLAN OF CARE
Goal Outcome Evaluation:               Pt discharging home  Problem: Adult Inpatient Plan of Care  Goal: Plan of Care Review  Outcome: Met  Goal: Patient-Specific Goal (Individualized)  Outcome: Met  Goal: Absence of Hospital-Acquired Illness or Injury  Outcome: Met  Intervention: Identify and Manage Fall Risk  Recent Flowsheet Documentation  Taken 9/27/2024 1250 by Gisel Loepz RN  Safety Promotion/Fall Prevention:   safety round/check completed   nonskid shoes/slippers when out of bed   fall prevention program maintained   activity supervised  Taken 9/27/2024 1050 by Gisel Lopez RN  Safety Promotion/Fall Prevention:   safety round/check completed   nonskid shoes/slippers when out of bed   fall prevention program maintained   activity supervised  Taken 9/27/2024 0931 by Giesl Lopez RN  Safety Promotion/Fall Prevention:   safety round/check completed   patient off unit   fall prevention program maintained   activity supervised  Intervention: Prevent Skin Injury  Recent Flowsheet Documentation  Taken 9/27/2024 1250 by Gisel Lopez RN  Body Position: position changed independently  Taken 9/27/2024 1050 by Gisel Lopez RN  Body Position: position changed independently  Taken 9/27/2024 0931 by Gisel Lopez RN  Body Position: position changed independently  Intervention: Prevent and Manage VTE (Venous Thromboembolism) Risk  Recent Flowsheet Documentation  Taken 9/27/2024 1050 by Gisel Lopez RN  Activity Management: activity encouraged  Taken 9/27/2024 0931 by Gisel Lopez RN  Activity Management: activity encouraged  Range of Motion: active ROM (range of motion) encouraged  Goal: Optimal Comfort and Wellbeing  Outcome: Met  Goal: Readiness for Transition of Care  Outcome: Met     Problem: Fall Injury Risk  Goal: Absence of Fall and Fall-Related Injury  Outcome: Met  Intervention: Promote Injury-Free Environment  Recent Flowsheet Documentation  Taken 9/27/2024 1250 by Gisel Lopez  RN  Safety Promotion/Fall Prevention:   safety round/check completed   nonskid shoes/slippers when out of bed   fall prevention program maintained   activity supervised  Taken 9/27/2024 1050 by Gisel Lopez RN  Safety Promotion/Fall Prevention:   safety round/check completed   nonskid shoes/slippers when out of bed   fall prevention program maintained   activity supervised  Taken 9/27/2024 0931 by Gisel Lopez RN  Safety Promotion/Fall Prevention:   safety round/check completed   patient off unit   fall prevention program maintained   activity supervised     Problem: Adjustment to Illness (Sepsis/Septic Shock)  Goal: Optimal Coping  Outcome: Met     Problem: Bleeding (Sepsis/Septic Shock)  Goal: Absence of Bleeding  Outcome: Met     Problem: Glycemic Control Impaired (Sepsis/Septic Shock)  Goal: Blood Glucose Level Within Desired Range  Outcome: Met     Problem: Infection Progression (Sepsis/Septic Shock)  Goal: Absence of Infection Signs and Symptoms  Outcome: Met  Intervention: Promote Recovery  Recent Flowsheet Documentation  Taken 9/27/2024 1050 by Gisel Lopez RN  Activity Management: activity encouraged  Taken 9/27/2024 0931 by Gisel Lopez RN  Activity Management: activity encouraged     Problem: Nutrition Impaired (Sepsis/Septic Shock)  Goal: Optimal Nutrition Intake  Outcome: Met

## 2024-09-27 NOTE — PROGRESS NOTES
[facility] Progress Note      Patient Name: David A Jolissaint  : 1945  MRN: [id number]  Primary Care Physician:  Walter Valero MD  Date of admission: 2024    Subjective     Interval History:     Patient feeling better.  Dyspnea improving  Denies any chest pain, nausea or vomiting  On 2 L of oxygen nasal cannula    Review of Systems:   As noted above    Objective     Vitals:   Temp:  [97.5 °F (36.4 °C)-97.9 °F (36.6 °C)] 97.5 °F (36.4 °C)  Heart Rate:  [74-95] 74  Resp:  [18-30] 21  BP: (114-152)/(60-94) 140/86  Flow (L/min):  [2] 2    Intake/Output Summary (Last 24 hours) at 2024 0845  Last data filed at 2024 2110  Gross per 24 hour   Intake 300 ml   Output --   Net 300 ml       Physical Exam:    General Appearance: NAD  HEENT: oral mucosa normal, nonicteric sclera  Neck: supple, no JVD  Lungs: CTA  Heart: RRR, normal S1 and S2  Abdomen: soft, nondistended  Extremities: no edema.  Left BKA  Neuro: Awake alert and moving all extremities    Scheduled Meds:     atorvastatin, 40 mg, Oral, Daily  azithromycin, 500 mg, Intravenous, Once  budesonide, 0.5 mg, Nebulization, BID - RT  clopidogrel, 75 mg, Oral, Daily  ferrous sulfate, 324 mg, Oral, Daily With Breakfast  finasteride, 5 mg, Oral, Daily  methylPREDNISolone sodium succinate, 40 mg, Intravenous, Q12H  metoprolol succinate XL, 25 mg, Oral, Daily  montelukast, 10 mg, Oral, Nightly  pantoprazole, 40 mg, Oral, Daily  saccharomyces boulardii, 250 mg, Oral, Daily With Lunch  sodium bicarbonate, 650 mg, Oral, BID  sodium chloride, 10 mL, Intravenous, Q12H  theophylline, 300 mg, Oral, Daily      IV Meds:        Results Reviewed:   I have personally reviewed the results from the time of this admission to 2024 08:45 EDT     Results from last 7 days   Lab Units 24  0529 24  2349 24  1044   SODIUM mmol/L 140 139 139   POTASSIUM mmol/L 4.5 4.2 3.8   CHLORIDE mmol/L 104 105 102   CO2 mmol/L 24.0 22.2  25.1   BUN mg/dL 37* 30* 30*   CREATININE mg/dL 1.98* 1.77* 1.83*   CALCIUM mg/dL 9.3 9.1 9.2   BILIRUBIN mg/dL  --   --  0.5   ALK PHOS U/L  --   --  154*   ALT (SGPT) U/L  --   --  12   AST (SGOT) U/L  --   --  21   GLUCOSE mg/dL 140* 139* 133*     Estimated Creatinine Clearance: 26.7 mL/min (A) (by C-G formula based on SCr of 1.98 mg/dL (H)).  Results from last 7 days   Lab Units 09/25/24  1044   MAGNESIUM mg/dL 1.8         Results from last 7 days   Lab Units 09/27/24  0529 09/25/24  2349 09/25/24  1044   WBC 10*3/mm3 18.50* 16.34* 20.20*   HEMOGLOBIN g/dL 10.9* 10.4* 11.5*   PLATELETS 10*3/mm3 311 261 280     Results from last 7 days   Lab Units 09/25/24  1044   INR  1.03       Assessment / Plan     ASSESSMENT:    Chronic kidney disease stage IIIb.  Patient has known history of chronic kidney disease due to hypertensive nephrosclerosis and incompletely resolved ATN in the past.  Creatinine slightly above baseline but otherwise stable.  electrolytes, volume status okay  Hypertension with chronic kidney disease.  blood pressure has been fairly well controlled  Dyspnea.  Patient has underlying COPD and tobacco abuse.  He is on IV steroids.  Dyspnea improving.  Management per primary team  Anemia and chronic disease.  Hemoglobin acceptable    PLAN:  Continue current treatment  Surveillance lab    Hoang Mendez MD  09/27/24  08:45 EDT    [facility]  [phone]

## 2024-09-27 NOTE — DISCHARGE SUMMARY
Date of Discharge:  9/27/2024    Discharge Diagnosis: Postobstructive pneumonia    Presenting Problem/History of Present Illness  Active Hospital Problems    Diagnosis  POA    **Shortness of breath [R06.02]  Yes    Pneumonia due to infectious organism [J18.9]  Yes    Anemia in chronic kidney disease [N18.9, D63.1]  Yes    Small cell lung cancer [C34.90]  Yes    Chest pain, atypical [R07.89]  Yes    Benign hypertension with chronic kidney disease, stage IV [I12.9, N18.4]  Yes    Prediabetes [R73.03]  Yes    Coronary artery disease involving native coronary artery of native heart without angina pectoris [I25.10]  Yes    History of prosthetic heart valve [Z95.2]  Not Applicable    Peripheral arterial occlusive disease [I77.9]  Yes      Resolved Hospital Problems   No resolved problems to display.          Hospital Course  Patient is a 79 y.o. male presented to the ER with shortness of breath and productive cough. He was mildly tachycardic. He has a history of lung cancer and typically wears 2-3 L of oxygen nasal cannula. He has CKDIII and his creatinine on admission was 1.83 which is his baseline. His d-dimer was elevated at 2.75. Lung ventilation perfusion scan showed low probability for PE. Chest x-ray showed right midlung postobstructive atelectasis with underlying mass or consolidation. Patient was put on rocephin, azithromycin, and methlyprednisolone. WBC count was at 20.20 on admission and brought down to 18.50. He states that his shortness of breath is almost resolved and that he is coughing up minimal sputum. His creatinine is back to his baseline. He switched to PO doxycycline and prednisone taper upon discharge.     Procedures Performed         Consults:   Consults       Date and Time Order Name Status Description    9/25/2024  3:51 PM Inpatient Nephrology Consult Completed     9/25/2024 12:21 PM Family Medicine Consult      9/25/2024 12:04 PM Hospitalist (on-call MD unless specified)              Pertinent  Test Results:    Lab Results (most recent)       Procedure Component Value Units Date/Time    Respiratory Culture - Sputum, Cough [[id number]] Collected: 09/25/24 1729    Specimen: Sputum from Cough Updated: 09/27/24 0931     Respiratory Culture Moderate growth (3+) Normal respiratory varsha. No S. aureus or Pseudomonas aeruginosa detected. Final report.     Gram Stain Moderate (3+) WBCs seen      Few (2+) Mixed bacterial morphotypes seen on Gram Stain    Basic Metabolic Panel [[id number]]  (Abnormal) Collected: 09/27/24 0529    Specimen: Blood Updated: 09/27/24 0602     Glucose 140 mg/dL      BUN 37 mg/dL      Creatinine 1.98 mg/dL      Sodium 140 mmol/L      Potassium 4.5 mmol/L      Chloride 104 mmol/L      CO2 24.0 mmol/L      Calcium 9.3 mg/dL      BUN/Creatinine Ratio 18.7     Anion Gap 12.0 mmol/L      eGFR 33.5 mL/min/1.73     Narrative:      GFR Normal >60  Chronic Kidney Disease <60  Kidney Failure <15    The GFR formula is only valid for adults with stable renal function between ages 18 and 70.    CBC & Differential [[id number]]  (Abnormal) Collected: 09/27/24 0529    Specimen: Blood Updated: 09/27/24 0535    Narrative:      The following orders were created for panel order CBC & Differential.  Procedure                               Abnormality         Status                     ---------                               -----------         ------                     CBC Auto Differential[[id number]]        Abnormal            Final result                 Please view results for these tests on the individual orders.    CBC Auto Differential [[id number]]  (Abnormal) Collected: 09/27/24 0529    Specimen: Blood Updated: 09/27/24 0535     WBC 18.50 10*3/mm3      RBC 3.67 10*6/mm3      Hemoglobin 10.9 g/dL      Hematocrit 35.0 %      MCV 95.4 fL      MCH 29.7 pg      MCHC 31.1 g/dL      RDW 15.0 %      RDW-SD 52.8 fl      MPV 10.3 fL      Platelets 311 10*3/mm3      Neutrophil % 95.4 %      Lymphocyte % 1.7 %       Monocyte % 1.8 %      Eosinophil % 0.1 %      Basophil % 0.1 %      Immature Grans % 0.9 %      Neutrophils, Absolute 17.65 10*3/mm3      Lymphocytes, Absolute 0.31 10*3/mm3      Monocytes, Absolute 0.34 10*3/mm3      Eosinophils, Absolute 0.01 10*3/mm3      Basophils, Absolute 0.02 10*3/mm3      Immature Grans, Absolute 0.17 10*3/mm3      nRBC 0.0 /100 WBC     Blood Culture - Blood, Hand, Right [[id number]]  (Normal) Collected: 09/25/24 1151    Specimen: Blood from Hand, Right Updated: 09/26/24 1200     Blood Culture No growth at 24 hours    Blood Culture - Blood, Arm, Right [[id number]]  (Normal) Collected: 09/25/24 1137    Specimen: Blood from Arm, Right Updated: 09/26/24 1200     Blood Culture No growth at 24 hours    Basic Metabolic Panel [[id number]]  (Abnormal) Collected: 09/25/24 2349    Specimen: Blood from Arm, Right Updated: 09/26/24 0030     Glucose 139 mg/dL      BUN 30 mg/dL      Creatinine 1.77 mg/dL      Sodium 139 mmol/L      Potassium 4.2 mmol/L      Chloride 105 mmol/L      CO2 22.2 mmol/L      Calcium 9.1 mg/dL      BUN/Creatinine Ratio 16.9     Anion Gap 11.8 mmol/L      eGFR 38.3 mL/min/1.73     Narrative:      GFR Normal >60  Chronic Kidney Disease <60  Kidney Failure <15    The GFR formula is only valid for adults with stable renal function between ages 18 and 70.    CBC & Differential [[id number]]  (Abnormal) Collected: 09/25/24 2349    Specimen: Blood from Arm, Right Updated: 09/26/24 0011    Narrative:      The following orders were created for panel order CBC & Differential.  Procedure                               Abnormality         Status                     ---------                               -----------         ------                     CBC Auto Differential[[id number]]        Abnormal            Final result                 Please view results for these tests on the individual orders.    CBC Auto Differential [[id number]]  (Abnormal) Collected: 09/25/24 2349    Specimen: Blood from  Arm, Right Updated: 09/26/24 0011     WBC 16.34 10*3/mm3      RBC 3.46 10*6/mm3      Hemoglobin 10.4 g/dL      Hematocrit 33.8 %      MCV 97.7 fL      MCH 30.1 pg      MCHC 30.8 g/dL      RDW 14.8 %      RDW-SD 53.2 fl      MPV 10.2 fL      Platelets 261 10*3/mm3      Neutrophil % 95.1 %      Lymphocyte % 2.0 %      Monocyte % 1.9 %      Eosinophil % 0.0 %      Basophil % 0.1 %      Immature Grans % 0.9 %      Neutrophils, Absolute 15.55 10*3/mm3      Lymphocytes, Absolute 0.32 10*3/mm3      Monocytes, Absolute 0.31 10*3/mm3      Eosinophils, Absolute 0.00 10*3/mm3      Basophils, Absolute 0.02 10*3/mm3      Immature Grans, Absolute 0.14 10*3/mm3      nRBC 0.0 /100 WBC     Legionella Antigen, Urine - Urine, Urine, Clean Catch [[id number]]  (Normal) Collected: 09/25/24 1921    Specimen: Urine, Clean Catch Updated: 09/25/24 1947     LEGIONELLA ANTIGEN, URINE Negative    S. Pneumo Ag Urine or CSF - Urine, Urine, Clean Catch [[id number]]  (Normal) Collected: 09/25/24 1921    Specimen: Urine, Clean Catch Updated: 09/25/24 1947     Strep Pneumo Ag Negative    Respiratory Panel PCR w/COVID-19(SARS-CoV-2) MIKE/DENZEL/ALONA/PAD/COR/MERLINE In-House, NP Swab in UTM/VTM, 2 HR TAT - Swab, Nasopharynx [[id number]]  (Normal) Collected: 09/25/24 1813    Specimen: Swab from Nasopharynx Updated: 09/25/24 1915     ADENOVIRUS, PCR Not Detected     Coronavirus 229E Not Detected     Coronavirus HKU1 Not Detected     Coronavirus NL63 Not Detected     Coronavirus OC43 Not Detected     COVID19 Not Detected     Human Metapneumovirus Not Detected     Human Rhinovirus/Enterovirus Not Detected     Influenza A PCR Not Detected     Influenza B PCR Not Detected     Parainfluenza Virus 1 Not Detected     Parainfluenza Virus 2 Not Detected     Parainfluenza Virus 3 Not Detected     Parainfluenza Virus 4 Not Detected     RSV, PCR Not Detected     Bordetella pertussis pcr Not Detected     Bordetella parapertussis PCR Not Detected     Chlamydophila pneumoniae PCR  Not Detected     Mycoplasma pneumo by PCR Not Detected    Narrative:      In the setting of a positive respiratory panel with a viral infection PLUS a negative procalcitonin without other underlying concern for bacterial infection, consider observing off antibiotics or discontinuation of antibiotics and continue supportive care. If the respiratory panel is positive for atypical bacterial infection (Bordetella pertussis, Chlamydophila pneumoniae, or Mycoplasma pneumoniae), consider antibiotic de-escalation to target atypical bacterial infection.    High Sensitivity Troponin T 2Hr [[id number]]  (Abnormal) Collected: 09/25/24 1322    Specimen: Blood from Arm, Right Updated: 09/25/24 1344     HS Troponin T 37 ng/L      Troponin T Delta -5 ng/L     Narrative:      High Sensitive Troponin T Reference Range:  <14.0 ng/L- Negative Female for AMI  <22.0 ng/L- Negative Male for AMI  >=14 - Abnormal Female indicating possible myocardial injury.  >=22 - Abnormal Male indicating possible myocardial injury.   Clinicians would have to utilize clinical acumen, EKG, Troponin, and serial changes to determine if it is an Acute Myocardial Infarction or myocardial injury due to an underlying chronic condition.         COVID-19, FLU A/B, RSV PCR 1 HR TAT - Swab, Nasopharynx [[id number]]  (Normal) Collected: 09/25/24 1108    Specimen: Swab from Nasopharynx Updated: 09/25/24 1203     COVID19 Not Detected     Influenza A PCR Not Detected     Influenza B PCR Not Detected     RSV, PCR Not Detected    POC Lactate [[id number]]  (Normal) Collected: 09/25/24 1154    Specimen: Blood Updated: 09/25/24 1156     Lactate 1.6 mmol/L      Comment: Serial Number: [id number]:  [id number]       Protime-INR [[id number]]  (Normal) Collected: 09/25/24 1044    Specimen: Blood Updated: 09/25/24 1116     Protime 11.2 Seconds      INR 1.03    aPTT [[id number]]  (Abnormal) Collected: 09/25/24 1044    Specimen: Blood Updated: 09/25/24 1116     PTT 29.7 seconds      "D-dimer, Quantitative [[id number]]  (Abnormal) Collected: 09/25/24 1044    Specimen: Blood Updated: 09/25/24 1116     D-Dimer, Quantitative 2.75 mg/L (FEU)     Narrative:      According to the assay 's published package insert, a normal (<0.50 mg/L (FEU)) D-dimer result in conjunction with a non-high clinical probability assessment, excludes deep vein thrombosis (DVT) and pulmonary embolism (PE) with high sensitivity.    D-dimer values increase with age and this can make VTE exclusion of an older population difficult. To address this, the [employer], based on best available evidence and recent guidelines, recommends that clinicians use age-adjusted D-dimer thresholds in patients greater than 50 years of age with: a) a low probability of PE who do not meet all Pulmonary Embolism Rule Out Criteria, or b) in those with intermediate probability of PE.   The formula for an age-adjusted D-dimer cut-off is \"age/100\".  For example, a 60 year old patient would have an age-adjusted cut-off of 0.60 mg/L (FEU) and an 80 year old 0.80 mg/L (FEU).    High Sensitivity Troponin T [[id number]]  (Abnormal) Collected: 09/25/24 1044    Specimen: Blood Updated: 09/25/24 1115     HS Troponin T 42 ng/L     Narrative:      High Sensitive Troponin T Reference Range:  <14.0 ng/L- Negative Female for AMI  <22.0 ng/L- Negative Male for AMI  >=14 - Abnormal Female indicating possible myocardial injury.  >=22 - Abnormal Male indicating possible myocardial injury.   Clinicians would have to utilize clinical acumen, EKG, Troponin, and serial changes to determine if it is an Acute Myocardial Infarction or myocardial injury due to an underlying chronic condition.         BNP [[id number]]  (Abnormal) Collected: 09/25/24 1044    Specimen: Blood Updated: 09/25/24 1115     proBNP 1,886.0 pg/mL     Narrative:      This assay is used as an aid in the diagnosis of individuals suspected of having heart failure. It can be used as " an aid in the diagnosis of acute decompensated heart failure (ADHF) in patients presenting with signs and symptoms of ADHF to the emergency department (ED). In addition, NT-proBNP of <300 pg/mL indicates ADHF is not likely.    Age Range Result Interpretation  NT-proBNP Concentration (pg/mL:      <50             Positive            >450                   Gray                 300-450                    Negative             <300    50-75           Positive            >900                  Gray                300-900                  Negative            <300      >75             Positive            >1800                  Gray                300-1800                  Negative            <300    Comprehensive Metabolic Panel [[id number]]  (Abnormal) Collected: 09/25/24 1044    Specimen: Blood Updated: 09/25/24 1115     Glucose 133 mg/dL      BUN 30 mg/dL      Creatinine 1.83 mg/dL      Sodium 139 mmol/L      Potassium 3.8 mmol/L      Chloride 102 mmol/L      CO2 25.1 mmol/L      Calcium 9.2 mg/dL      Total Protein 7.0 g/dL      Albumin 3.9 g/dL      ALT (SGPT) 12 U/L      AST (SGOT) 21 U/L      Alkaline Phosphatase 154 U/L      Total Bilirubin 0.5 mg/dL      Globulin 3.1 gm/dL      A/G Ratio 1.3 g/dL      BUN/Creatinine Ratio 16.4     Anion Gap 11.9 mmol/L      eGFR 36.8 mL/min/1.73     Narrative:      GFR Normal >60  Chronic Kidney Disease <60  Kidney Failure <15    The GFR formula is only valid for adults with stable renal function between ages 18 and 70.    Magnesium [[id number]]  (Normal) Collected: 09/25/24 1044    Specimen: Blood Updated: 09/25/24 1115     Magnesium 1.8 mg/dL     [place] Draw [[id number]] Collected: 09/25/24 1044    Specimen: Blood Updated: 09/25/24 1101    Narrative:      The following orders were created for panel order [place] Draw.  Procedure                               Abnormality         Status                     ---------                               -----------         ------                      Green Top (Gel)[[id number]]                                  Final result               Lavender Top[[id number]]                                     Final result               Gold Top - SST[[id number]]                                   Final result               Light Blue Top[[id number]]                                   Final result                 Please view results for these tests on the individual orders.    Lavender Top [[id number]] Collected: 09/25/24 1044    Specimen: Blood Updated: 09/25/24 1101     Extra Tube hold for add-on     Comment: Auto resulted       Light Blue Top [[id number]] Collected: 09/25/24 1044    Specimen: Blood Updated: 09/25/24 1101     Extra Tube Hold for add-ons.     Comment: Auto resulted       Green Top (Gel) [[id number]] Collected: 09/25/24 1044    Specimen: Blood Updated: 09/25/24 1101     Extra Tube Hold for add-ons.     Comment: Auto resulted.       Gold Top - SST [[id number]] Collected: 09/25/24 1044    Specimen: Blood Updated: 09/25/24 1101     Extra Tube Hold for add-ons.     Comment: Auto resulted.                Results for orders placed during the hospital encounter of 03/16/23    Adult Transthoracic Echo Limited W/ Cont if Necessary Per Protocol    Interpretation Summary  Only limited echo.  Parasternal windows were done.  Patient refused procedures midway through the echo.  Normal LV size and contractility EF of 60 to 65%  Normal RV size  Normal atrial size  Pulmonic valve is not well visualized.  Aortic valve, mitral valve, tricuspid valve appears structurally normal, no significant regurgitation seen.  No pericardial effusion seen.  Proximal aorta appears normal in size.              Condition on Discharge:  Stable    Vital Signs  Temp:  [97.5 °F (36.4 °C)-97.9 °F (36.6 °C)] 97.5 °F (36.4 °C)  Heart Rate:  [74-95] 80  Resp:  [18-30] 20  BP: (114-152)/(60-94) 135/71    Physical Exam:     General Appearance:    Alert, cooperative, in no acute distress   Head:     Normocephalic, without obvious abnormality, atraumatic   Eyes:            Lids and lashes normal, conjunctivae and sclerae normal, no   icterus, no pallor, corneas clear, PERRLA   Ears:    Ears appear intact with no abnormalities noted   Throat:   No oral lesions, no thrush, oral mucosa moist   Neck:   No adenopathy, supple, trachea midline, no thyromegaly, no   carotid bruit, no JVD   Lungs:     Clear to auscultation,respirations regular, even and                  unlabored    Heart:    Regular rhythm and normal rate, normal S1 and S2, no            murmur, no gallop, no rub, no click   Chest Wall:    No abnormalities observed   Abdomen:     Normal bowel sounds, no masses, no organomegaly, soft        non-tender, non-distended, no guarding, no rebound                tenderness   Extremities:   +Left BKA   Pulses:   Pulses palpable and equal bilaterally   Skin:   No bleeding, bruising or rash   Lymph nodes:   No palpable adenopathy   Neurologic:   Cranial nerves 2 - 12 grossly intact, sensation intact, DTR       present and equal bilaterally       Discharge Disposition  Home or Self Care    Discharge Medications     Discharge Medications        New Medications        Instructions Start Date   doxycycline 100 MG capsule  Commonly known as: VIBRAMYCIN   100 mg, Oral, 2 Times Daily      predniSONE 10 MG tablet  Commonly known as: DELTASONE   4 po q day x 3, 3 po q day x 3, 2 po q day x 3, 1 po q day x 3   Start Date: September 28, 2024            Continue These Medications        Instructions Start Date   aspirin 81 MG EC tablet   81 mg, Oral, Daily With Lunch      atorvastatin 40 MG tablet  Commonly known as: LIPITOR   40 mg, Oral, Every Morning      budesonide 0.5 MG/2ML nebulizer solution  Commonly known as: PULMICORT   0.5 mg, Nebulization, 2 times daily      clopidogrel 75 MG tablet  Commonly known as: PLAVIX   75 mg, Oral, Every Morning      dutasteride 0.5 MG capsule  Commonly known as: AVODART   0.5 mg, Oral,  Nightly      ferrous sulfate 324 (65 Fe) MG tablet delayed-release EC tablet   324 mg, Oral, Daily With Breakfast      HYDROcodone-acetaminophen 5-325 MG per tablet  Commonly known as: NORCO   1 tablet, Oral, As Needed      metoprolol succinate XL 25 MG 24 hr tablet  Commonly known as: TOPROL-XL   TAKE 1 TABLET EVERY DAY      montelukast 10 MG tablet  Commonly known as: SINGULAIR   10 mg, Oral, Nightly      multivitamin with minerals tablet tablet   1 tablet, Daily With Lunch      ondansetron 4 MG tablet  Commonly known as: ZOFRAN   4 mg, Oral, As Needed      pantoprazole 40 MG EC tablet  Commonly known as: PROTONIX   40 mg, Oral, Daily      Perforomist 20 MCG/2ML nebulizer solution  Generic drug: formoterol   Nebulization, 2 Times Daily - RT      revefenacin 175 MCG/3ML nebulizer solution  Commonly known as: YUPELRI   175 mcg, Nebulization, Daily - RT      saccharomyces boulardii 250 MG capsule  Commonly known as: FLORASTOR   250 mg, Oral, Daily With Lunch      sodium bicarbonate 650 MG tablet   650 mg, Oral, 2 Times Daily      theophylline 300 MG 12 hr tablet  Commonly known as: THEODUR   300 mg, Oral, 2 Times Daily               Discharge Diet: Regular diet    Activity at Discharge: As tolerated by patient    Follow-up Appointments  Future Appointments   Date Time Provider Department Center   4/17/2025  4:10 PM Kali Mistry MD [facility]     Additional Instructions for the Follow-ups that You Need to Schedule       Discharge Follow-up with PCP   As directed       Currently Documented PCP:    Walter Valero MD    PCP Phone Number:    [phone]     Follow Up Details: Call office to set up a hospital follow-up appointment.                Test Results Pending at Discharge  Pending Labs       Order Current Status    Blood Culture - Blood, Arm, Right Preliminary result    Blood Culture - Blood, Hand, Right Preliminary result             TERE Valdivia Student  09/27/24  11:54  EDT    Time: Discharge 25 min

## 2024-09-29 NOTE — DISCHARGE SUMMARY
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]   Prediabetes [R73.03]   Coronary artery disease involving native coronary artery of native heart without angina pectoris [I25.10]   History of prosthetic heart valve [Z95.2]   Peripheral arterial occlusive disease [I77.9]       Presenting Problem/History of Present Illness  Active Hospital Problems    Diagnosis  POA    **Shortness of breath [R06.02]  Yes    Pneumonia due to infectious organism [J18.9]  Yes    Anemia in chronic kidney disease [N18.9, D63.1]  Yes    Small cell lung cancer [C34.90]  Yes    Chest pain, atypical [R07.89]  Yes    Benign hypertension with chronic kidney disease, stage IV [I12.9, N18.4]  Yes    Prediabetes [R73.03]  Yes    Coronary artery disease involving native coronary artery of native heart without angina pectoris [I25.10]  Yes    History of prosthetic heart valve [Z95.2]  Not Applicable    Peripheral arterial occlusive disease [I77.9]  Yes      Resolved Hospital Problems   No resolved problems to display.          Hospital Course  Patient is a 79 y.o. male with COPD who presented with cough, pleuritic chest pain and shortness of breath.  He did not have PE or ACS.  He was treated with antibiotics, steroids and bronchodilators.  He improved significantly and feels back to baseline at discharge. He will complete a short course of oral antibiotics and prednisone taper.  He will follow up with PCP promptly.    Procedures Performed         Consults:   Consults       Date and Time Order Name Status Description    9/25/2024  3:51 PM Inpatient Nephrology Consult Completed             Pertinent Test Results:    Lab Results (most recent)       Procedure  Component Value Units Date/Time    Blood Culture - Blood, Hand, Right [[id number]]  (Normal) Collected: 09/25/24 1151    Specimen: Blood from Hand, Right Updated: 09/29/24 1200     Blood Culture No growth at 4 days    Blood Culture - Blood, Arm, Right [[id number]]  (Normal) Collected: 09/25/24 1137    Specimen: Blood from Arm, Right Updated: 09/29/24 1200     Blood Culture No growth at 4 days    Respiratory Culture - Sputum, Cough [[id number]] Collected: 09/25/24 1729    Specimen: Sputum from Cough Updated: 09/27/24 0931     Respiratory Culture Moderate growth (3+) Normal respiratory varsha. No S. aureus or Pseudomonas aeruginosa detected. Final report.     Gram Stain Moderate (3+) WBCs seen      Few (2+) Mixed bacterial morphotypes seen on Gram Stain    Basic Metabolic Panel [[id number]]  (Abnormal) Collected: 09/27/24 0529    Specimen: Blood Updated: 09/27/24 0602     Glucose 140 mg/dL      BUN 37 mg/dL      Creatinine 1.98 mg/dL      Sodium 140 mmol/L      Potassium 4.5 mmol/L      Chloride 104 mmol/L      CO2 24.0 mmol/L      Calcium 9.3 mg/dL      BUN/Creatinine Ratio 18.7     Anion Gap 12.0 mmol/L      eGFR 33.5 mL/min/1.73     Narrative:      GFR Normal >60  Chronic Kidney Disease <60  Kidney Failure <15    The GFR formula is only valid for adults with stable renal function between ages 18 and 70.    CBC & Differential [[id number]]  (Abnormal) Collected: 09/27/24 0529    Specimen: Blood Updated: 09/27/24 0535    Narrative:      The following orders were created for panel order CBC & Differential.  Procedure                               Abnormality         Status                     ---------                               -----------         ------                     CBC Auto Differential[[id number]]        Abnormal            Final result                 Please view results for these tests on the individual orders.    CBC Auto Differential [[id number]]  (Abnormal) Collected: 09/27/24 0529    Specimen: Blood  Updated: 09/27/24 0535     WBC 18.50 10*3/mm3      RBC 3.67 10*6/mm3      Hemoglobin 10.9 g/dL      Hematocrit 35.0 %      MCV 95.4 fL      MCH 29.7 pg      MCHC 31.1 g/dL      RDW 15.0 %      RDW-SD 52.8 fl      MPV 10.3 fL      Platelets 311 10*3/mm3      Neutrophil % 95.4 %      Lymphocyte % 1.7 %      Monocyte % 1.8 %      Eosinophil % 0.1 %      Basophil % 0.1 %      Immature Grans % 0.9 %      Neutrophils, Absolute 17.65 10*3/mm3      Lymphocytes, Absolute 0.31 10*3/mm3      Monocytes, Absolute 0.34 10*3/mm3      Eosinophils, Absolute 0.01 10*3/mm3      Basophils, Absolute 0.02 10*3/mm3      Immature Grans, Absolute 0.17 10*3/mm3      nRBC 0.0 /100 WBC     Basic Metabolic Panel [[id number]]  (Abnormal) Collected: 09/25/24 2349    Specimen: Blood from Arm, Right Updated: 09/26/24 0030     Glucose 139 mg/dL      BUN 30 mg/dL      Creatinine 1.77 mg/dL      Sodium 139 mmol/L      Potassium 4.2 mmol/L      Chloride 105 mmol/L      CO2 22.2 mmol/L      Calcium 9.1 mg/dL      BUN/Creatinine Ratio 16.9     Anion Gap 11.8 mmol/L      eGFR 38.3 mL/min/1.73     Narrative:      GFR Normal >60  Chronic Kidney Disease <60  Kidney Failure <15    The GFR formula is only valid for adults with stable renal function between ages 18 and 70.    CBC & Differential [[id number]]  (Abnormal) Collected: 09/25/24 2349    Specimen: Blood from Arm, Right Updated: 09/26/24 0011    Narrative:      The following orders were created for panel order CBC & Differential.  Procedure                               Abnormality         Status                     ---------                               -----------         ------                     CBC Auto Differential[[id number]]        Abnormal            Final result                 Please view results for these tests on the individual orders.    CBC Auto Differential [[id number]]  (Abnormal) Collected: 09/25/24 2349    Specimen: Blood from Arm, Right Updated: 09/26/24 0011     WBC 16.34 10*3/mm3       RBC 3.46 10*6/mm3      Hemoglobin 10.4 g/dL      Hematocrit 33.8 %      MCV 97.7 fL      MCH 30.1 pg      MCHC 30.8 g/dL      RDW 14.8 %      RDW-SD 53.2 fl      MPV 10.2 fL      Platelets 261 10*3/mm3      Neutrophil % 95.1 %      Lymphocyte % 2.0 %      Monocyte % 1.9 %      Eosinophil % 0.0 %      Basophil % 0.1 %      Immature Grans % 0.9 %      Neutrophils, Absolute 15.55 10*3/mm3      Lymphocytes, Absolute 0.32 10*3/mm3      Monocytes, Absolute 0.31 10*3/mm3      Eosinophils, Absolute 0.00 10*3/mm3      Basophils, Absolute 0.02 10*3/mm3      Immature Grans, Absolute 0.14 10*3/mm3      nRBC 0.0 /100 WBC     Legionella Antigen, Urine - Urine, Urine, Clean Catch [[id number]]  (Normal) Collected: 09/25/24 1921    Specimen: Urine, Clean Catch Updated: 09/25/24 1947     LEGIONELLA ANTIGEN, URINE Negative    S. Pneumo Ag Urine or CSF - Urine, Urine, Clean Catch [[id number]]  (Normal) Collected: 09/25/24 1921    Specimen: Urine, Clean Catch Updated: 09/25/24 1947     Strep Pneumo Ag Negative    Respiratory Panel PCR w/COVID-19(SARS-CoV-2) [facility] In-House, NP Swab in UTM/VTM, 2 HR TAT - Swab, Nasopharynx [[id number]]  (Normal) Collected: 09/25/24 1813    Specimen: Swab from Nasopharynx Updated: 09/25/24 1915     ADENOVIRUS, PCR Not Detected     Coronavirus 229E Not Detected     Coronavirus HKU1 Not Detected     Coronavirus NL63 Not Detected     Coronavirus OC43 Not Detected     COVID19 Not Detected     Human Metapneumovirus Not Detected     Human Rhinovirus/Enterovirus Not Detected     Influenza A PCR Not Detected     Influenza B PCR Not Detected     Parainfluenza Virus 1 Not Detected     Parainfluenza Virus 2 Not Detected     Parainfluenza Virus 3 Not Detected     Parainfluenza Virus 4 Not Detected     RSV, PCR Not Detected     Bordetella pertussis pcr Not Detected     Bordetella parapertussis PCR Not Detected     Chlamydophila pneumoniae PCR Not Detected     Mycoplasma pneumo by PCR Not Detected     Narrative:      In the setting of a positive respiratory panel with a viral infection PLUS a negative procalcitonin without other underlying concern for bacterial infection, consider observing off antibiotics or discontinuation of antibiotics and continue supportive care. If the respiratory panel is positive for atypical bacterial infection (Bordetella pertussis, Chlamydophila pneumoniae, or Mycoplasma pneumoniae), consider antibiotic de-escalation to target atypical bacterial infection.    High Sensitivity Troponin T 2Hr [[id number]]  (Abnormal) Collected: 09/25/24 1322    Specimen: Blood from Arm, Right Updated: 09/25/24 1344     HS Troponin T 37 ng/L      Troponin T Delta -5 ng/L     Narrative:      High Sensitive Troponin T Reference Range:  <14.0 ng/L- Negative Female for AMI  <22.0 ng/L- Negative Male for AMI  >=14 - Abnormal Female indicating possible myocardial injury.  >=22 - Abnormal Male indicating possible myocardial injury.   Clinicians would have to utilize clinical acumen, EKG, Troponin, and serial changes to determine if it is an Acute Myocardial Infarction or myocardial injury due to an underlying chronic condition.         COVID-19, FLU A/B, RSV PCR 1 HR TAT - Swab, Nasopharynx [[id number]]  (Normal) Collected: 09/25/24 1108    Specimen: Swab from Nasopharynx Updated: 09/25/24 1203     COVID19 Not Detected     Influenza A PCR Not Detected     Influenza B PCR Not Detected     RSV, PCR Not Detected    POC Lactate [[id number]]  (Normal) Collected: 09/25/24 1154    Specimen: Blood Updated: 09/25/24 1156     Lactate 1.6 mmol/L      Comment: Serial Number: [id number]:  [id number]       Protime-INR [[id number]]  (Normal) Collected: 09/25/24 1044    Specimen: Blood Updated: 09/25/24 1116     Protime 11.2 Seconds      INR 1.03    aPTT [[id number]]  (Abnormal) Collected: 09/25/24 1044    Specimen: Blood Updated: 09/25/24 1116     PTT 29.7 seconds     D-dimer, Quantitative [[id number]]  (Abnormal) Collected:  "09/25/24 1044    Specimen: Blood Updated: 09/25/24 1116     D-Dimer, Quantitative 2.75 mg/L (FEU)     Narrative:      According to the assay 's published package insert, a normal (<0.50 mg/L (FEU)) D-dimer result in conjunction with a non-high clinical probability assessment, excludes deep vein thrombosis (DVT) and pulmonary embolism (PE) with high sensitivity.    D-dimer values increase with age and this can make VTE exclusion of an older population difficult. To address this, the [employer], based on best available evidence and recent guidelines, recommends that clinicians use age-adjusted D-dimer thresholds in patients greater than 50 years of age with: a) a low probability of PE who do not meet all Pulmonary Embolism Rule Out Criteria, or b) in those with intermediate probability of PE.   The formula for an age-adjusted D-dimer cut-off is \"age/100\".  For example, a 60 year old patient would have an age-adjusted cut-off of 0.60 mg/L (FEU) and an 80 year old 0.80 mg/L (FEU).    High Sensitivity Troponin T [[id number]]  (Abnormal) Collected: 09/25/24 1044    Specimen: Blood Updated: 09/25/24 1115     HS Troponin T 42 ng/L     Narrative:      High Sensitive Troponin T Reference Range:  <14.0 ng/L- Negative Female for AMI  <22.0 ng/L- Negative Male for AMI  >=14 - Abnormal Female indicating possible myocardial injury.  >=22 - Abnormal Male indicating possible myocardial injury.   Clinicians would have to utilize clinical acumen, EKG, Troponin, and serial changes to determine if it is an Acute Myocardial Infarction or myocardial injury due to an underlying chronic condition.         BNP [[id number]]  (Abnormal) Collected: 09/25/24 1044    Specimen: Blood Updated: 09/25/24 1115     proBNP 1,886.0 pg/mL     Narrative:      This assay is used as an aid in the diagnosis of individuals suspected of having heart failure. It can be used as an aid in the diagnosis of acute decompensated heart " failure (ADHF) in patients presenting with signs and symptoms of ADHF to the emergency department (ED). In addition, NT-proBNP of <300 pg/mL indicates ADHF is not likely.    Age Range Result Interpretation  NT-proBNP Concentration (pg/mL:      <50             Positive            >450                   Gray                 300-450                    Negative             <300    50-75           Positive            >900                  Gray                300-900                  Negative            <300      >75             Positive            >1800                  Gray                300-1800                  Negative            <300    Comprehensive Metabolic Panel [[id number]]  (Abnormal) Collected: 09/25/24 1044    Specimen: Blood Updated: 09/25/24 1115     Glucose 133 mg/dL      BUN 30 mg/dL      Creatinine 1.83 mg/dL      Sodium 139 mmol/L      Potassium 3.8 mmol/L      Chloride 102 mmol/L      CO2 25.1 mmol/L      Calcium 9.2 mg/dL      Total Protein 7.0 g/dL      Albumin 3.9 g/dL      ALT (SGPT) 12 U/L      AST (SGOT) 21 U/L      Alkaline Phosphatase 154 U/L      Total Bilirubin 0.5 mg/dL      Globulin 3.1 gm/dL      A/G Ratio 1.3 g/dL      BUN/Creatinine Ratio 16.4     Anion Gap 11.9 mmol/L      eGFR 36.8 mL/min/1.73     Narrative:      GFR Normal >60  Chronic Kidney Disease <60  Kidney Failure <15    The GFR formula is only valid for adults with stable renal function between ages 18 and 70.    Magnesium [[id number]]  (Normal) Collected: 09/25/24 1044    Specimen: Blood Updated: 09/25/24 1115     Magnesium 1.8 mg/dL     [place] Draw [[id number]] Collected: 09/25/24 1044    Specimen: Blood Updated: 09/25/24 1101    Narrative:      The following orders were created for panel order [place] Draw.  Procedure                               Abnormality         Status                     ---------                               -----------         ------                     Green Top (Gel)[[id number]]                                   Final result               Lavender Top[[id number]]                                     Final result               Gold Top - SST[[id number]]                                   Final result               Light Blue Top[[id number]]                                   Final result                 Please view results for these tests on the individual orders.    Lavender Top [[id number]] Collected: 09/25/24 1044    Specimen: Blood Updated: 09/25/24 1101     Extra Tube hold for add-on     Comment: Auto resulted       Light Blue Top [[id number]] Collected: 09/25/24 1044    Specimen: Blood Updated: 09/25/24 1101     Extra Tube Hold for add-ons.     Comment: Auto resulted       Green Top (Gel) [[id number]] Collected: 09/25/24 1044    Specimen: Blood Updated: 09/25/24 1101     Extra Tube Hold for add-ons.     Comment: Auto resulted.       Gold Top - SST [[id number]] Collected: 09/25/24 1044    Specimen: Blood Updated: 09/25/24 1101     Extra Tube Hold for add-ons.     Comment: Auto resulted.                Results for orders placed during the hospital encounter of 03/16/23    Adult Transthoracic Echo Limited W/ Cont if Necessary Per Protocol    Interpretation Summary  Only limited echo.  Parasternal windows were done.  Patient refused procedures midway through the echo.  Normal LV size and contractility EF of 60 to 65%  Normal RV size  Normal atrial size  Pulmonic valve is not well visualized.  Aortic valve, mitral valve, tricuspid valve appears structurally normal, no significant regurgitation seen.  No pericardial effusion seen.  Proximal aorta appears normal in size.              Condition on Discharge:  Improved, stable    Vital Signs       Physical Exam:     General Appearance:    Alert, cooperative, in no acute distress, thin, chronically ill appearing   Head:    Normocephalic, without obvious abnormality, atraumatic   Eyes:            Lids and lashes normal, conjunctivae and sclerae normal, no   icterus, no pallor,  corneas clear, PERRLA   Ears:    Ears appear intact with no abnormalities noted   Throat:   No oral lesions, no thrush, oral mucosa moist   Neck:   No adenopathy, supple, trachea midline, no thyromegaly, no   carotid bruit, no JVD   Lungs:     Clear to auscultation,respirations regular, even and                  unlabored    Heart:    Regular rhythm and normal rate, normal S1 and S2, no            murmur, no gallop, no rub, no click   Chest Wall:    No abnormalities observed   Abdomen:     Normal bowel sounds, no masses, no organomegaly, soft        non-tender, non-distended, no guarding, no rebound                tenderness   Extremities:   Moves all extremities well, no edema, no cyanosis, no             redness   Pulses:   Pulses palpable and equal bilaterally   Skin:   No bleeding, bruising or rash   Lymph nodes:   No palpable adenopathy   Neurologic:   Cranial nerves 2 - 12 grossly intact, sensation intact, DTR       present and equal bilaterally       Discharge Disposition  Home or Self Care    Discharge Medications     Discharge Medications        New Medications        Instructions Start Date   doxycycline 100 MG capsule  Commonly known as: VIBRAMYCIN   100 mg, Oral, 2 Times Daily      predniSONE 10 MG tablet  Commonly known as: DELTASONE   4 po q day x 3, 3 po q day x 3, 2 po q day x 3, 1 po q day x 3             Continue These Medications        Instructions Start Date   aspirin 81 MG EC tablet   81 mg, Oral, Daily With Lunch      atorvastatin 40 MG tablet  Commonly known as: LIPITOR   40 mg, Oral, Every Morning      budesonide 0.5 MG/2ML nebulizer solution  Commonly known as: PULMICORT   0.5 mg, Nebulization, 2 times daily      clopidogrel 75 MG tablet  Commonly known as: PLAVIX   75 mg, Oral, Every Morning      dutasteride 0.5 MG capsule  Commonly known as: AVODART   0.5 mg, Oral, Nightly      ferrous sulfate 324 (65 Fe) MG tablet delayed-release EC tablet   324 mg, Oral, Daily With Breakfast       HYDROcodone-acetaminophen 5-325 MG per tablet  Commonly known as: NORCO   1 tablet, Oral, Every 6 Hours PRN      metoprolol succinate XL 25 MG 24 hr tablet  Commonly known as: TOPROL-XL   TAKE 1 TABLET EVERY DAY      montelukast 10 MG tablet  Commonly known as: SINGULAIR   10 mg, Oral, Nightly      multivitamin with minerals tablet tablet   1 tablet, Daily With Lunch      ondansetron 4 MG tablet  Commonly known as: ZOFRAN   4 mg, Oral, As Needed      pantoprazole 40 MG EC tablet  Commonly known as: PROTONIX   40 mg, Oral, Daily      Perforomist 20 MCG/2ML nebulizer solution  Generic drug: formoterol   Nebulization, 2 Times Daily - RT      revefenacin 175 MCG/3ML nebulizer solution  Commonly known as: YUPELRI   175 mcg, Nebulization, Daily - RT      saccharomyces boulardii 250 MG capsule  Commonly known as: FLORASTOR   250 mg, Oral, Daily With Lunch      sodium bicarbonate 650 MG tablet   650 mg, Oral, 2 Times Daily      theophylline 300 MG 12 hr tablet  Commonly known as: THEODUR   300 mg, Oral, 2 Times Daily               Discharge Diet: Regular    Activity at Discharge: No restrictions    Follow-up Appointments  Future Appointments   Date Time Provider Department Center   4/17/2025  4:10 PM Kali Mistry MD [facility]     Additional Instructions for the Follow-ups that You Need to Schedule       Discharge Follow-up with PCP   As directed       Currently Documented PCP:    Walter Valero MD    PCP Phone Number:    [phone]     Follow Up Details: Call office to set up a hospital follow-up appointment.                Test Results Pending at Discharge  Pending Labs       Order Current Status    Blood Culture - Blood, Arm, Right Preliminary result    Blood Culture - Blood, Hand, Right Preliminary result             She Thakur MD  09/29/24  18:11 EDT    Time: Discharge 25 min

## 2024-09-30 ENCOUNTER — READMISSION MANAGEMENT (OUTPATIENT)
Dept: CALL CENTER | Facility: HOSPITAL | Age: 79
End: 2024-09-30
Payer: MEDICARE

## 2024-09-30 LAB
BACTERIA SPEC AEROBE CULT: NORMAL
BACTERIA SPEC AEROBE CULT: NORMAL

## 2024-09-30 NOTE — OUTREACH NOTE
Medical Week 1 Survey      Flowsheet Row Responses   [facility] patient discharged from? [facility]   Does the patient have one of the following disease processes/diagnoses(primary or secondary)? Other   Week 1 attempt successful? Yes   Call start time 1408   Call end time 1415   Discharge diagnosis Shortness of breath   Meds reviewed with patient/caregiver? Yes   Is the patient having any side effects they believe may be caused by any medication additions or changes? Yes   Side effects comments  states antibiotic has caused diarrhea with blood, pt has quit taking, advised pt to call MD today to report that med has been d/c'd, and to report symptoms, pt agrees with plan   Does the patient have all medications ordered at discharge? Yes   Is the patient taking all medications as directed (includes completed medication regime)? Yes   Does the patient have a primary care provider?  Yes   Does the patient have an appointment with their PCP within 7 days of discharge? No   What is preventing the patient from scheduling follow up appointments within 7 days of discharge? Haven't had time   Nursing Interventions Advised patient to make appointment, Educated patient on importance of making appointment   Has the patient kept scheduled appointments due by today? N/A   Has home health visited the patient within 72 hours of discharge? N/A   Psychosocial issues? No   Did the patient receive a copy of their discharge instructions? Yes   Nursing interventions Reviewed instructions with patient   What is the patient's perception of their health status since discharge? Same   Is the patient/caregiver able to teach back signs and symptoms related to disease process for when to call PCP? Yes   Is the patient/caregiver able to teach back signs and symptoms related to disease process for when to call 911? Yes   Is the patient/caregiver able to teach back the hierarchy of who to call/visit for symptoms/problems? PCP, Specialist, Home  health nurse, Urgent Care, ED, 911 Yes   Additional teach back comments pt states antibiotic has caused diarrhea, and now is noting blood in stool, has stopped antibiotic, advised pt to call MD this pm to report diarrhea and blood and that pt has stopped antibiotic, pt states understanding and will call PCP today, advised pt to eat yoghurt, pt agreed   Week 1 call completed? Yes   Call end time 1415            Christi COTTON - Registered Nurse

## 2024-10-09 ENCOUNTER — READMISSION MANAGEMENT (OUTPATIENT)
Dept: CALL CENTER | Facility: HOSPITAL | Age: 79
End: 2024-10-09
Payer: MEDICARE

## 2024-10-09 NOTE — OUTREACH NOTE
Medical Week 2 Survey      Flowsheet Row Responses   [facility] patient discharged from? [facility]   Does the patient have one of the following disease processes/diagnoses(primary or secondary)? Other   Week 2 attempt successful? Yes   Call start time 1253   Discharge diagnosis Shortness of breath   Call end time 1254   Person spoke with today (if not patient) and relationship patient   Meds reviewed with patient/caregiver? Yes   Does the patient have a primary care provider?  Yes   Comments regarding PCP Seen pcp this morning.   Has home health visited the patient within 72 hours of discharge? N/A   Psychosocial issues? No   Did the patient receive a copy of their discharge instructions? Yes   Nursing interventions Reviewed instructions with patient   What is the patient's perception of their health status since discharge? Improving   Is the patient/caregiver able to teach back signs and symptoms related to disease process for when to call PCP? Yes   Is the patient/caregiver able to teach back signs and symptoms related to disease process for when to call 911? Yes   Is the patient/caregiver able to teach back the hierarchy of who to call/visit for symptoms/problems? PCP, Specialist, Home health nurse, Urgent Care, ED, 911 Yes   Week 2 Call Completed? Yes   Graduated Yes   Wrap up additional comments Patient reports doing much better, seen pcp today. No concerns or questions noted.   Call end time 1254            Mary FOREMAN - Registered Nurse

## 2024-12-13 ENCOUNTER — PATIENT OUTREACH (OUTPATIENT)
Dept: ONCOLOGY | Facility: CLINIC | Age: 79
End: 2024-12-13
Payer: COMMERCIAL

## 2024-12-13 NOTE — PROGRESS NOTES
HEMATOLOGY ONCOLOGY OUTPATIENT CONSULTATION       Patient name: David A Jolissaint  : 1945  MRN: [id number]  Primary Care Physician: Walter Valero MD  Referring Physician: Walter Valero MD  Reason For Consult: Progressive small cell lung cancer.    History of Present Illness:    2024: In the office for the first time for the treatment of what appears to be progressive small cell lung cancer.  He was first diagnosed with small cell lung cancer in .  At the time he is disease was staged as limited stage and he was treated both with radiation and chemotherapy.  Subsequently he had prophylactic cranial irradiation.  He did well until closer to the time of this visit when, on a PET scan, that reports increased size and activity of the necrotic tumor in the right lower lobe and superior segment, without associated right lung nodules that are increased in size and show increased and fluorodeoxyglucose avidity.  There is mediastinal adenopathy.  Plans for biopsy were made but he declined.  He was being seen at the [facility] but he decided to transfer his care here as he was not satisfied with the setting and the options he had received.  He is known as well for coronary artery disease and has undergone a coronary artery bypass graft surgery.  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 He suffers from chronic kidney disease and at some point needed to be on dialysis and, in fact, he has a fistula in the left upper extremity that has never been used.  On exam he is a well-built man who appears the stated age.  He seems chronically ill.  He is transported in a wheelchair.  He is in no distress.  No oral lesions.  Respirations not labored.  Lungs markedly diminished bilaterally.  Heart tachycardic and regular.  Abdomen soft.  Right lower extremity without edema.  Reviewed the  images of the PET scan and its report.  Discussed with him.  Explained to him the importance of tissue diagnosis.  I have asked him to see Dr. Donohue for consideration of endoscopic fine-needle aspiration both for histologic diagnosis and next-generation sequencing.  I have asked him to see me with results.  Explained the difference between a percutaneous biopsy and endoscopic biopsy and the reduced risk of the endoscopic biopsy.  Discussed with him goals of care and explained to him the importance of thinking about what he wants to do in the future given the findings, he has chronic problems and this most recent diagnosis.    Past Medical History:   Diagnosis Date    Anemia     Cellulitis     CKD (chronic kidney disease), stage III     COPD (chronic obstructive pulmonary disease)     Diverticulitis     Dyslipidemia     Hypertension     Myocardial infarction 2016    PVD (peripheral vascular disease)     Small cell lung cancer 05/2021     Past Surgical History:   Procedure Laterality Date    BELOW KNEE LEG AMPUTATION Left 2015    BRONCHOSCOPY N/A 05/20/2021    Procedure: BRONCHOSCOPY WITH ENDOBRONCHIAL ULTRASOUND WITH FINE NEEDLE ASPIRATION. BRONCHOALVEOLAR LAVAGE;  Surgeon: Jackson Quezada MD;  Location[facility] ENDOSCOPY;  Service: Pulmonary;  Laterality: N/A;  subcarinal mass    CARDIAC SURGERY      CHOLECYSTECTOMY      CORONARY ARTERY BYPASS GRAFT  2016    GALLBLADDER SURGERY         Current Outpatient Medications:     aspirin 81 MG EC tablet, Take 1 tablet by mouth Daily With Lunch., Disp: , Rfl: 0    atorvastatin (LIPITOR) 40 MG tablet, Take 1 tablet by mouth Every Morning., Disp: , Rfl:     budesonide (PULMICORT) 0.5 MG/2ML nebulizer solution, Take 2 mL by nebulization 2 (two) times a day., Disp: , Rfl:     clopidogrel (PLAVIX) 75 MG tablet, Take 1 tablet by mouth Every Morning., Disp: , Rfl:     dronabinol (MARINOL) 2.5 MG capsule, Take 1 capsule by mouth., Disp: , Rfl:     dutasteride (AVODART) 0.5 MG capsule,  Take 1 capsule by mouth Every Night., Disp: , Rfl:     ferrous sulfate 324 (65 Fe) MG tablet delayed-release EC tablet, Take 1 tablet by mouth Daily With Breakfast., Disp: 30 tablet, Rfl: 1    formoterol (Perforomist) 20 MCG/2ML nebulizer solution, Take  by nebulization 2 (Two) Times a Day., Disp: , Rfl:     HYDROcodone-acetaminophen (NORCO) 5-325 MG per tablet, Take 1 tablet by mouth Every 6 (Six) Hours As Needed for Moderate Pain., Disp: , Rfl:     metoprolol succinate XL (TOPROL-XL) 25 MG 24 hr tablet, TAKE 1 TABLET EVERY DAY, Disp: 90 tablet, Rfl: 3    montelukast (SINGULAIR) 10 MG tablet, Take 1 tablet by mouth Every Night., Disp: , Rfl:     Multiple Vitamins-Minerals (MULTI VITAMIN/MINERALS) tablet, 1 tablet Daily With Lunch., Disp: , Rfl:     ondansetron (ZOFRAN) 4 MG tablet, Take 1 tablet by mouth As Needed., Disp: , Rfl:     pantoprazole (PROTONIX) 40 MG EC tablet, Take 1 tablet by mouth Daily., Disp: , Rfl:     predniSONE (DELTASONE) 10 MG tablet, 4 po q day x 3, 3 po q day x 3, 2 po q day x 3, 1 po q day x 3, Disp: 30 tablet, Rfl: 0    revefenacin (YUPELRI) 175 MCG/3ML nebulizer solution, Take 3 mL by nebulization Daily., Disp: , Rfl:     saccharomyces boulardii (FLORASTOR) 250 MG capsule, Take 1 capsule by mouth Daily With Lunch., Disp: , Rfl:     sodium bicarbonate 650 MG tablet, Take 1 tablet by mouth 2 (Two) Times a Day., Disp: , Rfl:     theophylline (THEODUR) 300 MG 12 hr tablet, Take 1 tablet by mouth 2 (Two) Times a Day., Disp: , Rfl:     doxycycline (VIBRAMYCIN) 100 MG capsule, Take 1 capsule by mouth 2 (Two) Times a Day., Disp: 10 capsule, Rfl: 0  No current facility-administered medications for this visit.    Facility-Administered Medications Ordered in Other Visits:     heparin injection 500 Units, 500 Units, Intravenous, PRN, Ham Welch MD, 500 Units at 12/18/24 1025    sodium chloride 0.9 % flush 20 mL, 20 mL, Intravenous, PRN, Ham Welch MD, 20 mL at 12/18/24 1025    Allergies    Allergen Reactions    Diltiazem Unknown - High Severity     Swelling of lips     Family History   Problem Relation Age of Onset    Heart attack Mother     Heart attack Father     Heart disease Father      Cancer-related family history is not on file.    Social History     Tobacco Use    Smoking status: Former     Current packs/day: 0.00     Average packs/day: 2.0 packs/day for 57.0 years (114.0 ttl pk-yrs)     Types: Cigarettes     Start date:      Quit date:      Years since quittin.9    Smokeless tobacco: Never   Vaping Use    Vaping status: Never Used   Substance Use Topics    Alcohol use: Not Currently    Drug use: Never     Social History     Social History Narrative    Not on file     ROS:   Review of Systems   Constitutional:  Positive for activity change and fatigue. Negative for appetite change, chills, diaphoresis, fever and unexpected weight change.   HENT:  Negative for congestion, dental problem, drooling, ear discharge, ear pain, facial swelling, hearing loss, mouth sores, nosebleeds, postnasal drip, rhinorrhea, sinus pressure, sinus pain, sneezing, sore throat, tinnitus, trouble swallowing and voice change.    Eyes:  Negative for photophobia, pain, discharge, redness, itching and visual disturbance.   Respiratory:  Positive for cough and shortness of breath. Negative for apnea, choking, chest tightness, wheezing and stridor.    Cardiovascular:  Negative for chest pain, palpitations and leg swelling.   Gastrointestinal:  Negative for abdominal distention, abdominal pain, anal bleeding, blood in stool, constipation, diarrhea, nausea, rectal pain and vomiting.   Endocrine: Negative for cold intolerance, heat intolerance, polydipsia and polyuria.   Genitourinary:  Negative for decreased urine volume, difficulty urinating, dysuria, flank pain, frequency, genital sores, hematuria and urgency.   Musculoskeletal:  Negative for arthralgias, back pain, gait problem, joint swelling, myalgias, neck  "pain and neck stiffness.   Skin:  Negative for color change, pallor and rash.   Neurological:  Negative for dizziness, tremors, seizures, syncope, facial asymmetry, speech difficulty, weakness, light-headedness, numbness and headaches.   Hematological:  Negative for adenopathy. Does not bruise/bleed easily.   Psychiatric/Behavioral:  Negative for agitation, behavioral problems, confusion, decreased concentration, hallucinations, self-injury, sleep disturbance and suicidal ideas. The patient is not nervous/anxious.        Objective:    Vital Signs:  Vitals:    12/18/24 0924   BP: 127/81   Pulse: 98   Resp: 15   Temp: 98.3 °F (36.8 °C)   SpO2: 95%   Weight: 65.2 kg (143 lb 12.8 oz)   Height: 177.8 cm (70\")   PainSc: 0-No pain     Body mass index is 20.63 kg/m².    ECOG  (3) Capable of limited self-care, confined to bed or chair > 50% of waking hours    Physical Exam:   Physical Exam  Constitutional:       General: He is not in acute distress.     Appearance: Normal appearance. He is ill-appearing. He is not toxic-appearing or diaphoretic.   HENT:      Head: Normocephalic and atraumatic.      Right Ear: External ear normal.      Left Ear: External ear normal.      Nose: Nose normal.      Mouth/Throat:      Mouth: Mucous membranes are moist.      Pharynx: Oropharynx is clear.   Eyes:      General: No scleral icterus.        Right eye: No discharge.         Left eye: No discharge.      Conjunctiva/sclera: Conjunctivae normal.      Pupils: Pupils are equal, round, and reactive to light.   Cardiovascular:      Rate and Rhythm: Normal rate and regular rhythm.      Pulses: Normal pulses.      Heart sounds: Normal heart sounds. No murmur heard.     No friction rub. No gallop.   Pulmonary:      Effort: No respiratory distress.      Breath sounds: No stridor. No wheezing, rhonchi or rales.      Comments: Breath sounds markedly diminished bilaterally.  Chest:      Chest wall: No tenderness.   Abdominal:      General: Abdomen is " flat. Bowel sounds are normal. There is no distension.      Palpations: Abdomen is soft. There is no mass.      Tenderness: There is no abdominal tenderness. There is no right CVA tenderness, left CVA tenderness, guarding or rebound.      Comments: No palpable visceromegaly.   Musculoskeletal:         General: No tenderness, deformity or signs of injury.      Cervical back: No rigidity.      Right lower leg: No edema.      Left lower leg: No edema.   Lymphadenopathy:      Cervical: No cervical adenopathy.   Skin:     General: Skin is warm and dry.      Coloration: Skin is not jaundiced or pale.      Findings: No bruising or rash.   Neurological:      General: No focal deficit present.      Mental Status: He is alert and oriented to person, place, and time.      Cranial Nerves: No cranial nerve deficit.   Psychiatric:         Mood and Affect: Mood normal.         Behavior: Behavior normal.         Thought Content: Thought content normal.         Judgment: Judgment normal.     IBRAHIMA Welch MD performed the physical exam on 12/18/2024 as documented above.    Lab Results - Last 18 Months   Lab Units 12/18/24  0922 09/27/24  0529 09/25/24  2349   WBC 10*3/mm3 11.26* 18.50* 16.34*   HEMOGLOBIN g/dL 11.0* 10.9* 10.4*   HEMATOCRIT % 35.4* 35.0* 33.8*   PLATELETS 10*3/mm3 271 311 261   MCV fL 96.2 95.4 97.7*     Lab Results - Last 18 Months   Lab Units 12/18/24  1024 09/27/24  0529 09/25/24  2349 09/25/24  1044   SODIUM mmol/L 139 140 139 139   POTASSIUM mmol/L 3.9 4.5 4.2 3.8   CHLORIDE mmol/L 103 104 105 102   CO2 mmol/L 24.5 24.0 22.2 25.1   BUN mg/dL 31* 37* 30* 30*   CREATININE mg/dL 1.71* 1.98* 1.77* 1.83*   CALCIUM mg/dL 9.8 9.3 9.1 9.2   BILIRUBIN mg/dL 0.4  --   --  0.5   ALK PHOS U/L 137*  --   --  154*   ALT (SGPT) U/L 24  --   --  12   AST (SGOT) U/L 27  --   --  21   GLUCOSE mg/dL 91 140* 139* 133*     Lab Results   Component Value Date    GLUCOSE 91 12/18/2024    BUN 31 (H) 12/18/2024    CREATININE 1.71  (H) 12/18/2024    EGFRIFNONA 36 (L) 11/12/2021    BCR 18.1 12/18/2024    K 3.9 12/18/2024    CO2 24.5 12/18/2024    CALCIUM 9.8 12/18/2024    ALBUMIN 3.9 12/18/2024    LABIL2 0.9 (L) 04/04/2017    AST 27 12/18/2024    ALT 24 12/18/2024     Lab Results - Last 18 Months   Lab Units 12/18/24  1024 09/25/24  1044   INR  1.18* 1.03   APTT seconds 31.7 29.7*     Lab Results   Component Value Date    IRON 25 (L) 02/12/2024    TIBC 155 (L) 02/12/2024    FERRITIN 720.9 (H) 02/12/2024     Lab Results   Component Value Date    FOLATE 11.5 02/12/2024       Lab Results   Component Value Date    RETICCTPCT 1.49 03/16/2023     Lab Results   Component Value Date    DPIZMNZZ19 829 02/12/2024     Uric Acid   Date Value Ref Range Status   04/04/2017 7.3 4.8 - 8.7 mg/dL Final     Lab Results   Component Value Date    HAPTOGLOBIN 276 (H) 05/21/2021     Lab Results   Component Value Date    PTT 31.7 12/18/2024    INR 1.18 (H) 12/18/2024     Assessment & Plan     1.  Apparent malignancy of the right lower lobe: The history is highly suggestive of small cell carcinoma that is recurring.  However his initial diagnosis was made a few years ago and the possibility of a different histology exists.  A histologic diagnosis is essential.  He is reluctant to consider this but I have explained to him the difference between endoscopic and percutaneous biopsy.  He has accepted a biopsy done endoscopically and a referral to cardiothoracic surgery has been made.  2.  Severe chronic obstructive pulmonary disease.  3.  History of tobacco smoking.  4.  Reviewed all the records including notes from oncology and notes from the [facility].  Reviewed the images and the report of the recent PET scan and discussed with him.  5.  See me again after the biopsy.    Ham Welch MD on 12/18/2024 at 1815.

## 2024-12-13 NOTE — SIGNIFICANT NOTE
Adding to navigation tracking; Small cell 2021 with [facility], also seen at [place] and  (most recently on 11/27/2024 PET and biopsy recommended, unsure if done). Patient wanted to transfer to [place].

## 2024-12-17 ENCOUNTER — TELEPHONE (OUTPATIENT)
Dept: ONCOLOGY | Facility: CLINIC | Age: 79
End: 2024-12-17
Payer: COMMERCIAL

## 2024-12-17 NOTE — TELEPHONE ENCOUNTER
The [facility] received a fax that requires your attention. The document has been indexed to the patient’s chart for your review.      Reason for sending: RECEIVED MEDICAL RECORDS    Documents Description: PETSCAN 12/14/24.> INDEXED IN CHART    Name of Sender: [facility]  -    Date Indexed: 12/17/24    Notes (if needed): THANKS!

## 2024-12-18 ENCOUNTER — HOSPITAL ENCOUNTER (OUTPATIENT)
Dept: ONCOLOGY | Facility: HOSPITAL | Age: 79
Discharge: HOME | End: 2024-12-18
Payer: MEDICARE

## 2024-12-18 ENCOUNTER — APPOINTMENT (OUTPATIENT)
Dept: LAB | Facility: HOSPITAL | Age: 79
End: 2024-12-18
Payer: MEDICARE

## 2024-12-18 ENCOUNTER — CONSULT (OUTPATIENT)
Dept: ONCOLOGY | Facility: CLINIC | Age: 79
End: 2024-12-18
Payer: MEDICARE

## 2024-12-18 VITALS
RESPIRATION RATE: 15 BRPM | HEART RATE: 98 BPM | SYSTOLIC BLOOD PRESSURE: 127 MMHG | WEIGHT: 143.8 LBS | OXYGEN SATURATION: 95 % | DIASTOLIC BLOOD PRESSURE: 81 MMHG | TEMPERATURE: 98.3 F | BODY MASS INDEX: 20.59 KG/M2 | HEIGHT: 70 IN

## 2024-12-18 DIAGNOSIS — C34.90 SMALL CELL CARCINOMA OF LUNG, UNSPECIFIED LATERALITY, UNSPECIFIED PART OF LUNG: Primary | ICD-10-CM

## 2024-12-18 DIAGNOSIS — R79.1 ABNORMAL COAGULATION PROFILE: ICD-10-CM

## 2024-12-18 DIAGNOSIS — R59.0 MEDIASTINAL LYMPHADENOPATHY: ICD-10-CM

## 2024-12-18 DIAGNOSIS — E46 PROTEIN-CALORIE MALNUTRITION, UNSPECIFIED SEVERITY: ICD-10-CM

## 2024-12-18 DIAGNOSIS — Z95.828 PORT-A-CATH IN PLACE: Primary | ICD-10-CM

## 2024-12-18 LAB
ALBUMIN SERPL-MCNC: 3.9 G/DL (ref 3.5–5.2)
ALBUMIN/GLOB SERPL: 1.3 G/DL
ALP SERPL-CCNC: 137 U/L (ref 39–117)
ALT SERPL W P-5'-P-CCNC: 24 U/L (ref 1–41)
ANION GAP SERPL CALCULATED.3IONS-SCNC: 11.5 MMOL/L (ref 5–15)
APTT PPP: 31.7 SECONDS (ref 22.7–35.4)
AST SERPL-CCNC: 27 U/L (ref 1–40)
BASOPHILS # BLD AUTO: 0.04 10*3/MM3 (ref 0–0.2)
BASOPHILS NFR BLD AUTO: 0.4 % (ref 0–1.5)
BILIRUB SERPL-MCNC: 0.4 MG/DL (ref 0–1.2)
BUN SERPL-MCNC: 31 MG/DL (ref 8–23)
BUN/CREAT SERPL: 18.1 (ref 7–25)
CALCIUM SPEC-SCNC: 9.8 MG/DL (ref 8.6–10.5)
CHLORIDE SERPL-SCNC: 103 MMOL/L (ref 98–107)
CO2 SERPL-SCNC: 24.5 MMOL/L (ref 22–29)
CREAT SERPL-MCNC: 1.71 MG/DL (ref 0.76–1.27)
DEPRECATED RDW RBC AUTO: 52.1 FL (ref 37–54)
EGFRCR SERPLBLD CKD-EPI 2021: 40 ML/MIN/1.73
EOSINOPHIL # BLD AUTO: 0.38 10*3/MM3 (ref 0–0.4)
EOSINOPHIL NFR BLD AUTO: 3.4 % (ref 0.3–6.2)
ERYTHROCYTE [DISTWIDTH] IN BLOOD BY AUTOMATED COUNT: 15.4 % (ref 12.3–15.4)
GLOBULIN UR ELPH-MCNC: 3.1 GM/DL
GLUCOSE SERPL-MCNC: 91 MG/DL (ref 65–99)
HCT VFR BLD AUTO: 35.4 % (ref 37.5–51)
HGB BLD-MCNC: 11 G/DL (ref 13–17.7)
INR PPP: 1.18 (ref 0.9–1.1)
LYMPHOCYTES # BLD AUTO: 0.55 10*3/MM3 (ref 0.7–3.1)
LYMPHOCYTES NFR BLD AUTO: 4.9 % (ref 19.6–45.3)
MCH RBC QN AUTO: 29.9 PG (ref 26.6–33)
MCHC RBC AUTO-ENTMCNC: 31.1 G/DL (ref 31.5–35.7)
MCV RBC AUTO: 96.2 FL (ref 79–97)
MONOCYTES # BLD AUTO: 0.95 10*3/MM3 (ref 0.1–0.9)
MONOCYTES NFR BLD AUTO: 8.4 % (ref 5–12)
NEUTROPHILS NFR BLD AUTO: 82.9 % (ref 42.7–76)
NEUTROPHILS NFR BLD AUTO: 9.34 10*3/MM3 (ref 1.7–7)
PLATELET # BLD AUTO: 271 10*3/MM3 (ref 140–450)
PMV BLD AUTO: 10 FL (ref 6–12)
POTASSIUM SERPL-SCNC: 3.9 MMOL/L (ref 3.5–5.2)
PROT SERPL-MCNC: 7 G/DL (ref 6–8.5)
PROTHROMBIN TIME: 15.2 SECONDS (ref 11.7–14.2)
RBC # BLD AUTO: 3.68 10*6/MM3 (ref 4.14–5.8)
SODIUM SERPL-SCNC: 139 MMOL/L (ref 136–145)
WBC NRBC COR # BLD AUTO: 11.26 10*3/MM3 (ref 3.4–10.8)

## 2024-12-18 PROCEDURE — 1126F AMNT PAIN NOTED NONE PRSNT: CPT | Performed by: INTERNAL MEDICINE

## 2024-12-18 PROCEDURE — 1160F RVW MEDS BY RX/DR IN RCRD: CPT | Performed by: INTERNAL MEDICINE

## 2024-12-18 PROCEDURE — 25010000002 HEPARIN LOCK FLUSH PER 10 UNITS: Performed by: INTERNAL MEDICINE

## 2024-12-18 PROCEDURE — 99204 OFFICE O/P NEW MOD 45 MIN: CPT | Performed by: INTERNAL MEDICINE

## 2024-12-18 PROCEDURE — 1159F MED LIST DOCD IN RCRD: CPT | Performed by: INTERNAL MEDICINE

## 2024-12-18 PROCEDURE — 3079F DIAST BP 80-89 MM HG: CPT | Performed by: INTERNAL MEDICINE

## 2024-12-18 PROCEDURE — 36415 COLL VENOUS BLD VENIPUNCTURE: CPT | Performed by: INTERNAL MEDICINE

## 2024-12-18 PROCEDURE — 3074F SYST BP LT 130 MM HG: CPT | Performed by: INTERNAL MEDICINE

## 2024-12-18 PROCEDURE — 85025 COMPLETE CBC W/AUTO DIFF WBC: CPT | Performed by: INTERNAL MEDICINE

## 2024-12-18 PROCEDURE — 85730 THROMBOPLASTIN TIME PARTIAL: CPT | Performed by: INTERNAL MEDICINE

## 2024-12-18 PROCEDURE — 85610 PROTHROMBIN TIME: CPT | Performed by: INTERNAL MEDICINE

## 2024-12-18 PROCEDURE — 80053 COMPREHEN METABOLIC PANEL: CPT | Performed by: INTERNAL MEDICINE

## 2024-12-18 PROCEDURE — 36591 DRAW BLOOD OFF VENOUS DEVICE: CPT

## 2024-12-18 RX ORDER — HEPARIN SODIUM (PORCINE) LOCK FLUSH IV SOLN 100 UNIT/ML 100 UNIT/ML
500 SOLUTION INTRAVENOUS AS NEEDED
OUTPATIENT
Start: 2024-12-18

## 2024-12-18 RX ORDER — SODIUM CHLORIDE 0.9 % (FLUSH) 0.9 %
20 SYRINGE (ML) INJECTION AS NEEDED
Status: DISCONTINUED | OUTPATIENT
Start: 2024-12-18 | End: 2024-12-19 | Stop reason: HOSPADM

## 2024-12-18 RX ORDER — HEPARIN SODIUM (PORCINE) LOCK FLUSH IV SOLN 100 UNIT/ML 100 UNIT/ML
500 SOLUTION INTRAVENOUS AS NEEDED
Status: DISCONTINUED | OUTPATIENT
Start: 2024-12-18 | End: 2024-12-19 | Stop reason: HOSPADM

## 2024-12-18 RX ORDER — SODIUM CHLORIDE 0.9 % (FLUSH) 0.9 %
20 SYRINGE (ML) INJECTION AS NEEDED
OUTPATIENT
Start: 2024-12-18

## 2024-12-18 RX ORDER — DRONABINOL 2.5 MG/1
2.5 CAPSULE ORAL
COMMUNITY
Start: 2024-11-05

## 2024-12-18 RX ADMIN — HEPARIN 500 UNITS: 100 SYRINGE at 10:25

## 2024-12-18 RX ADMIN — Medication 20 ML: at 10:25

## 2024-12-18 NOTE — PROGRESS NOTES
Patient came from seeing Dr. Welch for labs and port flush. Port accessed and flushed with good blood return noted. 10cc of blood wasted prior to specimen collection. Blood specimen obtained and sent to lab for processing per protocol.  Port flushed with saline and heparin prior to needle removal. Patient tolerated well and was discharged.

## 2025-01-04 ENCOUNTER — APPOINTMENT (OUTPATIENT)
Dept: CT IMAGING | Facility: HOSPITAL | Age: 80
End: 2025-01-04
Payer: MEDICARE

## 2025-01-04 ENCOUNTER — HOSPITAL ENCOUNTER (INPATIENT)
Facility: HOSPITAL | Age: 80
LOS: 3 days | Discharge: NURSING, REHAB OR LONG-TERM CARE | End: 2025-01-08
Attending: EMERGENCY MEDICINE | Admitting: FAMILY MEDICINE
Payer: MEDICARE

## 2025-01-04 DIAGNOSIS — J18.0 BRONCHIAL PNEUMONIA: ICD-10-CM

## 2025-01-04 DIAGNOSIS — E87.70 HYPERVOLEMIA, UNSPECIFIED HYPERVOLEMIA TYPE: Primary | ICD-10-CM

## 2025-01-04 DIAGNOSIS — R09.02 HYPOXIA: ICD-10-CM

## 2025-01-04 DIAGNOSIS — R00.0 TACHYCARDIA: ICD-10-CM

## 2025-01-04 DIAGNOSIS — R59.0 MEDIASTINAL ADENOPATHY: ICD-10-CM

## 2025-01-04 DIAGNOSIS — R06.02 SHORTNESS OF BREATH: ICD-10-CM

## 2025-01-04 PROBLEM — R06.00 DYSPNEA: Status: ACTIVE | Noted: 2025-01-04

## 2025-01-04 LAB
ALBUMIN SERPL-MCNC: 3.8 G/DL (ref 3.5–5.2)
ALBUMIN/GLOB SERPL: 1.2 G/DL
ALP SERPL-CCNC: 145 U/L (ref 39–117)
ALT SERPL W P-5'-P-CCNC: 13 U/L (ref 1–41)
ANION GAP SERPL CALCULATED.3IONS-SCNC: 10.5 MMOL/L (ref 5–15)
ARTERIAL PATENCY WRIST A: POSITIVE
AST SERPL-CCNC: 21 U/L (ref 1–40)
ATMOSPHERIC PRESS: ABNORMAL MM[HG]
B PARAPERT DNA SPEC QL NAA+PROBE: NOT DETECTED
B PERT DNA SPEC QL NAA+PROBE: NOT DETECTED
BACTERIA UR QL AUTO: NORMAL /HPF
BASE EXCESS BLDA CALC-SCNC: 3.3 MMOL/L (ref 0–3)
BASOPHILS # BLD AUTO: 0.07 10*3/MM3 (ref 0–0.2)
BASOPHILS NFR BLD AUTO: 0.5 % (ref 0–1.5)
BDY SITE: ABNORMAL
BILIRUB SERPL-MCNC: 0.9 MG/DL (ref 0–1.2)
BILIRUB UR QL STRIP: NEGATIVE
BUN SERPL-MCNC: 30 MG/DL (ref 8–23)
BUN/CREAT SERPL: 16.3 (ref 7–25)
C PNEUM DNA NPH QL NAA+NON-PROBE: NOT DETECTED
CALCIUM SPEC-SCNC: 9.9 MG/DL (ref 8.6–10.5)
CHLORIDE SERPL-SCNC: 99 MMOL/L (ref 98–107)
CLARITY UR: CLEAR
CO2 BLDA-SCNC: 28.2 MMOL/L (ref 22–29)
CO2 SERPL-SCNC: 27.5 MMOL/L (ref 22–29)
COLOR UR: YELLOW
CREAT SERPL-MCNC: 1.84 MG/DL (ref 0.76–1.27)
D DIMER PPP FEU-MCNC: 2.24 MCGFEU/ML (ref 0–0.8)
DEPRECATED RDW RBC AUTO: 51.8 FL (ref 37–54)
EGFRCR SERPLBLD CKD-EPI 2021: 36.6 ML/MIN/1.73
EOSINOPHIL # BLD AUTO: 0.16 10*3/MM3 (ref 0–0.4)
EOSINOPHIL NFR BLD AUTO: 1.2 % (ref 0.3–6.2)
ERYTHROCYTE [DISTWIDTH] IN BLOOD BY AUTOMATED COUNT: 14.8 % (ref 12.3–15.4)
FLUAV SUBTYP SPEC NAA+PROBE: NOT DETECTED
FLUBV RNA ISLT QL NAA+PROBE: NOT DETECTED
GLOBULIN UR ELPH-MCNC: 3.2 GM/DL
GLUCOSE SERPL-MCNC: 131 MG/DL (ref 65–99)
GLUCOSE UR STRIP-MCNC: NEGATIVE MG/DL
HADV DNA SPEC NAA+PROBE: NOT DETECTED
HCO3 BLDA-SCNC: 27.1 MMOL/L (ref 21–28)
HCOV 229E RNA SPEC QL NAA+PROBE: NOT DETECTED
HCOV HKU1 RNA SPEC QL NAA+PROBE: NOT DETECTED
HCOV NL63 RNA SPEC QL NAA+PROBE: NOT DETECTED
HCOV OC43 RNA SPEC QL NAA+PROBE: NOT DETECTED
HCT VFR BLD AUTO: 33.8 % (ref 37.5–51)
HEMODILUTION: NO
HGB BLD-MCNC: 10.6 G/DL (ref 13–17.7)
HGB UR QL STRIP.AUTO: NEGATIVE
HMPV RNA NPH QL NAA+NON-PROBE: NOT DETECTED
HOLD SPECIMEN: NORMAL
HPIV1 RNA ISLT QL NAA+PROBE: NOT DETECTED
HPIV2 RNA SPEC QL NAA+PROBE: NOT DETECTED
HPIV3 RNA NPH QL NAA+PROBE: NOT DETECTED
HPIV4 P GENE NPH QL NAA+PROBE: NOT DETECTED
HYALINE CASTS UR QL AUTO: NORMAL /LPF
IMM GRANULOCYTES # BLD AUTO: 0.08 10*3/MM3 (ref 0–0.05)
IMM GRANULOCYTES NFR BLD AUTO: 0.6 % (ref 0–0.5)
INHALED O2 CONCENTRATION: 32 %
KETONES UR QL STRIP: NEGATIVE
LEUKOCYTE ESTERASE UR QL STRIP.AUTO: NEGATIVE
LYMPHOCYTES # BLD AUTO: 0.4 10*3/MM3 (ref 0.7–3.1)
LYMPHOCYTES NFR BLD AUTO: 2.9 % (ref 19.6–45.3)
M PNEUMO IGG SER IA-ACNC: NOT DETECTED
MCH RBC QN AUTO: 29.5 PG (ref 26.6–33)
MCHC RBC AUTO-ENTMCNC: 31.4 G/DL (ref 31.5–35.7)
MCV RBC AUTO: 94.2 FL (ref 79–97)
MODALITY: ABNORMAL
MONOCYTES # BLD AUTO: 0.9 10*3/MM3 (ref 0.1–0.9)
MONOCYTES NFR BLD AUTO: 6.6 % (ref 5–12)
NEUTROPHILS NFR BLD AUTO: 11.97 10*3/MM3 (ref 1.7–7)
NEUTROPHILS NFR BLD AUTO: 88.2 % (ref 42.7–76)
NITRITE UR QL STRIP: NEGATIVE
NRBC BLD AUTO-RTO: 0 /100 WBC (ref 0–0.2)
NT-PROBNP SERPL-MCNC: 2744 PG/ML (ref 0–1800)
PCO2 BLDA: 37.3 MM HG (ref 35–48)
PH BLDA: 7.47 PH UNITS (ref 7.35–7.45)
PH UR STRIP.AUTO: 7.5 [PH] (ref 5–8)
PLATELET # BLD AUTO: 297 10*3/MM3 (ref 140–450)
PMV BLD AUTO: 10.3 FL (ref 6–12)
PO2 BLD: 248 MM[HG] (ref 0–500)
PO2 BLDA: 79.5 MM HG (ref 83–108)
POTASSIUM SERPL-SCNC: 3.6 MMOL/L (ref 3.5–5.2)
PROT SERPL-MCNC: 7 G/DL (ref 6–8.5)
PROT UR QL STRIP: ABNORMAL
QT INTERVAL: 387 MS
QTC INTERVAL: 500 MS
RBC # BLD AUTO: 3.59 10*6/MM3 (ref 4.14–5.8)
RBC # UR STRIP: NORMAL /HPF
REF LAB TEST METHOD: NORMAL
RHINOVIRUS RNA SPEC NAA+PROBE: NOT DETECTED
RSV RNA NPH QL NAA+NON-PROBE: NOT DETECTED
SAO2 % BLDCOA: 96.4 % (ref 94–98)
SARS-COV-2 RNA RESP QL NAA+PROBE: NOT DETECTED
SODIUM SERPL-SCNC: 137 MMOL/L (ref 136–145)
SP GR UR STRIP: 1.01 (ref 1–1.03)
SQUAMOUS #/AREA URNS HPF: NORMAL /HPF
THEOPHYLLINE SERPL-MCNC: 14.4 MCG/ML (ref 10–20)
UROBILINOGEN UR QL STRIP: ABNORMAL
WBC # UR STRIP: NORMAL /HPF
WBC NRBC COR # BLD AUTO: 13.58 10*3/MM3 (ref 3.4–10.8)
WHOLE BLOOD HOLD COAG: NORMAL

## 2025-01-04 PROCEDURE — 0202U NFCT DS 22 TRGT SARS-COV-2: CPT

## 2025-01-04 PROCEDURE — G0378 HOSPITAL OBSERVATION PER HR: HCPCS

## 2025-01-04 PROCEDURE — 25010000002 FUROSEMIDE PER 20 MG

## 2025-01-04 PROCEDURE — 94799 UNLISTED PULMONARY SVC/PX: CPT

## 2025-01-04 PROCEDURE — 80053 COMPREHEN METABOLIC PANEL: CPT

## 2025-01-04 PROCEDURE — 71250 CT THORAX DX C-: CPT

## 2025-01-04 PROCEDURE — 81001 URINALYSIS AUTO W/SCOPE: CPT

## 2025-01-04 PROCEDURE — 94640 AIRWAY INHALATION TREATMENT: CPT

## 2025-01-04 PROCEDURE — 93005 ELECTROCARDIOGRAM TRACING: CPT | Performed by: EMERGENCY MEDICINE

## 2025-01-04 PROCEDURE — 36600 WITHDRAWAL OF ARTERIAL BLOOD: CPT

## 2025-01-04 PROCEDURE — 80198 ASSAY OF THEOPHYLLINE: CPT

## 2025-01-04 PROCEDURE — 85027 COMPLETE CBC AUTOMATED: CPT | Performed by: NURSE PRACTITIONER

## 2025-01-04 PROCEDURE — 25010000002 ONDANSETRON PER 1 MG

## 2025-01-04 PROCEDURE — 85025 COMPLETE CBC W/AUTO DIFF WBC: CPT

## 2025-01-04 PROCEDURE — 99285 EMERGENCY DEPT VISIT HI MDM: CPT

## 2025-01-04 PROCEDURE — 82803 BLOOD GASES ANY COMBINATION: CPT

## 2025-01-04 PROCEDURE — 25010000002 ONDANSETRON PER 1 MG: Performed by: NURSE PRACTITIONER

## 2025-01-04 PROCEDURE — 83880 ASSAY OF NATRIURETIC PEPTIDE: CPT

## 2025-01-04 PROCEDURE — 85379 FIBRIN DEGRADATION QUANT: CPT

## 2025-01-04 RX ORDER — AMOXICILLIN 250 MG
2 CAPSULE ORAL 2 TIMES DAILY PRN
Status: DISCONTINUED | OUTPATIENT
Start: 2025-01-04 | End: 2025-01-08 | Stop reason: HOSPADM

## 2025-01-04 RX ORDER — DIPHENHYDRAMINE HCL 25 MG
25 CAPSULE ORAL EVERY 6 HOURS PRN
COMMUNITY

## 2025-01-04 RX ORDER — POTASSIUM CHLORIDE 1500 MG/1
20 TABLET, EXTENDED RELEASE ORAL ONCE
Status: COMPLETED | OUTPATIENT
Start: 2025-01-04 | End: 2025-01-04

## 2025-01-04 RX ORDER — MONTELUKAST SODIUM 10 MG/1
10 TABLET ORAL NIGHTLY
Status: DISCONTINUED | OUTPATIENT
Start: 2025-01-04 | End: 2025-01-08 | Stop reason: HOSPADM

## 2025-01-04 RX ORDER — SODIUM CHLORIDE 9 MG/ML
40 INJECTION, SOLUTION INTRAVENOUS AS NEEDED
Status: DISCONTINUED | OUTPATIENT
Start: 2025-01-04 | End: 2025-01-08 | Stop reason: HOSPADM

## 2025-01-04 RX ORDER — METOPROLOL SUCCINATE 25 MG/1
25 TABLET, EXTENDED RELEASE ORAL DAILY
Status: DISCONTINUED | OUTPATIENT
Start: 2025-01-05 | End: 2025-01-08 | Stop reason: HOSPADM

## 2025-01-04 RX ORDER — BISACODYL 5 MG/1
5 TABLET, DELAYED RELEASE ORAL DAILY PRN
Status: DISCONTINUED | OUTPATIENT
Start: 2025-01-04 | End: 2025-01-08 | Stop reason: HOSPADM

## 2025-01-04 RX ORDER — ACETAMINOPHEN 160 MG/5ML
650 SOLUTION ORAL EVERY 4 HOURS PRN
Status: DISCONTINUED | OUTPATIENT
Start: 2025-01-04 | End: 2025-01-08 | Stop reason: HOSPADM

## 2025-01-04 RX ORDER — ACETAMINOPHEN 650 MG/1
650 SUPPOSITORY RECTAL EVERY 4 HOURS PRN
Status: DISCONTINUED | OUTPATIENT
Start: 2025-01-04 | End: 2025-01-08 | Stop reason: HOSPADM

## 2025-01-04 RX ORDER — FUROSEMIDE 10 MG/ML
80 INJECTION INTRAMUSCULAR; INTRAVENOUS ONCE
Status: COMPLETED | OUTPATIENT
Start: 2025-01-04 | End: 2025-01-04

## 2025-01-04 RX ORDER — SODIUM CHLORIDE 0.9 % (FLUSH) 0.9 %
10 SYRINGE (ML) INJECTION EVERY 12 HOURS SCHEDULED
Status: DISCONTINUED | OUTPATIENT
Start: 2025-01-04 | End: 2025-01-08 | Stop reason: HOSPADM

## 2025-01-04 RX ORDER — ONDANSETRON 4 MG/1
4 TABLET, ORALLY DISINTEGRATING ORAL EVERY 6 HOURS PRN
Status: DISCONTINUED | OUTPATIENT
Start: 2025-01-04 | End: 2025-01-08 | Stop reason: HOSPADM

## 2025-01-04 RX ORDER — FERROUS SULFATE 325(65) MG
325 TABLET ORAL
Status: DISCONTINUED | OUTPATIENT
Start: 2025-01-05 | End: 2025-01-08 | Stop reason: HOSPADM

## 2025-01-04 RX ORDER — SODIUM BICARBONATE 650 MG/1
650 TABLET ORAL 2 TIMES DAILY
Status: DISCONTINUED | OUTPATIENT
Start: 2025-01-04 | End: 2025-01-08 | Stop reason: HOSPADM

## 2025-01-04 RX ORDER — ATORVASTATIN CALCIUM 40 MG/1
40 TABLET, FILM COATED ORAL EVERY MORNING
Status: DISCONTINUED | OUTPATIENT
Start: 2025-01-05 | End: 2025-01-08 | Stop reason: HOSPADM

## 2025-01-04 RX ORDER — HYDRALAZINE HYDROCHLORIDE 20 MG/ML
10 INJECTION INTRAMUSCULAR; INTRAVENOUS EVERY 6 HOURS PRN
Status: DISCONTINUED | OUTPATIENT
Start: 2025-01-04 | End: 2025-01-08 | Stop reason: HOSPADM

## 2025-01-04 RX ORDER — ONDANSETRON 2 MG/ML
4 INJECTION INTRAMUSCULAR; INTRAVENOUS EVERY 6 HOURS PRN
Status: DISCONTINUED | OUTPATIENT
Start: 2025-01-04 | End: 2025-01-08 | Stop reason: HOSPADM

## 2025-01-04 RX ORDER — SACCHAROMYCES BOULARDII 250 MG
250 CAPSULE ORAL
Status: DISCONTINUED | OUTPATIENT
Start: 2025-01-04 | End: 2025-01-08 | Stop reason: HOSPADM

## 2025-01-04 RX ORDER — PANTOPRAZOLE SODIUM 40 MG/1
40 TABLET, DELAYED RELEASE ORAL DAILY
Status: DISCONTINUED | OUTPATIENT
Start: 2025-01-05 | End: 2025-01-08 | Stop reason: HOSPADM

## 2025-01-04 RX ORDER — POLYETHYLENE GLYCOL 3350 17 G/17G
17 POWDER, FOR SOLUTION ORAL DAILY PRN
Status: DISCONTINUED | OUTPATIENT
Start: 2025-01-04 | End: 2025-01-08 | Stop reason: HOSPADM

## 2025-01-04 RX ORDER — ACETAMINOPHEN 325 MG/1
650 TABLET ORAL EVERY 4 HOURS PRN
Status: DISCONTINUED | OUTPATIENT
Start: 2025-01-04 | End: 2025-01-08 | Stop reason: HOSPADM

## 2025-01-04 RX ORDER — ONDANSETRON 2 MG/ML
4 INJECTION INTRAMUSCULAR; INTRAVENOUS ONCE
Status: COMPLETED | OUTPATIENT
Start: 2025-01-04 | End: 2025-01-04

## 2025-01-04 RX ORDER — BISACODYL 10 MG
10 SUPPOSITORY, RECTAL RECTAL DAILY PRN
Status: DISCONTINUED | OUTPATIENT
Start: 2025-01-04 | End: 2025-01-08 | Stop reason: HOSPADM

## 2025-01-04 RX ORDER — SODIUM CHLORIDE 0.9 % (FLUSH) 0.9 %
10 SYRINGE (ML) INJECTION AS NEEDED
Status: DISCONTINUED | OUTPATIENT
Start: 2025-01-04 | End: 2025-01-08 | Stop reason: HOSPADM

## 2025-01-04 RX ORDER — FINASTERIDE 5 MG/1
5 TABLET, FILM COATED ORAL NIGHTLY
Status: DISCONTINUED | OUTPATIENT
Start: 2025-01-04 | End: 2025-01-08 | Stop reason: HOSPADM

## 2025-01-04 RX ORDER — BUDESONIDE 0.5 MG/2ML
0.5 INHALANT ORAL
Status: DISCONTINUED | OUTPATIENT
Start: 2025-01-04 | End: 2025-01-08 | Stop reason: HOSPADM

## 2025-01-04 RX ORDER — ASPIRIN 81 MG/1
81 TABLET ORAL
Status: DISCONTINUED | OUTPATIENT
Start: 2025-01-04 | End: 2025-01-08 | Stop reason: HOSPADM

## 2025-01-04 RX ORDER — HYDROCODONE BITARTRATE AND ACETAMINOPHEN 5; 325 MG/1; MG/1
1 TABLET ORAL EVERY 6 HOURS PRN
Status: DISCONTINUED | OUTPATIENT
Start: 2025-01-04 | End: 2025-01-08 | Stop reason: HOSPADM

## 2025-01-04 RX ORDER — CLOPIDOGREL BISULFATE 75 MG/1
75 TABLET ORAL EVERY MORNING
Status: DISCONTINUED | OUTPATIENT
Start: 2025-01-05 | End: 2025-01-08 | Stop reason: HOSPADM

## 2025-01-04 RX ORDER — IPRATROPIUM BROMIDE AND ALBUTEROL SULFATE 2.5; .5 MG/3ML; MG/3ML
3 SOLUTION RESPIRATORY (INHALATION)
Status: DISCONTINUED | OUTPATIENT
Start: 2025-01-04 | End: 2025-01-07

## 2025-01-04 RX ADMIN — Medication 10 ML: at 14:13

## 2025-01-04 RX ADMIN — BUDESONIDE 0.5 MG: 0.5 INHALANT RESPIRATORY (INHALATION) at 19:39

## 2025-01-04 RX ADMIN — ONDANSETRON 4 MG: 2 INJECTION INTRAMUSCULAR; INTRAVENOUS at 11:40

## 2025-01-04 RX ADMIN — Medication 10 ML: at 20:47

## 2025-01-04 RX ADMIN — IPRATROPIUM BROMIDE AND ALBUTEROL SULFATE 3 ML: .5; 3 SOLUTION RESPIRATORY (INHALATION) at 19:35

## 2025-01-04 RX ADMIN — FINASTERIDE 5 MG: 5 TABLET, FILM COATED ORAL at 20:07

## 2025-01-04 RX ADMIN — POTASSIUM CHLORIDE 20 MEQ: 1500 TABLET, EXTENDED RELEASE ORAL at 18:16

## 2025-01-04 RX ADMIN — SODIUM BICARBONATE 650 MG: 650 TABLET ORAL at 20:07

## 2025-01-04 RX ADMIN — FUROSEMIDE 80 MG: 10 INJECTION, SOLUTION INTRAMUSCULAR; INTRAVENOUS at 14:08

## 2025-01-04 RX ADMIN — ASPIRIN 81 MG: 81 TABLET, COATED ORAL at 17:13

## 2025-01-04 RX ADMIN — Medication 250 MG: at 17:13

## 2025-01-04 RX ADMIN — MONTELUKAST 10 MG: 10 TABLET, FILM COATED ORAL at 20:07

## 2025-01-04 RX ADMIN — ONDANSETRON 4 MG: 2 INJECTION INTRAMUSCULAR; INTRAVENOUS at 18:16

## 2025-01-04 RX ADMIN — HYDROCODONE BITARTRATE AND ACETAMINOPHEN 1 TABLET: 5; 325 TABLET ORAL at 20:07

## 2025-01-04 NOTE — ED NOTES
Nursing report ED to floor  David A Jolissaint  79 y.o.  male    HPI:   Chief Complaint   Patient presents with    Shortness of Breath     Wheezing and increasing SOA for over 24 hours,85% on 3L O2 from home tank, 94% on 3L tank in triage. No chest pain.        Admitting doctor:   No admitting provider for patient encounter.    Admitting diagnosis:   The primary encounter diagnosis was Hypervolemia, unspecified hypervolemia type. Diagnoses of Shortness of breath, Hypoxia, and Tachycardia were also pertinent to this visit.    Code status:   Current Code Status       Date Active Code Status Order ID Comments User Context       1/4/2025 1407 CPR (Attempt to Resuscitate) [id number]  Rosa Ayoub, [facility]        Question Answer    Code Status (Patient has no pulse and is not breathing) CPR (Attempt to Resuscitate)    Medical Interventions (Patient has pulse or is breathing) Full Support    Level Of Support Discussed With Patient                    Allergies:   Diltiazem    Isolation:  No active isolations     Fall Risk:  Fall Risk Assessment was completed, and patient is at low risk for falls.   Predictive Model Details         9 (Low) Factor Value    Calculated 1/4/2025 14:42 Age 79    Risk of Fall Model Imaging order in this encounter Present     Respiratory Rate 20     Magnesium not on file     Number of Distinct Medication Classes administered 3     Creatinine 1.84 mg/dL     Eliezer Scale not on file     Chloride 99 mmol/L     Total Bilirubin 0.9 mg/dL     Albumin 3.8 g/dL     Clinically Relevant Sex Not Female     Diastolic BP 88     Gastrointestinal Assessment X     Days after Admission 0.156     Tobacco Use Quit     ALT 13 U/L     Calcium 9.9 mg/dL     Potassium 3.6 mmol/L         Weight:       01/04/25  1120   Weight: 65.2 kg (143 lb 11.8 oz)       Intake and Output  No intake or output data in the 24 hours ending 01/04/25 1445    Diet:   Dietary Orders (From admission, onward)       Start     Ordered     01/04/25 1407  Diet: Regular/House; Fluid Consistency: Thin (IDDSI 0)  Diet Effective Now        References:    Diet Order Definitions   Question Answer Comment   Diets: Regular/House    Fluid Consistency: Thin (IDDSI 0)        01/04/25 1407                     Most recent vitals:   Vitals:    01/04/25 1403 01/04/25 1432 01/04/25 1439 01/04/25 1440   BP: 165/99 148/88     Pulse: 95 119 117    Resp:       Temp:       TempSrc:       SpO2:    95%   Weight:       Height:           Active LDAs/IV Access:   Lines, Drains & Airways       Active LDAs       Name Placement date Placement time Site Days    Single Lumen Implantable Port  Right Subclavian --  unknown  --  unknown  Subclavian  --                    Skin Condition:   Skin Assessments (last day)       None             Labs (abnormal labs have a star):   Labs Reviewed   COMPREHENSIVE METABOLIC PANEL - Abnormal; Notable for the following components:       Result Value    Glucose 131 (*)     BUN 30 (*)     Creatinine 1.84 (*)     Alkaline Phosphatase 145 (*)     eGFR 36.6 (*)     All other components within normal limits    Narrative:     GFR Categories in Chronic Kidney Disease (CKD)      GFR Category          GFR (mL/min/1.73)    Interpretation  G1                     90 or greater         Normal or high (1)  G2                      60-89                Mild decrease (1)  G3a                   45-59                Mild to moderate decrease  G3b                   30-44                Moderate to severe decrease  G4                    15-29                Severe decrease  G5                    14 or less           Kidney failure          (1)In the absence of evidence of kidney disease, neither GFR category G1 or G2 fulfill the criteria for CKD.    eGFR calculation 2021 CKD-EPI creatinine equation, which does not include race as a factor   URINALYSIS W/ MICROSCOPIC IF INDICATED (NO CULTURE) - Abnormal; Notable for the following components:    Protein, UA >=300 mg/dL  (3+) (*)     All other components within normal limits   BLOOD GAS, ARTERIAL - Abnormal; Notable for the following components:    pH, Arterial 7.468 (*)     pO2, Arterial 79.5 (*)     Base Excess, Arterial 3.3 (*)     All other components within normal limits   BNP (IN-HOUSE) - Abnormal; Notable for the following components:    proBNP 2,744.0 (*)     All other components within normal limits    Narrative:     This assay is used as an aid in the diagnosis of individuals suspected of having heart failure. It can be used as an aid in the diagnosis of acute decompensated heart failure (ADHF) in patients presenting with signs and symptoms of ADHF to the emergency department (ED). In addition, NT-proBNP of <300 pg/mL indicates ADHF is not likely.    Age Range Result Interpretation  NT-proBNP Concentration (pg/mL:      <50             Positive            >450                   Gray                 300-450                    Negative             <300    50-75           Positive            >900                  Gray                300-900                  Negative            <300      >75             Positive            >1800                  Gray                300-1800                  Negative            <300   CBC WITH AUTO DIFFERENTIAL - Abnormal; Notable for the following components:    WBC 13.58 (*)     RBC 3.59 (*)     Hemoglobin 10.6 (*)     Hematocrit 33.8 (*)     MCHC 31.4 (*)     Neutrophil % 88.2 (*)     Lymphocyte % 2.9 (*)     Immature Grans % 0.6 (*)     Neutrophils, Absolute 11.97 (*)     Lymphocytes, Absolute 0.40 (*)     Immature Grans, Absolute 0.08 (*)     All other components within normal limits   D-DIMER, QUANTITATIVE - Abnormal; Notable for the following components:    D-Dimer, Quantitative 2.24 (*)     All other components within normal limits    Narrative:     According to the assay 's published package insert, a normal (<0.50 MCGFEU/mL) D-dimer result in conjunction with a non-high  "clinical probability assessment, excludes deep vein thrombosis (DVT) and pulmonary embolism (PE) with high sensitivity.    D-dimer values increase with age and this can make VTE exclusion of an older population difficult. To address this, the [employer], based on best available evidence and recent guidelines, recommends that clinicians use age-adjusted D-dimer thresholds in patients greater than 50 years of age with: a) a low probability of PE who do not meet all Pulmonary Embolism Rule Out Criteria, or b) in those with intermediate probability of PE.   The formula for an age-adjusted D-dimer cut-off is \"age/100\".  For example, a 60 year old patient would have an age-adjusted cut-off of 0.60 MCGFEU/mL and an 80 year old 0.80 MCGFEU/mL.   RESPIRATORY PANEL PCR W/ COVID-19 (SARS-COV-2), NP SWAB IN UTM/VTP, 2 HR TAT - Normal    Narrative:     In the setting of a positive respiratory panel with a viral infection PLUS a negative procalcitonin without other underlying concern for bacterial infection, consider observing off antibiotics or discontinuation of antibiotics and continue supportive care. If the respiratory panel is positive for atypical bacterial infection (Bordetella pertussis, Chlamydophila pneumoniae, or Mycoplasma pneumoniae), consider antibiotic de-escalation to target atypical bacterial infection.   THEOPHYLLINE LEVEL - Normal   URINALYSIS, MICROSCOPIC ONLY   CBC AND DIFFERENTIAL    Narrative:     The following orders were created for panel order CBC & Differential.  Procedure                               Abnormality         Status                     ---------                               -----------         ------                     CBC Auto Differential[[id number]]        Abnormal            Final result                 Please view results for these tests on the individual orders.   EXTRA TUBES    Narrative:     The following orders were created for panel order Extra " Tubes.  Procedure                               Abnormality         Status                     ---------                               -----------         ------                     Gold Top - [facility][[id number]]                                   Final result               Light Blue Top[[id number]]                                   Final result                 Please view results for these tests on the individual orders.   [facility] - [facility]   LIGHT BLUE TOP       LOC: Person, Place, Time, and Situation    Telemetry:  Telemetry    Cardiac Monitoring Ordered: yes    EKG:   ECG 12 Lead Dyspnea   Preliminary Result   HEART RQXK=564  bpm   RR Zkgzpmgl=362  ms   NE Pjhpeofq=263  ms   P Horizontal Axis=3  deg   P Front Axis=10  deg   QRSD Wjtycoex=865  ms   QT Khqyipro=160  ms   QTqM=690  ms   QRS Axis=2  deg   T Wave Axis=34  deg   - ABNORMAL ECG -   Sinus tachycardia with irregular rate   Right bundle branch block   Borderline ST elevation, lateral leads   Date and Time of Study:2025-01-04 11:14:34          Medications Given in the ED:   Medications   sodium chloride 0.9 % flush 10 mL (has no administration in time range)   sodium chloride 0.9 % flush 10 mL (10 mL Intravenous Given 1/4/25 1413)   sodium chloride 0.9 % flush 10 mL (has no administration in time range)   sodium chloride 0.9 % infusion 40 mL (has no administration in time range)   Potassium Replacement - Follow Nurse / BPA Driven Protocol (has no administration in time range)   Magnesium Standard Dose Replacement - Follow Nurse / BPA Driven Protocol (has no administration in time range)   Phosphorus Replacement - Follow Nurse / BPA Driven Protocol (has no administration in time range)   Calcium Replacement - Follow Nurse / BPA Driven Protocol (has no administration in time range)   acetaminophen (TYLENOL) tablet 650 mg (has no administration in time range)     Or   acetaminophen (TYLENOL) 160 MG/5ML oral solution 650 mg (has no administration in time range)     Or    acetaminophen (TYLENOL) suppository 650 mg (has no administration in time range)   sennosides-docusate (PERICOLACE) 8.6-50 MG per tablet 2 tablet (has no administration in time range)     And   polyethylene glycol (MIRALAX) packet 17 g (has no administration in time range)     And   bisacodyl (DULCOLAX) EC tablet 5 mg (has no administration in time range)     And   bisacodyl (DULCOLAX) suppository 10 mg (has no administration in time range)   ondansetron ODT (ZOFRAN-ODT) disintegrating tablet 4 mg (has no administration in time range)     Or   ondansetron (ZOFRAN) injection 4 mg (has no administration in time range)   hydrALAZINE (APRESOLINE) injection 10 mg (has no administration in time range)   ondansetron (ZOFRAN) injection 4 mg (4 mg Intravenous Given 1/4/25 1140)   furosemide (LASIX) injection 80 mg (80 mg Intravenous Given 1/4/25 1408)       Imaging results:  CT Chest Without Contrast Diagnostic    Result Date: 1/4/2025  Impression: 1. Findings concerning for disease progression with increasing consolidative appearance of a mass in the posterior right upper lung and new nodule in the medial left lower lung. 2. Increased postobstructive changes at the right lung base. Electronically Signed: Susanna Muniz MD  1/4/2025 2:19 PM EST  Workstation ID: [id number]     Social issues:   Social History     Socioeconomic History    Marital status:    Tobacco Use    Smoking status: Former     Current packs/day: 0.00     Average packs/day: 2.0 packs/day for 57.0 years (114.0 ttl pk-yrs)     Types: Cigarettes     Start date: 1958     Quit date: 2015     Years since quitting: 10.0    Smokeless tobacco: Never   Vaping Use    Vaping status: Never Used   Substance and Sexual Activity    Alcohol use: Not Currently    Drug use: Never    Sexual activity: Defer       NIH Stroke Scale:  Interval: (not recorded)  1a. Level of Consciousness: (not recorded)  1b. LOC Questions: (not recorded)  1c. LOC Commands: (not  recorded)  2. Best Gaze: (not recorded)  3. Visual: (not recorded)  4. Facial Palsy: (not recorded)  5a. Motor Arm, Left: (not recorded)  5b. Motor Arm, Right: (not recorded)  6a. Motor Leg, Left: (not recorded)  6b. Motor Leg, Right: (not recorded)  7. Limb Ataxia: (not recorded)  8. Sensory: (not recorded)  9. Best Language: (not recorded)  10. Dysarthria: (not recorded)  11. Extinction and Inattention (formerly Neglect): (not recorded)    Total (NIH Stroke Scale): (not recorded)     Additional notable assessment information:     Nursing report ED to floor:  To Alysa SIBLEY on [facility]    Zachariah Jenkins RN   01/04/25 14:45 EST

## 2025-01-04 NOTE — ED PROVIDER NOTES
Subjective   History of Present Illness  Patient is a pleasant 79-year-old  male with a history of COPD, chronic kidney disease, left below the knee amputation and small cell lung cancer who presents to the emergency room with complaints of shortness of breath that started around Wednesday.  He wears 3 L by nasal cannula all the time and increased to 4 L last night without improvement.  This morning, family had to increase his oxygen to 5 L and he continued to have symptoms.  He has had a cough with some thick white sputum production with nausea and vomiting.  He has had no fever or chest pain.  Family at bedside states that patient finished chemoradiation December 2021 but recently had a PET scan to evaluate returning cancer.  He has a biopsy scheduled Tuesday 1/7 for further evaluation of this.  He does report taking aspirin but does not take other blood thinners at this time.    PCP: MD Anjum ([facility])      Review of Systems   Constitutional:  Positive for chills. Negative for appetite change and fever.   HENT:  Negative for congestion.    Respiratory:  Positive for cough and shortness of breath.    Cardiovascular:  Negative for chest pain and leg swelling.   Gastrointestinal:  Positive for nausea and vomiting. Negative for abdominal pain.   Genitourinary:  Negative for dysuria.   Neurological:  Negative for syncope and headaches.   Psychiatric/Behavioral:  The patient is not nervous/anxious.    All other systems reviewed and are negative.      Past Medical History:   Diagnosis Date    Anemia     Cellulitis     CKD (chronic kidney disease), stage III     COPD (chronic obstructive pulmonary disease)     Diverticulitis     Dyslipidemia     Hypertension     Myocardial infarction 2016    PVD (peripheral vascular disease)     Small cell lung cancer 05/2021       Allergies   Allergen Reactions    Diltiazem Unknown - High Severity     Swelling of lips       Past Surgical History:   Procedure Laterality Date     BELOW KNEE LEG AMPUTATION Left 2015    BRONCHOSCOPY N/A 05/20/2021    Procedure: BRONCHOSCOPY WITH ENDOBRONCHIAL ULTRASOUND WITH FINE NEEDLE ASPIRATION. BRONCHOALVEOLAR LAVAGE;  Surgeon: Jackson Quezada MD;  Location: [facility] ENDOSCOPY;  Service: Pulmonary;  Laterality: N/A;  subcarinal mass    CARDIAC SURGERY      CHOLECYSTECTOMY      CORONARY ARTERY BYPASS GRAFT  2016    GALLBLADDER SURGERY         Family History   Problem Relation Age of Onset    Heart attack Mother     Heart attack Father     Heart disease Father        Social History     Socioeconomic History    Marital status:    Tobacco Use    Smoking status: Former     Current packs/day: 0.00     Average packs/day: 2.0 packs/day for 57.0 years (114.0 ttl pk-yrs)     Types: Cigarettes     Start date: 1958     Quit date: 2015     Years since quitting: 10.0    Smokeless tobacco: Never   Vaping Use    Vaping status: Never Used   Substance and Sexual Activity    Alcohol use: Not Currently    Drug use: Never    Sexual activity: Defer           Objective   Physical Exam  Vitals and nursing note reviewed.   Constitutional:       Appearance: He is well-developed and normal weight.   HENT:      Head: Normocephalic and atraumatic.   Cardiovascular:      Rate and Rhythm: Regular rhythm. Tachycardia present.      Heart sounds: No murmur heard.  Pulmonary:      Effort: Pulmonary effort is normal.      Breath sounds: Examination of the right-upper field reveals wheezing. Examination of the right-lower field reveals rales. Examination of the left-lower field reveals rales. Wheezing and rales present.   Neurological:      Mental Status: He is alert and oriented to person, place, and time.         Procedures           ED Course      /87   Pulse 104   Temp 97.8 °F (36.6 °C) (Oral)   Resp 20   SpO2 95%   Labs Reviewed   COMPREHENSIVE METABOLIC PANEL - Abnormal; Notable for the following components:       Result Value    Glucose 131 (*)     BUN 30 (*)     Creatinine  1.84 (*)     Alkaline Phosphatase 145 (*)     eGFR 36.6 (*)     All other components within normal limits    Narrative:     GFR Categories in Chronic Kidney Disease (CKD)      GFR Category          GFR (mL/min/1.73)    Interpretation  G1                     90 or greater         Normal or high (1)  G2                      60-89                Mild decrease (1)  G3a                   45-59                Mild to moderate decrease  G3b                   30-44                Moderate to severe decrease  G4                    15-29                Severe decrease  G5                    14 or less           Kidney failure          (1)In the absence of evidence of kidney disease, neither GFR category G1 or G2 fulfill the criteria for CKD.    eGFR calculation 2021 CKD-EPI creatinine equation, which does not include race as a factor   URINALYSIS W/ MICROSCOPIC IF INDICATED (NO CULTURE) - Abnormal; Notable for the following components:    Protein, UA >=300 mg/dL (3+) (*)     All other components within normal limits   BLOOD GAS, ARTERIAL - Abnormal; Notable for the following components:    pH, Arterial 7.468 (*)     pO2, Arterial 79.5 (*)     Base Excess, Arterial 3.3 (*)     All other components within normal limits   BNP (IN-HOUSE) - Abnormal; Notable for the following components:    proBNP 2,744.0 (*)     All other components within normal limits    Narrative:     This assay is used as an aid in the diagnosis of individuals suspected of having heart failure. It can be used as an aid in the diagnosis of acute decompensated heart failure (ADHF) in patients presenting with signs and symptoms of ADHF to the emergency department (ED). In addition, NT-proBNP of <300 pg/mL indicates ADHF is not likely.    Age Range Result Interpretation  NT-proBNP Concentration (pg/mL:      <50             Positive            >450                   Gray                 300-450                    Negative             <300    50-75            "Positive            >900                  Gray                300-900                  Negative            <300      >75             Positive            >1800                  Gray                300-1800                  Negative            <300   CBC WITH AUTO DIFFERENTIAL - Abnormal; Notable for the following components:    WBC 13.58 (*)     RBC 3.59 (*)     Hemoglobin 10.6 (*)     Hematocrit 33.8 (*)     MCHC 31.4 (*)     Neutrophil % 88.2 (*)     Lymphocyte % 2.9 (*)     Immature Grans % 0.6 (*)     Neutrophils, Absolute 11.97 (*)     Lymphocytes, Absolute 0.40 (*)     Immature Grans, Absolute 0.08 (*)     All other components within normal limits   D-DIMER, QUANTITATIVE - Abnormal; Notable for the following components:    D-Dimer, Quantitative 2.24 (*)     All other components within normal limits    Narrative:     According to the assay 's published package insert, a normal (<0.50 MCGFEU/mL) D-dimer result in conjunction with a non-high clinical probability assessment, excludes deep vein thrombosis (DVT) and pulmonary embolism (PE) with high sensitivity.    D-dimer values increase with age and this can make VTE exclusion of an older population difficult. To address this, the [employer], based on best available evidence and recent guidelines, recommends that clinicians use age-adjusted D-dimer thresholds in patients greater than 50 years of age with: a) a low probability of PE who do not meet all Pulmonary Embolism Rule Out Criteria, or b) in those with intermediate probability of PE.   The formula for an age-adjusted D-dimer cut-off is \"age/100\".  For example, a 60 year old patient would have an age-adjusted cut-off of 0.60 MCGFEU/mL and an 80 year old 0.80 MCGFEU/mL.   RESPIRATORY PANEL PCR W/ COVID-19 (SARS-COV-2), NP SWAB IN UTM/VTP, 2 HR TAT - Normal    Narrative:     In the setting of a positive respiratory panel with a viral infection PLUS a negative procalcitonin without " other underlying concern for bacterial infection, consider observing off antibiotics or discontinuation of antibiotics and continue supportive care. If the respiratory panel is positive for atypical bacterial infection (Bordetella pertussis, Chlamydophila pneumoniae, or Mycoplasma pneumoniae), consider antibiotic de-escalation to target atypical bacterial infection.   THEOPHYLLINE LEVEL - Normal   URINALYSIS, MICROSCOPIC ONLY   CBC AND DIFFERENTIAL    Narrative:     The following orders were created for panel order CBC & Differential.  Procedure                               Abnormality         Status                     ---------                               -----------         ------                     CBC Auto Differential[[id number]]        Abnormal            Final result                 Please view results for these tests on the individual orders.   EXTRA TUBES    Narrative:     The following orders were created for panel order Extra Tubes.  Procedure                               Abnormality         Status                     ---------                               -----------         ------                     Gold Top - SST[[id number]]                                   Final result               Light Blue Top[[id number]]                                   Final result                 Please view results for these tests on the individual orders.   GOLD TOP - SST   LIGHT BLUE TOP     Medications   sodium chloride 0.9 % flush 10 mL (has no administration in time range)   ondansetron (ZOFRAN) injection 4 mg (has no administration in time range)     No radiology results for the last day                                                   Medical Decision Making  Problems Addressed:  Hypervolemia, unspecified hypervolemia type: complicated acute illness or injury  Hypoxia: complicated acute illness or injury  Shortness of breath: complicated acute illness or injury  Tachycardia: complicated acute illness or  injury    Amount and/or Complexity of Data Reviewed  Labs: ordered. Decision-making details documented in ED Course.  Radiology: ordered. Decision-making details documented in ED Course.  ECG/medicine tests: ordered.     Details: My interpretation of EKG reveals sinus tachycardia with an irregular rate of 103.  Right bundle branch block noted with no acute ST elevation.  No significant change from previous study completed 9/25/2024.  EKG was also reviewed by Dr. Paz, who is agreeable to my interpretation.    Risk  Prescription drug management.  Decision regarding hospitalization.    Patient is a pleasant 79-year-old  male with a history of small cell lung cancer, COPD and chronic kidney disease who presents to the emergency room with complaints of shortness of breath is been ongoing for the past 3 days  With nausea and vomiting.  On exam, patient is alert and answers questions appropriately.   He has normal S1/S2 without clicks murmurs.  No JVD or leg swelling.  His lungs have coarse crackles in the bases bilaterally with wheezes in the upper right lobe.  Abdomen is soft and nontender with normal bowel sounds throughout.  Initial differentials include pneumonia, fluid overload, lung mass.  This is not a complete list.    IV was established labs were obtained.  Patient received the above examination.  He was given Zofran for nausea.  CBC reveals mild leukocytosis of 13.5 with hemoglobin of 10.6.  CMP with elevated kidney function and a GFR of 36.6 which is baseline for the patient.  Calcium is within normal limits.  Theophylline level to be 14.4.  BNP noted at 4744 and patient was given Lasix.  His respiratory panel is negative.  No acute UTI.  A contrasted CT study was ordered to evaluate for pulmonary embolism.  At time of exam, patient declined contrast dye, stating his nephrologist has told him not to use contrast given his history.  For this reason, patient refused contrast dye and a D-dimer was  ordered instead.  This dimer was found to be elevated at 2.24 and patient will need further rule out for pulmonary embolism given his symptoms of hypoxia, tachycardia and shortness of breath.  This was discussed with patient and he is agreeable to admission for VQ scan and further workup.    Based on the clinical findings, at this time I anticipate the patient will require a 2 midnight stay.  I discussed the patient with MAHOGANY Lee from the hospitalist group who is agreeable to the plan of care and has accepted patient for admission on behalf of Dr. Thakur.    Final diagnoses:   Shortness of breath   Hypoxia   Hypervolemia, unspecified hypervolemia type   Tachycardia       ED Disposition  ED Disposition       ED Disposition   Decision to Admit    Condition   --    Comment   Level of Care: Telemetry [5]   Diagnosis: Dyspnea [[id number]]                 No follow-up provider specified.       Medication List      No changes were made to your prescriptions during this visit.            Makenzie Sellers, APRN  01/04/25 [id number]

## 2025-01-04 NOTE — PLAN OF CARE
Problem: Adult Inpatient Plan of Care  Goal: Plan of Care Review  Outcome: Progressing  Goal: Patient-Specific Goal (Individualized)  Outcome: Progressing  Goal: Absence of Hospital-Acquired Illness or Injury  Outcome: Progressing  Goal: Optimal Comfort and Wellbeing  Outcome: Progressing  Goal: Readiness for Transition of Care  Outcome: Progressing  Intervention: Mutually Develop Transition Plan  Recent Flowsheet Documentation  Taken 1/4/2025 1556 by Alysa Yang RN  Transportation Anticipated: family or friend will provide  Patient/Family Anticipated Services at Transition: none  Patient/Family Anticipates Transition to: home  Taken 1/4/2025 1553 by Alysa Yang, RN  Equipment Currently Used at Home:   wheelchair   oxygen   walker, rolling     Problem: Sepsis/Septic Shock  Goal: Optimal Coping  Outcome: Progressing  Goal: Absence of Bleeding  Outcome: Progressing  Goal: Blood Glucose Level Within Target Range  Outcome: Progressing  Goal: Absence of Infection Signs and Symptoms  Outcome: Progressing  Goal: Optimal Nutrition Delivery  Outcome: Progressing     Problem: Skin Injury Risk Increased  Goal: Skin Health and Integrity  Outcome: Progressing   Goal Outcome Evaluation:

## 2025-01-05 PROBLEM — J18.0 BRONCHIAL PNEUMONIA: Status: ACTIVE | Noted: 2025-01-04

## 2025-01-05 LAB
ANION GAP SERPL CALCULATED.3IONS-SCNC: 12.5 MMOL/L (ref 5–15)
BUN SERPL-MCNC: 32 MG/DL (ref 8–23)
BUN/CREAT SERPL: 15 (ref 7–25)
CALCIUM SPEC-SCNC: 9.8 MG/DL (ref 8.6–10.5)
CHLORIDE SERPL-SCNC: 99 MMOL/L (ref 98–107)
CO2 SERPL-SCNC: 25.5 MMOL/L (ref 22–29)
CREAT SERPL-MCNC: 2.14 MG/DL (ref 0.76–1.27)
D-LACTATE SERPL-SCNC: 1.2 MMOL/L (ref 0.5–2)
DEPRECATED RDW RBC AUTO: 52.1 FL (ref 37–54)
EGFRCR SERPLBLD CKD-EPI 2021: 30.5 ML/MIN/1.73
ERYTHROCYTE [DISTWIDTH] IN BLOOD BY AUTOMATED COUNT: 15 % (ref 12.3–15.4)
GLUCOSE SERPL-MCNC: 123 MG/DL (ref 65–99)
HCT VFR BLD AUTO: 34.8 % (ref 37.5–51)
HGB BLD-MCNC: 10.9 G/DL (ref 13–17.7)
MCH RBC QN AUTO: 29.5 PG (ref 26.6–33)
MCHC RBC AUTO-ENTMCNC: 31.3 G/DL (ref 31.5–35.7)
MCV RBC AUTO: 94.3 FL (ref 79–97)
PLATELET # BLD AUTO: 331 10*3/MM3 (ref 140–450)
PMV BLD AUTO: 10.1 FL (ref 6–12)
POTASSIUM SERPL-SCNC: 3.9 MMOL/L (ref 3.5–5.2)
PROCALCITONIN SERPL-MCNC: 0.58 NG/ML (ref 0–0.25)
RBC # BLD AUTO: 3.69 10*6/MM3 (ref 4.14–5.8)
SODIUM SERPL-SCNC: 137 MMOL/L (ref 136–145)
WBC NRBC COR # BLD AUTO: 17.92 10*3/MM3 (ref 3.4–10.8)

## 2025-01-05 PROCEDURE — 94799 UNLISTED PULMONARY SVC/PX: CPT

## 2025-01-05 PROCEDURE — 63710000001 DIPHENHYDRAMINE PER 50 MG: Performed by: NURSE PRACTITIONER

## 2025-01-05 PROCEDURE — 94761 N-INVAS EAR/PLS OXIMETRY MLT: CPT

## 2025-01-05 PROCEDURE — 25010000002 PIPERACILLIN SOD-TAZOBACTAM PER 1 G: Performed by: INTERNAL MEDICINE

## 2025-01-05 PROCEDURE — 83605 ASSAY OF LACTIC ACID: CPT | Performed by: INTERNAL MEDICINE

## 2025-01-05 PROCEDURE — 84145 PROCALCITONIN (PCT): CPT | Performed by: FAMILY MEDICINE

## 2025-01-05 PROCEDURE — 25010000002 METHYLPREDNISOLONE PER 40 MG: Performed by: FAMILY MEDICINE

## 2025-01-05 PROCEDURE — 87040 BLOOD CULTURE FOR BACTERIA: CPT | Performed by: INTERNAL MEDICINE

## 2025-01-05 PROCEDURE — 99222 1ST HOSP IP/OBS MODERATE 55: CPT | Performed by: INTERNAL MEDICINE

## 2025-01-05 PROCEDURE — 94664 DEMO&/EVAL PT USE INHALER: CPT

## 2025-01-05 RX ORDER — DIPHENHYDRAMINE HCL 25 MG
25 CAPSULE ORAL NIGHTLY PRN
Status: DISCONTINUED | OUTPATIENT
Start: 2025-01-05 | End: 2025-01-08 | Stop reason: HOSPADM

## 2025-01-05 RX ORDER — METHYLPREDNISOLONE SODIUM SUCCINATE 40 MG/ML
40 INJECTION, POWDER, LYOPHILIZED, FOR SOLUTION INTRAMUSCULAR; INTRAVENOUS EVERY 12 HOURS
Status: DISCONTINUED | OUTPATIENT
Start: 2025-01-05 | End: 2025-01-08

## 2025-01-05 RX ADMIN — FINASTERIDE 5 MG: 5 TABLET, FILM COATED ORAL at 20:17

## 2025-01-05 RX ADMIN — IPRATROPIUM BROMIDE AND ALBUTEROL SULFATE 3 ML: .5; 3 SOLUTION RESPIRATORY (INHALATION) at 15:49

## 2025-01-05 RX ADMIN — HYDROCODONE BITARTRATE AND ACETAMINOPHEN 1 TABLET: 5; 325 TABLET ORAL at 08:48

## 2025-01-05 RX ADMIN — HYDROCODONE BITARTRATE AND ACETAMINOPHEN 1 TABLET: 5; 325 TABLET ORAL at 20:17

## 2025-01-05 RX ADMIN — METHYLPREDNISOLONE SODIUM SUCCINATE 40 MG: 40 INJECTION, POWDER, FOR SOLUTION INTRAMUSCULAR; INTRAVENOUS at 08:55

## 2025-01-05 RX ADMIN — ASPIRIN 81 MG: 81 TABLET, COATED ORAL at 12:15

## 2025-01-05 RX ADMIN — IPRATROPIUM BROMIDE AND ALBUTEROL SULFATE 3 ML: .5; 3 SOLUTION RESPIRATORY (INHALATION) at 20:03

## 2025-01-05 RX ADMIN — Medication 250 MG: at 12:15

## 2025-01-05 RX ADMIN — SENNOSIDES AND DOCUSATE SODIUM 2 TABLET: 50; 8.6 TABLET ORAL at 17:16

## 2025-01-05 RX ADMIN — Medication 10 ML: at 20:18

## 2025-01-05 RX ADMIN — FERROUS SULFATE TAB 325 MG (65 MG ELEMENTAL FE) 325 MG: 325 (65 FE) TAB at 08:50

## 2025-01-05 RX ADMIN — DIPHENHYDRAMINE HYDROCHLORIDE 25 MG: 25 CAPSULE ORAL at 20:17

## 2025-01-05 RX ADMIN — ATORVASTATIN CALCIUM 40 MG: 40 TABLET, FILM COATED ORAL at 08:49

## 2025-01-05 RX ADMIN — PIPERACILLIN AND TAZOBACTAM 3.38 G: 3; .375 INJECTION, POWDER, FOR SOLUTION INTRAVENOUS at 17:17

## 2025-01-05 RX ADMIN — PIPERACILLIN AND TAZOBACTAM 3.38 G: 3; .375 INJECTION, POWDER, FOR SOLUTION INTRAVENOUS at 12:15

## 2025-01-05 RX ADMIN — PANTOPRAZOLE SODIUM 40 MG: 40 TABLET, DELAYED RELEASE ORAL at 08:49

## 2025-01-05 RX ADMIN — Medication 10 ML: at 08:50

## 2025-01-05 RX ADMIN — SODIUM BICARBONATE 650 MG: 650 TABLET ORAL at 08:49

## 2025-01-05 RX ADMIN — MONTELUKAST 10 MG: 10 TABLET, FILM COATED ORAL at 20:17

## 2025-01-05 RX ADMIN — METOPROLOL SUCCINATE 25 MG: 25 TABLET, EXTENDED RELEASE ORAL at 08:49

## 2025-01-05 RX ADMIN — BUDESONIDE 0.5 MG: 0.5 INHALANT RESPIRATORY (INHALATION) at 20:07

## 2025-01-05 RX ADMIN — IPRATROPIUM BROMIDE AND ALBUTEROL SULFATE 3 ML: .5; 3 SOLUTION RESPIRATORY (INHALATION) at 08:22

## 2025-01-05 RX ADMIN — BUDESONIDE 0.5 MG: 0.5 INHALANT RESPIRATORY (INHALATION) at 08:26

## 2025-01-05 RX ADMIN — SODIUM BICARBONATE 650 MG: 650 TABLET ORAL at 20:17

## 2025-01-05 RX ADMIN — METHYLPREDNISOLONE SODIUM SUCCINATE 40 MG: 40 INJECTION, POWDER, FOR SOLUTION INTRAMUSCULAR; INTRAVENOUS at 20:17

## 2025-01-05 RX ADMIN — CLOPIDOGREL BISULFATE 75 MG: 75 TABLET ORAL at 08:49

## 2025-01-05 NOTE — PLAN OF CARE
Problem: Adult Inpatient Plan of Care  Goal: Plan of Care Review  Outcome: Progressing  Goal: Patient-Specific Goal (Individualized)  Outcome: Progressing  Goal: Absence of Hospital-Acquired Illness or Injury  Outcome: Progressing  Intervention: Identify and Manage Fall Risk  Recent Flowsheet Documentation  Taken 1/5/2025 1600 by Alysa Yang RN  Safety Promotion/Fall Prevention: safety round/check completed  Taken 1/5/2025 1400 by Alysa Yang RN  Safety Promotion/Fall Prevention: safety round/check completed  Taken 1/5/2025 1200 by Alysa Yang RN  Safety Promotion/Fall Prevention: safety round/check completed  Taken 1/5/2025 1000 by Alysa Yang RN  Safety Promotion/Fall Prevention: safety round/check completed  Taken 1/5/2025 0800 by Alysa Yang RN  Safety Promotion/Fall Prevention: safety round/check completed  Intervention: Prevent Skin Injury  Recent Flowsheet Documentation  Taken 1/5/2025 0800 by Alysa Yang RN  Body Position: position changed independently  Skin Protection: incontinence pads utilized  Intervention: Prevent and Manage VTE (Venous Thromboembolism) Risk  Recent Flowsheet Documentation  Taken 1/5/2025 0800 by Alysa Yang RN  VTE Prevention/Management: SCDs (sequential compression devices) off  Intervention: Prevent Infection  Recent Flowsheet Documentation  Taken 1/5/2025 1600 by Alysa Yang RN  Infection Prevention: single patient room provided  Taken 1/5/2025 1400 by Alysa Yang RN  Infection Prevention: single patient room provided  Taken 1/5/2025 1200 by Alysa Yang RN  Infection Prevention: single patient room provided  Taken 1/5/2025 1000 by Alysa Yang RN  Infection Prevention: single patient room provided  Taken 1/5/2025 0800 by Alysa Yang RN  Infection Prevention: single patient room provided  Goal: Optimal Comfort and Wellbeing  Outcome: Progressing  Intervention: Provide Person-Centered Care  Recent Flowsheet  Documentation  Taken 1/5/2025 0800 by Alysa Yang RN  Trust Relationship/Rapport:   care explained   choices provided   emotional support provided  Goal: Readiness for Transition of Care  Outcome: Progressing     Problem: Sepsis/Septic Shock  Goal: Optimal Coping  Outcome: Progressing  Intervention: Support Patient and Family Response  Recent Flowsheet Documentation  Taken 1/5/2025 0800 by Alysa Yang RN  Family/Support System Care: self-care encouraged  Goal: Absence of Bleeding  Outcome: Progressing  Goal: Blood Glucose Level Within Target Range  Outcome: Progressing  Goal: Absence of Infection Signs and Symptoms  Outcome: Progressing  Intervention: Initiate Sepsis Management  Recent Flowsheet Documentation  Taken 1/5/2025 1600 by Alysa Yang RN  Infection Prevention: single patient room provided  Taken 1/5/2025 1400 by Alysa Yang RN  Infection Prevention: single patient room provided  Taken 1/5/2025 1200 by Alysa Yang RN  Infection Prevention: single patient room provided  Taken 1/5/2025 1000 by Alysa Yang RN  Infection Prevention: single patient room provided  Taken 1/5/2025 0800 by Alysa Yang RN  Infection Prevention: single patient room provided  Intervention: Promote Recovery  Recent Flowsheet Documentation  Taken 1/5/2025 0800 by Alysa Yang RN  Activity Management: activity encouraged  Goal: Optimal Nutrition Delivery  Outcome: Progressing     Problem: Skin Injury Risk Increased  Goal: Skin Health and Integrity  Outcome: Progressing  Intervention: Optimize Skin Protection  Recent Flowsheet Documentation  Taken 1/5/2025 0800 by Alysa Yang RN  Activity Management: activity encouraged  Pressure Reduction Techniques: frequent weight shift encouraged  Head of Bed (HOB) Positioning: HOB elevated  Pressure Reduction Devices: pressure-redistributing mattress utilized  Skin Protection: incontinence pads utilized     Problem: Fall Injury Risk  Goal: Absence  of Fall and Fall-Related Injury  Outcome: Progressing  Intervention: Identify and Manage Contributors  Recent Flowsheet Documentation  Taken 1/5/2025 1600 by Alysa Yang RN  Medication Review/Management: medications reviewed  Taken 1/5/2025 1400 by Alysa Yang RN  Medication Review/Management: medications reviewed  Taken 1/5/2025 1200 by Alysa Yang RN  Medication Review/Management: medications reviewed  Taken 1/5/2025 1000 by Alysa Yang RN  Medication Review/Management: medications reviewed  Taken 1/5/2025 0800 by Alysa Yang RN  Medication Review/Management: medications reviewed  Intervention: Promote Injury-Free Environment  Recent Flowsheet Documentation  Taken 1/5/2025 1600 by Alysa Yang RN  Safety Promotion/Fall Prevention: safety round/check completed  Taken 1/5/2025 1400 by Alysa Yang RN  Safety Promotion/Fall Prevention: safety round/check completed  Taken 1/5/2025 1200 by Alysa Yang RN  Safety Promotion/Fall Prevention: safety round/check completed  Taken 1/5/2025 1000 by Alysa Yang RN  Safety Promotion/Fall Prevention: safety round/check completed  Taken 1/5/2025 0800 by Alysa Yang RN  Safety Promotion/Fall Prevention: safety round/check completed   Goal Outcome Evaluation:

## 2025-01-05 NOTE — H&P
Patient Care Team:  Walter Valero MD as PCP - General  Kali Mistry MD as Consulting Physician (Interventional Cardiology)  Riki Hedrick MD as Consulting Physician (Hematology and Oncology)  Hoang Mendez MD as Consulting Physician (Nephrology)  Ham Welch MD as Consulting Physician (Hematology and Oncology)  Kiki Reina, RN as Nurse Navigator    Chief Complaint  Subjective     The patient is a 79 y.o. male with known history of lung cancer with significant disease burden who presents with progressive shortness of breath and dyspnea upon exertion with acute on chronic hypoxic respiratory failure    HPI  The patient was in his usual state of health until 3 to 4 days prior to presentation when he began to experience a progressive decompensation with some dyspnea upon exertion and shortness of breath.  He attempted to use home medications but decompensated and presented to the [facility] emergency room for evaluation where he was admitted.  The patient is in the process of being worked up for a lung cancer.  He did have a biopsy scheduled for January 7 related some progressive coughing with the production of some white sputum production with some associated nausea and vomiting.  He apparently had treatment for lung cancer in the past including XRT and chemotherapy.  He sees oncology and is in the process of having reevaluation performed.  He denies substernal chest pain left arm paresthesias hemoptysis or other constitutional complaint.  He does have multiple comorbidities.  Review of Systems  Review of Systems   Constitutional:  Positive for activity change and appetite change.   Respiratory:  Positive for cough and shortness of breath.    Neurological:  Positive for weakness.       History  Past Medical History:   Diagnosis Date    Anemia     Cellulitis     CKD (chronic kidney disease), stage III     COPD (chronic obstructive pulmonary disease)     Diverticulitis     Dyslipidemia      Hypertension     Myocardial infarction 2016    PVD (peripheral vascular disease)     Small cell lung cancer 05/2021     Past Surgical History:   Procedure Laterality Date    BELOW KNEE LEG AMPUTATION Left 2015    BRONCHOSCOPY N/A 05/20/2021    Procedure: BRONCHOSCOPY WITH ENDOBRONCHIAL ULTRASOUND WITH FINE NEEDLE ASPIRATION. BRONCHOALVEOLAR LAVAGE;  Surgeon: Jackson Quezada MD;  Location: [facility] ENDOSCOPY;  Service: Pulmonary;  Laterality: N/A;  subcarinal mass    CARDIAC SURGERY      CHOLECYSTECTOMY      CORONARY ARTERY BYPASS GRAFT  2016    GALLBLADDER SURGERY       Family History   Problem Relation Age of Onset    Heart attack Mother     Heart attack Father     Heart disease Father      Social History     Tobacco Use    Smoking status: Former     Current packs/day: 0.00     Average packs/day: 2.0 packs/day for 57.0 years (114.0 ttl pk-yrs)     Types: Cigarettes     Start date: 1958     Quit date: 2015     Years since quitting: 10.0    Smokeless tobacco: Never   Vaping Use    Vaping status: Never Used   Substance Use Topics    Alcohol use: Not Currently    Drug use: Never     Allergies:  Diltiazem    Objective     Vital Signs  Temp:  [97.6 °F (36.4 °C)-98.7 °F (37.1 °C)] 98.7 °F (37.1 °C)  Heart Rate:  [] 94  Resp:  [11-39] 18  BP: ()/(63-99) 92/65      Physical Exam:   Physical Exam  Vitals reviewed.   Cardiovascular:      Rate and Rhythm: Tachycardia present.      Heart sounds: Normal heart sounds.   Pulmonary:      Breath sounds: Wheezing present.      Comments: Poor air exchange  Neurological:      Mental Status: He is alert.              Results Review:   CBC    Results from last 7 days   Lab Units 01/04/25  2352 01/04/25  1135   WBC 10*3/mm3 17.92* 13.58*   HEMOGLOBIN g/dL 10.9* 10.6*   PLATELETS 10*3/mm3 331 297     BMP   Results from last 7 days   Lab Units 01/04/25  2352 01/04/25  1135   SODIUM mmol/L 137 137   POTASSIUM mmol/L 3.9 3.6   CHLORIDE mmol/L 99 99   CO2 mmol/L 25.5 27.5   BUN mg/dL  "32* 30*   CREATININE mg/dL 2.14* 1.84*   GLUCOSE mg/dL 123* 131*     Cr Clearance Estimated Creatinine Clearance: 25.4 mL/min (A) (by C-G formula based on SCr of 2.14 mg/dL (H)).  Coa     HbA1C   Lab Results   Component Value Date    HGBA1C 5.3 02/23/2023    HGBA1C 5.9 (H) 05/20/2021    HGBA1C 5.8 (H) 11/19/2019     Blood Glucose No results found for: \"POCGLU\"  Infection     CMP   Results from last 7 days   Lab Units 01/04/25  2352 01/04/25  1135   SODIUM mmol/L 137 137   POTASSIUM mmol/L 3.9 3.6   CHLORIDE mmol/L 99 99   CO2 mmol/L 25.5 27.5   BUN mg/dL 32* 30*   CREATININE mg/dL 2.14* 1.84*   GLUCOSE mg/dL 123* 131*   ALBUMIN g/dL  --  3.8   BILIRUBIN mg/dL  --  0.9   ALK PHOS U/L  --  145*   AST (SGOT) U/L  --  21   ALT (SGPT) U/L  --  13     UA    Results from last 7 days   Lab Units 01/04/25  1222   NITRITE UA  Negative   WBC UA /HPF 0-2   BACTERIA UA /HPF None Seen   SQUAM EPITHEL UA /HPF 0-2     Radiology(recent) CT Chest Without Contrast Diagnostic    Result Date: 1/4/2025  Impression: 1. Findings concerning for disease progression with increasing consolidative appearance of a mass in the posterior right upper lung and new nodule in the medial left lower lung. 2. Increased postobstructive changes at the right lung base. Electronically Signed: Susanna Muniz MD  1/4/2025 2:19 PM EST  Workstation ID: [id number]      Assessment:      Dyspnea    Acute on chronic hypoxic respiratory failure  Acute exacerbation of panlobular COPD with emphysema  Small cell lung cancer right lower lobe  Posterior right upper lung consolidation  Medial left lower lobe lung nodule  Oxygen dependency  Acute exacerbation of chronic mucopurulent bronchitis  Chronic kidney disease stage IIIb  Hypertension associated with chronic kidney disease stage IIIb  Normocytic normochromic anemia, hypoproliferative, related to chronic kidney disease  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     Nj Morataya MD  01/05/25  07:33 EST

## 2025-01-05 NOTE — PLAN OF CARE
Goal Outcome Evaluation:            No new concerns overnight. Educated and encouraged to wear male pure wick d/t HR increasing to 130's and RR 40's when OOB. Call light in reach. Able to reposition self in bed.

## 2025-01-05 NOTE — CONSULTS
Hematology/Oncology Inpatient Consultation    Patient name: David A Jolissaint  : 1945  MRN: [id number]  Referring Provider: Dr. Morataya  Reason for Consultation: Recurrent lung cancer    Chief complaint: Profound dyspnea.    History of present illness:    David A Jolissaint is a 79 y.o. male who presented to [facility] on 2025 with complaints of     2024: In the office for the first time for the treatment of what appears to be progressive small cell lung cancer.  He was first diagnosed with small cell lung cancer in .  At the time he is disease was staged as limited stage and he was treated both with radiation and chemotherapy.  Subsequently he had prophylactic cranial irradiation.  He did well until closer to the time of this visit when, on a PET scan, that reports increased size and activity of the necrotic tumor in the right lower lobe and superior segment, without associated right lung nodules that are increased in size and show increased and fluorodeoxyglucose avidity.  There is mediastinal adenopathy.  Plans for biopsy were made but he declined.  He was being seen at the [facility] but he decided to transfer his care here as he was not satisfied with the setting and the options he had received.  He is known as well for coronary artery disease and has undergone a coronary artery bypass graft surgery.  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 He suffers from chronic kidney disease and at some point needed to be on dialysis and, in fact, he has a fistula in the left upper extremity that has never been used.  On exam he is a well-built man who appears the stated age.  He seems chronically ill.  He is transported in a wheelchair.  He is in no distress.  No oral lesions.  Respirations not labored.  Lungs markedly diminished bilaterally.  Heart tachycardic and regular.   Abdomen soft.  Right lower extremity without edema.  Reviewed the images of the PET scan and its report.  Discussed with him.  Explained to him the importance of tissue diagnosis.  I have asked him to see Dr. Donohue for consideration of endoscopic fine-needle aspiration both for histologic diagnosis and next-generation sequencing.  I have asked him to see me with results.  Explained the difference between a percutaneous biopsy and endoscopic biopsy and the reduced risk of the endoscopic biopsy.  Discussed with him goals of care and explained to him the importance of thinking about what he wants to do in the future given the findings, he has chronic problems and this most recent diagnosis.     1/5/2025: Mr. Jolissaint came to the emergency room after he started to have dyspnea with minimal activity.  This started probably 2 days, or so, before his presentation.  Imaging of the chest revealed progressive enlargement of the previously identified right lung tumor with some postobstructive consolidation of the upper and lower lobes on the right.  No new findings concerning for metastatic disease were identified.  History of jaundice and had not been seen by thoracic surgery and was scheduled to be seen on January 7, 2025.  He has no pain.  He has been eating well.  He denies fevers.  He has been coughing about as much as usual but has had little expectoration and no hemoptysis.  He denies abdominal pain.  He has had no difficulties with defecation or dysuria and he denies peripheral edema.    He/She  has a past medical history of Anemia, Cellulitis, CKD (chronic kidney disease), stage III, COPD (chronic obstructive pulmonary disease), Diverticulitis, Dyslipidemia, Hypertension, Myocardial infarction (2016), PVD (peripheral vascular disease), and Small cell lung cancer (05/2021).    PCP: Walter Valero MD    History:  Past Medical History:   Diagnosis Date    Anemia     Cellulitis     CKD (chronic kidney disease), stage  III     COPD (chronic obstructive pulmonary disease)     Diverticulitis     Dyslipidemia     Hypertension     Myocardial infarction 2016    PVD (peripheral vascular disease)     Small cell lung cancer 05/2021   ,   Past Surgical History:   Procedure Laterality Date    BELOW KNEE LEG AMPUTATION Left 2015    BRONCHOSCOPY N/A 05/20/2021    Procedure: BRONCHOSCOPY WITH ENDOBRONCHIAL ULTRASOUND WITH FINE NEEDLE ASPIRATION. BRONCHOALVEOLAR LAVAGE;  Surgeon: Jackson Quezada MD;  Location: [facility] ENDOSCOPY;  Service: Pulmonary;  Laterality: N/A;  subcarinal mass    CARDIAC SURGERY      CHOLECYSTECTOMY      CORONARY ARTERY BYPASS GRAFT  2016    GALLBLADDER SURGERY     ,   Family History   Problem Relation Age of Onset    Heart attack Mother     Heart attack Father     Heart disease Father    ,   Social History     Tobacco Use    Smoking status: Former     Current packs/day: 0.00     Average packs/day: 2.0 packs/day for 57.0 years (114.0 ttl pk-yrs)     Types: Cigarettes     Start date: 1958     Quit date: 2015     Years since quitting: 10.0    Smokeless tobacco: Never   Vaping Use    Vaping status: Never Used   Substance Use Topics    Alcohol use: Not Currently    Drug use: Never   ,   Medications Prior to Admission   Medication Sig Dispense Refill Last Dose/Taking    aspirin 81 MG EC tablet Take 1 tablet by mouth Daily With Lunch.  0 1/3/2025 Noon    atorvastatin (LIPITOR) 40 MG tablet Take 1 tablet by mouth Every Morning.   1/4/2025    budesonide (PULMICORT) 0.5 MG/2ML nebulizer solution Take 2 mL by nebulization 2 (two) times a day.   1/3/2025    clopidogrel (PLAVIX) 75 MG tablet Take 1 tablet by mouth Every Morning.   1/4/2025    diphenhydrAMINE (BENADRYL) 25 mg capsule Take 1 capsule by mouth Every 6 (Six) Hours As Needed for Itching. States he takes branded Z-Quil at hs   1/3/2025    dronabinol (MARINOL) 2.5 MG capsule Take 1 capsule by mouth 2 (Two) Times a Day.   1/4/2025    dutasteride (AVODART) 0.5 MG capsule Take 1  capsule by mouth Every Night.   1/3/2025    ferrous sulfate 324 (65 Fe) MG tablet delayed-release EC tablet Take 1 tablet by mouth Daily With Breakfast. 30 tablet 1 1/4/2025    formoterol (Perforomist) 20 MCG/2ML nebulizer solution Take  by nebulization 2 (Two) Times a Day.   1/4/2025    metoprolol succinate XL (TOPROL-XL) 25 MG 24 hr tablet TAKE 1 TABLET EVERY DAY 90 tablet 3 1/4/2025    montelukast (SINGULAIR) 10 MG tablet Take 1 tablet by mouth Every Night.   1/3/2025    Multiple Vitamins-Minerals (MULTI VITAMIN/MINERALS) tablet 1 tablet Daily With Lunch.   1/3/2025    pantoprazole (PROTONIX) 40 MG EC tablet Take 1 tablet by mouth Daily.   1/4/2025    revefenacin (YUPELRI) 175 MCG/3ML nebulizer solution Take 3 mL by nebulization Daily.   1/3/2025    saccharomyces boulardii (FLORASTOR) 250 MG capsule Take 1 capsule by mouth Daily With Lunch.   1/3/2025    sodium bicarbonate 650 MG tablet Take 1 tablet by mouth 2 (Two) Times a Day.   1/4/2025    theophylline (THEODUR) 300 MG 12 hr tablet Take 1 tablet by mouth 2 (Two) Times a Day.   1/4/2025    HYDROcodone-acetaminophen (NORCO) 5-325 MG per tablet Take 1 tablet by mouth Every 6 (Six) Hours As Needed for Moderate Pain.       ondansetron (ZOFRAN) 4 MG tablet Take 1 tablet by mouth As Needed.      , Scheduled Meds:  aspirin, 81 mg, Oral, Daily With Lunch  atorvastatin, 40 mg, Oral, QAM  budesonide, 0.5 mg, Nebulization, BID - RT  clopidogrel, 75 mg, Oral, QAM  ferrous sulfate, 325 mg, Oral, Daily With Breakfast  finasteride, 5 mg, Oral, Nightly  ipratropium-albuterol, 3 mL, Nebulization, Q4H - RT  methylPREDNISolone sodium succinate, 40 mg, Intravenous, Q12H  metoprolol succinate XL, 25 mg, Oral, Daily  montelukast, 10 mg, Oral, Nightly  pantoprazole, 40 mg, Oral, Daily  saccharomyces boulardii, 250 mg, Oral, Daily With Lunch  sodium bicarbonate, 650 mg, Oral, BID  sodium chloride, 10 mL, Intravenous, Q12H    , Continuous Infusions:   , PRN Meds:    acetaminophen  **OR** acetaminophen **OR** acetaminophen    senna-docusate sodium **AND** polyethylene glycol **AND** bisacodyl **AND** bisacodyl    Calcium Replacement - Follow Nurse / BPA Driven Protocol    hydrALAZINE    HYDROcodone-acetaminophen    Magnesium Standard Dose Replacement - Follow Nurse / BPA Driven Protocol    ondansetron ODT **OR** ondansetron    Phosphorus Replacement - Follow Nurse / BPA Driven Protocol    Potassium Replacement - Follow Nurse / BPA Driven Protocol    [COMPLETED] Insert Peripheral IV **AND** sodium chloride    sodium chloride    sodium chloride   Allergies:  Diltiazem    Subjective     ROS:  Review of Systems   Constitutional:  Positive for fatigue. Negative for activity change, appetite change, chills, diaphoresis, fever and unexpected weight change.   HENT:  Negative for congestion, dental problem, drooling, ear discharge, ear pain, facial swelling, hearing loss, mouth sores, nosebleeds, postnasal drip, rhinorrhea, sinus pressure, sinus pain, sneezing, sore throat, tinnitus, trouble swallowing and voice change.    Eyes:  Negative for photophobia, pain, discharge, redness, itching and visual disturbance.   Respiratory:  Positive for cough and shortness of breath. Negative for apnea, choking, chest tightness, wheezing and stridor.    Cardiovascular:  Negative for chest pain, palpitations and leg swelling.   Gastrointestinal:  Negative for abdominal distention, abdominal pain, anal bleeding, blood in stool, constipation, diarrhea, nausea, rectal pain and vomiting.   Endocrine: Negative for cold intolerance, heat intolerance, polydipsia and polyuria.   Genitourinary:  Negative for decreased urine volume, difficulty urinating, dysuria, flank pain, frequency, genital sores, hematuria and urgency.   Musculoskeletal:  Negative for arthralgias, back pain, gait problem, joint swelling, myalgias, neck pain and neck stiffness.   Skin:  Negative for color change, pallor and rash.   Neurological:  Negative  "for dizziness, tremors, seizures, syncope, facial asymmetry, speech difficulty, weakness, light-headedness, numbness and headaches.   Hematological:  Negative for adenopathy. Does not bruise/bleed easily.   Psychiatric/Behavioral:  Negative for agitation, behavioral problems, confusion, decreased concentration, hallucinations, self-injury, sleep disturbance and suicidal ideas. The patient is not nervous/anxious.         Objective   Vital Signs:   /79 (BP Location: Right arm, Patient Position: Lying)   Pulse 106   Temp 98.1 °F (36.7 °C) (Oral)   Resp 16   Ht 177.8 cm (70\")   Wt 65.2 kg (143 lb 11.8 oz)   SpO2 94%   BMI 20.62 kg/m²     Physical Exam: (performed by MD)  Physical Exam  Constitutional:       General: He is not in acute distress.     Appearance: He is ill-appearing. He is not toxic-appearing or diaphoretic.      Comments: Well-built man.  Appears chronically ill.  Seems to stated age.  No acute distress.   HENT:      Head: Normocephalic and atraumatic.      Right Ear: External ear normal.      Left Ear: External ear normal.      Nose: Nose normal.      Mouth/Throat:      Mouth: Mucous membranes are moist.      Pharynx: Oropharynx is clear.   Eyes:      General: No scleral icterus.        Right eye: No discharge.         Left eye: No discharge.      Conjunctiva/sclera: Conjunctivae normal.      Pupils: Pupils are equal, round, and reactive to light.   Cardiovascular:      Rate and Rhythm: Normal rate and regular rhythm.      Pulses: Normal pulses.      Heart sounds: Normal heart sounds. No murmur heard.     No friction rub. No gallop.   Pulmonary:      Effort: No respiratory distress.      Breath sounds: No stridor. No wheezing, rhonchi or rales.      Comments: Breath sounds markedly diminished bilaterally with absence of breath sounds particularly in the upper portions of the right.  Chest:      Chest wall: No tenderness.   Abdominal:      General: Bowel sounds are normal. There is no " "distension.      Palpations: Abdomen is soft. There is no mass.      Tenderness: There is no abdominal tenderness. There is no right CVA tenderness, left CVA tenderness, guarding or rebound.      Comments: Soft and not tender.   Musculoskeletal:         General: No tenderness, deformity or signs of injury.      Cervical back: No rigidity.      Right lower leg: No edema.      Left lower leg: No edema.   Lymphadenopathy:      Cervical: No cervical adenopathy.   Skin:     General: Skin is warm and dry.      Coloration: Skin is not jaundiced or pale.      Findings: No bruising or rash.   Neurological:      General: No focal deficit present.      Mental Status: He is alert and oriented to person, place, and time.      Cranial Nerves: No cranial nerve deficit.   Psychiatric:         Mood and Affect: Mood normal.         Behavior: Behavior normal.         Thought Content: Thought content normal.         Judgment: Judgment normal.     IBRAHIMA Welch MD performed the physical exam on 1/5/2025 at 10:15 AM.    Results Review:  Lab Results (last 48 hours)       Procedure Component Value Units Date/Time    Procalcitonin [[id number]]  (Abnormal) Collected: 01/05/25 0903    Specimen: Blood Updated: 01/05/25 0940     Procalcitonin 0.58 ng/mL     Narrative:      As a Marker for Sepsis (Non-Neonates):    1. <0.5 ng/mL represents a low risk of severe sepsis and/or septic shock.  2. >2 ng/mL represents a high risk of severe sepsis and/or septic shock.    As a Marker for Lower Respiratory Tract Infections that require antibiotic therapy:    PCT on Admission    Antibiotic Therapy       6-12 Hrs later    >0.5                Strongly Recommended  >0.25 - <0.5        Recommended   0.1 - 0.25          Discouraged              Remeasure/reassess PCT  <0.1                Strongly Discouraged     Remeasure/reassess PCT    As 28 day mortality risk marker: \"Change in Procalcitonin Result\" (>80% or <=80%) if Day 0 (or Day 1) and Day 4 values are " available. Refer to http://www.[facility]-pct-calculator.com    Change in PCT <=80%  A decrease of PCT levels below or equal to 80% defines a positive change in PCT test result representing a higher risk for 28-day all-cause mortality of patients diagnosed with severe sepsis for septic shock.    Change in PCT >80%  A decrease of PCT levels of more than 80% defines a negative change in PCT result representing a lower risk for 28-day all-cause mortality of patients diagnosed with severe sepsis or septic shock.       Basic Metabolic Panel [[id number]]  (Abnormal) Collected: 01/04/25 2352    Specimen: Blood Updated: 01/05/25 0025     Glucose 123 mg/dL      BUN 32 mg/dL      Creatinine 2.14 mg/dL      Sodium 137 mmol/L      Potassium 3.9 mmol/L      Chloride 99 mmol/L      CO2 25.5 mmol/L      Calcium 9.8 mg/dL      BUN/Creatinine Ratio 15.0     Anion Gap 12.5 mmol/L      eGFR 30.5 mL/min/1.73     Narrative:      GFR Categories in Chronic Kidney Disease (CKD)      GFR Category          GFR (mL/min/1.73)    Interpretation  G1                     90 or greater         Normal or high (1)  G2                      60-89                Mild decrease (1)  G3a                   45-59                Mild to moderate decrease  G3b                   30-44                Moderate to severe decrease  G4                    15-29                Severe decrease  G5                    14 or less           Kidney failure          (1)In the absence of evidence of kidney disease, neither GFR category G1 or G2 fulfill the criteria for CKD.    eGFR calculation 2021 CKD-EPI creatinine equation, which does not include race as a factor    CBC (No Diff) [[id number]]  (Abnormal) Collected: 01/04/25 2352    Specimen: Blood Updated: 01/05/25 0004     WBC 17.92 10*3/mm3      RBC 3.69 10*6/mm3      Hemoglobin 10.9 g/dL      Hematocrit 34.8 %      MCV 94.3 fL      MCH 29.5 pg      MCHC 31.3 g/dL      RDW 15.0 %      RDW-SD 52.1 fl      MPV 10.1 fL       "Platelets 331 10*3/mm3     D-dimer, Quantitative [[id number]]  (Abnormal) Collected: 01/04/25 1135    Specimen: Blood from Arm, Right Updated: 01/04/25 1348     D-Dimer, Quantitative 2.24 MCGFEU/mL     Narrative:      According to the assay 's published package insert, a normal (<0.50 MCGFEU/mL) D-dimer result in conjunction with a non-high clinical probability assessment, excludes deep vein thrombosis (DVT) and pulmonary embolism (PE) with high sensitivity.    D-dimer values increase with age and this can make VTE exclusion of an older population difficult. To address this, the [employer], based on best available evidence and recent guidelines, recommends that clinicians use age-adjusted D-dimer thresholds in patients greater than 50 years of age with: a) a low probability of PE who do not meet all Pulmonary Embolism Rule Out Criteria, or b) in those with intermediate probability of PE.   The formula for an age-adjusted D-dimer cut-off is \"age/100\".  For example, a 60 year old patient would have an age-adjusted cut-off of 0.60 MCGFEU/mL and an 80 year old 0.80 MCGFEU/mL.    Urinalysis With Microscopic If Indicated (No Culture) - Urine, Clean Catch [[id number]]  (Abnormal) Collected: 01/04/25 1222    Specimen: Urine, Clean Catch Updated: 01/04/25 1243     Color, UA Yellow     Appearance, UA Clear     pH, UA 7.5     Specific Gravity, UA 1.015     Glucose, UA Negative     Ketones, UA Negative     Bilirubin, UA Negative     Blood, UA Negative     Protein, UA >=300 mg/dL (3+)     Leuk Esterase, UA Negative     Nitrite, UA Negative     Urobilinogen, UA 1.0 E.U./dL    Urinalysis, Microscopic Only - Urine, Clean Catch [[id number]] Collected: 01/04/25 1222    Specimen: Urine, Clean Catch Updated: 01/04/25 1243     RBC, UA 0-2 /HPF      WBC, UA 0-2 /HPF      Bacteria, UA None Seen /HPF      Squamous Epithelial Cells, UA 0-2 /HPF      Hyaline Casts, UA 3-6 /LPF      Methodology Automated " Microscopy    Respiratory Panel PCR w/COVID-19(SARS-CoV-2) MIKE/DENZEL/ALONA/PAD/COR/MERLINE In-House, NP Swab in UTM/VTM, 2 HR TAT - Swab, Nasopharynx [[id number]]  (Normal) Collected: 01/04/25 1137    Specimen: Swab from Nasopharynx Updated: 01/04/25 1235     ADENOVIRUS, PCR Not Detected     Coronavirus 229E Not Detected     Coronavirus HKU1 Not Detected     Coronavirus NL63 Not Detected     Coronavirus OC43 Not Detected     COVID19 Not Detected     Human Metapneumovirus Not Detected     Human Rhinovirus/Enterovirus Not Detected     Influenza A PCR Not Detected     Influenza B PCR Not Detected     Parainfluenza Virus 1 Not Detected     Parainfluenza Virus 2 Not Detected     Parainfluenza Virus 3 Not Detected     Parainfluenza Virus 4 Not Detected     RSV, PCR Not Detected     Bordetella pertussis pcr Not Detected     Bordetella parapertussis PCR Not Detected     Chlamydophila pneumoniae PCR Not Detected     Mycoplasma pneumo by PCR Not Detected    Narrative:      In the setting of a positive respiratory panel with a viral infection PLUS a negative procalcitonin without other underlying concern for bacterial infection, consider observing off antibiotics or discontinuation of antibiotics and continue supportive care. If the respiratory panel is positive for atypical bacterial infection (Bordetella pertussis, Chlamydophila pneumoniae, or Mycoplasma pneumoniae), consider antibiotic de-escalation to target atypical bacterial infection.    Comprehensive Metabolic Panel [[id number]]  (Abnormal) Collected: 01/04/25 1135    Specimen: Blood from Arm, Right Updated: 01/04/25 1216     Glucose 131 mg/dL      BUN 30 mg/dL      Creatinine 1.84 mg/dL      Sodium 137 mmol/L      Potassium 3.6 mmol/L      Chloride 99 mmol/L      CO2 27.5 mmol/L      Calcium 9.9 mg/dL      Total Protein 7.0 g/dL      Albumin 3.8 g/dL      ALT (SGPT) 13 U/L      AST (SGOT) 21 U/L      Alkaline Phosphatase 145 U/L      Total Bilirubin 0.9 mg/dL      Globulin 3.2  gm/dL      A/G Ratio 1.2 g/dL      BUN/Creatinine Ratio 16.3     Anion Gap 10.5 mmol/L      eGFR 36.6 mL/min/1.73     Narrative:      GFR Categories in Chronic Kidney Disease (CKD)      GFR Category          GFR (mL/min/1.73)    Interpretation  G1                     90 or greater         Normal or high (1)  G2                      60-89                Mild decrease (1)  G3a                   45-59                Mild to moderate decrease  G3b                   30-44                Moderate to severe decrease  G4                    15-29                Severe decrease  G5                    14 or less           Kidney failure          (1)In the absence of evidence of kidney disease, neither GFR category G1 or G2 fulfill the criteria for CKD.    eGFR calculation 2021 CKD-EPI creatinine equation, which does not include race as a factor    BNP [[id number]]  (Abnormal) Collected: 01/04/25 1135    Specimen: Blood from Arm, Right Updated: 01/04/25 1216     proBNP 2,744.0 pg/mL     Narrative:      This assay is used as an aid in the diagnosis of individuals suspected of having heart failure. It can be used as an aid in the diagnosis of acute decompensated heart failure (ADHF) in patients presenting with signs and symptoms of ADHF to the emergency department (ED). In addition, NT-proBNP of <300 pg/mL indicates ADHF is not likely.    Age Range Result Interpretation  NT-proBNP Concentration (pg/mL:      <50             Positive            >450                   Gray                 300-450                    Negative             <300    50-75           Positive            >900                  Gray                300-900                  Negative            <300      >75             Positive            >1800                  Gray                300-1800                  Negative            <300    Theophylline Level [[id number]]  (Normal) Collected: 01/04/25 1135    Specimen: Blood from Arm, Right Updated: 01/04/25 1216      Theophylline Level 14.4 mcg/mL     Extra Tubes [[id number]] Collected: 01/04/25 1135    Specimen: Blood from Arm, Right Updated: 01/04/25 1200    Narrative:      The following orders were created for panel order Extra Tubes.  Procedure                               Abnormality         Status                     ---------                               -----------         ------                     Gold Top - SST[[id number]]                                   Final result               Light Blue Top[[id number]]                                   Final result                 Please view results for these tests on the individual orders.    Gold Top - SST [[id number]] Collected: 01/04/25 1135    Specimen: Blood from Arm, Right Updated: 01/04/25 1200     Extra Tube Hold for add-ons.     Comment: Auto resulted.       Light Blue Top [[id number]] Collected: 01/04/25 1135    Specimen: Blood from Arm, Right Updated: 01/04/25 1200     Extra Tube Hold for add-ons.     Comment: Auto resulted       CBC & Differential [[id number]]  (Abnormal) Collected: 01/04/25 1135    Specimen: Blood from Arm, Right Updated: 01/04/25 1149    Narrative:      The following orders were created for panel order CBC & Differential.  Procedure                               Abnormality         Status                     ---------                               -----------         ------                     CBC Auto Differential[[id number]]        Abnormal            Final result                 Please view results for these tests on the individual orders.    CBC Auto Differential [[id number]]  (Abnormal) Collected: 01/04/25 1135    Specimen: Blood from Arm, Right Updated: 01/04/25 1149     WBC 13.58 10*3/mm3      RBC 3.59 10*6/mm3      Hemoglobin 10.6 g/dL      Hematocrit 33.8 %      MCV 94.2 fL      MCH 29.5 pg      MCHC 31.4 g/dL      RDW 14.8 %      RDW-SD 51.8 fl      MPV 10.3 fL      Platelets 297 10*3/mm3      Neutrophil % 88.2 %      Lymphocyte % 2.9 %       Monocyte % 6.6 %      Eosinophil % 1.2 %      Basophil % 0.5 %      Immature Grans % 0.6 %      Neutrophils, Absolute 11.97 10*3/mm3      Lymphocytes, Absolute 0.40 10*3/mm3      Monocytes, Absolute 0.90 10*3/mm3      Eosinophils, Absolute 0.16 10*3/mm3      Basophils, Absolute 0.07 10*3/mm3      Immature Grans, Absolute 0.08 10*3/mm3      nRBC 0.0 /100 WBC     Blood Gas, Arterial - [[id number]]  (Abnormal) Collected: 01/04/25 1136    Specimen: Arterial Blood Updated: 01/04/25 1139     Site Right Radial     Umesh's Test Positive     pH, Arterial 7.468 pH units      pCO2, Arterial 37.3 mm Hg      pO2, Arterial 79.5 mm Hg      HCO3, Arterial 27.1 mmol/L      Base Excess, Arterial 3.3 mmol/L      Comment: Serial Number: [id number]  [id number]        O2 Saturation, Arterial 96.4 %      CO2 Content 28.2 mmol/L      Barometric Pressure for Blood Gas --     Comment: N/A        Modality Cannula     FIO2 32 %      Hemodilution No     PO2/FIO2 248        Maging Reviewed:   CT Chest Without Contrast Diagnostic    Result Date: 1/4/2025  Impression: 1. Findings concerning for disease progression with increasing consolidative appearance of a mass in the posterior right upper lung and new nodule in the medial left lower lung. 2. Increased postobstructive changes at the right lung base. Electronically Signed: Susanna Muniz MD  1/4/2025 2:19 PM EST  Workstation ID: [id number]     Assessment & Plan   ASSESSMENT  Apparent recurring small cell lung cancer: I have reviewed independently the images of the scans and discussed the results with him.  The findings are highly concerning for recurrent malignancy.  A histologic diagnosis is essential and for this a biopsy is unavoidable.  Because of his previous history a percutaneous biopsy, though it may be possible, would not be the sound step approach.  However and endoscopic ultrasound-guided FNA might provide adequate tissue both for diagnosis and also for next-generation sequencing.   Because his first appointment with thoracic surgery would be in the next 48 hours and given the inclement weather I want to see if Dr. Donohue or  would be available to do it in the hospital.  Discussed with him.  Dyspnea: This is a result most likely of a combination of factors.  He has chronic obstructive pulmonary disease as well as the apparent progressive malignancy are the biggest players.  He is better with the respiratory treatments that he is received.  Leukocytosis: He has had leukocytosis now since September of last year.  This is most likely the result of several factors.  The malignancy most likely participates in the process but, if he has an exacerbation of his chronic obstructive pulmonary disease this could be a participant as well.  At this time no intervention from the hematology point of view.  Normocytic anemia: This most likely is the result of chronic disease.  Will continue to observe and support with transfusions as needed.  At this point no need for intervention.    PLAN  As above.    Electronically signed by Ham Welch MD, 01/05/25, 10:11 AM EST.

## 2025-01-05 NOTE — CONSULTS
[facility]: [facility]         CONSULT NOTE    Patient Identification:  David A Jolissaint  79 y.o.  male  1945  [id number]            Requesting physician: Attending physician    Reason for Consultation: Pneumonia      History of Present Illness:  79-year-old male admitted on 1/5/2025 with increasing shortness of breath and cough  50-pack-year history of smoking     Assessment:    Acute/chronic hypoxic respiratory insufficiency    Right lower lobe pneumonia    Respiratory panel negative on 1/4/2025    Lung cancer, small cell lung cancer   EBUS bronchoscopy 5/20/2021 , limited stage treated with radiation and chemotherapy  Followed by [facility], robotic bronchoscopy 2/6/2024  Right lower lobe FNA and biopsies were inadequate, FNA 11R and adequate, procedure was complicated by pneumothorax required admission to the hospital and chest tube placement    PET scan done showed worsening right lower lobe necrotic tumor with mediastinal adenopathy    50-pack-year history of smoking     COPD, no PFTs on CAT scan appears severe     Atherosclerotic heart disease  Chronic bronchitis  Stage IV chronic kidney disease  Prediabetes  Dyslipidemia  Slightly elevated alkaline phosphatase  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    oxygen titration keep O2 sats above 90% currently on 3 to 4 L    Steroids currently on IV Solu-Medrol 40 mg every 12  Bronchodilators budesonide and DuoNeb    Initiate Lovenox for DVT prophylaxis after bronch  Currently on Plavix and aspirin            Review of Sytems:  Review of Systems   Respiratory:  Positive for cough and shortness of breath. Negative for wheezing and stridor.    Cardiovascular:  Negative for chest pain, palpitations and leg swelling.       Past Medical History:  Past Medical History:   Diagnosis Date     Anemia     Cellulitis     CKD (chronic kidney disease), stage III     COPD (chronic obstructive pulmonary disease)     Diverticulitis     Dyslipidemia     Hypertension     Myocardial infarction 2016    PVD (peripheral vascular disease)     Small cell lung cancer 05/2021       Past Surgical History:  Past Surgical History:   Procedure Laterality Date    BELOW KNEE LEG AMPUTATION Left 2015    BRONCHOSCOPY N/A 05/20/2021    Procedure: BRONCHOSCOPY WITH ENDOBRONCHIAL ULTRASOUND WITH FINE NEEDLE ASPIRATION. BRONCHOALVEOLAR LAVAGE;  Surgeon: Jackson Quezada MD;  Location[facility] ENDOSCOPY;  Service: Pulmonary;  Laterality: N/A;  subcarinal mass    CARDIAC SURGERY      CHOLECYSTECTOMY      CORONARY ARTERY BYPASS GRAFT  2016    GALLBLADDER SURGERY          Home Meds:  Medications Prior to Admission   Medication Sig Dispense Refill Last Dose/Taking    aspirin 81 MG EC tablet Take 1 tablet by mouth Daily With Lunch.  0 1/3/2025 Noon    atorvastatin (LIPITOR) 40 MG tablet Take 1 tablet by mouth Every Morning.   1/4/2025    budesonide (PULMICORT) 0.5 MG/2ML nebulizer solution Take 2 mL by nebulization 2 (two) times a day.   1/3/2025    clopidogrel (PLAVIX) 75 MG tablet Take 1 tablet by mouth Every Morning.   1/4/2025    diphenhydrAMINE (BENADRYL) 25 mg capsule Take 1 capsule by mouth Every 6 (Six) Hours As Needed for Itching. States he takes branded Z-Quil at hs   1/3/2025    dronabinol (MARINOL) 2.5 MG capsule Take 1 capsule by mouth 2 (Two) Times a Day.   1/4/2025    dutasteride (AVODART) 0.5 MG capsule Take 1 capsule by mouth Every Night.   1/3/2025    ferrous sulfate 324 (65 Fe) MG tablet delayed-release EC tablet Take 1 tablet by mouth Daily With Breakfast. 30 tablet 1 1/4/2025    formoterol (Perforomist) 20 MCG/2ML nebulizer solution Take  by nebulization 2 (Two) Times a Day.   1/4/2025    metoprolol succinate XL (TOPROL-XL) 25 MG 24 hr tablet TAKE 1 TABLET EVERY DAY 90 tablet 3 1/4/2025    montelukast (SINGULAIR) 10 MG  "tablet Take 1 tablet by mouth Every Night.   1/3/2025    Multiple Vitamins-Minerals (MULTI VITAMIN/MINERALS) tablet 1 tablet Daily With Lunch.   1/3/2025    pantoprazole (PROTONIX) 40 MG EC tablet Take 1 tablet by mouth Daily.   1/4/2025    revefenacin (YUPELRI) 175 MCG/3ML nebulizer solution Take 3 mL by nebulization Daily.   1/3/2025    saccharomyces boulardii (FLORASTOR) 250 MG capsule Take 1 capsule by mouth Daily With Lunch.   1/3/2025    sodium bicarbonate 650 MG tablet Take 1 tablet by mouth 2 (Two) Times a Day.   1/4/2025    theophylline (THEODUR) 300 MG 12 hr tablet Take 1 tablet by mouth 2 (Two) Times a Day.   1/4/2025    HYDROcodone-acetaminophen (NORCO) 5-325 MG per tablet Take 1 tablet by mouth Every 6 (Six) Hours As Needed for Moderate Pain.       ondansetron (ZOFRAN) 4 MG tablet Take 1 tablet by mouth As Needed.          Allergies:  Allergies   Allergen Reactions    Diltiazem Unknown - High Severity     Swelling of lips       Social History:   Social History     Socioeconomic History    Marital status:    Tobacco Use    Smoking status: Former     Current packs/day: 0.00     Average packs/day: 2.0 packs/day for 57.0 years (114.0 ttl pk-yrs)     Types: Cigarettes     Start date: 1958     Quit date: 2015     Years since quitting: 10.0    Smokeless tobacco: Never   Vaping Use    Vaping status: Never Used   Substance and Sexual Activity    Alcohol use: Not Currently    Drug use: Never    Sexual activity: Defer       Family History:  Family History   Problem Relation Age of Onset    Heart attack Mother     Heart attack Father     Heart disease Father        Physical Exam:  /79 (BP Location: Right arm, Patient Position: Lying)   Pulse 106   Temp 98.1 °F (36.7 °C) (Oral)   Resp 16   Ht 177.8 cm (70\")   Wt 65.2 kg (143 lb 11.8 oz)   SpO2 94%   BMI 20.62 kg/m²  Body mass index is 20.62 kg/m². 94% 65.2 kg (143 lb 11.8 oz)  Physical Exam  Cardiovascular:      Heart sounds: Murmur heard.      " "No gallop.   Pulmonary:      Effort: No respiratory distress.      Breath sounds: No stridor. Rhonchi and rales present. No wheezing.   Chest:      Chest wall: No tenderness.         LABS:  Lab Results   Component Value Date    CALCIUM 9.8 01/04/2025    PHOS 3.5 04/29/2024     Results from last 7 days   Lab Units 01/05/25  0903 01/04/25  2352 01/04/25  1135   SODIUM mmol/L  --  137 137   POTASSIUM mmol/L  --  3.9 3.6   CHLORIDE mmol/L  --  99 99   CO2 mmol/L  --  25.5 27.5   BUN mg/dL  --  32* 30*   CREATININE mg/dL  --  2.14* 1.84*   GLUCOSE mg/dL  --  123* 131*   CALCIUM mg/dL  --  9.8 9.9   WBC 10*3/mm3  --  17.92* 13.58*   HEMOGLOBIN g/dL  --  10.9* 10.6*   PLATELETS 10*3/mm3  --  331 297   ALT (SGPT) U/L  --   --  13   AST (SGOT) U/L  --   --  21   PROBNP pg/mL  --   --  2,744.0*   PROCALCITONIN ng/mL 0.58*  --   --      Lab Results   Component Value Date    TROPONINT 37 (H) 09/25/2024             Results from last 7 days   Lab Units 01/05/25  0903   PROCALCITONIN ng/mL 0.58*     Results from last 7 days   Lab Units 01/04/25  1136   PH, ARTERIAL pH units 7.468*   PCO2, ARTERIAL mm Hg 37.3   PO2 ART mm Hg 79.5*   O2 SATURATION ART % 96.4   MODALITY  Cannula     Results from last 7 days   Lab Units 01/04/25  1137   ADENOVIRUS DETECTION BY PCR  Not Detected   CORONAVIRUS 229E  Not Detected   CORONAVIRUS HKU1  Not Detected   CORONAVIRUS NL63  Not Detected   CORONAVIRUS OC43  Not Detected   HUMAN METAPNEUMOVIRUS  Not Detected   HUMAN RHINOVIRUS/ENTEROVIRUS  Not Detected   INFLUENZA B PCR  Not Detected   PARAINFLUENZA 1  Not Detected   PARAINFLUENZA VIRUS 2  Not Detected   PARAINFLUENZA VIRUS 3  Not Detected   PARAINFLUENZA VIRUS 4  Not Detected   BORDETELLA PERTUSSIS PCR  Not Detected   CHLAMYDOPHILA PNEUMONIAE PCR  Not Detected   MYCOPLAMA PNEUMO PCR  Not Detected   INFLUENZA A PCR  Not Detected   RSV, PCR  Not Detected             No results found for: \"TSH\"  Estimated Creatinine Clearance: 25.4 mL/min (A) (by C-G " formula based on SCr of 2.14 mg/dL (H)).  Results from last 7 days   Lab Units 01/04/25  1222   NITRITE UA  Negative   WBC UA /HPF 0-2   BACTERIA UA /HPF None Seen   SQUAM EPITHEL UA /HPF 0-2        Imaging:  Imaging Results (Last 24 Hours)       Procedure Component Value Units Date/Time    CT Chest Without Contrast Diagnostic [[id number]] Collected: 01/04/25 1408     Updated: 01/04/25 1421    Narrative:      CT CHEST WO CONTRAST DIAGNOSTIC    Date of Exam: 1/4/2025 1:32 PM EST    Indication: soa - refused contrast, hx lung cancer and COPD.    Comparison: CT chest 11/16/2024    Technique: Axial CT images were obtained of the chest without contrast administration.  Sagittal and coronal reconstructions were performed.  Automated exposure control and iterative reconstruction methods were used.      Findings:  Hilum and Mediastinum: An AP window node measures 9 mm in short axis unchanged.  Normal heart size.   No pericardial effusion. There is calcified atherosclerotic disease of the thoracic aorta and coronary arteries. There is a right chest wall port   catheter.    Lung Parenchyma and Pleura: Evaluation of lung parenchyma demonstrates consolidation in the posterior right upper lung with slight interval increase in size from prior examination extending to the medial superior pleural and pleural calcifications   measuring 4.6 x 5.8 x 5.3 cm (image 55 of series 5 and image 69 of series 3). There is an adjacent satellite nodule measuring 13 mm (image 58 of series 5) not significantly changed. There is nodularity along the fissure in the right middle lung unchanged   measuring 12 mm (image 69 of series 5). A thick walled cavitary lesion is noted adjacent to this nodule unchanged measuring 11 mm (image 70 of series 5). Postobstructive changes throughout the right lower lung with bronchial wall thickening and mucous   plugging are increased from prior examination.    There is suspected rounded atelectasis in the medial left  lower lung; however there is a new nodular opacity in the medial left lung base (image 91 of series 5) which measures 14 mm.    Findings are superimposed on centrilobular emphysema. There are pleural calcifications noted. No pneumothorax. No large pleural effusion.    Upper Abdomen: The gallbladder is surgically absent. Renal vascular calcifications are noted.    Soft tissues: Unremarkable.    Osseous structures: No aggressive focal lytic or sclerotic osseous lesions.      Impression:      Impression:    1. Findings concerning for disease progression with increasing consolidative appearance of a mass in the posterior right upper lung and new nodule in the medial left lower lung.    2. Increased postobstructive changes at the right lung base.      Electronically Signed: Susanna Muniz MD    1/4/2025 2:19 PM EST    Workstation ID: [id number]              Current Meds:   SCHEDULE  aspirin, 81 mg, Oral, Daily With Lunch  atorvastatin, 40 mg, Oral, QAM  budesonide, 0.5 mg, Nebulization, BID - RT  clopidogrel, 75 mg, Oral, QAM  ferrous sulfate, 325 mg, Oral, Daily With Breakfast  finasteride, 5 mg, Oral, Nightly  ipratropium-albuterol, 3 mL, Nebulization, Q4H - RT  methylPREDNISolone sodium succinate, 40 mg, Intravenous, Q12H  metoprolol succinate XL, 25 mg, Oral, Daily  montelukast, 10 mg, Oral, Nightly  pantoprazole, 40 mg, Oral, Daily  saccharomyces boulardii, 250 mg, Oral, Daily With Lunch  sodium bicarbonate, 650 mg, Oral, BID  sodium chloride, 10 mL, Intravenous, Q12H      Infusions     PRNs    acetaminophen **OR** acetaminophen **OR** acetaminophen    senna-docusate sodium **AND** polyethylene glycol **AND** bisacodyl **AND** bisacodyl    Calcium Replacement - Follow Nurse / BPA Driven Protocol    hydrALAZINE    HYDROcodone-acetaminophen    Magnesium Standard Dose Replacement - Follow Nurse / BPA Driven Protocol    ondansetron ODT **OR** ondansetron    Phosphorus Replacement - Follow Nurse / BPA Driven Protocol     Potassium Replacement - Follow Nurse / BPA Driven Protocol    [COMPLETED] Insert Peripheral IV **AND** sodium chloride    sodium chloride    sodium chloride        Jackson Quezada MD  1/5/2025  10:09 EST      Much of this encounter note is an electronic transcription/translation of spoken language to printed text using Dragon Software.

## 2025-01-05 NOTE — PLAN OF CARE
Pt is A&O*4, VSS on 4L NC. Pt is npo after midnight for bronchoscopy on 6th, sent two sets of blood culture and administered iv abx and steroids. Administered pain meds and stool softer as ordered, external cath is on and call light within the reach. Bed alarm is on.     Problem: Adult Inpatient Plan of Care  Goal: Plan of Care Review  1/5/2025 1614 by Alysa Yang RN  Outcome: Progressing  1/5/2025 1613 by Alysa Yang RN  Outcome: Progressing  Goal: Patient-Specific Goal (Individualized)  1/5/2025 1614 by Alysa Yang RN  Outcome: Progressing  1/5/2025 1613 by Alysa Yang RN  Outcome: Progressing  Goal: Absence of Hospital-Acquired Illness or Injury  1/5/2025 1614 by Alysa Yang RN  Outcome: Progressing  1/5/2025 1613 by Alysa Yang RN  Outcome: Progressing  Intervention: Identify and Manage Fall Risk  Recent Flowsheet Documentation  Taken 1/5/2025 1600 by Alysa Yang RN  Safety Promotion/Fall Prevention: safety round/check completed  Taken 1/5/2025 1400 by Alysa Yang RN  Safety Promotion/Fall Prevention: safety round/check completed  Taken 1/5/2025 1200 by Alysa Yang RN  Safety Promotion/Fall Prevention: safety round/check completed  Taken 1/5/2025 1000 by Alysa Yang RN  Safety Promotion/Fall Prevention: safety round/check completed  Taken 1/5/2025 0800 by Alysa Yang RN  Safety Promotion/Fall Prevention: safety round/check completed  Intervention: Prevent Skin Injury  Recent Flowsheet Documentation  Taken 1/5/2025 0800 by Alysa Yang RN  Body Position: position changed independently  Skin Protection: incontinence pads utilized  Intervention: Prevent and Manage VTE (Venous Thromboembolism) Risk  Recent Flowsheet Documentation  Taken 1/5/2025 0800 by Alysa Yang RN  VTE Prevention/Management: SCDs (sequential compression devices) off  Intervention: Prevent Infection  Recent Flowsheet Documentation  Taken 1/5/2025 1600 by Trey  TOÑITO Watt  Infection Prevention: single patient room provided  Taken 1/5/2025 1400 by Alysa Yang RN  Infection Prevention: single patient room provided  Taken 1/5/2025 1200 by Alysa Yang RN  Infection Prevention: single patient room provided  Taken 1/5/2025 1000 by Alysa Yang RN  Infection Prevention: single patient room provided  Taken 1/5/2025 0800 by Alysa Yang RN  Infection Prevention: single patient room provided  Goal: Optimal Comfort and Wellbeing  1/5/2025 1614 by Alysa Yang RN  Outcome: Progressing  1/5/2025 1613 by Alysa Yang RN  Outcome: Progressing  Intervention: Provide Person-Centered Care  Recent Flowsheet Documentation  Taken 1/5/2025 0800 by Alysa Yang RN  Trust Relationship/Rapport:   care explained   choices provided   emotional support provided  Goal: Readiness for Transition of Care  1/5/2025 1614 by Alysa Yang RN  Outcome: Progressing  1/5/2025 1613 by Alysa Yang RN  Outcome: Progressing     Problem: Sepsis/Septic Shock  Goal: Optimal Coping  1/5/2025 1614 by Alysa Yang RN  Outcome: Progressing  1/5/2025 1613 by Alysa Yang RN  Outcome: Progressing  Intervention: Support Patient and Family Response  Recent Flowsheet Documentation  Taken 1/5/2025 0800 by Alysa Yang RN  Family/Support System Care: self-care encouraged  Goal: Absence of Bleeding  1/5/2025 1614 by Alysa Yang RN  Outcome: Progressing  1/5/2025 1613 by Alysa Yang RN  Outcome: Progressing  Goal: Blood Glucose Level Within Target Range  1/5/2025 1614 by Alysa Yang RN  Outcome: Progressing  1/5/2025 1613 by Alysa Yang RN  Outcome: Progressing  Goal: Absence of Infection Signs and Symptoms  1/5/2025 1614 by Alysa Yang RN  Outcome: Progressing  1/5/2025 1613 by Alysa Yang RN  Outcome: Progressing  Intervention: Initiate Sepsis Management  Recent Flowsheet Documentation  Taken 1/5/2025 1600 by Alysa Yang,  RN  Infection Prevention: single patient room provided  Taken 1/5/2025 1400 by Alysa Yang RN  Infection Prevention: single patient room provided  Taken 1/5/2025 1200 by Alysa Yang RN  Infection Prevention: single patient room provided  Taken 1/5/2025 1000 by Alysa Yang RN  Infection Prevention: single patient room provided  Taken 1/5/2025 0800 by Alysa Yang RN  Infection Prevention: single patient room provided  Intervention: Promote Recovery  Recent Flowsheet Documentation  Taken 1/5/2025 0800 by Alysa Yang RN  Activity Management: activity encouraged  Goal: Optimal Nutrition Delivery  1/5/2025 1614 by Alysa Yang RN  Outcome: Progressing  1/5/2025 1613 by Alysa Yang RN  Outcome: Progressing     Problem: Skin Injury Risk Increased  Goal: Skin Health and Integrity  1/5/2025 1614 by Alysa Yang RN  Outcome: Progressing  1/5/2025 1613 by Alysa Yang RN  Outcome: Progressing  Intervention: Optimize Skin Protection  Recent Flowsheet Documentation  Taken 1/5/2025 0800 by Alysa Yang RN  Activity Management: activity encouraged  Pressure Reduction Techniques: frequent weight shift encouraged  Head of Bed (HOB) Positioning: HOB elevated  Pressure Reduction Devices: pressure-redistributing mattress utilized  Skin Protection: incontinence pads utilized     Problem: Fall Injury Risk  Goal: Absence of Fall and Fall-Related Injury  1/5/2025 1614 by Alysa Yang RN  Outcome: Progressing  1/5/2025 1613 by Alysa Yang RN  Outcome: Progressing  Intervention: Identify and Manage Contributors  Recent Flowsheet Documentation  Taken 1/5/2025 1600 by Alysa Yang RN  Medication Review/Management: medications reviewed  Taken 1/5/2025 1400 by Alysa Yang RN  Medication Review/Management: medications reviewed  Taken 1/5/2025 1200 by Alysa Yang RN  Medication Review/Management: medications reviewed  Taken 1/5/2025 1000 by Alysa Yang  RN  Medication Review/Management: medications reviewed  Taken 1/5/2025 0800 by Alysa Yang RN  Medication Review/Management: medications reviewed  Intervention: Promote Injury-Free Environment  Recent Flowsheet Documentation  Taken 1/5/2025 1600 by Alysa Yang RN  Safety Promotion/Fall Prevention: safety round/check completed  Taken 1/5/2025 1400 by Alysa Yang RN  Safety Promotion/Fall Prevention: safety round/check completed  Taken 1/5/2025 1200 by Alysa Yang RN  Safety Promotion/Fall Prevention: safety round/check completed  Taken 1/5/2025 1000 by Alysa Yang RN  Safety Promotion/Fall Prevention: safety round/check completed  Taken 1/5/2025 0800 by Alysa Yang RN  Safety Promotion/Fall Prevention: safety round/check completed   Goal Outcome Evaluation:

## 2025-01-06 ENCOUNTER — ANESTHESIA EVENT (OUTPATIENT)
Dept: GASTROENTEROLOGY | Facility: HOSPITAL | Age: 80
End: 2025-01-06
Payer: MEDICARE

## 2025-01-06 ENCOUNTER — ANESTHESIA (OUTPATIENT)
Dept: GASTROENTEROLOGY | Facility: HOSPITAL | Age: 80
End: 2025-01-06
Payer: MEDICARE

## 2025-01-06 LAB
ANION GAP SERPL CALCULATED.3IONS-SCNC: 12.9 MMOL/L (ref 5–15)
B PARAPERT DNA SPEC QL NAA+PROBE: NOT DETECTED
B PERT DNA SPEC QL NAA+PROBE: NOT DETECTED
BUN SERPL-MCNC: 43 MG/DL (ref 8–23)
BUN/CREAT SERPL: 16.1 (ref 7–25)
C PNEUM DNA NPH QL NAA+NON-PROBE: NOT DETECTED
CALCIUM SPEC-SCNC: 9.4 MG/DL (ref 8.6–10.5)
CHLORIDE SERPL-SCNC: 100 MMOL/L (ref 98–107)
CO2 SERPL-SCNC: 26.1 MMOL/L (ref 22–29)
CREAT SERPL-MCNC: 2.67 MG/DL (ref 0.76–1.27)
DEPRECATED RDW RBC AUTO: 52.9 FL (ref 37–54)
EGFRCR SERPLBLD CKD-EPI 2021: 23.4 ML/MIN/1.73
ERYTHROCYTE [DISTWIDTH] IN BLOOD BY AUTOMATED COUNT: 15.1 % (ref 12.3–15.4)
FLUAV SUBTYP SPEC NAA+PROBE: NOT DETECTED
FLUBV RNA ISLT QL NAA+PROBE: NOT DETECTED
GLUCOSE SERPL-MCNC: 205 MG/DL (ref 65–99)
HADV DNA SPEC NAA+PROBE: NOT DETECTED
HCOV 229E RNA SPEC QL NAA+PROBE: NOT DETECTED
HCOV HKU1 RNA SPEC QL NAA+PROBE: NOT DETECTED
HCOV NL63 RNA SPEC QL NAA+PROBE: NOT DETECTED
HCOV OC43 RNA SPEC QL NAA+PROBE: NOT DETECTED
HCT VFR BLD AUTO: 31.4 % (ref 37.5–51)
HGB BLD-MCNC: 9.9 G/DL (ref 13–17.7)
HMPV RNA NPH QL NAA+NON-PROBE: NOT DETECTED
HPIV1 RNA ISLT QL NAA+PROBE: NOT DETECTED
HPIV2 RNA SPEC QL NAA+PROBE: NOT DETECTED
HPIV3 RNA NPH QL NAA+PROBE: NOT DETECTED
HPIV4 P GENE NPH QL NAA+PROBE: NOT DETECTED
M PNEUMO IGG SER IA-ACNC: NOT DETECTED
MCH RBC QN AUTO: 29.7 PG (ref 26.6–33)
MCHC RBC AUTO-ENTMCNC: 31.5 G/DL (ref 31.5–35.7)
MCV RBC AUTO: 94.3 FL (ref 79–97)
PLATELET # BLD AUTO: 292 10*3/MM3 (ref 140–450)
PMV BLD AUTO: 10.4 FL (ref 6–12)
POTASSIUM SERPL-SCNC: 3.9 MMOL/L (ref 3.5–5.2)
RBC # BLD AUTO: 3.33 10*6/MM3 (ref 4.14–5.8)
RHINOVIRUS RNA SPEC NAA+PROBE: NOT DETECTED
RSV RNA NPH QL NAA+NON-PROBE: NOT DETECTED
SARS-COV-2 RNA RESP QL NAA+PROBE: NOT DETECTED
SODIUM SERPL-SCNC: 139 MMOL/L (ref 136–145)
WBC NRBC COR # BLD AUTO: 14.05 10*3/MM3 (ref 3.4–10.8)

## 2025-01-06 PROCEDURE — 25810000003 SODIUM CHLORIDE 0.9 % SOLUTION: Performed by: ANESTHESIOLOGIST ASSISTANT

## 2025-01-06 PROCEDURE — 94761 N-INVAS EAR/PLS OXIMETRY MLT: CPT

## 2025-01-06 PROCEDURE — 25010000002 METHYLPREDNISOLONE PER 40 MG: Performed by: FAMILY MEDICINE

## 2025-01-06 PROCEDURE — 94799 UNLISTED PULMONARY SVC/PX: CPT

## 2025-01-06 PROCEDURE — 0B968ZX DRAINAGE OF RIGHT LOWER LOBE BRONCHUS, VIA NATURAL OR ARTIFICIAL OPENING ENDOSCOPIC, DIAGNOSTIC: ICD-10-PCS | Performed by: INTERNAL MEDICINE

## 2025-01-06 PROCEDURE — 87206 SMEAR FLUORESCENT/ACID STAI: CPT | Performed by: INTERNAL MEDICINE

## 2025-01-06 PROCEDURE — 87205 SMEAR GRAM STAIN: CPT | Performed by: INTERNAL MEDICINE

## 2025-01-06 PROCEDURE — 0202U NFCT DS 22 TRGT SARS-COV-2: CPT | Performed by: INTERNAL MEDICINE

## 2025-01-06 PROCEDURE — 88108 CYTOPATH CONCENTRATE TECH: CPT | Performed by: INTERNAL MEDICINE

## 2025-01-06 PROCEDURE — 85027 COMPLETE CBC AUTOMATED: CPT | Performed by: FAMILY MEDICINE

## 2025-01-06 PROCEDURE — 25010000002 PIPERACILLIN SOD-TAZOBACTAM PER 1 G: Performed by: INTERNAL MEDICINE

## 2025-01-06 PROCEDURE — 94760 N-INVAS EAR/PLS OXIMETRY 1: CPT

## 2025-01-06 PROCEDURE — 94664 DEMO&/EVAL PT USE INHALER: CPT

## 2025-01-06 PROCEDURE — 25010000002 PROPOFOL 10 MG/ML EMULSION: Performed by: ANESTHESIOLOGIST ASSISTANT

## 2025-01-06 PROCEDURE — 80048 BASIC METABOLIC PNL TOTAL CA: CPT | Performed by: FAMILY MEDICINE

## 2025-01-06 PROCEDURE — 87798 DETECT AGENT NOS DNA AMP: CPT | Performed by: INTERNAL MEDICINE

## 2025-01-06 PROCEDURE — 87070 CULTURE OTHR SPECIMN AEROBIC: CPT | Performed by: INTERNAL MEDICINE

## 2025-01-06 PROCEDURE — 87102 FUNGUS ISOLATION CULTURE: CPT | Performed by: INTERNAL MEDICINE

## 2025-01-06 PROCEDURE — 25010000002 LIDOCAINE 2% SOLUTION: Performed by: INTERNAL MEDICINE

## 2025-01-06 PROCEDURE — 87116 MYCOBACTERIA CULTURE: CPT | Performed by: INTERNAL MEDICINE

## 2025-01-06 PROCEDURE — 63710000001 DIPHENHYDRAMINE PER 50 MG: Performed by: NURSE PRACTITIONER

## 2025-01-06 PROCEDURE — 25010000002 ENOXAPARIN PER 10 MG: Performed by: PHYSICIAN ASSISTANT

## 2025-01-06 RX ORDER — LIDOCAINE HYDROCHLORIDE 20 MG/ML
INJECTION, SOLUTION INFILTRATION; PERINEURAL AS NEEDED
Status: DISCONTINUED | OUTPATIENT
Start: 2025-01-06 | End: 2025-01-06 | Stop reason: HOSPADM

## 2025-01-06 RX ORDER — SODIUM CHLORIDE 9 MG/ML
INJECTION, SOLUTION INTRAVENOUS CONTINUOUS PRN
Status: DISCONTINUED | OUTPATIENT
Start: 2025-01-06 | End: 2025-01-06 | Stop reason: SURG

## 2025-01-06 RX ORDER — ENOXAPARIN SODIUM 100 MG/ML
30 INJECTION SUBCUTANEOUS DAILY
Status: DISCONTINUED | OUTPATIENT
Start: 2025-01-06 | End: 2025-01-08 | Stop reason: HOSPADM

## 2025-01-06 RX ADMIN — ENOXAPARIN SODIUM 30 MG: 100 INJECTION SUBCUTANEOUS at 18:01

## 2025-01-06 RX ADMIN — DIPHENHYDRAMINE HYDROCHLORIDE 25 MG: 25 CAPSULE ORAL at 20:25

## 2025-01-06 RX ADMIN — IPRATROPIUM BROMIDE AND ALBUTEROL SULFATE 3 ML: .5; 3 SOLUTION RESPIRATORY (INHALATION) at 23:30

## 2025-01-06 RX ADMIN — PIPERACILLIN AND TAZOBACTAM 3.38 G: 3; .375 INJECTION, POWDER, FOR SOLUTION INTRAVENOUS at 09:26

## 2025-01-06 RX ADMIN — PANTOPRAZOLE SODIUM 40 MG: 40 TABLET, DELAYED RELEASE ORAL at 09:26

## 2025-01-06 RX ADMIN — FINASTERIDE 5 MG: 5 TABLET, FILM COATED ORAL at 20:25

## 2025-01-06 RX ADMIN — ATORVASTATIN CALCIUM 40 MG: 40 TABLET, FILM COATED ORAL at 09:25

## 2025-01-06 RX ADMIN — PIPERACILLIN AND TAZOBACTAM 3.38 G: 3; .375 INJECTION, POWDER, FOR SOLUTION INTRAVENOUS at 02:17

## 2025-01-06 RX ADMIN — SODIUM BICARBONATE 650 MG: 650 TABLET ORAL at 09:27

## 2025-01-06 RX ADMIN — IPRATROPIUM BROMIDE AND ALBUTEROL SULFATE 3 ML: .5; 3 SOLUTION RESPIRATORY (INHALATION) at 16:15

## 2025-01-06 RX ADMIN — ASPIRIN 81 MG: 81 TABLET, COATED ORAL at 14:29

## 2025-01-06 RX ADMIN — Medication 250 MG: at 14:29

## 2025-01-06 RX ADMIN — PROPOFOL 100 MCG/KG/MIN: 10 INJECTION, EMULSION INTRAVENOUS at 11:19

## 2025-01-06 RX ADMIN — PIPERACILLIN AND TAZOBACTAM 3.38 G: 3; .375 INJECTION, POWDER, FOR SOLUTION INTRAVENOUS at 17:32

## 2025-01-06 RX ADMIN — SODIUM BICARBONATE 650 MG: 650 TABLET ORAL at 20:25

## 2025-01-06 RX ADMIN — Medication 10 ML: at 09:27

## 2025-01-06 RX ADMIN — SODIUM CHLORIDE: 9 INJECTION, SOLUTION INTRAVENOUS at 11:19

## 2025-01-06 RX ADMIN — METHYLPREDNISOLONE SODIUM SUCCINATE 40 MG: 40 INJECTION, POWDER, FOR SOLUTION INTRAMUSCULAR; INTRAVENOUS at 20:27

## 2025-01-06 RX ADMIN — IPRATROPIUM BROMIDE AND ALBUTEROL SULFATE 3 ML: .5; 3 SOLUTION RESPIRATORY (INHALATION) at 19:52

## 2025-01-06 RX ADMIN — METOPROLOL SUCCINATE 25 MG: 25 TABLET, EXTENDED RELEASE ORAL at 09:26

## 2025-01-06 RX ADMIN — MONTELUKAST 10 MG: 10 TABLET, FILM COATED ORAL at 20:25

## 2025-01-06 RX ADMIN — FERROUS SULFATE TAB 325 MG (65 MG ELEMENTAL FE) 325 MG: 325 (65 FE) TAB at 09:27

## 2025-01-06 RX ADMIN — Medication 10 ML: at 20:16

## 2025-01-06 RX ADMIN — HYDROCODONE BITARTRATE AND ACETAMINOPHEN 1 TABLET: 5; 325 TABLET ORAL at 20:25

## 2025-01-06 RX ADMIN — BUDESONIDE 0.5 MG: 0.5 INHALANT RESPIRATORY (INHALATION) at 07:24

## 2025-01-06 RX ADMIN — BUDESONIDE 0.5 MG: 0.5 INHALANT RESPIRATORY (INHALATION) at 19:52

## 2025-01-06 RX ADMIN — IPRATROPIUM BROMIDE AND ALBUTEROL SULFATE 3 ML: .5; 3 SOLUTION RESPIRATORY (INHALATION) at 07:20

## 2025-01-06 RX ADMIN — METHYLPREDNISOLONE SODIUM SUCCINATE 40 MG: 40 INJECTION, POWDER, FOR SOLUTION INTRAMUSCULAR; INTRAVENOUS at 09:26

## 2025-01-06 NOTE — ANESTHESIA PREPROCEDURE EVALUATION
Anesthesia Evaluation     Patient summary reviewed and Nursing notes reviewed   no history of anesthetic complications:   NPO Solid Status: > 8 hours  NPO Liquid Status: > 8 hours           Airway   Mallampati: I  TM distance: >3 FB  Neck ROM: full  No difficulty expected  Dental - normal exam   (+) edentulous    Pulmonary - normal exam   (+) pneumonia , a smoker Former, Abstained day of surgery, lung cancer, COPD severe,shortness of breath    ROS comment: CT scan showed large mediastinal subcarinal mass highly suspicious for malignancy, bilateral hilar adenopathy suspicious for metastatic disease  2.2 x 1.7 cm left lower lobe nodule  11 mm right upper lobe nodule  Cardiovascular - normal exam    ECG reviewed    (+) hypertension, past MI , CAD, CABG, dysrhythmias, PVD,  carotid artery disease carotid bilateral      Neuro/Psych  (+) weakness  GI/Hepatic/Renal/Endo    (+) renal disease- CRI    Musculoskeletal (-) negative ROS    Abdominal  - normal exam    Bowel sounds: normal.   Substance History - negative use     OB/GYN negative ob/gyn ROS         Other      history of cancer active    ROS/Med Hx Other: NORMAL ECG -  Sinus rhythm    History of Present Illness:  79-year-old male admitted on 1/5/2025 with increasing shortness of breath and cough  50-pack-year history of smoking      Assessment:     Acute/chronic hypoxic respiratory insufficiency     Right lower lobe pneumonia     Respiratory panel negative on 1/4/2025     Lung cancer, small cell lung cancer   EBUS bronchoscopy 5/20/2021 , limited stage treated with radiation and chemotherapy  Followed by [facility], robotic bronchoscopy 2/6/2024  Right lower lobe FNA and biopsies were inadequate, FNA 11R and adequate, procedure was complicated by pneumothorax required admission to the hospital and chest tube placement     PET scan done showed worsening right lower lobe necrotic tumor with mediastinal adenopathy     50-pack-year history of smoking       COPD, no PFTs on CAT scan appears severe     Atherosclerotic heart disease  Chronic bronchitis  Stage IV chronic kidney disease  Prediabetes  Dyslipidemia  Slightly elevated alkaline phosphatase  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  Peripheral vascular disease  BPH with LUTS        Recommendations:        Initiate antibiotics Zosyn   Bronchoscopy in a.m.     oxygen titration keep O2 sats above 90% currently on 3 to 4 L     Steroids currently on IV Solu-Medrol 40 mg every 12  Bronchodilators budesonide and DuoNeb     Initiate Lovenox for DVT prophylaxis after bronch  Currently on Plavix and aspirin                        Anesthesia Plan    ASA 4     general   total IV anesthesia  intravenous induction     Anesthetic plan, risks, benefits, and alternatives have been provided, discussed and informed consent has been obtained with: patient.    Plan discussed with CRNA and CAA.

## 2025-01-06 NOTE — ANESTHESIA POSTPROCEDURE EVALUATION
Patient: David A Jolissaint    Procedure Summary       Date: 01/06/25 Room / Location: [facility] ENDOSCOPY 2 / [facility] ENDOSCOPY    Anesthesia Start: 1117 Anesthesia Stop: 1125    Procedure: BRONCHOSCOPY with bronchial washing (Bronchus) Diagnosis:       Bronchial pneumonia      (Bronchial pneumonia [J18.0])    Surgeons: Jackson Quezada MD Provider: Aracelis Bartholomew MD    Anesthesia Type: general ASA Status: 4            Anesthesia Type: general    Vitals  Vitals Value Taken Time   BP 82/51 01/06/25 1148   Temp     Pulse 94 01/06/25 1150   Resp 14 01/06/25 1140   SpO2 96 % 01/06/25 1150   Vitals shown include unfiled device data.        Post Anesthesia Care and Evaluation    Patient location during evaluation: PACU  Patient participation: complete - patient participated  Level of consciousness: awake and alert  Pain management: satisfactory to patient    Airway patency: patent  Anesthetic complications: No anesthetic complications  PONV Status: none  Cardiovascular status: acceptable  Respiratory status: acceptable  Hydration status: acceptable

## 2025-01-06 NOTE — CASE MANAGEMENT/SOCIAL WORK
Discharge Planning Assessment   Titi     Patient Name: David A Jolissaint  MRN: [id number]  Today's Date: 1/6/2025    Admit Date: 1/4/2025    Plan: Return home with caregiver. Declines [facility]. Current with home O2 3L [employer].   Discharge Needs Assessment       Row Name 01/06/25 1555       Living Environment    People in Home friend(s)    Name(s) of People in Home friend/caregiver Mary    Current Living Arrangements home    Potentially Unsafe Housing Conditions none    In the past 12 months has the electric, gas, oil, or water company threatened to shut off services in your home? No    Primary Care Provided by self    Provides Primary Care For no one    Family Caregiver if Needed friend(s)    Family Caregiver Names friend/caregiver Mary    Quality of Family Relationships helpful;involved;supportive    Able to Return to Prior Arrangements yes       Resource/Environmental Concerns    Resource/Environmental Concerns none    Transportation Concerns none       Transportation Needs    In the past 12 months, has lack of transportation kept you from medical appointments or from getting medications? no    In the past 12 months, has lack of transportation kept you from meetings, work, or from getting things needed for daily living? No       Food Insecurity    Within the past 12 months, you worried that your food would run out before you got the money to buy more. Never true    Within the past 12 months, the food you bought just didn't last and you didn't have money to get more. Never true       Transition Planning    Patient/Family Anticipates Transition to home with family    Patient/Family Anticipated Services at Transition none    Transportation Anticipated family or friend will provide       Discharge Needs Assessment    Readmission Within the Last 30 Days no previous admission in last 30 days    Equipment Currently Used at Home wheelchair;oxygen;walker, rolling;shower chair;nebulizer    Concerns to be Addressed no  discharge needs identified;denies needs/concerns at this time;patient refuses services    Anticipated Changes Related to Illness none    Equipment Needed After Discharge none    Current Discharge Risk dependent with mobility/activities of daily living                   Discharge Plan       Row Name 01/06/25 5079       Plan    Plan Return home with caregiver. Declines HHC. Current with home O2 3L [employer].    Patient/Family in Agreement with Plan yes    Plan Comments CM met with pt at bedside to discuss discharge needs. Pt lives at home with caregiver Mary, does not drive and is assist with ADLs. PCP and pharmacy verified- pt enrolled in [facility] as requested. No issues stated affording food/ medications or transport, and home environment is safe. Current DME: walker, wheelchair, nebulizer, showerchair, prosthetic leg, O2 3L [place] ([employer].) No current HHC-declines HHC at discharge. Family will provide transportation home.                Demographic Summary       Row Name 01/06/25 4242       General Information    Admission Type inpatient    Arrived From emergency department    Required Notices Provided Important Message from Medicare    Referral Source admission list    Reason for Consult discharge planning    Preferred Language English       Contact Information    Permission Granted to Share Info With                    Functional Status       Row Name 01/06/25 1558       Functional Status    Usual Activity Tolerance fair    Current Activity Tolerance poor       Functional Status, IADL    Medications completely dependent    Meal Preparation completely dependent    Housekeeping completely dependent    Laundry completely dependent    Shopping completely dependent       Mental Status    General Appearance WDL WDL       Mental Status Summary    Recent Changes in Mental Status/Cognitive Functioning no changes       Employment/    Current or Previous Occupation not applicable                    Patient Forms        Row Name 01/06/25 1554       Patient Forms    Important Message from Medicare (IMM) Delivered  IMM 1/6 per CM    Delivered to Patient    Method of delivery In person                      Zaina Contreras RN     Office phone: [phone]  Office fax: [phone]

## 2025-01-06 NOTE — PROGRESS NOTES
LOS: 1 day   Patient Care Team:  Walter Valero MD as PCP - General  [facility], Kali Stephen MD as Consulting Physician (Interventional Cardiology)  Riki Hedrick MD as Consulting Physician (Hematology and Oncology)  Hoang Mendez MD as Consulting Physician (Nephrology)  Ham Welch MD as Consulting Physician (Hematology and Oncology)  Kiki Reina, RN as Nurse Navigator    Subjective     Interval History:stable    Patient Complaints: no voiced complaints    History taken from: patient    Review of Systems   Constitutional:  Positive for activity change and fatigue.   HENT:  Negative for trouble swallowing.    Eyes:  Negative for visual disturbance.   Respiratory:  Positive for cough and shortness of breath. Negative for wheezing.    Cardiovascular:  Negative for chest pain, palpitations and leg swelling.   Gastrointestinal:  Negative for abdominal pain and constipation.   Genitourinary:  Negative for difficulty urinating.   Musculoskeletal:  Negative for arthralgias.   Neurological:  Positive for weakness.   Psychiatric/Behavioral:  Negative for confusion.            Objective     Vital Signs  Temp:  [97.2 °F (36.2 °C)-97.7 °F (36.5 °C)] 97.7 °F (36.5 °C)  Heart Rate:  [72-99] 85  Resp:  [12-22] 14  BP: (100-160)/(54-97) 139/76    Physical Exam:     General Appearance:    Alert, cooperative, in no acute distress,   Head:    Normocephalic, without obvious abnormality, atraumatic   Eyes:            Lids and lashes normal, conjunctivae and sclerae normal, no   icterus, no pallor, corneas clear   Ears:    Ears appear intact with no abnormalities noted   Throat:   No oral lesions, no thrush, oral mucosa moist   Neck:   No adenopathy, supple, trachea midline, no thyromegaly, no   carotid bruit, no JVD   Lungs:     Clear to auscultation,respirations regular, even and                  unlabored    Heart:    Regular rhythm and normal rate, normal S1 and S2, no            murmur, no gallop, no rub, no click    Chest Wall:    No abnormalities observed   Abdomen:     Normal bowel sounds, no masses, no organomegaly, soft        Non-tender non-distended, no guarding,   Extremities:   Moves all extremities well, no edema, no cyanosis, no             Redness   Pulses:   Pulses palpable and equal bilaterally   Skin:   No bleeding, bruising or rash   Lymph nodes:   No palpable adenopathy            Results Review:    Lab Results (last 24 hours)       Procedure Component Value Units Date/Time    Respiratory Panel PCR w/COVID-19(SARS-CoV-2) MIKE/DENZEL/ALONA/PAD/COR/MERLINE In-House, NP Swab in UTM/VTM, 2 HR TAT - Wash, Lung, Right Lower Lobe [[id number]]  (Normal) Collected: 01/06/25 1125    Specimen: Wash from Lung, Right Lower Lobe Updated: 01/06/25 1306     ADENOVIRUS, PCR Not Detected     Coronavirus 229E Not Detected     Coronavirus HKU1 Not Detected     Coronavirus NL63 Not Detected     Coronavirus OC43 Not Detected     COVID19 Not Detected     Human Metapneumovirus Not Detected     Human Rhinovirus/Enterovirus Not Detected     Influenza A PCR Not Detected     Influenza B PCR Not Detected     Parainfluenza Virus 1 Not Detected     Parainfluenza Virus 2 Not Detected     Parainfluenza Virus 3 Not Detected     Parainfluenza Virus 4 Not Detected     RSV, PCR Not Detected     Bordetella pertussis pcr Not Detected     Bordetella parapertussis PCR Not Detected     Chlamydophila pneumoniae PCR Not Detected     Mycoplasma pneumo by PCR Not Detected    Narrative:      In the setting of a positive respiratory panel with a viral infection PLUS a negative procalcitonin without other underlying concern for bacterial infection, consider observing off antibiotics or discontinuation of antibiotics and continue supportive care. If the respiratory panel is positive for atypical bacterial infection (Bordetella pertussis, Chlamydophila pneumoniae, or Mycoplasma pneumoniae), consider antibiotic de-escalation to target atypical bacterial infection.     Respiratory Culture - Wash, Lung, Right Lower Lobe [[id number]] Collected: 01/06/25 1125    Specimen: Wash from Lung, Right Lower Lobe Updated: 01/06/25 1249     Gram Stain Many (4+) WBCs per low power field      Rare (1+) Mixed bacterial morphotypes seen on Gram Stain    Blood Culture - Blood, Wrist, Right [[id number]]  (Normal) Collected: 01/05/25 1202    Specimen: Blood from Wrist, Right Updated: 01/06/25 1231     Blood Culture No growth at 24 hours    Narrative:      Less than seven (7) mL's of blood was collected.  Insufficient quantity may yield false negative results.    Blood Culture - Blood, Arm, Right [[id number]]  (Normal) Collected: 01/05/25 1202    Specimen: Blood from Arm, Right Updated: 01/06/25 1231     Blood Culture No growth at 24 hours    Narrative:      Less than seven (7) mL's of blood was collected.  Insufficient quantity may yield false negative results.    Fungus Culture - Wash, Lung, Right Lower Lobe [[id number]] Collected: 01/06/25 1125    Specimen: Wash from Lung, Right Lower Lobe Updated: 01/06/25 1152    AFB Culture - Wash, Lung, Right Lower Lobe [[id number]] Collected: 01/06/25 1125    Specimen: Wash from Lung, Right Lower Lobe Updated: 01/06/25 1152    Pneumocystis PCR - Wash, Lung, Right Lower Lobe [[id number]] Collected: 01/06/25 1125    Specimen: Wash from Lung, Right Lower Lobe Updated: 01/06/25 1152    Non-gynecologic Cytology [[id number]] Collected: 01/06/25 1125    Specimen: Wash from Lung, Right Lower Lobe Updated: 01/06/25 1151    Basic Metabolic Panel [[id number]]  (Abnormal) Collected: 01/06/25 0233    Specimen: Blood Updated: 01/06/25 0311     Glucose 205 mg/dL      BUN 43 mg/dL      Creatinine 2.67 mg/dL      Sodium 139 mmol/L      Potassium 3.9 mmol/L      Chloride 100 mmol/L      CO2 26.1 mmol/L      Calcium 9.4 mg/dL      BUN/Creatinine Ratio 16.1     Anion Gap 12.9 mmol/L      eGFR 23.4 mL/min/1.73     Narrative:      GFR Categories in Chronic Kidney Disease (CKD)      GFR  Category          GFR (mL/min/1.73)    Interpretation  G1                     90 or greater         Normal or high (1)  G2                      60-89                Mild decrease (1)  G3a                   45-59                Mild to moderate decrease  G3b                   30-44                Moderate to severe decrease  G4                    15-29                Severe decrease  G5                    14 or less           Kidney failure          (1)In the absence of evidence of kidney disease, neither GFR category G1 or G2 fulfill the criteria for CKD.    eGFR calculation 2021 CKD-EPI creatinine equation, which does not include race as a factor    CBC (No Diff) [[id number]]  (Abnormal) Collected: 01/06/25 0233    Specimen: Blood Updated: 01/06/25 0247     WBC 14.05 10*3/mm3      RBC 3.33 10*6/mm3      Hemoglobin 9.9 g/dL      Hematocrit 31.4 %      MCV 94.3 fL      MCH 29.7 pg      MCHC 31.5 g/dL      RDW 15.1 %      RDW-SD 52.9 fl      MPV 10.4 fL      Platelets 292 10*3/mm3              Imaging Results (Last 24 Hours)       ** No results found for the last 24 hours. **                 I reviewed the patient's new clinical results.    Medication Review:   Scheduled Meds:aspirin, 81 mg, Oral, Daily With Lunch  atorvastatin, 40 mg, Oral, QAM  budesonide, 0.5 mg, Nebulization, BID - RT  clopidogrel, 75 mg, Oral, QAM  ferrous sulfate, 325 mg, Oral, Daily With Breakfast  finasteride, 5 mg, Oral, Nightly  ipratropium-albuterol, 3 mL, Nebulization, Q4H - RT  methylPREDNISolone sodium succinate, 40 mg, Intravenous, Q12H  metoprolol succinate XL, 25 mg, Oral, Daily  montelukast, 10 mg, Oral, Nightly  pantoprazole, 40 mg, Oral, Daily  piperacillin-tazobactam, 3.375 g, Intravenous, Q8H  saccharomyces boulardii, 250 mg, Oral, Daily With Lunch  sodium bicarbonate, 650 mg, Oral, BID  sodium chloride, 10 mL, Intravenous, Q12H      Continuous Infusions:   PRN Meds:.  acetaminophen **OR** acetaminophen **OR** acetaminophen     senna-docusate sodium **AND** polyethylene glycol **AND** bisacodyl **AND** bisacodyl    Calcium Replacement - Follow Nurse / BPA Driven Protocol    diphenhydrAMINE    hydrALAZINE    HYDROcodone-acetaminophen    Magnesium Standard Dose Replacement - Follow Nurse / BPA Driven Protocol    ondansetron ODT **OR** ondansetron    Phosphorus Replacement - Follow Nurse / BPA Driven Protocol    Potassium Replacement - Follow Nurse / BPA Driven Protocol    [COMPLETED] Insert Peripheral IV **AND** sodium chloride    sodium chloride    sodium chloride     Assessment & Plan       Dyspnea    Bronchial pneumonia  History of small cell lung cancer  COPD  -Bronchoscopy 01/06/2025 showed thick green mucopurulent secretions obstructing right bronchus, monitor bronchoscopy results  -Zosyn  -Solu-medrol, bronchodilators, budesonide, duoneb  -consider biopsy for possible reoccurrence of small cell lung cancer    CKD stage 4  Atherosclerotic heart disease  -Plavix    Hyperlipidemia  -atorvastatin    Hypertension  -metoprolol     BPH  - finasteride    GERD  -pantoprazole    DVT prophylaxis  -Lovenox    Plan for disposition:KELLEY Genao PA-C  01/06/25  17:40 EST

## 2025-01-06 NOTE — OP NOTE
Bronchoscopy Procedure Note    Timeout was done appropriately by staff    Procedure:  Bronchoscopy, Therapeutic peering of very thick mucopurulent secretions right lower lobe  Right lower lobe washing    Preoperative Diagnosis: Right lower lobe pneumonia    Postoperative Diagnosis:     Thick green mucopurulent secretions obstructing the right bronchus intermedius suctioned therapeutically from right lower lobe  Right lower lobe washing    Anesthesia: Moderate Sedation    Procedure Details: Patient was consented for the procedure with all risks and benefits of the procedure explained in detail.  Patient was given the opportunity to ask questions and all concerns were answered.  The bronchocope was inserted into the main airway via the oropharynx. An anatomical survey was done of the main airways and the subsegmental bronchus to at least the first subsegmental level of all five lobes of both lungs.  The findings are reported below.  A bronchoalveolar lavage was performed using aliquots of normal saline instilled into the airways then aspirated back.      Findings:      Thick green mucopurulent secretions obstructing the right bronchus intermedius suctioned therapeutically from right lower lobe  Right lower lobe washing      Patient tolerated procedure well        Estimated Blood Loss:  Minimal           Specimens:  Sent purulent fluid                Complications:  None; patient tolerated the procedure well.           Disposition: PACU - hemodynamically stable.      Patient tolerated the procedure well.    Jackson Quezada MD  1/6/2025  13:18 EST

## 2025-01-06 NOTE — PROGRESS NOTES
Daily Progress Note        Dyspnea    Bronchial pneumonia      Assessment    Acute/chronic hypoxic respiratory insufficiency     Right lower lobe pneumonia     Respiratory panel negative on 1/4/2025     Lung cancer, small cell lung cancer   EBUS bronchoscopy 5/20/2021 , limited stage treated with radiation and chemotherapy  Followed by [facility], robotic bronchoscopy 2/6/2024  Right lower lobe FNA and biopsies were inadequate, FNA 11R and adequate, procedure was complicated by pneumothorax required admission to the hospital and chest tube placement     PET scan done showed worsening right lower lobe necrotic tumor with mediastinal adenopathy     50-pack-year history of smoking      COPD, no PFTs on CAT scan appears severe     Atherosclerotic heart disease  Chronic bronchitis  Stage IV chronic kidney disease  Prediabetes  Dyslipidemia  Slightly elevated alkaline phosphatase  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  Peripheral vascular disease  BPH with LUTS    Plan    Bronchoscopy 1/6/2025   thick green mucopurulent secretions obstructing the right bronchus intermedius suctioned therapeutically from right lower lobe  Right lower lobe washing        Antibiotics Zosyn   Monitor bronchoscopy results     oxygen titration keep O2 sats above 90% currently on 3 to 4 L     Steroids currently on IV Solu-Medrol 40 mg every 12  Bronchodilators budesonide and DuoNeb     Initiate Lovenox for DVT prophylaxis after bronch  Currently on Plavix and aspirin              LOS: 1 day     Subjective         Objective     Vital signs for last 24 hours:  Vitals:    01/06/25 0755 01/06/25 1130 01/06/25 1135 01/06/25 1140   BP: 110/63  160/97 115/76   BP Location: Right arm      Patient Position: Lying      Pulse: 89 90 99 94   Resp: 22 16 12 14   Temp: 97.2 °F (36.2 °C)      TempSrc: Oral      SpO2: 97% 100% 94% 95%   Weight:       Height:           Intake/Output last 3 shifts:  I/O last 3  completed shifts:  In: 600 [P.O.:600]  Out: 1000 [Urine:1000]  Intake/Output this shift:  I/O this shift:  In: 50 [I.V.:50]  Out: -       Radiology  Imaging Results (Last 24 Hours)       ** No results found for the last 24 hours. **            Labs:  Results from last 7 days   Lab Units 01/06/25  0233   WBC 10*3/mm3 14.05*   HEMOGLOBIN g/dL 9.9*   HEMATOCRIT % 31.4*   PLATELETS 10*3/mm3 292     Results from last 7 days   Lab Units 01/06/25  0233 01/04/25  2352 01/04/25  1135   SODIUM mmol/L 139   < > 137   POTASSIUM mmol/L 3.9   < > 3.6   CHLORIDE mmol/L 100   < > 99   CO2 mmol/L 26.1   < > 27.5   BUN mg/dL 43*   < > 30*   CREATININE mg/dL 2.67*   < > 1.84*   CALCIUM mg/dL 9.4   < > 9.9   BILIRUBIN mg/dL  --   --  0.9   ALK PHOS U/L  --   --  145*   ALT (SGPT) U/L  --   --  13   AST (SGOT) U/L  --   --  21   GLUCOSE mg/dL 205*   < > 131*    < > = values in this interval not displayed.     Results from last 7 days   Lab Units 01/04/25  1136   PH, ARTERIAL pH units 7.468*   PO2 ART mm Hg 79.5*   PCO2, ARTERIAL mm Hg 37.3   HCO3 ART mmol/L 27.1     Results from last 7 days   Lab Units 01/04/25  1135   ALBUMIN g/dL 3.8                               Meds:   SCHEDULE  aspirin, 81 mg, Oral, Daily With Lunch  atorvastatin, 40 mg, Oral, QAM  budesonide, 0.5 mg, Nebulization, BID - RT  clopidogrel, 75 mg, Oral, QAM  ferrous sulfate, 325 mg, Oral, Daily With Breakfast  finasteride, 5 mg, Oral, Nightly  ipratropium-albuterol, 3 mL, Nebulization, Q4H - RT  methylPREDNISolone sodium succinate, 40 mg, Intravenous, Q12H  metoprolol succinate XL, 25 mg, Oral, Daily  montelukast, 10 mg, Oral, Nightly  pantoprazole, 40 mg, Oral, Daily  piperacillin-tazobactam, 3.375 g, Intravenous, Q8H  saccharomyces boulardii, 250 mg, Oral, Daily With Lunch  sodium bicarbonate, 650 mg, Oral, BID  sodium chloride, 10 mL, Intravenous, Q12H      Infusions     PRNs    acetaminophen **OR** acetaminophen **OR** acetaminophen    senna-docusate sodium  **AND** polyethylene glycol **AND** bisacodyl **AND** bisacodyl    Calcium Replacement - Follow Nurse / BPA Driven Protocol    diphenhydrAMINE    hydrALAZINE    HYDROcodone-acetaminophen    Magnesium Standard Dose Replacement - Follow Nurse / BPA Driven Protocol    ondansetron ODT **OR** ondansetron    Phosphorus Replacement - Follow Nurse / BPA Driven Protocol    Potassium Replacement - Follow Nurse / BPA Driven Protocol    [COMPLETED] Insert Peripheral IV **AND** sodium chloride    sodium chloride    sodium chloride    Physical Exam:  Physical Exam  Cardiovascular:      Heart sounds: Murmur heard.      No gallop.   Pulmonary:      Effort: No respiratory distress.      Breath sounds: No stridor. Rhonchi and rales present. No wheezing.   Chest:      Chest wall: No tenderness.         ROS  Review of Systems   Respiratory:  Positive for cough and shortness of breath. Negative for wheezing and stridor.    Cardiovascular:  Negative for chest pain, palpitations and leg swelling.             Total time spent with patient greater than: 45 Minutes

## 2025-01-06 NOTE — PLAN OF CARE
Problem: Adult Inpatient Plan of Care  Goal: Absence of Hospital-Acquired Illness or Injury  Intervention: Identify and Manage Fall Risk  Recent Flowsheet Documentation  Taken 1/6/2025 1000 by Eunice Iniguez RN  Safety Promotion/Fall Prevention: safety round/check completed  Taken 1/6/2025 0800 by Eunice Iniguez RN  Safety Promotion/Fall Prevention:   assistive device/personal items within reach   clutter free environment maintained   room organization consistent     Problem: Adult Inpatient Plan of Care  Goal: Absence of Hospital-Acquired Illness or Injury  Intervention: Prevent Skin Injury  Recent Flowsheet Documentation  Taken 1/6/2025 1000 by Eunice Iniguez RN  Body Position: side-lying  Taken 1/6/2025 0800 by Eunice Iniguez RN  Body Position: supine     Problem: Adult Inpatient Plan of Care  Goal: Optimal Comfort and Wellbeing  Outcome: Progressing  Intervention: Provide Person-Centered Care  Recent Flowsheet Documentation  Taken 1/6/2025 0800 by Eunice Iniguez RN  Trust Relationship/Rapport:   thoughts/feelings acknowledged   reassurance provided   care explained   choices provided   empathic listening provided   questions answered   questions encouraged   Goal Outcome Evaluation:  Plan of Care Reviewed With: patient        Progress: improving

## 2025-01-06 NOTE — PLAN OF CARE
Problem: Adult Inpatient Plan of Care  Goal: Absence of Hospital-Acquired Illness or Injury  1/6/2025 1620 by Eunice Iniguez RN  Outcome: Progressing  1/6/2025 1300 by Eunice Iniguez RN  Outcome: Progressing  Intervention: Identify and Manage Fall Risk  Recent Flowsheet Documentation  Taken 1/6/2025 1000 by Eunice Iniguez RN  Safety Promotion/Fall Prevention: safety round/check completed  Taken 1/6/2025 0800 by Eunice Iniguez RN  Safety Promotion/Fall Prevention:   assistive device/personal items within reach   clutter free environment maintained   room organization consistent  Intervention: Prevent Skin Injury  Recent Flowsheet Documentation  Taken 1/6/2025 1000 by Eunice Iniguez RN  Body Position: side-lying  Taken 1/6/2025 0800 by Eunice Iniguez RN  Body Position: supine  Intervention: Prevent Infection  Recent Flowsheet Documentation  Taken 1/6/2025 1000 by Eunice Iniguez RN  Infection Prevention:   single patient room provided   environmental surveillance performed   equipment surfaces disinfected   hand hygiene promoted  Taken 1/6/2025 0800 by Eunice Iniguez RN  Infection Prevention: single patient room provided     Problem: Adult Inpatient Plan of Care  Goal: Absence of Hospital-Acquired Illness or Injury  Intervention: Prevent Skin Injury  Recent Flowsheet Documentation  Taken 1/6/2025 1000 by Eunice Iniguez RN  Body Position: side-lying  Taken 1/6/2025 0800 by Eunice Iniguez RN  Body Position: supine     Problem: Adult Inpatient Plan of Care  Goal: Absence of Hospital-Acquired Illness or Injury  Intervention: Identify and Manage Fall Risk  Recent Flowsheet Documentation  Taken 1/6/2025 1000 by Eunice Iniguez RN  Safety Promotion/Fall Prevention: safety round/check completed  Taken 1/6/2025 0800 by Eunice Iniguez RN  Safety Promotion/Fall Prevention:   assistive device/personal items within reach   clutter free environment maintained   room organization consistent   Goal Outcome  Evaluation:  Plan of Care Reviewed With: patient        Progress: improving

## 2025-01-06 NOTE — PLAN OF CARE
Goal Outcome Evaluation:      No new concerns overnight. No s/s distress. Bronch consent in folder. IV ABT continue as ordered. Call light in reach.

## 2025-01-07 PROCEDURE — 25010000002 METHYLPREDNISOLONE PER 40 MG: Performed by: FAMILY MEDICINE

## 2025-01-07 PROCEDURE — 25010000002 PIPERACILLIN SOD-TAZOBACTAM PER 1 G: Performed by: INTERNAL MEDICINE

## 2025-01-07 PROCEDURE — 94761 N-INVAS EAR/PLS OXIMETRY MLT: CPT

## 2025-01-07 PROCEDURE — 94664 DEMO&/EVAL PT USE INHALER: CPT

## 2025-01-07 PROCEDURE — 94799 UNLISTED PULMONARY SVC/PX: CPT

## 2025-01-07 PROCEDURE — 25010000002 ENOXAPARIN PER 10 MG: Performed by: PHYSICIAN ASSISTANT

## 2025-01-07 PROCEDURE — 99223 1ST HOSP IP/OBS HIGH 75: CPT | Performed by: SURGERY

## 2025-01-07 PROCEDURE — 63710000001 DIPHENHYDRAMINE PER 50 MG: Performed by: NURSE PRACTITIONER

## 2025-01-07 PROCEDURE — 99232 SBSQ HOSP IP/OBS MODERATE 35: CPT | Performed by: INTERNAL MEDICINE

## 2025-01-07 RX ORDER — IPRATROPIUM BROMIDE AND ALBUTEROL SULFATE 2.5; .5 MG/3ML; MG/3ML
3 SOLUTION RESPIRATORY (INHALATION) 2 TIMES DAILY
Status: DISCONTINUED | OUTPATIENT
Start: 2025-01-07 | End: 2025-01-08 | Stop reason: HOSPADM

## 2025-01-07 RX ORDER — GUAIFENESIN 600 MG/1
1200 TABLET, EXTENDED RELEASE ORAL EVERY 12 HOURS SCHEDULED
Status: DISCONTINUED | OUTPATIENT
Start: 2025-01-07 | End: 2025-01-08 | Stop reason: HOSPADM

## 2025-01-07 RX ADMIN — Medication 10 ML: at 09:24

## 2025-01-07 RX ADMIN — PANTOPRAZOLE SODIUM 40 MG: 40 TABLET, DELAYED RELEASE ORAL at 09:21

## 2025-01-07 RX ADMIN — CLOPIDOGREL BISULFATE 75 MG: 75 TABLET ORAL at 09:20

## 2025-01-07 RX ADMIN — METHYLPREDNISOLONE SODIUM SUCCINATE 40 MG: 40 INJECTION, POWDER, FOR SOLUTION INTRAMUSCULAR; INTRAVENOUS at 20:05

## 2025-01-07 RX ADMIN — GUAIFENESIN 1200 MG: 600 TABLET ORAL at 13:45

## 2025-01-07 RX ADMIN — ASPIRIN 81 MG: 81 TABLET, COATED ORAL at 16:15

## 2025-01-07 RX ADMIN — IPRATROPIUM BROMIDE AND ALBUTEROL SULFATE 3 ML: .5; 3 SOLUTION RESPIRATORY (INHALATION) at 06:58

## 2025-01-07 RX ADMIN — PIPERACILLIN AND TAZOBACTAM 3.38 G: 3; .375 INJECTION, POWDER, FOR SOLUTION INTRAVENOUS at 18:47

## 2025-01-07 RX ADMIN — MONTELUKAST 10 MG: 10 TABLET, FILM COATED ORAL at 20:04

## 2025-01-07 RX ADMIN — FERROUS SULFATE TAB 325 MG (65 MG ELEMENTAL FE) 325 MG: 325 (65 FE) TAB at 09:23

## 2025-01-07 RX ADMIN — METHYLPREDNISOLONE SODIUM SUCCINATE 40 MG: 40 INJECTION, POWDER, FOR SOLUTION INTRAMUSCULAR; INTRAVENOUS at 09:24

## 2025-01-07 RX ADMIN — ENOXAPARIN SODIUM 30 MG: 100 INJECTION SUBCUTANEOUS at 16:14

## 2025-01-07 RX ADMIN — DIPHENHYDRAMINE HYDROCHLORIDE 25 MG: 25 CAPSULE ORAL at 20:04

## 2025-01-07 RX ADMIN — PIPERACILLIN AND TAZOBACTAM 3.38 G: 3; .375 INJECTION, POWDER, FOR SOLUTION INTRAVENOUS at 01:14

## 2025-01-07 RX ADMIN — Medication 10 ML: at 20:18

## 2025-01-07 RX ADMIN — SODIUM BICARBONATE 650 MG: 650 TABLET ORAL at 20:04

## 2025-01-07 RX ADMIN — GUAIFENESIN 1200 MG: 600 TABLET ORAL at 20:04

## 2025-01-07 RX ADMIN — SODIUM BICARBONATE 650 MG: 650 TABLET ORAL at 09:21

## 2025-01-07 RX ADMIN — ATORVASTATIN CALCIUM 40 MG: 40 TABLET, FILM COATED ORAL at 09:20

## 2025-01-07 RX ADMIN — Medication 250 MG: at 16:16

## 2025-01-07 RX ADMIN — METOPROLOL SUCCINATE 25 MG: 25 TABLET, EXTENDED RELEASE ORAL at 09:21

## 2025-01-07 RX ADMIN — BUDESONIDE 0.5 MG: 0.5 INHALANT RESPIRATORY (INHALATION) at 20:35

## 2025-01-07 RX ADMIN — PIPERACILLIN AND TAZOBACTAM 3.38 G: 3; .375 INJECTION, POWDER, FOR SOLUTION INTRAVENOUS at 09:23

## 2025-01-07 RX ADMIN — HYDROCODONE BITARTRATE AND ACETAMINOPHEN 1 TABLET: 5; 325 TABLET ORAL at 20:04

## 2025-01-07 RX ADMIN — FINASTERIDE 5 MG: 5 TABLET, FILM COATED ORAL at 20:04

## 2025-01-07 RX ADMIN — IPRATROPIUM BROMIDE AND ALBUTEROL SULFATE 3 ML: .5; 3 SOLUTION RESPIRATORY (INHALATION) at 20:30

## 2025-01-07 RX ADMIN — BUDESONIDE 0.5 MG: 0.5 INHALANT RESPIRATORY (INHALATION) at 07:02

## 2025-01-07 NOTE — PLAN OF CARE
Problem: Adult Inpatient Plan of Care  Goal: Absence of Hospital-Acquired Illness or Injury  Intervention: Identify and Manage Fall Risk  Recent Flowsheet Documentation  Taken 1/6/2025 2000 by Becky Segura RN  Safety Promotion/Fall Prevention:   assistive device/personal items within reach   nonskid shoes/slippers when out of bed   safety round/check completed  Intervention: Prevent Infection  Recent Flowsheet Documentation  Taken 1/6/2025 2000 by Becky Segura, RN  Infection Prevention: single patient room provided   Goal Outcome Evaluation:

## 2025-01-07 NOTE — PROGRESS NOTES
Hematology/Oncology Inpatient Progress Note    PATIENT NAME: David A Jolissaint  : 1945  MRN: [id number]    CHIEF COMPLAINT: Dyspnea.     HISTORY OF PRESENT ILLNESS:      2024: In the office for the first time for the treatment of what appears to be progressive small cell lung cancer.  He was first diagnosed with small cell lung cancer in .  At the time he is disease was staged as limited stage and he was treated both with radiation and chemotherapy.  Subsequently he had prophylactic cranial irradiation.  He did well until closer to the time of this visit when, on a PET scan, that reports increased size and activity of the necrotic tumor in the right lower lobe and superior segment, without associated right lung nodules that are increased in size and show increased and fluorodeoxyglucose avidity.  There is mediastinal adenopathy.  Plans for biopsy were made but he declined.  He was being seen at the [facility] but he decided to transfer his care here as he was not satisfied with the setting and the options he had received.  He is known as well for coronary artery disease and has undergone a coronary artery bypass graft surgery.  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 He suffers from chronic kidney disease and at some point needed to be on dialysis and, in fact, he has a fistula in the left upper extremity that has never been used.  On exam he is a well-built man who appears the stated age.  He seems chronically ill.  He is transported in a wheelchair.  He is in no distress.  No oral lesions.  Respirations not labored.  Lungs markedly diminished bilaterally.  Heart tachycardic and regular.  Abdomen soft.  Right lower extremity without edema.  Reviewed the images of the PET scan and its report.  Discussed with him.  Explained to him the importance of tissue diagnosis.  I have asked him  to see Dr. Donohue for consideration of endoscopic fine-needle aspiration both for histologic diagnosis and next-generation sequencing.  I have asked him to see me with results.  Explained the difference between a percutaneous biopsy and endoscopic biopsy and the reduced risk of the endoscopic biopsy.  Discussed with him goals of care and explained to him the importance of thinking about what he wants to do in the future given the findings, he has chronic problems and this most recent diagnosis.      1/5/2025: Mr. Jolissaint came to the emergency room after he started to have dyspnea with minimal activity.  This started probably 2 days, or so, before his presentation.  Imaging of the chest revealed progressive enlargement of the previously identified right lung tumor with some postobstructive consolidation of the upper and lower lobes on the right.  No new findings concerning for metastatic disease were identified.  History of jaundice and had not been seen by thoracic surgery and was scheduled to be seen on January 7, 2025.  He has no pain.  He has been eating well.  He denies fevers.  He has been coughing about as much as usual but has had little expectoration and no hemoptysis.  He denies abdominal pain.  He has had no difficulties with defecation or dysuria and he denies peripheral edema.    Subjective   1/7/2025: Feeling somewhat better after the bronchoscopy.   ROS:  Review of Systems   Constitutional:  Negative for activity change, appetite change, chills, diaphoresis, fatigue, fever and unexpected weight change.   HENT:  Negative for congestion, dental problem, drooling, ear discharge, ear pain, facial swelling, hearing loss, mouth sores, nosebleeds, postnasal drip, rhinorrhea, sinus pressure, sinus pain, sneezing, sore throat, tinnitus, trouble swallowing and voice change.    Eyes:  Negative for photophobia, pain, discharge, redness, itching and visual disturbance.   Respiratory:  Positive for cough and  shortness of breath. Negative for apnea, choking, chest tightness, wheezing and stridor.    Cardiovascular:  Negative for chest pain, palpitations and leg swelling.   Gastrointestinal:  Negative for abdominal distention, abdominal pain, anal bleeding, blood in stool, constipation, diarrhea, nausea, rectal pain and vomiting.   Endocrine: Negative for cold intolerance, heat intolerance, polydipsia and polyuria.   Genitourinary:  Negative for decreased urine volume, difficulty urinating, dysuria, flank pain, frequency, genital sores, hematuria and urgency.   Musculoskeletal:  Negative for arthralgias, back pain, gait problem, joint swelling, myalgias, neck pain and neck stiffness.   Skin:  Negative for color change, pallor and rash.   Neurological:  Negative for dizziness, tremors, seizures, syncope, facial asymmetry, speech difficulty, weakness, light-headedness, numbness and headaches.   Hematological:  Negative for adenopathy. Does not bruise/bleed easily.   Psychiatric/Behavioral:  Negative for agitation, behavioral problems, confusion, decreased concentration, hallucinations, self-injury, sleep disturbance and suicidal ideas. The patient is not nervous/anxious.         MEDICATIONS:    Scheduled Meds:  aspirin, 81 mg, Oral, Daily With Lunch  atorvastatin, 40 mg, Oral, QAM  budesonide, 0.5 mg, Nebulization, BID - RT  clopidogrel, 75 mg, Oral, QAM  enoxaparin, 30 mg, Subcutaneous, Daily  ferrous sulfate, 325 mg, Oral, Daily With Breakfast  finasteride, 5 mg, Oral, Nightly  guaiFENesin, 1,200 mg, Oral, Q12H  ipratropium-albuterol, 3 mL, Nebulization, BID  methylPREDNISolone sodium succinate, 40 mg, Intravenous, Q12H  metoprolol succinate XL, 25 mg, Oral, Daily  montelukast, 10 mg, Oral, Nightly  pantoprazole, 40 mg, Oral, Daily  piperacillin-tazobactam, 3.375 g, Intravenous, Q8H  saccharomyces boulardii, 250 mg, Oral, Daily With Lunch  sodium bicarbonate, 650 mg, Oral, BID  sodium chloride, 10 mL, Intravenous,  "Q12H       Continuous Infusions:  Pharmacy to Dose enoxaparin (LOVENOX),        PRN Meds:    acetaminophen **OR** acetaminophen **OR** acetaminophen    senna-docusate sodium **AND** polyethylene glycol **AND** bisacodyl **AND** bisacodyl    Calcium Replacement - Follow Nurse / BPA Driven Protocol    diphenhydrAMINE    hydrALAZINE    HYDROcodone-acetaminophen    Magnesium Standard Dose Replacement - Follow Nurse / BPA Driven Protocol    ondansetron ODT **OR** ondansetron    Pharmacy to Dose enoxaparin (LOVENOX)    Phosphorus Replacement - Follow Nurse / BPA Driven Protocol    Potassium Replacement - Follow Nurse / BPA Driven Protocol    [COMPLETED] Insert Peripheral IV **AND** sodium chloride    sodium chloride    sodium chloride     ALLERGIES:    Allergies   Allergen Reactions    Diltiazem Unknown - High Severity     Swelling of lips       Objective    VITALS:   /71 (BP Location: Right arm, Patient Position: Sitting)   Pulse 78   Temp 97.8 °F (36.6 °C) (Oral)   Resp 9   Ht 177.8 cm (70\")   Wt 65.2 kg (143 lb 11.8 oz)   SpO2 100%   BMI 20.62 kg/m²     PHYSICAL EXAM: (performed by MD)  Physical Exam  Constitutional:       General: He is not in acute distress.     Appearance: He is ill-appearing. He is not toxic-appearing or diaphoretic.   HENT:      Head: Normocephalic and atraumatic.      Right Ear: External ear normal.      Left Ear: External ear normal.      Nose: Nose normal.      Mouth/Throat:      Mouth: Mucous membranes are moist.      Pharynx: Oropharynx is clear.   Eyes:      General: No scleral icterus.        Right eye: No discharge.         Left eye: No discharge.      Conjunctiva/sclera: Conjunctivae normal.      Pupils: Pupils are equal, round, and reactive to light.   Cardiovascular:      Rate and Rhythm: Normal rate and regular rhythm.      Pulses: Normal pulses.      Heart sounds: Normal heart sounds. No murmur heard.     No friction rub. No gallop.   Pulmonary:      Effort: No " respiratory distress.      Breath sounds: No stridor. No wheezing, rhonchi or rales.      Comments: Breath sounds markedly diminished bilaterally.   Chest:      Chest wall: No tenderness.   Abdominal:      General: Abdomen is flat. Bowel sounds are normal. There is no distension.      Palpations: Abdomen is soft. There is no mass.      Tenderness: There is no abdominal tenderness. There is no right CVA tenderness, left CVA tenderness, guarding or rebound.   Musculoskeletal:         General: No tenderness, deformity or signs of injury.      Cervical back: No rigidity.      Right lower leg: No edema.      Left lower leg: No edema.   Lymphadenopathy:      Cervical: No cervical adenopathy.   Skin:     General: Skin is warm and dry.      Coloration: Skin is not jaundiced or pale.      Findings: No bruising or rash.   Neurological:      General: No focal deficit present.      Mental Status: He is alert and oriented to person, place, and time.      Cranial Nerves: No cranial nerve deficit.   Psychiatric:         Mood and Affect: Mood normal.         Behavior: Behavior normal.         Thought Content: Thought content normal.         Judgment: Judgment normal.     IBRAHIMA Welch MD performed the physical exam on 1/7/2025 as documented above.       RECENT LABS:  Lab Results (last 24 hours)       Procedure Component Value Units Date/Time    Blood Culture - Blood, Arm, Right [[id number]]  (Normal) Collected: 01/05/25 1202    Specimen: Blood from Arm, Right Updated: 01/07/25 1230     Blood Culture No growth at 2 days    Narrative:      Less than seven (7) mL's of blood was collected.  Insufficient quantity may yield false negative results.    Blood Culture - Blood, Wrist, Right [[id number]]  (Normal) Collected: 01/05/25 1202    Specimen: Blood from Wrist, Right Updated: 01/07/25 1230     Blood Culture No growth at 2 days    Narrative:      Less than seven (7) mL's of blood was collected.  Insufficient quantity may yield false  negative results.    Respiratory Culture - Wash, Lung, Right Lower Lobe [[id number]] Collected: 01/06/25 1125    Specimen: Wash from Lung, Right Lower Lobe Updated: 01/07/25 1045     Respiratory Culture Light growth (2+) The culture consists of normal respiratory varsha. This is a preliminary report; final report to follow.     Gram Stain Many (4+) WBCs per low power field      Rare (1+) Mixed bacterial morphotypes seen on Gram Stain    Non-gynecologic Cytology [[id number]] Collected: 01/06/25 1125    Specimen: Wash from Lung, Right Lower Lobe Updated: 01/07/25 1007    AFB Culture - Wash, Lung, Right Lower Lobe [[id number]] Collected: 01/06/25 1125    Specimen: Wash from Lung, Right Lower Lobe Updated: 01/07/25 0958     AFB Stain No acid fast bacilli seen on concentrated smear          IMAGING REVIEWED:  No radiology results for the last day    Assessment & Plan   ASSESSMENT:  Apparent progressive lung malignancy.   Pneumonia: On treatment. To await for biopsy once the infectious process is resolve.     PLAN:  As above.     Ham Welch MD on 1/7/2025 at 16:25

## 2025-01-07 NOTE — NURSING NOTE
Patient has been motivated to get up and transfer to bed and back to chair. Has been successful with transfers. Interested in PT/OT and rehab

## 2025-01-07 NOTE — CONSULTS
"Nutrition Services  Patient Name: David A Jolissaint  YOB: 1945  MRN: [id number]  Admission date: 1/4/2025    Continue Regular diet, which remains the most appropriate nutrition intervention presently.    NUTRITION SCREENING      Trending Narrative: Pt assessed due to concern for MST. Eating and tolerating PO presently but with generally decreased appetite. Not able to complete NFPE this date but will follow up -- apears thin and potentially could meet criteria for malnutrition.        PO Diet: Diet: Regular/House; Fluid Consistency: Thin (IDDSI 0)   PO Supplements: None ordered    Trending PO Intake:  Averaging 75% at meals        Nutritionally-Pertinent Medications RDN Reviewed, C/W clinical course         Labs (reviewed below): Reviewed; noted hyperglycemia at times; A1C is normal, so will not apply consistent carb restriction just yet; will monitor      Results from last 7 days   Lab Units 01/06/25  0233 01/04/25  2352 01/04/25  1135   SODIUM mmol/L 139 137 137   POTASSIUM mmol/L 3.9 3.9 3.6   CHLORIDE mmol/L 100 99 99   CO2 mmol/L 26.1 25.5 27.5   BUN mg/dL 43* 32* 30*   CREATININE mg/dL 2.67* 2.14* 1.84*   CALCIUM mg/dL 9.4 9.8 9.9   BILIRUBIN mg/dL  --   --  0.9   ALK PHOS U/L  --   --  145*   ALT (SGPT) U/L  --   --  13   AST (SGOT) U/L  --   --  21   GLUCOSE mg/dL 205* 123* 131*     Results from last 7 days   Lab Units 01/06/25  0233   HEMOGLOBIN g/dL 9.9*   HEMATOCRIT % 31.4*     Lab Results   Component Value Date    HGBA1C 5.3 02/23/2023          GI Function:  No BM documented yet this admission; has had some nausea       Skin: No breakdown documented        Weight Review: Estimated body mass index is 20.62 kg/m² as calculated from the following:    Height as of this encounter: 177.8 cm (70\").    Weight as of this encounter: 65.2 kg (143 lb 11.8 oz).    Comment:   1/7: Scale weight from 1/4 was 65.2 kg; somewhat limited recent weight Hx, but compared to weight three months ago, fairly " stable; general downtrend though over the last four years from prior UBW ~165 lb    Wt Readings from Last 30 Encounters:   01/04/25 1120 65.2 kg (143 lb 11.8 oz)   12/18/24 0924 65.2 kg (143 lb 12.8 oz)   09/27/24 0517 63.4 kg (139 lb 12.4 oz)   09/26/24 0446 60.7 kg (133 lb 13.1 oz)   09/25/24 1007 65.8 kg (145 lb)   04/11/24 1627 68.9 kg (152 lb)   10/14/23 1132 70.8 kg (156 lb)   03/23/23 1630 74.8 kg (165 lb)   03/18/23 0358 78.5 kg (173 lb 1 oz)   03/17/23 1728 74.8 kg (165 lb)   03/16/23 1052 74.8 kg (165 lb)   03/14/23 1639 74.8 kg (165 lb)   03/13/23 1541 74.8 kg (165 lb)   06/02/22 1816 70.8 kg (156 lb)   02/23/22 1118 71.7 kg (158 lb)   11/10/21 1615 70.3 kg (155 lb)   11/09/21 2309 70.4 kg (155 lb 3.3 oz)   11/09/21 1453 76.2 kg (168 lb)   08/06/21 0819 74.8 kg (165 lb)   06/27/21 0822 73.9 kg (163 lb)   06/27/21 0500 74.3 kg (163 lb 12.8 oz)   06/26/21 1645 75 kg (165 lb 5.5 oz)   06/26/21 0823 75.8 kg (167 lb 1.7 oz)   06/08/21 0545 76.4 kg (168 lb 6.9 oz)   06/06/21 1128 76.4 kg (168 lb 6.4 oz)   05/22/21 0305 79.4 kg (175 lb 0.7 oz)   05/21/21 0433 78.1 kg (172 lb 2.9 oz)   05/20/21 0323 78.1 kg (172 lb 2.9 oz)   05/19/21 1324 79.8 kg (175 lb 14.8 oz)   05/19/21 0950 81.6 kg (180 lb)   02/09/21 0945 81.6 kg (180 lb)   01/17/20 1010 84.4 kg (186 lb)   06/21/19 1042 83.5 kg (184 lb)   06/21/18 0944 81.2 kg (179 lb)   12/14/17 1042 79.4 kg (175 lb)   06/05/17 1328 76.9 kg (169 lb 8 oz)   12/05/16 1258 70.6 kg (155 lb 12 oz)   10/20/16 1125 70.4 kg (155 lb 4 oz)   07/01/16 1034 61.2 kg (135 lb)   06/30/16 0914 65.8 kg (145 lb)   06/02/16 0939 65.8 kg (145 lb)   05/06/16 0914 65.8 kg (145 lb)   04/29/16 1101 65.8 kg (145 lb)   04/29/16 0927 65.8 kg (145 lb)      --       Nutrition Problem Statement: Unintentional weight loss R/t suspected hypermetabolism in the setting of chronic catabolic Dz states; as evidenced by weight downtrend over last several years, now with borderline low weight BMI 20.62 kg/m^2.          Nutrition Intervention: Continue current diet as tolerated. Will continue to follow up for further interventions as clinically indicated.          Monitoring/Evaluation PO intake, Pertinent labs, Weight, Skin status, GI status, POC/GOC        RD to follow up per protocol.    Electronically signed by:  Roma Sawant RD  01/07/25 17:27 EST

## 2025-01-07 NOTE — PLAN OF CARE
Problem: Adult Inpatient Plan of Care  Goal: Absence of Hospital-Acquired Illness or Injury  Outcome: Progressing  Intervention: Identify and Manage Fall Risk  Recent Flowsheet Documentation  Taken 1/7/2025 1400 by Eunice Iniguez RN  Safety Promotion/Fall Prevention:   clutter free environment maintained   assistive device/personal items within reach   safety round/check completed  Taken 1/7/2025 1200 by Eunice Iniguez RN  Safety Promotion/Fall Prevention:   safety round/check completed   room organization consistent   clutter free environment maintained   assistive device/personal items within reach  Taken 1/7/2025 1000 by Eunice Iniguez RN  Safety Promotion/Fall Prevention:   safety round/check completed   nonskid shoes/slippers when out of bed   clutter free environment maintained   assistive device/personal items within reach  Intervention: Prevent Infection  Recent Flowsheet Documentation  Taken 1/7/2025 1200 by Eunice Iniguez RN  Infection Prevention:   environmental surveillance performed   equipment surfaces disinfected   hand hygiene promoted   visitors restricted/screened  Taken 1/7/2025 1000 by Eunice Iniguez RN  Infection Prevention:   single patient room provided   hand hygiene promoted   equipment surfaces disinfected   environmental surveillance performed     Problem: Adult Inpatient Plan of Care  Goal: Absence of Hospital-Acquired Illness or Injury  Intervention: Prevent Infection  Recent Flowsheet Documentation  Taken 1/7/2025 1200 by Eunice Iniguez RN  Infection Prevention:   environmental surveillance performed   equipment surfaces disinfected   hand hygiene promoted   visitors restricted/screened  Taken 1/7/2025 1000 by Eunice Iniguez RN  Infection Prevention:   single patient room provided   hand hygiene promoted   equipment surfaces disinfected   environmental surveillance performed   Goal Outcome Evaluation:  Plan of Care Reviewed With: patient        Progress: improving

## 2025-01-07 NOTE — PROGRESS NOTES
LOS: 2 days   Patient Care Team:  Walter Valero MD as PCP - General  Kali Mistry MD as Consulting Physician (Interventional Cardiology)  Riki Hedrick MD as Consulting Physician (Hematology and Oncology)  Hoang Mendez MD as Consulting Physician (Nephrology)  Ham Welch MD as Consulting Physician (Hematology and Oncology)  Kiki Reina, RN as Nurse Navigator    Subjective     Patient states he is improved but not back to baseline      Review of Systems   Constitutional:  Positive for activity change and fatigue.   HENT: Negative.     Respiratory:  Positive for shortness of breath.    Cardiovascular: Negative.    Gastrointestinal: Negative.    Genitourinary: Negative.    Musculoskeletal: Negative.    Neurological:  Positive for weakness.   Psychiatric/Behavioral: Negative.             Objective     Vital Signs  Temp:  [97.1 °F (36.2 °C)-97.7 °F (36.5 °C)] 97.5 °F (36.4 °C)  Heart Rate:  [66-99] 74  Resp:  [12-22] 16  BP: (115-160)/(62-97) 154/80      Physical Exam  Vitals reviewed.   Constitutional:       Appearance: He is not ill-appearing.   HENT:      Head: Normocephalic and atraumatic.      Right Ear: External ear normal.      Left Ear: External ear normal.      Nose: Nose normal.      Mouth/Throat:      Mouth: Mucous membranes are moist.   Eyes:      General:         Right eye: No discharge.         Left eye: No discharge.   Cardiovascular:      Rate and Rhythm: Normal rate and regular rhythm.      Pulses: Normal pulses.      Heart sounds: Normal heart sounds.   Pulmonary:      Effort: Pulmonary effort is normal.      Breath sounds: Normal breath sounds.   Abdominal:      General: Bowel sounds are normal.      Palpations: Abdomen is soft.   Musculoskeletal:         General: Normal range of motion.      Cervical back: Normal range of motion.   Skin:     General: Skin is warm.   Neurological:      Mental Status: He is alert and oriented to person, place, and time.   Psychiatric:          Behavior: Behavior normal.              Results Review:    Lab Results (last 24 hours)       Procedure Component Value Units Date/Time    Non-gynecologic Cytology [[id number]] Collected: 01/06/25 1125    Specimen: Wash from Lung, Right Lower Lobe Updated: 01/07/25 1007    AFB Culture - Wash, Lung, Right Lower Lobe [[id number]] Collected: 01/06/25 1125    Specimen: Wash from Lung, Right Lower Lobe Updated: 01/07/25 0958     AFB Stain No acid fast bacilli seen on concentrated smear    Respiratory Panel PCR w/COVID-19(SARS-CoV-2) [facility]ONA/[facility] In-House, NP Swab in UTM/VTM, 2 HR TAT - Wash, Lung, Right Lower Lobe [[id number]]  (Normal) Collected: 01/06/25 1125    Specimen: Wash from Lung, Right Lower Lobe Updated: 01/06/25 1306     ADENOVIRUS, PCR Not Detected     Coronavirus 229E Not Detected     Coronavirus HKU1 Not Detected     Coronavirus NL63 Not Detected     Coronavirus OC43 Not Detected     COVID19 Not Detected     Human Metapneumovirus Not Detected     Human Rhinovirus/Enterovirus Not Detected     Influenza A PCR Not Detected     Influenza B PCR Not Detected     Parainfluenza Virus 1 Not Detected     Parainfluenza Virus 2 Not Detected     Parainfluenza Virus 3 Not Detected     Parainfluenza Virus 4 Not Detected     RSV, PCR Not Detected     Bordetella pertussis pcr Not Detected     Bordetella parapertussis PCR Not Detected     Chlamydophila pneumoniae PCR Not Detected     Mycoplasma pneumo by PCR Not Detected    Narrative:      In the setting of a positive respiratory panel with a viral infection PLUS a negative procalcitonin without other underlying concern for bacterial infection, consider observing off antibiotics or discontinuation of antibiotics and continue supportive care. If the respiratory panel is positive for atypical bacterial infection (Bordetella pertussis, Chlamydophila pneumoniae, or Mycoplasma pneumoniae), consider antibiotic de-escalation to target atypical bacterial infection.     Respiratory Culture - Wash, Lung, Right Lower Lobe [[id number]] Collected: 01/06/25 1125    Specimen: Wash from Lung, Right Lower Lobe Updated: 01/06/25 1249     Gram Stain Many (4+) WBCs per low power field      Rare (1+) Mixed bacterial morphotypes seen on Gram Stain    Blood Culture - Blood, Wrist, Right [[id number]]  (Normal) Collected: 01/05/25 1202    Specimen: Blood from Wrist, Right Updated: 01/06/25 1231     Blood Culture No growth at 24 hours    Narrative:      Less than seven (7) mL's of blood was collected.  Insufficient quantity may yield false negative results.    Blood Culture - Blood, Arm, Right [[id number]]  (Normal) Collected: 01/05/25 1202    Specimen: Blood from Arm, Right Updated: 01/06/25 1231     Blood Culture No growth at 24 hours    Narrative:      Less than seven (7) mL's of blood was collected.  Insufficient quantity may yield false negative results.    Fungus Culture - Wash, Lung, Right Lower Lobe [[id number]] Collected: 01/06/25 1125    Specimen: Wash from Lung, Right Lower Lobe Updated: 01/06/25 1152    Pneumocystis PCR - Wash, Lung, Right Lower Lobe [[id number]] Collected: 01/06/25 1125    Specimen: Wash from Lung, Right Lower Lobe Updated: 01/06/25 1152             Imaging Results (Last 24 Hours)       ** No results found for the last 24 hours. **                 I reviewed the patient's new clinical results.    Medication Review:   Scheduled Meds:aspirin, 81 mg, Oral, Daily With Lunch  atorvastatin, 40 mg, Oral, QAM  budesonide, 0.5 mg, Nebulization, BID - RT  clopidogrel, 75 mg, Oral, QAM  enoxaparin, 30 mg, Subcutaneous, Daily  ferrous sulfate, 325 mg, Oral, Daily With Breakfast  finasteride, 5 mg, Oral, Nightly  ipratropium-albuterol, 3 mL, Nebulization, BID  methylPREDNISolone sodium succinate, 40 mg, Intravenous, Q12H  metoprolol succinate XL, 25 mg, Oral, Daily  montelukast, 10 mg, Oral, Nightly  pantoprazole, 40 mg, Oral, Daily  piperacillin-tazobactam, 3.375 g, Intravenous,  Q8H  saccharomyces boulardii, 250 mg, Oral, Daily With Lunch  sodium bicarbonate, 650 mg, Oral, BID  sodium chloride, 10 mL, Intravenous, Q12H      Continuous Infusions:Pharmacy to Dose enoxaparin (LOVENOX),       PRN Meds:.  acetaminophen **OR** acetaminophen **OR** acetaminophen    senna-docusate sodium **AND** polyethylene glycol **AND** bisacodyl **AND** bisacodyl    Calcium Replacement - Follow Nurse / BPA Driven Protocol    diphenhydrAMINE    hydrALAZINE    HYDROcodone-acetaminophen    Magnesium Standard Dose Replacement - Follow Nurse / BPA Driven Protocol    ondansetron ODT **OR** ondansetron    Pharmacy to Dose enoxaparin (LOVENOX)    Phosphorus Replacement - Follow Nurse / BPA Driven Protocol    Potassium Replacement - Follow Nurse / BPA Driven Protocol    [COMPLETED] Insert Peripheral IV **AND** sodium chloride    sodium chloride    sodium chloride     Interval History:    Assessment & Plan     Dyspnea  Bronchial pneumonia  History of small cell lung cancer  COPD  -Bronchoscopy 01/06/2025 showed thick green mucopurulent secretions obstructing right bronchus, monitor bronchoscopy results  -Zosyn  -Solu-medrol, bronchodilators, budesonide, duoneb  -consider biopsy for possible reoccurrence of small cell lung cancer  -up out of bed to chair     CKD stage 4  Atherosclerotic heart disease  -Plavix     Hyperlipidemia  -atorvastatin     Hypertension  -metoprolol      BPH  - finasteride     GERD  -pantoprazole     DVT prophylaxis  -Lovenox    Plan for disposition:Home with caregiver declines RAMESH Ayoub, APRN  01/07/25  10:39 EST

## 2025-01-07 NOTE — PROGRESS NOTES
Daily Progress Note          Assessment  Acute/chronic hypoxic respiratory insufficiency     Right lower lobe pneumonia     Respiratory panel negative on 1/4/2025     Lung cancer, small cell lung cancer   EBUS bronchoscopy 5/20/2021 , limited stage treated with radiation and chemotherapy  Followed by [facility], robotic bronchoscopy 2/6/2024  Right lower lobe FNA and biopsies were inadequate, FNA 11R and adequate, procedure was complicated by pneumothorax required admission to the hospital and chest tube placement     PET scan done showed worsening right lower lobe necrotic tumor with mediastinal adenopathy     50-pack-year history of smoking      COPD, no PFTs on CAT scan appears severe     Atherosclerotic heart disease  Chronic bronchitis  Stage IV chronic kidney disease  Prediabetes  Dyslipidemia  Slightly elevated alkaline phosphatase  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  Peripheral vascular disease  BPH with LUTS     Plan     Bronchoscopy 1/6/2025 : monitor cultures  thick green mucopurulent secretions obstructing the right bronchus intermedius suctioned therapeutically from right lower lobe       flutter valve     Antibiotics Zosyn      oxygen titration keep O2 sats above 90% currently on 3 L     Steroids currently on IV Solu-Medrol 40 mg every 12  Bronchodilators budesonide and DuoNeb     Initiate Lovenox for DVT prophylaxis after bronch  Currently on Plavix and aspirin                       LOS: 2 days     Subjective     Cough and SOA    Objective     Vital signs for last 24 hours:  Vitals:    01/07/25 0701 01/07/25 0702 01/07/25 0705 01/07/25 0751   BP:    154/80   BP Location:    Right arm   Patient Position:    Lying   Pulse: 70 72 74    Resp: 16 16 16 16   Temp:    97.5 °F (36.4 °C)   TempSrc:    Oral   SpO2: 100% 100% 100%    Weight:       Height:           Intake/Output last 3 shifts:  I/O last 3 completed shifts:  In: 290 [P.O.:240; I.V.:50]  Out:  200 [Urine:200]  Intake/Output this shift:  No intake/output data recorded.      Radiology  Imaging Results (Last 24 Hours)       ** No results found for the last 24 hours. **            Labs:  Results from last 7 days   Lab Units 01/06/25  0233   WBC 10*3/mm3 14.05*   HEMOGLOBIN g/dL 9.9*   HEMATOCRIT % 31.4*   PLATELETS 10*3/mm3 292     Results from last 7 days   Lab Units 01/06/25  0233 01/04/25  2352 01/04/25  1135   SODIUM mmol/L 139   < > 137   POTASSIUM mmol/L 3.9   < > 3.6   CHLORIDE mmol/L 100   < > 99   CO2 mmol/L 26.1   < > 27.5   BUN mg/dL 43*   < > 30*   CREATININE mg/dL 2.67*   < > 1.84*   CALCIUM mg/dL 9.4   < > 9.9   BILIRUBIN mg/dL  --   --  0.9   ALK PHOS U/L  --   --  145*   ALT (SGPT) U/L  --   --  13   AST (SGOT) U/L  --   --  21   GLUCOSE mg/dL 205*   < > 131*    < > = values in this interval not displayed.     Results from last 7 days   Lab Units 01/04/25  1136   PH, ARTERIAL pH units 7.468*   PO2 ART mm Hg 79.5*   PCO2, ARTERIAL mm Hg 37.3   HCO3 ART mmol/L 27.1     Results from last 7 days   Lab Units 01/04/25  1135   ALBUMIN g/dL 3.8                               Meds:   SCHEDULE  aspirin, 81 mg, Oral, Daily With Lunch  atorvastatin, 40 mg, Oral, QAM  budesonide, 0.5 mg, Nebulization, BID - RT  clopidogrel, 75 mg, Oral, QAM  enoxaparin, 30 mg, Subcutaneous, Daily  ferrous sulfate, 325 mg, Oral, Daily With Breakfast  finasteride, 5 mg, Oral, Nightly  ipratropium-albuterol, 3 mL, Nebulization, BID  methylPREDNISolone sodium succinate, 40 mg, Intravenous, Q12H  metoprolol succinate XL, 25 mg, Oral, Daily  montelukast, 10 mg, Oral, Nightly  pantoprazole, 40 mg, Oral, Daily  piperacillin-tazobactam, 3.375 g, Intravenous, Q8H  saccharomyces boulardii, 250 mg, Oral, Daily With Lunch  sodium bicarbonate, 650 mg, Oral, BID  sodium chloride, 10 mL, Intravenous, Q12H      Infusions  Pharmacy to Dose enoxaparin (LOVENOX),       PRNs    acetaminophen **OR** acetaminophen **OR** acetaminophen     senna-docusate sodium **AND** polyethylene glycol **AND** bisacodyl **AND** bisacodyl    Calcium Replacement - Follow Nurse / BPA Driven Protocol    diphenhydrAMINE    hydrALAZINE    HYDROcodone-acetaminophen    Magnesium Standard Dose Replacement - Follow Nurse / BPA Driven Protocol    ondansetron ODT **OR** ondansetron    Pharmacy to Dose enoxaparin (LOVENOX)    Phosphorus Replacement - Follow Nurse / BPA Driven Protocol    Potassium Replacement - Follow Nurse / BPA Driven Protocol    [COMPLETED] Insert Peripheral IV **AND** sodium chloride    sodium chloride    sodium chloride    Physical Exam:  General Appearance:  Alert   HEENT:  Normocephalic, without obvious abnormality, Conjunctiva/corneas clear,.   Nares normal, no drainage     Neck:  Supple, symmetrical, trachea midline.   Lungs /Chest wall:  wheezing and Bilateral basal rhonchi, respirations unlabored, symmetrical wall movement.     Heart:  Regular rate and rhythm, S1 S2 normal  Abdomen: Soft, non-tender, no masses, no organomegaly.    Extremities: No edema, no clubbing or cyanosis     ROS  Constitutional: Negative for chills, fever and malaise/fatigue.   HENT: Negative.    Eyes: Negative.    Cardiovascular: Negative.    Respiratory: Positive for cough and shortness of breath.    Skin: Negative.    Musculoskeletal: Negative.    Gastrointestinal: Negative.    Genitourinary: Negative.    Neurological: Negative.    Psychiatric/Behavioral: Negative.      I reviewed the recent clinical results  I personally reviewed the latest radiological studies    Part of this note may be an electronic transcription/translation of spoken language to printed text using the Dragon Dictation System.

## 2025-01-08 VITALS
HEIGHT: 70 IN | WEIGHT: 143.74 LBS | RESPIRATION RATE: 21 BRPM | SYSTOLIC BLOOD PRESSURE: 157 MMHG | OXYGEN SATURATION: 98 % | TEMPERATURE: 97.2 F | DIASTOLIC BLOOD PRESSURE: 79 MMHG | BODY MASS INDEX: 20.58 KG/M2 | HEART RATE: 79 BPM

## 2025-01-08 PROBLEM — R09.02 HYPOXIA: Status: ACTIVE | Noted: 2025-01-08

## 2025-01-08 LAB
ALBUMIN SERPL-MCNC: 3.2 G/DL (ref 3.5–5.2)
ALBUMIN/GLOB SERPL: 1.1 G/DL
ALP SERPL-CCNC: 131 U/L (ref 39–117)
ALT SERPL W P-5'-P-CCNC: 10 U/L (ref 1–41)
ANION GAP SERPL CALCULATED.3IONS-SCNC: 10.1 MMOL/L (ref 5–15)
AST SERPL-CCNC: 16 U/L (ref 1–40)
BACTERIA SPEC RESP CULT: NORMAL
BILIRUB SERPL-MCNC: 0.3 MG/DL (ref 0–1.2)
BUN SERPL-MCNC: 45 MG/DL (ref 8–23)
BUN/CREAT SERPL: 20.8 (ref 7–25)
CALCIUM SPEC-SCNC: 9.3 MG/DL (ref 8.6–10.5)
CHLORIDE SERPL-SCNC: 102 MMOL/L (ref 98–107)
CO2 SERPL-SCNC: 26.9 MMOL/L (ref 22–29)
CREAT SERPL-MCNC: 2.16 MG/DL (ref 0.76–1.27)
EGFRCR SERPLBLD CKD-EPI 2021: 30.2 ML/MIN/1.73
GLOBULIN UR ELPH-MCNC: 3 GM/DL
GLUCOSE SERPL-MCNC: 123 MG/DL (ref 65–99)
GRAM STN SPEC: NORMAL
GRAM STN SPEC: NORMAL
LAB AP CASE REPORT: NORMAL
PATH REPORT.FINAL DX SPEC: NORMAL
PATH REPORT.GROSS SPEC: NORMAL
POTASSIUM SERPL-SCNC: 4.3 MMOL/L (ref 3.5–5.2)
PROT SERPL-MCNC: 6.2 G/DL (ref 6–8.5)
SODIUM SERPL-SCNC: 139 MMOL/L (ref 136–145)

## 2025-01-08 PROCEDURE — 94799 UNLISTED PULMONARY SVC/PX: CPT

## 2025-01-08 PROCEDURE — 94761 N-INVAS EAR/PLS OXIMETRY MLT: CPT

## 2025-01-08 PROCEDURE — 80053 COMPREHEN METABOLIC PANEL: CPT | Performed by: FAMILY MEDICINE

## 2025-01-08 PROCEDURE — 25010000002 PIPERACILLIN SOD-TAZOBACTAM PER 1 G: Performed by: INTERNAL MEDICINE

## 2025-01-08 PROCEDURE — 99231 SBSQ HOSP IP/OBS SF/LOW 25: CPT | Performed by: NURSE PRACTITIONER

## 2025-01-08 PROCEDURE — 94664 DEMO&/EVAL PT USE INHALER: CPT

## 2025-01-08 PROCEDURE — 97161 PT EVAL LOW COMPLEX 20 MIN: CPT

## 2025-01-08 PROCEDURE — 25010000002 METHYLPREDNISOLONE PER 40 MG: Performed by: FAMILY MEDICINE

## 2025-01-08 PROCEDURE — 25010000002 HEPARIN LOCK FLUSH PER 10 UNITS: Performed by: NURSE PRACTITIONER

## 2025-01-08 RX ORDER — PREDNISONE 20 MG/1
20 TABLET ORAL
Qty: 5 TABLET | Refills: 0 | Status: SHIPPED | OUTPATIENT
Start: 2025-01-09 | End: 2025-01-14

## 2025-01-08 RX ORDER — PREDNISONE 20 MG/1
20 TABLET ORAL
Status: DISCONTINUED | OUTPATIENT
Start: 2025-01-09 | End: 2025-01-08 | Stop reason: HOSPADM

## 2025-01-08 RX ORDER — HEPARIN SODIUM (PORCINE) LOCK FLUSH IV SOLN 100 UNIT/ML 100 UNIT/ML
5 SOLUTION INTRAVENOUS AS NEEDED
Status: DISCONTINUED | OUTPATIENT
Start: 2025-01-08 | End: 2025-01-08 | Stop reason: HOSPADM

## 2025-01-08 RX ORDER — SODIUM CHLORIDE 0.9 % (FLUSH) 0.9 %
10 SYRINGE (ML) INJECTION AS NEEDED
Status: DISCONTINUED | OUTPATIENT
Start: 2025-01-08 | End: 2025-01-08 | Stop reason: HOSPADM

## 2025-01-08 RX ADMIN — SODIUM BICARBONATE 650 MG: 650 TABLET ORAL at 08:58

## 2025-01-08 RX ADMIN — BUDESONIDE 0.5 MG: 0.5 INHALANT RESPIRATORY (INHALATION) at 09:09

## 2025-01-08 RX ADMIN — HEPARIN 500 UNITS: 100 SYRINGE at 15:36

## 2025-01-08 RX ADMIN — Medication 250 MG: at 12:21

## 2025-01-08 RX ADMIN — FERROUS SULFATE TAB 325 MG (65 MG ELEMENTAL FE) 325 MG: 325 (65 FE) TAB at 08:57

## 2025-01-08 RX ADMIN — METOPROLOL SUCCINATE 25 MG: 25 TABLET, EXTENDED RELEASE ORAL at 08:58

## 2025-01-08 RX ADMIN — PANTOPRAZOLE SODIUM 40 MG: 40 TABLET, DELAYED RELEASE ORAL at 08:57

## 2025-01-08 RX ADMIN — PIPERACILLIN AND TAZOBACTAM 3.38 G: 3; .375 INJECTION, POWDER, FOR SOLUTION INTRAVENOUS at 02:45

## 2025-01-08 RX ADMIN — ASPIRIN 81 MG: 81 TABLET, COATED ORAL at 12:21

## 2025-01-08 RX ADMIN — METHYLPREDNISOLONE SODIUM SUCCINATE 40 MG: 40 INJECTION, POWDER, FOR SOLUTION INTRAMUSCULAR; INTRAVENOUS at 08:56

## 2025-01-08 RX ADMIN — ATORVASTATIN CALCIUM 40 MG: 40 TABLET, FILM COATED ORAL at 08:57

## 2025-01-08 RX ADMIN — CLOPIDOGREL BISULFATE 75 MG: 75 TABLET ORAL at 08:57

## 2025-01-08 RX ADMIN — Medication 10 ML: at 15:37

## 2025-01-08 RX ADMIN — GUAIFENESIN 1200 MG: 600 TABLET ORAL at 08:57

## 2025-01-08 RX ADMIN — PIPERACILLIN AND TAZOBACTAM 3.38 G: 3; .375 INJECTION, POWDER, FOR SOLUTION INTRAVENOUS at 09:02

## 2025-01-08 RX ADMIN — IPRATROPIUM BROMIDE AND ALBUTEROL SULFATE 3 ML: .5; 3 SOLUTION RESPIRATORY (INHALATION) at 09:05

## 2025-01-08 RX ADMIN — Medication 10 ML: at 08:56

## 2025-01-08 NOTE — PROGRESS NOTES
"    Chief Complaint: Pneumonia    Subjective:  Symptoms:  Improved.  No shortness of breath or chest pain.    Pain:  He complains of pain that is mild.  He reports pain is improving.  Pain is well controlled.        Vital Signs:  Temp:  [97.2 °F (36.2 °C)-97.9 °F (36.6 °C)] 97.2 °F (36.2 °C)  Heart Rate:  [68-79] 79  Resp:  [9-21] 21  BP: (118-166)/(63-98) 157/79    Intake & Output (last day)         01/07 0701  01/08 0700 01/08 0701 01/09 0700    P.O. 720 240    I.V. (mL/kg)      Total Intake(mL/kg) 720 (11) 240 (3.7)    Urine (mL/kg/hr) 950 (0.6)     Stool 0     Total Output 950     Net -230 +240          Stool Unmeasured Occurrence 2 x 1 x            Objective:  General Appearance:  Comfortable and in no acute distress.    Vital signs: (most recent): Blood pressure 157/79, pulse 79, temperature 97.2 °F (36.2 °C), temperature source Oral, resp. rate 21, height 177.8 cm (70\"), weight 65.2 kg (143 lb 11.8 oz), SpO2 98%.    HEENT: Normal HEENT exam.    Lungs:  Normal effort.    Heart: Normal rate.    Neurological: Patient is alert.                  Results Review:     I reviewed the patient's new clinical results.  I reviewed the patient's new imaging results and agree with the interpretation.    Imaging Results (Last 24 Hours)       ** No results found for the last 24 hours. **            Lab Results:     Lab Results (last 24 hours)       Procedure Component Value Units Date/Time    Blood Culture - Blood, Wrist, Right [[id number]]  (Normal) Collected: 01/05/25 1202    Specimen: Blood from Wrist, Right Updated: 01/08/25 1231     Blood Culture No growth at 3 days    Narrative:      Less than seven (7) mL's of blood was collected.  Insufficient quantity may yield false negative results.    Blood Culture - Blood, Arm, Right [[id number]]  (Normal) Collected: 01/05/25 1202    Specimen: Blood from Arm, Right Updated: 01/08/25 1231     Blood Culture No growth at 3 days    Narrative:      Less than seven (7) mL's of blood was " collected.  Insufficient quantity may yield false negative results.    Comprehensive Metabolic Panel [[id number]]  (Abnormal) Collected: 01/08/25 1103    Specimen: Blood from Arm, Right Updated: 01/08/25 1138     Glucose 123 mg/dL      BUN 45 mg/dL      Creatinine 2.16 mg/dL      Sodium 139 mmol/L      Potassium 4.3 mmol/L      Chloride 102 mmol/L      CO2 26.9 mmol/L      Calcium 9.3 mg/dL      Total Protein 6.2 g/dL      Albumin 3.2 g/dL      ALT (SGPT) 10 U/L      AST (SGOT) 16 U/L      Alkaline Phosphatase 131 U/L      Total Bilirubin 0.3 mg/dL      Globulin 3.0 gm/dL      A/G Ratio 1.1 g/dL      BUN/Creatinine Ratio 20.8     Anion Gap 10.1 mmol/L      eGFR 30.2 mL/min/1.73     Narrative:      GFR Categories in Chronic Kidney Disease (CKD)      GFR Category          GFR (mL/min/1.73)    Interpretation  G1                     90 or greater         Normal or high (1)  G2                      60-89                Mild decrease (1)  G3a                   45-59                Mild to moderate decrease  G3b                   30-44                Moderate to severe decrease  G4                    15-29                Severe decrease  G5                    14 or less           Kidney failure          (1)In the absence of evidence of kidney disease, neither GFR category G1 or G2 fulfill the criteria for CKD.    eGFR calculation 2021 CKD-EPI creatinine equation, which does not include race as a factor    Respiratory Culture - Wash, Lung, Right Lower Lobe [[id number]] Collected: 01/06/25 1125    Specimen: Wash from Lung, Right Lower Lobe Updated: 01/08/25 0949     Respiratory Culture Light growth (2+) Normal respiratory varsha. No S. aureus or Pseudomonas aeruginosa detected. Final report.     Gram Stain Many (4+) WBCs per low power field      Rare (1+) Mixed bacterial morphotypes seen on Gram Stain    Non-gynecologic Cytology [[id number]] Collected: 01/06/25 1125    Specimen: Wash from Lung, Right Lower Lobe Updated: 01/08/25  "0757     Case Report --     Medical Cytology Report                           Case: [id number]                                  Authorizing Provider:  Jackson Quezada MD             Collected:           01/06/2025 11:25 AM          Ordering Location:     [facility]  Received:            01/06/2025 11:51 AM                                 SUITES                                                                       Pathologist:           Negrito Gilmore MD                                                             Specimen:    Lung, Right Lower Lobe                                                                      Final Diagnosis --     Lung, right lower lobe, bronch wash, smears and cytospin preparation:    Bronchial cells and squamous cells    Pulmonary macrophages present    Moderate acute inflammation and abundant mucus    No fungal organisms identified    Negative for malignant cells    JPR       Gross Description --     1. Lung, Right Lower Lobe.  Received in carbowax and designated \"Lung, Right Lower Lobe \" are 45 mL of cloudy, green fluid. Particulate matter is present. This specimen is processed as per protocol.                   Assessment & Plan       Dyspnea    Bronchial pneumonia       Assessment & Plan  Mr. Jolissaint was expected in office 1/6/2025 for follow up with Dr. Issa. History of small cell lung cancer. Presented to hospital with pneumonia. Symptoms improving, continue antibiotic course and pulmonary toilet. Follow up to office in one month with chest CT.    Discharge per primary service.     Dariana Rodríguez, ISIDORO, APRN  [facility]  01/08/25  12:45 EST    Greater than 15 minutes was spent reviewing the patient's chart, radiographic imaging, labs, provider notes, assessing the patient and developing a plan of care.  This was discussed with the patient and RN.      "

## 2025-01-08 NOTE — DISCHARGE SUMMARY
Date of Discharge:  1/8/2025    Discharge Diagnosis:   Dyspnea  Bronchial pneumonia  History of small cell lung cancer  COPD  CKD stage 4  Atherosclerotic heart disease  Hyperlipidemia  Hypertension  BPH  GERD    Presenting Problem/History of Present Illness  Active Hospital Problems    Diagnosis  POA    **Dyspnea [R06.00]  Yes    Hypoxia [R09.02]  Unknown    Bronchial pneumonia [J18.0]  Yes    Pneumonia due to infectious organism [J18.9]  Yes    Stage 3b chronic kidney disease [N18.32]  Yes    Small cell lung cancer [C34.90]  Yes    Shortness of breath [R06.02]  Yes    Coronary artery disease involving native coronary artery of native heart without angina pectoris [I25.10]  Yes    Dyslipidemia [E78.5]  Yes    Chronic obstructive pulmonary disease [J44.9]  Yes      Resolved Hospital Problems   No resolved problems to display.          Hospital Course    Patient is a 79 y.o. 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 EBUS bronchoscopy 5/20/2021 , limited stage treated with radiation and chemotherapy.Followed by [facility], robotic bronchoscopy 2/6/2024. Right lower lobe FNA and biopsies were inadequate, FNA 11R and adequate, procedure was complicated by pneumothorax required admission to the hospital and chest tube placement.PET scan done showed worsening right lower lobe necrotic tumor with mediastinal adenopathy.   He was treated for pneumonia this admission and was seen by oncology and thoracic surgeon. He did have bronchoscopy on this admission with mucopurulent secretion obstructing the right bronchus suctioned and lavaged. He has done well and near baseline however has weakness and deconditioning. He will go to inpatient rehab. He will need to follow up with thoracic surgeon in one month for chest CT and follow up with his oncologist and  pulmonologist.        Procedures Performed    Procedure(s):  BRONCHOSCOPY with bronchial washing  -------------------  01/06 1118 Bronchoscopy    Consults:   Consults       Date and Time Order Name Status Description    1/5/2025  7:41 AM Hematology & Oncology Inpatient Consult Completed     1/4/2025  2:09 PM Inpatient Pulmonology Consult Completed     1/4/2025  1:54 PM Family Medicine Consult      1/4/2025  1:45 PM Family Medicine Consult              Pertinent Test Results:    Lab Results (most recent)       Procedure Component Value Units Date/Time    Blood Culture - Blood, Wrist, Right [[id number]]  (Normal) Collected: 01/05/25 1202    Specimen: Blood from Wrist, Right Updated: 01/08/25 1231     Blood Culture No growth at 3 days    Narrative:      Less than seven (7) mL's of blood was collected.  Insufficient quantity may yield false negative results.    Blood Culture - Blood, Arm, Right [[id number]]  (Normal) Collected: 01/05/25 1202    Specimen: Blood from Arm, Right Updated: 01/08/25 1231     Blood Culture No growth at 3 days    Narrative:      Less than seven (7) mL's of blood was collected.  Insufficient quantity may yield false negative results.    Comprehensive Metabolic Panel [[id number]]  (Abnormal) Collected: 01/08/25 1103    Specimen: Blood from Arm, Right Updated: 01/08/25 1138     Glucose 123 mg/dL      BUN 45 mg/dL      Creatinine 2.16 mg/dL      Sodium 139 mmol/L      Potassium 4.3 mmol/L      Chloride 102 mmol/L      CO2 26.9 mmol/L      Calcium 9.3 mg/dL      Total Protein 6.2 g/dL      Albumin 3.2 g/dL      ALT (SGPT) 10 U/L      AST (SGOT) 16 U/L      Alkaline Phosphatase 131 U/L      Total Bilirubin 0.3 mg/dL      Globulin 3.0 gm/dL      A/G Ratio 1.1 g/dL      BUN/Creatinine Ratio 20.8     Anion Gap 10.1 mmol/L      eGFR 30.2 mL/min/1.73     Narrative:      GFR Categories in Chronic Kidney Disease (CKD)      GFR Category          GFR (mL/min/1.73)    Interpretation  G1                     90 or  greater         Normal or high (1)  G2                      60-89                Mild decrease (1)  G3a                   45-59                Mild to moderate decrease  G3b                   30-44                Moderate to severe decrease  G4                    15-29                Severe decrease  G5                    14 or less           Kidney failure          (1)In the absence of evidence of kidney disease, neither GFR category G1 or G2 fulfill the criteria for CKD.    eGFR calculation 2021 CKD-EPI creatinine equation, which does not include race as a factor    Respiratory Culture - Wash, Lung, Right Lower Lobe [[id number]] Collected: 01/06/25 1125    Specimen: Wash from Lung, Right Lower Lobe Updated: 01/08/25 0949     Respiratory Culture Light growth (2+) Normal respiratory varsha. No S. aureus or Pseudomonas aeruginosa detected. Final report.     Gram Stain Many (4+) WBCs per low power field      Rare (1+) Mixed bacterial morphotypes seen on Gram Stain    Non-gynecologic Cytology [[id number]] Collected: 01/06/25 1125    Specimen: Wash from Lung, Right Lower Lobe Updated: 01/08/25 0757     Case Report --     Medical Cytology Report                           Case: [id number]                                  Authorizing Provider:  Jackson Quezada MD             Collected:           01/06/2025 11:25 AM          Ordering Location:     [facility]  Received:            01/06/2025 11:51 AM                                 SUITES                                                                       Pathologist:           Negrito Gilmore MD                                                             Specimen:    Lung, Right Lower Lobe                                                                      Final Diagnosis --     Lung, right lower lobe, bronch wash, smears and cytospin preparation:    Bronchial cells and squamous cells    Pulmonary macrophages present    Moderate acute inflammation and abundant  "mucus    No fungal organisms identified    Negative for malignant cells    JPR       Gross Description --     1. Lung, Right Lower Lobe.  Received in [place] and designated \"Lung, Right Lower Lobe \" are 45 mL of cloudy, green fluid. Particulate matter is present. This specimen is processed as per protocol.          AFB Culture - Wash, Lung, Right Lower Lobe [[id number]] Collected: 01/06/25 1125    Specimen: Wash from Lung, Right Lower Lobe Updated: 01/07/25 0958     AFB Stain No acid fast bacilli seen on concentrated smear    Respiratory Panel PCR w/COVID-19(SARS-CoV-2) [facility] In-House, NP Swab in UTM/VTM, 2 HR TAT - Wash, Lung, Right Lower Lobe [[id number]]  (Normal) Collected: 01/06/25 1125    Specimen: Wash from Lung, Right Lower Lobe Updated: 01/06/25 1306     ADENOVIRUS, PCR Not Detected     Coronavirus 229E Not Detected     Coronavirus HKU1 Not Detected     Coronavirus NL63 Not Detected     Coronavirus OC43 Not Detected     COVID19 Not Detected     Human Metapneumovirus Not Detected     Human Rhinovirus/Enterovirus Not Detected     Influenza A PCR Not Detected     Influenza B PCR Not Detected     Parainfluenza Virus 1 Not Detected     Parainfluenza Virus 2 Not Detected     Parainfluenza Virus 3 Not Detected     Parainfluenza Virus 4 Not Detected     RSV, PCR Not Detected     Bordetella pertussis pcr Not Detected     Bordetella parapertussis PCR Not Detected     Chlamydophila pneumoniae PCR Not Detected     Mycoplasma pneumo by PCR Not Detected    Narrative:      In the setting of a positive respiratory panel with a viral infection PLUS a negative procalcitonin without other underlying concern for bacterial infection, consider observing off antibiotics or discontinuation of antibiotics and continue supportive care. If the respiratory panel is positive for atypical bacterial infection (Bordetella pertussis, Chlamydophila pneumoniae, or Mycoplasma pneumoniae), consider antibiotic de-escalation to " target atypical bacterial infection.    Fungus Culture - Wash, Lung, Right Lower Lobe [[id number]] Collected: 01/06/25 1125    Specimen: Wash from Lung, Right Lower Lobe Updated: 01/06/25 1152    Pneumocystis PCR - Wash, Lung, Right Lower Lobe [[id number]] Collected: 01/06/25 1125    Specimen: Wash from Lung, Right Lower Lobe Updated: 01/06/25 1152    Basic Metabolic Panel [[id number]]  (Abnormal) Collected: 01/06/25 0233    Specimen: Blood Updated: 01/06/25 0311     Glucose 205 mg/dL      BUN 43 mg/dL      Creatinine 2.67 mg/dL      Sodium 139 mmol/L      Potassium 3.9 mmol/L      Chloride 100 mmol/L      CO2 26.1 mmol/L      Calcium 9.4 mg/dL      BUN/Creatinine Ratio 16.1     Anion Gap 12.9 mmol/L      eGFR 23.4 mL/min/1.73     Narrative:      GFR Categories in Chronic Kidney Disease (CKD)      GFR Category          GFR (mL/min/1.73)    Interpretation  G1                     90 or greater         Normal or high (1)  G2                      60-89                Mild decrease (1)  G3a                   45-59                Mild to moderate decrease  G3b                   30-44                Moderate to severe decrease  G4                    15-29                Severe decrease  G5                    14 or less           Kidney failure          (1)In the absence of evidence of kidney disease, neither GFR category G1 or G2 fulfill the criteria for CKD.    eGFR calculation 2021 CKD-EPI creatinine equation, which does not include race as a factor    CBC (No Diff) [[id number]]  (Abnormal) Collected: 01/06/25 0233    Specimen: Blood Updated: 01/06/25 0247     WBC 14.05 10*3/mm3      RBC 3.33 10*6/mm3      Hemoglobin 9.9 g/dL      Hematocrit 31.4 %      MCV 94.3 fL      MCH 29.7 pg      MCHC 31.5 g/dL      RDW 15.1 %      RDW-SD 52.9 fl      MPV 10.4 fL      Platelets 292 10*3/mm3     Lactic Acid, Plasma [[id number]]  (Normal) Collected: 01/05/25 1202    Specimen: Blood Updated: 01/05/25 1243     Lactate 1.2 mmol/L      "Procalcitonin [[id number]]  (Abnormal) Collected: 01/05/25 0903    Specimen: Blood Updated: 01/05/25 0940     Procalcitonin 0.58 ng/mL     Narrative:      As a Marker for Sepsis (Non-Neonates):    1. <0.5 ng/mL represents a low risk of severe sepsis and/or septic shock.  2. >2 ng/mL represents a high risk of severe sepsis and/or septic shock.    As a Marker for Lower Respiratory Tract Infections that require antibiotic therapy:    PCT on Admission    Antibiotic Therapy       6-12 Hrs later    >0.5                Strongly Recommended  >0.25 - <0.5        Recommended   0.1 - 0.25          Discouraged              Remeasure/reassess PCT  <0.1                Strongly Discouraged     Remeasure/reassess PCT    As 28 day mortality risk marker: \"Change in Procalcitonin Result\" (>80% or <=80%) if Day 0 (or Day 1) and Day 4 values are available. Refer to http://www.Innotrievepct-calculator.com    Change in PCT <=80%  A decrease of PCT levels below or equal to 80% defines a positive change in PCT test result representing a higher risk for 28-day all-cause mortality of patients diagnosed with severe sepsis for septic shock.    Change in PCT >80%  A decrease of PCT levels of more than 80% defines a negative change in PCT result representing a lower risk for 28-day all-cause mortality of patients diagnosed with severe sepsis or septic shock.       Basic Metabolic Panel [[id number]]  (Abnormal) Collected: 01/04/25 2352    Specimen: Blood Updated: 01/05/25 0025     Glucose 123 mg/dL      BUN 32 mg/dL      Creatinine 2.14 mg/dL      Sodium 137 mmol/L      Potassium 3.9 mmol/L      Chloride 99 mmol/L      CO2 25.5 mmol/L      Calcium 9.8 mg/dL      BUN/Creatinine Ratio 15.0     Anion Gap 12.5 mmol/L      eGFR 30.5 mL/min/1.73     Narrative:      GFR Categories in Chronic Kidney Disease (CKD)      GFR Category          GFR (mL/min/1.73)    Interpretation  G1                     90 or greater         Normal or high (1)  G2                   " "   60-89                Mild decrease (1)  G3a                   45-59                Mild to moderate decrease  G3b                   30-44                Moderate to severe decrease  G4                    15-29                Severe decrease  G5                    14 or less           Kidney failure          (1)In the absence of evidence of kidney disease, neither GFR category G1 or G2 fulfill the criteria for CKD.    eGFR calculation 2021 CKD-EPI creatinine equation, which does not include race as a factor    CBC (No Diff) [[id number]]  (Abnormal) Collected: 01/04/25 2352    Specimen: Blood Updated: 01/05/25 0004     WBC 17.92 10*3/mm3      RBC 3.69 10*6/mm3      Hemoglobin 10.9 g/dL      Hematocrit 34.8 %      MCV 94.3 fL      MCH 29.5 pg      MCHC 31.3 g/dL      RDW 15.0 %      RDW-SD 52.1 fl      MPV 10.1 fL      Platelets 331 10*3/mm3     D-dimer, Quantitative [[id number]]  (Abnormal) Collected: 01/04/25 1135    Specimen: Blood from Arm, Right Updated: 01/04/25 1348     D-Dimer, Quantitative 2.24 MCGFEU/mL     Narrative:      According to the assay 's published package insert, a normal (<0.50 MCGFEU/mL) D-dimer result in conjunction with a non-high clinical probability assessment, excludes deep vein thrombosis (DVT) and pulmonary embolism (PE) with high sensitivity.    D-dimer values increase with age and this can make VTE exclusion of an older population difficult. To address this, the [employer], based on best available evidence and recent guidelines, recommends that clinicians use age-adjusted D-dimer thresholds in patients greater than 50 years of age with: a) a low probability of PE who do not meet all Pulmonary Embolism Rule Out Criteria, or b) in those with intermediate probability of PE.   The formula for an age-adjusted D-dimer cut-off is \"age/100\".  For example, a 60 year old patient would have an age-adjusted cut-off of 0.60 MCGFEU/mL and an 80 year old 0.80 MCGFEU/mL. "    Urinalysis With Microscopic If Indicated (No Culture) - Urine, Clean Catch [[id number]]  (Abnormal) Collected: 01/04/25 1222    Specimen: Urine, Clean Catch Updated: 01/04/25 1243     Color, UA Yellow     Appearance, UA Clear     pH, UA 7.5     Specific Gravity, UA 1.015     Glucose, UA Negative     Ketones, UA Negative     Bilirubin, UA Negative     Blood, UA Negative     Protein, UA >=300 mg/dL (3+)     Leuk Esterase, UA Negative     Nitrite, UA Negative     Urobilinogen, UA 1.0 E.U./dL    Urinalysis, Microscopic Only - Urine, Clean Catch [[id number]] Collected: 01/04/25 1222    Specimen: Urine, Clean Catch Updated: 01/04/25 1243     RBC, UA 0-2 /HPF      WBC, UA 0-2 /HPF      Bacteria, UA None Seen /HPF      Squamous Epithelial Cells, UA 0-2 /HPF      Hyaline Casts, UA 3-6 /LPF      Methodology Automated Microscopy    Respiratory Panel PCR w/COVID-19(SARS-CoV-2) [facility] In-House, NP Swab in UTM/VTM, 2 HR TAT - Swab, Nasopharynx [[id number]]  (Normal) Collected: 01/04/25 1137    Specimen: Swab from Nasopharynx Updated: 01/04/25 1235     ADENOVIRUS, PCR Not Detected     Coronavirus 229E Not Detected     Coronavirus HKU1 Not Detected     Coronavirus NL63 Not Detected     Coronavirus OC43 Not Detected     COVID19 Not Detected     Human Metapneumovirus Not Detected     Human Rhinovirus/Enterovirus Not Detected     Influenza A PCR Not Detected     Influenza B PCR Not Detected     Parainfluenza Virus 1 Not Detected     Parainfluenza Virus 2 Not Detected     Parainfluenza Virus 3 Not Detected     Parainfluenza Virus 4 Not Detected     RSV, PCR Not Detected     Bordetella pertussis pcr Not Detected     Bordetella parapertussis PCR Not Detected     Chlamydophila pneumoniae PCR Not Detected     Mycoplasma pneumo by PCR Not Detected    Narrative:      In the setting of a positive respiratory panel with a viral infection PLUS a negative procalcitonin without other underlying concern for bacterial infection,  consider observing off antibiotics or discontinuation of antibiotics and continue supportive care. If the respiratory panel is positive for atypical bacterial infection (Bordetella pertussis, Chlamydophila pneumoniae, or Mycoplasma pneumoniae), consider antibiotic de-escalation to target atypical bacterial infection.    Comprehensive Metabolic Panel [[id number]]  (Abnormal) Collected: 01/04/25 1135    Specimen: Blood from Arm, Right Updated: 01/04/25 1216     Glucose 131 mg/dL      BUN 30 mg/dL      Creatinine 1.84 mg/dL      Sodium 137 mmol/L      Potassium 3.6 mmol/L      Chloride 99 mmol/L      CO2 27.5 mmol/L      Calcium 9.9 mg/dL      Total Protein 7.0 g/dL      Albumin 3.8 g/dL      ALT (SGPT) 13 U/L      AST (SGOT) 21 U/L      Alkaline Phosphatase 145 U/L      Total Bilirubin 0.9 mg/dL      Globulin 3.2 gm/dL      A/G Ratio 1.2 g/dL      BUN/Creatinine Ratio 16.3     Anion Gap 10.5 mmol/L      eGFR 36.6 mL/min/1.73     Narrative:      GFR Categories in Chronic Kidney Disease (CKD)      GFR Category          GFR (mL/min/1.73)    Interpretation  G1                     90 or greater         Normal or high (1)  G2                      60-89                Mild decrease (1)  G3a                   45-59                Mild to moderate decrease  G3b                   30-44                Moderate to severe decrease  G4                    15-29                Severe decrease  G5                    14 or less           Kidney failure          (1)In the absence of evidence of kidney disease, neither GFR category G1 or G2 fulfill the criteria for CKD.    eGFR calculation 2021 CKD-EPI creatinine equation, which does not include race as a factor    BNP [[id number]]  (Abnormal) Collected: 01/04/25 1135    Specimen: Blood from Arm, Right Updated: 01/04/25 1216     proBNP 2,744.0 pg/mL     Narrative:      This assay is used as an aid in the diagnosis of individuals suspected of having heart failure. It can be used as an aid  in the diagnosis of acute decompensated heart failure (ADHF) in patients presenting with signs and symptoms of ADHF to the emergency department (ED). In addition, NT-proBNP of <300 pg/mL indicates ADHF is not likely.    Age Range Result Interpretation  NT-proBNP Concentration (pg/mL:      <50             Positive            >450                   Gray                 300-450                    Negative             <300    50-75           Positive            >900                  Gray                300-900                  Negative            <300      >75             Positive            >1800                  Gray                300-1800                  Negative            <300    Theophylline Level [[id number]]  (Normal) Collected: 01/04/25 1135    Specimen: Blood from Arm, Right Updated: 01/04/25 1216     Theophylline Level 14.4 mcg/mL     Extra Tubes [[id number]] Collected: 01/04/25 1135    Specimen: Blood from Arm, Right Updated: 01/04/25 1200    Narrative:      The following orders were created for panel order Extra Tubes.  Procedure                               Abnormality         Status                     ---------                               -----------         ------                     Gold Top - SST[[id number]]                                   Final result               Light Blue Top[[id number]]                                   Final result                 Please view results for these tests on the individual orders.    Gold Top - SST [[id number]] Collected: 01/04/25 1135    Specimen: Blood from Arm, Right Updated: 01/04/25 1200     Extra Tube Hold for add-ons.     Comment: Auto resulted.       Light Blue Top [[id number]] Collected: 01/04/25 1135    Specimen: Blood from Arm, Right Updated: 01/04/25 1200     Extra Tube Hold for add-ons.     Comment: Auto resulted       CBC & Differential [[id number]]  (Abnormal) Collected: 01/04/25 1135    Specimen: Blood from Arm, Right Updated: 01/04/25 1149     Narrative:      The following orders were created for panel order CBC & Differential.  Procedure                               Abnormality         Status                     ---------                               -----------         ------                     CBC Auto Differential[[id number]]        Abnormal            Final result                 Please view results for these tests on the individual orders.    CBC Auto Differential [[id number]]  (Abnormal) Collected: 01/04/25 1135    Specimen: Blood from Arm, Right Updated: 01/04/25 1149     WBC 13.58 10*3/mm3      RBC 3.59 10*6/mm3      Hemoglobin 10.6 g/dL      Hematocrit 33.8 %      MCV 94.2 fL      MCH 29.5 pg      MCHC 31.4 g/dL      RDW 14.8 %      RDW-SD 51.8 fl      MPV 10.3 fL      Platelets 297 10*3/mm3      Neutrophil % 88.2 %      Lymphocyte % 2.9 %      Monocyte % 6.6 %      Eosinophil % 1.2 %      Basophil % 0.5 %      Immature Grans % 0.6 %      Neutrophils, Absolute 11.97 10*3/mm3      Lymphocytes, Absolute 0.40 10*3/mm3      Monocytes, Absolute 0.90 10*3/mm3      Eosinophils, Absolute 0.16 10*3/mm3      Basophils, Absolute 0.07 10*3/mm3      Immature Grans, Absolute 0.08 10*3/mm3      nRBC 0.0 /100 WBC     Blood Gas, Arterial - [[id number]]  (Abnormal) Collected: 01/04/25 1136    Specimen: Arterial Blood Updated: 01/04/25 1139     Site Right Radial     Umesh's Test Positive     pH, Arterial 7.468 pH units      pCO2, Arterial 37.3 mm Hg      pO2, Arterial 79.5 mm Hg      HCO3, Arterial 27.1 mmol/L      Base Excess, Arterial 3.3 mmol/L      Comment: Serial Number: [id number]:  [id number]        O2 Saturation, Arterial 96.4 %      CO2 Content 28.2 mmol/L      Barometric Pressure for Blood Gas --     Comment: N/A        Modality Cannula     FIO2 32 %      Hemodilution No     PO2/FIO2 248             Results for orders placed during the hospital encounter of 03/16/23    Adult Transthoracic Echo Limited W/ Cont if Necessary Per Protocol    Interpretation  Summary  Only limited echo.  Parasternal windows were done.  Patient refused procedures midway through the echo.  Normal LV size and contractility EF of 60 to 65%  Normal RV size  Normal atrial size  Pulmonic valve is not well visualized.  Aortic valve, mitral valve, tricuspid valve appears structurally normal, no significant regurgitation seen.  No pericardial effusion seen.  Proximal aorta appears normal in size.       Condition on Discharge:  Stable    Vital Signs  Temp:  [97.2 °F (36.2 °C)-97.9 °F (36.6 °C)] 97.2 °F (36.2 °C)  Heart Rate:  [68-79] 79  Resp:  [9-21] 21  BP: (118-166)/(63-98) 157/79      Physical Exam         Discharge Disposition  Rehab Facility or Unit (DC - External)    Discharge Medications     Discharge Medications        New Medications        Instructions Start Date   cephalexin 250 MG capsule  Commonly known as: KEFLEX   250 mg, Oral, Every 12 Hours Scheduled      predniSONE 20 MG tablet  Commonly known as: DELTASONE   20 mg, Oral, Daily With Breakfast   Start Date: January 9, 2025            Continue These Medications        Instructions Start Date   aspirin 81 MG EC tablet   81 mg, Daily With Lunch      atorvastatin 40 MG tablet  Commonly known as: LIPITOR   40 mg, Every Morning      budesonide 0.5 MG/2ML nebulizer solution  Commonly known as: PULMICORT   0.5 mg, 2 times daily      clopidogrel 75 MG tablet  Commonly known as: PLAVIX   75 mg, Every Morning      diphenhydrAMINE 25 mg capsule  Commonly known as: BENADRYL   25 mg, Every 6 Hours PRN      dronabinol 2.5 MG capsule  Commonly known as: MARINOL   2.5 mg, 2 Times Daily      dutasteride 0.5 MG capsule  Commonly known as: AVODART   0.5 mg, Nightly      ferrous sulfate 324 (65 Fe) MG tablet delayed-release EC tablet   324 mg, Oral, Daily With Breakfast      HYDROcodone-acetaminophen 5-325 MG per tablet  Commonly known as: NORCO   1 tablet, Every 6 Hours PRN      metoprolol succinate XL 25 MG 24 hr tablet  Commonly known as:  TOPROL-XL   TAKE 1 TABLET EVERY DAY      montelukast 10 MG tablet  Commonly known as: SINGULAIR   10 mg, Nightly      multivitamin with minerals tablet tablet   1 tablet, Daily With Lunch      pantoprazole 40 MG EC tablet  Commonly known as: PROTONIX   40 mg, Daily      Perforomist 20 MCG/2ML nebulizer solution  Generic drug: formoterol   2 Times Daily - RT      revefenacin 175 MCG/3ML nebulizer solution  Commonly known as: YUPELRI   175 mcg, Daily - RT      saccharomyces boulardii 250 MG capsule  Commonly known as: FLORASTOR   250 mg, Daily With Lunch      sodium bicarbonate 650 MG tablet   650 mg, 2 Times Daily      theophylline 300 MG 12 hr tablet  Commonly known as: THEODUR   300 mg, 2 Times Daily             ASK your doctor about these medications        Instructions Start Date   ondansetron 4 MG tablet  Commonly known as: ZOFRAN   4 mg, As Needed               Discharge Diet:   Diet Instructions       Diet: Regular/House Diet; Regular (IDDSI 7); Thin (IDDSI 0)      Discharge Diet: Regular/House Diet    Texture: Regular (IDDSI 7)    Fluid Consistency: Thin (IDDSI 0)            Activity at Discharge:   Activity Instructions       Gradually Increase Activity Until at Pre-Hospitalization Level              Follow-up Appointments  Future Appointments   Date Time Provider Department Center   1/30/2025  8:30 AM [facility]   1/30/2025  8:45 AM Ham Welch MD [facility]   4/17/2025  4:10 PM Kali Mistry MD [facility]     Additional Instructions for the Follow-ups that You Need to Schedule       Discharge Follow-up with PCP   As directed       Currently Documented PCP:    Walter Valero MD    PCP Phone Number:    [phone]     Follow Up Details: after rehab        Discharge Follow-up with Specified Provider: Oncologist; 2 Weeks   As directed      To: Oncologist   Follow Up: 2 Weeks        Discharge Follow-up with Specified Provider: Thoracic surgeon; 1  Month   As directed      To: Thoracic surgeon   Follow Up: 1 Month        CT Chest Without Contrast    Feb 08, 2025 (Approximate)      Does this exam require Dharmesh Bronch Protocol?: Yes   Exam reason: ION protocol, follow up pneumonia   Release to patient: Routine Release                Test Results Pending at Discharge  Pending Results       Procedure [Order ID] Specimen - Date/Time    Fungus Culture - Wash, Lung, Right Lower Lobe [[id number]] Collected: 01/06/25 1125    Specimen: Wash from Lung, Right Lower Lobe Updated: 01/06/25 1152    Pneumocystis PCR - Wash, Lung, Right Lower Lobe [[id number]] Collected: 01/06/25 1125    Specimen: Wash from Lung, Right Lower Lobe Updated: 01/06/25 1152             TERE Jean  01/08/25  14:14 EST    Time: Discharge 25 min

## 2025-01-08 NOTE — CASE MANAGEMENT/SOCIAL WORK
Social Work Assessment  [facility]     Patient Name: David A Jolissaint  MRN: [id number]  Today's Date: 1/8/2025    Admit Date: 1/4/2025       Discharge Plan       Row Name 01/08/25 1146       Plan    Plan [facility] (pending acceptance.) PASRR approved. No precert required.    Plan Comments CM updated on status of PASRR.        BRAD Walters, LSW    Phone: [phone]  Fax: [phone]  [email or web address]

## 2025-01-08 NOTE — PLAN OF CARE
Problem: Adult Inpatient Plan of Care  Goal: Plan of Care Review  Outcome: Met  Goal: Patient-Specific Goal (Individualized)  Outcome: Met  Goal: Absence of Hospital-Acquired Illness or Injury  Outcome: Met  Goal: Optimal Comfort and Wellbeing  Outcome: Met  Goal: Readiness for Transition of Care  Outcome: Met     Problem: Sepsis/Septic Shock  Goal: Optimal Coping  Outcome: Met  Goal: Absence of Bleeding  Outcome: Met  Goal: Blood Glucose Level Within Target Range  Outcome: Met  Goal: Absence of Infection Signs and Symptoms  Outcome: Met  Goal: Optimal Nutrition Delivery  Outcome: Met     Problem: Skin Injury Risk Increased  Goal: Skin Health and Integrity  Outcome: Met     Problem: Fall Injury Risk  Goal: Absence of Fall and Fall-Related Injury  Outcome: Met   Goal Outcome Evaluation:

## 2025-01-08 NOTE — DISCHARGE PLACEMENT REQUEST
"JolissaintReji (79 y.o. Male)       Date of Birth   1945    Social Security Number       Address   [address] [place] [facility]    Home Phone   [phone]    MRN   [id number]       Latter day   None    Marital Status                               Admission Date   1/4/25    Admission Type   Emergency    Admitting Provider   Nj Morataya MD    Attending Provider   Kusum Tesfaye MD    Department, Room/Bed   [facility] INPATIENT, 302/1       Discharge Date       Discharge Disposition       Discharge Destination                                 Attending Provider: Kusum Tesfaye MD    Allergies: Diltiazem    Isolation: None   Infection: None   Code Status: CPR    Ht: 177.8 cm (70\")   Wt: 65.2 kg (143 lb 11.8 oz)    Admission Cmt: None   Principal Problem: Dyspnea [R06.00]                   Active Insurance as of 1/4/2025       Primary Coverage       Payor Plan Insurance Group Employer/Plan Group    MEDICARE MEDICARE A & B        Payor Plan Address Payor Plan Phone Number Payor Plan Fax Number Effective Dates    [address] [phone]  4/1/2009 - None Entered    [facility] [id number]         Subscriber Name Subscriber Birth Date Member ID       JOLISSAINT,DAVID A 1945 [id number]               Secondary Coverage       Payor Plan Insurance Group Employer/Plan Group    [facility] [employer] PLAN N       Payor Plan Address Payor Plan Phone Number Payor Plan Fax Number Effective Dates    [facility] [phone]  1/1/2017 - None Entered    [address]       [facility] [address]         Subscriber Name Subscriber Birth Date Member ID       JOLISSAINT,DAVID A 1945 [id number]                     Emergency Contacts        (Rel.) Home Phone Work Phone Mobile Phone    Mary Robertson (Care Giver) -- -- [phone]                "

## 2025-01-08 NOTE — PROGRESS NOTES
Daily Progress Note          Assessment  Acute/chronic hypoxic respiratory insufficiency     Right lower lobe pneumonia     Respiratory panel negative on 1/4/2025     Lung cancer, small cell lung cancer   EBUS bronchoscopy 5/20/2021 , limited stage treated with radiation and chemotherapy  Followed by [facility] pulmonology/navya, robotic bronchoscopy 2/6/2024  Right lower lobe FNA and biopsies were inadequate, FNA 11R and adequate, procedure was complicated by pneumothorax required admission to the hospital and chest tube placement     PET scan done showed worsening right lower lobe necrotic tumor with mediastinal adenopathy     50-pack-year history of smoking      COPD, no PFTs on CAT scan appears severe     Atherosclerotic heart disease  Chronic bronchitis  Stage IV chronic kidney disease  Prediabetes  Dyslipidemia  Slightly elevated alkaline phosphatase  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  Peripheral vascular disease  BPH with LUTS     Plan     Respiratory status is improving, pt would benefit from rehab    Bronchoscopy 1/6/2025 : monitor cultures: normal varsha so far  thick green mucopurulent secretions obstructing the right bronchus intermedius suctioned therapeutically from right lower lobe       flutter valve     Antibiotics Zosyn      oxygen titration keep O2 sats above 90% currently on 3 L     Steroids: prednisone  Bronchodilators budesonide and DuoNeb     Lovenox for DVT prophylaxis   Currently on Plavix and aspirin                       LOS: 3 days     Subjective     Cough and SOA    Objective     Vital signs for last 24 hours:  Vitals:    01/08/25 0908 01/08/25 0909 01/08/25 0912 01/08/25 1134   BP:    157/79   BP Location:    Right arm   Patient Position:    Lying   Pulse: 79 74 73 79   Resp: 18 20 18 21   Temp:    97.2 °F (36.2 °C)   TempSrc:    Oral   SpO2: 99% 99% 100% 98%   Weight:       Height:           Intake/Output last 3 shifts:  I/O last 3 completed  shifts:  In: 720 [P.O.:720]  Out: 950 [Urine:950]  Intake/Output this shift:  I/O this shift:  In: 240 [P.O.:240]  Out: -       Radiology  Imaging Results (Last 24 Hours)       ** No results found for the last 24 hours. **            Labs:  Results from last 7 days   Lab Units 01/06/25  0233   WBC 10*3/mm3 14.05*   HEMOGLOBIN g/dL 9.9*   HEMATOCRIT % 31.4*   PLATELETS 10*3/mm3 292     Results from last 7 days   Lab Units 01/08/25  1103   SODIUM mmol/L 139   POTASSIUM mmol/L 4.3   CHLORIDE mmol/L 102   CO2 mmol/L 26.9   BUN mg/dL 45*   CREATININE mg/dL 2.16*   CALCIUM mg/dL 9.3   BILIRUBIN mg/dL 0.3   ALK PHOS U/L 131*   ALT (SGPT) U/L 10   AST (SGOT) U/L 16   GLUCOSE mg/dL 123*     Results from last 7 days   Lab Units 01/04/25  1136   PH, ARTERIAL pH units 7.468*   PO2 ART mm Hg 79.5*   PCO2, ARTERIAL mm Hg 37.3   HCO3 ART mmol/L 27.1     Results from last 7 days   Lab Units 01/08/25  1103 01/04/25  1135   ALBUMIN g/dL 3.2* 3.8                               Meds:   SCHEDULE  aspirin, 81 mg, Oral, Daily With Lunch  atorvastatin, 40 mg, Oral, QAM  budesonide, 0.5 mg, Nebulization, BID - RT  clopidogrel, 75 mg, Oral, QAM  enoxaparin, 30 mg, Subcutaneous, Daily  ferrous sulfate, 325 mg, Oral, Daily With Breakfast  finasteride, 5 mg, Oral, Nightly  guaiFENesin, 1,200 mg, Oral, Q12H  ipratropium-albuterol, 3 mL, Nebulization, BID  methylPREDNISolone sodium succinate, 40 mg, Intravenous, Q12H  metoprolol succinate XL, 25 mg, Oral, Daily  montelukast, 10 mg, Oral, Nightly  pantoprazole, 40 mg, Oral, Daily  piperacillin-tazobactam, 3.375 g, Intravenous, Q8H  saccharomyces boulardii, 250 mg, Oral, Daily With Lunch  sodium bicarbonate, 650 mg, Oral, BID  sodium chloride, 10 mL, Intravenous, Q12H      Infusions  Pharmacy to Dose enoxaparin (LOVENOX),       PRNs    acetaminophen **OR** acetaminophen **OR** acetaminophen    senna-docusate sodium **AND** polyethylene glycol **AND** bisacodyl **AND** bisacodyl    Calcium  Replacement - Follow Nurse / BPA Driven Protocol    diphenhydrAMINE    hydrALAZINE    HYDROcodone-acetaminophen    Magnesium Standard Dose Replacement - Follow Nurse / BPA Driven Protocol    ondansetron ODT **OR** ondansetron    Pharmacy to Dose enoxaparin (LOVENOX)    Phosphorus Replacement - Follow Nurse / BPA Driven Protocol    Potassium Replacement - Follow Nurse / BPA Driven Protocol    [COMPLETED] Insert Peripheral IV **AND** sodium chloride    sodium chloride    sodium chloride    Physical Exam:  General Appearance:  Alert   HEENT:  Normocephalic, without obvious abnormality, Conjunctiva/corneas clear,.   Nares normal, no drainage     Neck:  Supple, symmetrical, trachea midline.   Lungs /Chest wall: less wheezing and Bilateral basal rhonchi, respirations unlabored, symmetrical wall movement.     Heart:  Regular rate and rhythm, S1 S2 normal  Abdomen: Soft, non-tender, no masses, no organomegaly.    Extremities: No edema, no clubbing or cyanosis     ROS  Constitutional: Negative for chills, fever and malaise/fatigue.   HENT: Negative.    Eyes: Negative.    Cardiovascular: Negative.    Respiratory: Positive for less cough and shortness of breath.    Skin: Negative.    Musculoskeletal: Negative.    Gastrointestinal: Negative.    Genitourinary: Negative.    Neurological: Negative.    Psychiatric/Behavioral: Negative.      I reviewed the recent clinical results  I personally reviewed the latest radiological studies    Part of this note may be an electronic transcription/translation of spoken language to printed text using the Dragon Dictation System.

## 2025-01-08 NOTE — THERAPY EVALUATION
Patient Name: David A Jolissaint  : 1945    MRN: [id number]                              Today's Date: 2025       Admit Date: 2025    Visit Dx:     ICD-10-CM ICD-9-CM   1. Hypervolemia, unspecified hypervolemia type  E87.70 276.69   2. Shortness of breath  R06.02 786.05   3. Hypoxia  R09.02 799.02   4. Tachycardia  R00.0 785.0   5. Bronchial pneumonia  J18.0 485   6. Mediastinal adenopathy  R59.0 785.6     Patient Active Problem List   Diagnosis    Coronary artery disease involving native coronary artery of native heart without angina pectoris    Right bundle branch block    Shortness of breath    Chest pain, atypical    Lower abdominal pain    Acute constipation    Chest pressure    Benign hypertension with chronic kidney disease, stage IV    Prediabetes    Mediastinal adenopathy    Small cell lung cancer    Chest pain    Bilateral carotid artery stenosis    Chronic obstructive pulmonary disease    Chronic renal insufficiency, stage III (moderate)    Dehiscence of amputation stump    Dyslipidemia    History of prosthetic heart valve    Peripheral arterial occlusive disease    Status post unilateral below knee amputation    Atypical chest pain    Atherosclerosis of artery    Atherosclerosis of coronary artery bypass graft    Coronary atherosclerosis    Mediastinal lymphadenopathy    Small cell carcinoma of lung    Poor venous access    Near syncope    Weakness    Anemia in chronic kidney disease    Benign prostatic hyperplasia    Stage 3b chronic kidney disease    Anemia of chronic renal failure    Pneumonia due to infectious organism    Port-A-Cath in place    Dyspnea    Bronchial pneumonia    Hypoxia     Past Medical History:   Diagnosis Date    Anemia     Cellulitis     CKD (chronic kidney disease), stage III     COPD (chronic obstructive pulmonary disease)     Diverticulitis     Dyslipidemia     Hypertension     Myocardial infarction 2016    PVD (peripheral vascular disease)     Small cell lung  cancer 05/2021     Past Surgical History:   Procedure Laterality Date    BELOW KNEE LEG AMPUTATION Left 2015    BRONCHOSCOPY N/A 05/20/2021    Procedure: BRONCHOSCOPY WITH ENDOBRONCHIAL ULTRASOUND WITH FINE NEEDLE ASPIRATION. BRONCHOALVEOLAR LAVAGE;  Surgeon: Jackson Quezada MD;  Location: [facility] ENDOSCOPY;  Service: Pulmonary;  Laterality: N/A;  subcarinal mass    BRONCHOSCOPY N/A 1/6/2025    Procedure: BRONCHOSCOPY with bronchial washing;  Surgeon: Jackson Quezada MD;  Location: [facility] ENDOSCOPY;  Service: Pulmonary;  Laterality: N/A;  postop: pneumonia    CARDIAC SURGERY      CHOLECYSTECTOMY      CORONARY ARTERY BYPASS GRAFT  2016    GALLBLADDER SURGERY        General Information       Row Name 01/08/25 1415          Physical Therapy Time and Intention    Document Type evaluation  -       Row Name 01/08/25 1415          General Information    Prior Level of Function independent:;all household mobility  pt reports ambulating without AD at baseline. Household and limited community distances.  Denies falls within the past 6 months. On 3L 02 at  baseline. Does not drive. Lives with girlfriend who works full-time from home, [employer] since 2016.  -     Existing Precautions/Restrictions oxygen therapy device and L/min  3L at baseline  -     Barriers to Rehab none identified  -       Row Name 01/08/25 1415          Living Environment    People in Home significant other  -       Row Name 01/08/25 1415          Home Main Entrance    Number of Stairs, Main Entrance four  -       Row Name 01/08/25 1415          Stairs Within Home, Primary    Number of Stairs, Within Home, Primary none  -       Row Name 01/08/25 1415          Cognition    Orientation Status (Cognition) oriented x 4  -       Row Name 01/08/25 1415          Safety Issues/Impairments Affecting Functional Mobility    Impairments Affecting Function (Mobility) balance;endurance/activity tolerance  -               User Key  (r) = Recorded By, (t) = Taken By,  (c) = Cosigned By      Initials Name Provider Type    Miryam Garrett, PT Physical Therapist                   Mobility       Row Name 01/08/25 1419          Bed Mobility    Bed Mobility supine-sit  -     Supine-Sit [place] (Bed Mobility) contact guard  -     Assistive Device (Bed Mobility) bed rails  -       Row Name 01/08/25 1419          Sit-Stand Transfer    Sit-Stand [place] (Transfers) contact guard  -     Assistive Device (Sit-Stand Transfers) walker, front-wheeled  -     Comment, (Sit-Stand Transfer) cues for hand placement  -       Row Name 01/08/25 1419          Gait/Stairs (Locomotion)    [place] Level (Gait) contact guard  -     Assistive Device (Gait) walker, front-wheeled  -     Distance in Feet (Gait) --  80' x 2  -MK     Comment, (Gait/Stairs) forward flexed posture, increased work of breathing and heavy reliance on AD. 02 sat 92% following ambulation on 3L. Prosthetic donned on L LE, pt performed independently. Demonstrates increased lateral shift to L during ambulation which pt states is his normal gait pattern.  -               User Key  (r) = Recorded By, (t) = Taken By, (c) = Cosigned By      Initials Name Provider Type    Miryam Garrett, PT Physical Therapist                   Obj/Interventions       Row Name 01/08/25 1421          Range of Motion Comprehensive    General Range of Motion no range of motion deficits identified  -     Comment, General Range of Motion except L BKA  -       Row Name 01/08/25 1421          Strength Comprehensive (MMT)    Comment, General Manual Muscle Testing (MMT) Assessment Grossly 3+/5  -MK               User Key  (r) = Recorded By, (t) = Taken By, (c) = Cosigned By      Initials Name Provider Type    Miryam Garrett, PT Physical Therapist                   Goals/Plan       Row Name 01/08/25 1428          Transfer Goal 1 (PT)    Activity/Assistive Device (Transfer Goal 1, PT) transfers, all  -     [place] Level/Cues  Needed (Transfer Goal 1, PT) independent  -MK     Time Frame (Transfer Goal 1, PT) long term goal (LTG);2 weeks  -       Row Name 01/08/25 1428          Gait Training Goal 1 (PT)    Activity/Assistive Device (Gait Training Goal 1, PT) gait (walking locomotion)  -     Distance (Gait Training Goal 1, PT) 200' without AD  -MK     Time Frame (Gait Training Goal 1, PT) long term goal (LTG);2 weeks  -       Row Name 01/08/25 1428          Stairs Goal 1 (PT)    Activity/Assistive Device (Stairs Goal 1, PT) ascending stairs;descending stairs  -     [place] Level/Cues Needed (Stairs Goal 1, PT) contact guard required  -     Number of Stairs (Stairs Goal 1, PT) 4  -MK     Time Frame (Stairs Goal 1, PT) long term goal (LTG);2 weeks  -       Row Name 01/08/25 1428          Therapy Assessment/Plan (PT)    Planned Therapy Interventions (PT) balance training;bed mobility training;gait training;neuromuscular re-education;patient/family education;stair training;strengthening;transfer training  -               User Key  (r) = Recorded By, (t) = Taken By, (c) = Cosigned By      Initials Name Provider Type    Miryam Garrett, PT Physical Therapist                   Clinical Impression       Row Name 01/08/25 1423          Pain    Pretreatment Pain Rating 0/10 - no pain  -     Posttreatment Pain Rating 0/10 - no pain  -       Row Name 01/08/25 1423          Plan of Care Review    Plan of Care Reviewed With patient  -     Outcome Evaluation 80 yo admit with hypoxia, dx with acute on chronic resp failure and PNA. s/p bronch 1/7. Pt also with hx of lung CA and work-up/bx currently in progress to r/o additional CA. At baseline, pt independent with household and limited community distances, does not utilize AD and on 3L continuous 02. Pt with L DEE from 2016, independent with donning/doffing prosthesis. Pt lives with his girlfriend who works full-time. Pt CGA for bed mobility, transfers and ambulation 80' x 2 with  brief standing rest break. 02 sat 92% following ambulation on 3L. Noted increased work of breathing and dyspnea with exertion. Pt also with reliance on AD and demonstrating increased risk of falls. Pt pleasant and motivated to work with therapy, Anticipate quick return to baseline with short SNF stay.  -       Row Name 01/08/25 1423          Therapy Assessment/Plan (PT)    Rehab Potential (PT) good  -     Criteria for Skilled Interventions Met (PT) yes;meets criteria  -     Therapy Frequency (PT) 3 times/wk  -       Row Name 01/08/25 1423          Vital Signs    Post SpO2 (%) 97  -     O2 Delivery Post Treatment supplemental O2  -       Row Name 01/08/25 1423          Positioning and Restraints    Pre-Treatment Position in bed  -     Post Treatment Position chair  -     In Chair sitting;call light within reach;exit alarm on;encouraged to call for assist  -               User Key  (r) = Recorded By, (t) = Taken By, (c) = Cosigned By      Initials Name Provider Type    Miryam Garrett, PT Physical Therapist                   Outcome Measures       Row Name 01/08/25 1430 01/08/25 0926       How much help from another person do you currently need...    Turning from your back to your side while in flat bed without using bedrails? 3  -MK 3  -SC    Moving from lying on back to sitting on the side of a flat bed without bedrails? 3  -MK 3  -SC    Moving to and from a bed to a chair (including a wheelchair)? 3  -MK 3  -SC    Standing up from a chair using your arms (e.g., wheelchair, bedside chair)? 3  -MK 3  -SC    Climbing 3-5 steps with a railing? 3  -MK 3  -SC    To walk in hospital room? 3  -MK 3  -SC    AM-PAC 6 Clicks Score (PT) 18  -MK 18  -SC              User Key  (r) = Recorded By, (t) = Taken By, (c) = Cosigned By      Initials Name Provider Type    Miryam Garrett, PT Physical Therapist    Sepideh Oconnor, RN Registered Nurse                                 Physical Therapy Education        Title: PT OT SLP Therapies (In Progress)       Topic: Physical Therapy (In Progress)       Point: Mobility training (Not Started)       Learner Progress:  Not documented in this visit.                                  PT Recommendation and Plan  Planned Therapy Interventions (PT): balance training, bed mobility training, gait training, neuromuscular re-education, patient/family education, stair training, strengthening, transfer training  Outcome Evaluation: 80 yo admit with hypoxia, dx with acute on chronic resp failure and PNA. s/p bronch 1/7. Pt also with hx of lung CA and work-up/bx currently in progress to r/o additional CA. At baseline, pt independent with household and limited community distances, does not utilize AD and on 3L continuous 02. Pt with L BKA from 2016, independent with donning/doffing prosthesis. Pt lives with his girlfriend who works full-time. Pt CGA for bed mobility, transfers and ambulation 80' x 2 with brief standing rest break. 02 sat 92% following ambulation on 3L. Noted increased work of breathing and dyspnea with exertion. Pt also with reliance on AD and demonstrating increased risk of falls. Pt pleasant and motivated to work with therapy, Anticipate quick return to baseline with short SNF stay.     Time Calculation:         PT Charges       Row Name 01/08/25 1431             Time Calculation    Start Time 1400  -MK      Stop Time 1420  -MK      Time Calculation (min) 20 min  -MK      PT Received On 01/08/25  -      PT - Next Appointment 01/10/25  -      PT Goal Re-Cert Due Date 01/22/25  -         Time Calculation- PT    Total Timed Code Minutes- PT 0 minute(s)  -                User Key  (r) = Recorded By, (t) = Taken By, (c) = Cosigned By      Initials Name Provider Type    Miryam Garrett, PT Physical Therapist                  Therapy Charges for Today       Code Description Service Date Service Provider Modifiers Qty    60787847197 HC PT EVAL LOW COMPLEXITY 3 1/8/2025  Miryam Hamilton, PT GP 1            PT G-Codes  AM-PAC 6 Clicks Score (PT): 18  PT Discharge Summary  Anticipated Discharge Disposition (PT): skilled nursing facility    Miryam Hamilton, PT  1/8/2025

## 2025-01-08 NOTE — PLAN OF CARE
Goal Outcome Evaluation:  Plan of Care Reviewed With: patient           Outcome Evaluation: 78 yo admit with hypoxia, dx with acute on chronic resp failure and PNA. s/p bronch 1/7. Pt also with hx of lung CA and work-up/bx currently in progress to r/o additional CA. At baseline, pt independent with household and limited community distances, does not utilize AD and on 3L continuous 02. Pt with L BKA from 2016, independent with donning/doffing prosthesis. Pt lives with his girlfriend who works full-time. Pt CGA for bed mobility, transfers and ambulation 80' x 2 with brief standing rest break. 02 sat 92% following ambulation on 3L. Noted increased work of breathing and dyspnea with exertion. Pt also with reliance on AD and demonstrating increased risk of falls. Pt pleasant and motivated to work with therapy, Anticipate quick return to baseline with short SNF stay.    Anticipated Discharge Disposition (PT): skilled nursing facility

## 2025-01-08 NOTE — PLAN OF CARE
Goal Outcome Evaluation:   Plan is for patient to return home when medically stable.

## 2025-01-08 NOTE — PLAN OF CARE
Problem: Adult Inpatient Plan of Care  Goal: Optimal Comfort and Wellbeing  Intervention: Monitor Pain and Promote Comfort  Recent Flowsheet Documentation  Taken 1/7/2025 2000 by Becky Segura RN  Pain Management Interventions:   pain management plan reviewed with patient/caregiver   pain medication given     Problem: Adult Inpatient Plan of Care  Goal: Optimal Comfort and Wellbeing  Intervention: Provide Person-Centered Care  Recent Flowsheet Documentation  Taken 1/7/2025 2000 by Becky Segura RN  Trust Relationship/Rapport:   care explained   questions answered   Goal Outcome Evaluation:

## 2025-01-08 NOTE — CASE MANAGEMENT/SOCIAL WORK
Continued Stay Note  FERNANDEZ Walls     Patient Name: David A Jolissaint  MRN: [id number]  Today's Date: 1/8/2025    Admit Date: 1/4/2025    Plan: SNF [facility] (accepting/ pharmacy updated.) PASRR approved. No precert required.   Discharge Plan       Row Name 01/08/25 1232       Plan    Plan [facility] (accepting/ pharmacy updated.) PASRR approved. No precert required.                   Zaina Contreras RN     Office phone: [phone]  Office fax: [phone]

## 2025-01-08 NOTE — CASE MANAGEMENT/SOCIAL WORK
Continued Stay Note   Titi     Patient Name: David A Jolissaint  MRN: [id number]  Today's Date: 1/8/2025    Admit Date: 1/4/2025    Plan: [facility] (pending acceptance.) PASRR pending. No precert required.   Discharge Plan       Row Name 01/08/25 1124       Plan    Plan [facility] (pending acceptance.) PASRR pending. No precert required.                     Zaina Contreras RN      Office phone: [phone]  Office fax: [phone]

## 2025-01-09 NOTE — CASE MANAGEMENT/SOCIAL WORK
Case Management Discharge Note      Final Note: [facility] SNF         Selected Continued Care - Discharged on 1/8/2025 Admission date: 1/4/2025 - Discharge disposition: Rehab Facility or Unit (DC - External)      Destination Coordination complete.      Service Provider Services Address Phone Fax Patient Preferred    [address] [facility] [facility] [address] [place] [address] [phone] --                      Transportation Services  W/C Van: Skilled Nursing Facility Van    Final Discharge Disposition Code: 03 - skilled nursing facility (SNF)

## 2025-01-10 LAB
BACTERIA SPEC AEROBE CULT: NORMAL
BACTERIA SPEC AEROBE CULT: NORMAL
P JIROVECII DNA L RESP QL NAA+NON-PROBE: NEGATIVE
REF LAB TEST METHOD: NORMAL

## 2025-01-13 LAB — FUNGUS WND CULT: ABNORMAL

## 2025-01-28 NOTE — PROGRESS NOTES
HEMATOLOGY ONCOLOGY OUTPATIENT FOLLOW UP       Patient name: David A Jolissaint  : 1945  MRN: [id number]  Primary Care Physician: Walter Valero MD  Referring Physician: Walter Valero MD  Reason For Consult: Progressive small cell lung cancer.    History of Present Illness:    2024: In the office for the first time for the treatment of what appears to be progressive small cell lung cancer.  He was first diagnosed with small cell lung cancer in .  At the time he is disease was staged as limited stage and he was treated both with radiation and chemotherapy.  Subsequently he had prophylactic cranial irradiation.  He did well until closer to the time of this visit when, on a PET scan, that reports increased size and activity of the necrotic tumor in the right lower lobe and superior segment, without associated right lung nodules that are increased in size and show increased and fluorodeoxyglucose avidity.  There is mediastinal adenopathy.  Plans for biopsy were made but he declined.  He was being seen at the [facility] but he decided to transfer his care here as he was not satisfied with the setting and the options he had received.  He is known as well for coronary artery disease and has undergone a coronary artery bypass graft surgery.  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 He suffers from chronic kidney disease and at some point needed to be on dialysis and, in fact, he has a fistula in the left upper extremity that has never been used.  On exam he is a well-built man who appears the stated age.  He seems chronically ill.  He is transported in a wheelchair.  He is in no distress.  No oral lesions.  Respirations not labored.  Lungs markedly diminished bilaterally.  Heart tachycardic and regular.  Abdomen soft.  Right lower extremity without edema.  Reviewed the  images of the PET scan and its report.  Discussed with him.  Explained to him the importance of tissue diagnosis.  I have asked him to see Dr. Donohue for consideration of endoscopic fine-needle aspiration both for histologic diagnosis and next-generation sequencing.  I have asked him to see me with results.  Explained the difference between a percutaneous biopsy and endoscopic biopsy and the reduced risk of the endoscopic biopsy.  Discussed with him goals of care and explained to him the importance of thinking about what he wants to do in the future given the findings, he has chronic problems and this most recent diagnosis.    1/5/2025: Mr. Jolissaint came to the emergency room after he started to have dyspnea with minimal activity.  This started probably 2 days, or so, before his presentation.  Imaging of the chest revealed progressive enlargement of the previously identified right lung tumor with some postobstructive consolidation of the upper and lower lobes on the right.  No new findings concerning for metastatic disease were identified.  History of jaundice and had not been seen by thoracic surgery and was scheduled to be seen on January 7, 2025.  He has no pain.  He has been eating well.  He denies fevers.  He has been coughing about as much as usual but has had little expectoration and no hemoptysis.  He denies abdominal pain.  He has had no difficulties with defecation or dysuria and he denies peripheral edema.       1/30/2025: Was admitted to the hospital with respiratory symptoms.  Underwent a bronchoscopy that revealed abundant purulent secretions.  He was treated with antibiotics.  He feels better today.  He has been eating well and has not had any new problems.  He has had no fevers.  On exam chronically ill-appearing but in no distress.  No jaundice.  The lungs are diminished bilaterally.  The heart is regular.  Abdomen is not distended.  There is no edema.  Laboratory exams reviewed.  Reviewed and  discussed with him the images of the recent scans and the results of the bronchoscopy.  The bronchial washings did not reveal a malignant process.  There was mainly inflammation.  He is to have a repeat CT scan in early February 2025 and, if needed, will undergo bronchoscopy and biopsy.  He will see me early in March.    Past Medical History:   Diagnosis Date    Anemia     Cellulitis     CKD (chronic kidney disease), stage III     COPD (chronic obstructive pulmonary disease)     Diverticulitis     Dyslipidemia     Hypertension     Myocardial infarction 2016    PVD (peripheral vascular disease)     Small cell lung cancer 05/2021     Past Surgical History:   Procedure Laterality Date    BELOW KNEE LEG AMPUTATION Left 2015    BRONCHOSCOPY N/A 05/20/2021    Procedure: BRONCHOSCOPY WITH ENDOBRONCHIAL ULTRASOUND WITH FINE NEEDLE ASPIRATION. BRONCHOALVEOLAR LAVAGE;  Surgeon: Jackson Quezada MD;  Location: [facility] ENDOSCOPY;  Service: Pulmonary;  Laterality: N/A;  subcarinal mass    BRONCHOSCOPY N/A 1/6/2025    Procedure: BRONCHOSCOPY with bronchial washing;  Surgeon: Jackson Quezada MD;  Location: [facility] ENDOSCOPY;  Service: Pulmonary;  Laterality: N/A;  postop: pneumonia    CARDIAC SURGERY      CHOLECYSTECTOMY      CORONARY ARTERY BYPASS GRAFT  2016    GALLBLADDER SURGERY         Current Outpatient Medications:     atorvastatin (LIPITOR) 40 MG tablet, Take 1 tablet by mouth Every Morning., Disp: , Rfl:     budesonide (PULMICORT) 0.5 MG/2ML nebulizer solution, Take 2 mL by nebulization 2 (two) times a day., Disp: , Rfl:     clopidogrel (PLAVIX) 75 MG tablet, Take 1 tablet by mouth Every Morning., Disp: , Rfl:     diphenhydrAMINE (BENADRYL) 25 mg capsule, Take 1 capsule by mouth Every 6 (Six) Hours As Needed for Itching. States he takes branded Z-Quil at hs, Disp: , Rfl:     dronabinol (MARINOL) 2.5 MG capsule, Take 1 capsule by mouth 2 (Two) Times a Day., Disp: , Rfl:     dutasteride (AVODART) 0.5 MG capsule, Take 1 capsule by  mouth Every Night., Disp: , Rfl:     ferrous sulfate 324 (65 Fe) MG tablet delayed-release EC tablet, Take 1 tablet by mouth Daily With Breakfast., Disp: 30 tablet, Rfl: 1    formoterol (Perforomist) 20 MCG/2ML nebulizer solution, Take  by nebulization 2 (Two) Times a Day., Disp: , Rfl:     HYDROcodone-acetaminophen (NORCO) 5-325 MG per tablet, Take 1 tablet by mouth Every 6 (Six) Hours As Needed for Moderate Pain., Disp: , Rfl:     metoprolol succinate XL (TOPROL-XL) 25 MG 24 hr tablet, TAKE 1 TABLET EVERY DAY, Disp: 90 tablet, Rfl: 3    montelukast (SINGULAIR) 10 MG tablet, Take 1 tablet by mouth Every Night., Disp: , Rfl:     Multiple Vitamins-Minerals (MULTI VITAMIN/MINERALS) tablet, 1 tablet Daily With Lunch., Disp: , Rfl:     ondansetron (ZOFRAN) 4 MG tablet, Take 1 tablet by mouth As Needed., Disp: , Rfl:     pantoprazole (PROTONIX) 40 MG EC tablet, Take 1 tablet by mouth Daily., Disp: , Rfl:     revefenacin (YUPELRI) 175 MCG/3ML nebulizer solution, Take 3 mL by nebulization Daily., Disp: , Rfl:     saccharomyces boulardii (FLORASTOR) 250 MG capsule, Take 1 capsule by mouth Daily With Lunch., Disp: , Rfl:     sodium bicarbonate 650 MG tablet, Take 1 tablet by mouth 2 (Two) Times a Day., Disp: , Rfl:     theophylline (THEODUR) 300 MG 12 hr tablet, Take 1 tablet by mouth 2 (Two) Times a Day., Disp: , Rfl:     aspirin 81 MG EC tablet, Take 1 tablet by mouth Daily With Lunch. (Patient not taking: Reported on 1/30/2025), Disp: , Rfl: 0  No current facility-administered medications for this visit.    Facility-Administered Medications Ordered in Other Visits:     heparin injection 500 Units, 500 Units, Intravenous, PRN, Ham Welch MD, 300 Units at 01/30/25 0837    sodium chloride 0.9 % flush 20 mL, 20 mL, Intravenous, PRN, Ham Welch MD, 20 mL at 01/30/25 0836    Allergies   Allergen Reactions    Diltiazem Unknown - High Severity     Swelling of lips     Family History   Problem Relation Age of Onset     Heart attack Mother     Heart attack Father     Heart disease Father      Cancer-related family history is not on file.    Social History     Tobacco Use    Smoking status: Former     Current packs/day: 0.00     Average packs/day: 2.0 packs/day for 57.0 years (114.0 ttl pk-yrs)     Types: Cigarettes     Start date: 1958     Quit date: 2015     Years since quitting: 10.0    Smokeless tobacco: Never   Vaping Use    Vaping status: Never Used   Substance Use Topics    Alcohol use: Not Currently    Drug use: Never     Social History     Social History Narrative    Not on file     ROS:   Review of Systems   Constitutional:  Positive for activity change and fatigue. Negative for appetite change, chills, diaphoresis, fever and unexpected weight change.   HENT:  Negative for congestion, dental problem, drooling, ear discharge, ear pain, facial swelling, hearing loss, mouth sores, nosebleeds, postnasal drip, rhinorrhea, sinus pressure, sinus pain, sneezing, sore throat, tinnitus, trouble swallowing and voice change.    Eyes:  Negative for photophobia, pain, discharge, redness, itching and visual disturbance.   Respiratory:  Positive for cough and shortness of breath. Negative for apnea, choking, chest tightness, wheezing and stridor.    Cardiovascular:  Negative for chest pain, palpitations and leg swelling.   Gastrointestinal:  Negative for abdominal distention, abdominal pain, anal bleeding, blood in stool, constipation, diarrhea, nausea, rectal pain and vomiting.   Endocrine: Negative for cold intolerance, heat intolerance, polydipsia and polyuria.   Genitourinary:  Negative for decreased urine volume, difficulty urinating, dysuria, flank pain, frequency, genital sores, hematuria and urgency.   Musculoskeletal:  Negative for arthralgias, back pain, gait problem, joint swelling, myalgias, neck pain and neck stiffness.   Skin:  Negative for color change, pallor and rash.   Neurological:  Negative for dizziness, tremors,  "seizures, syncope, facial asymmetry, speech difficulty, weakness, light-headedness, numbness and headaches.   Hematological:  Negative for adenopathy. Does not bruise/bleed easily.   Psychiatric/Behavioral:  Negative for agitation, behavioral problems, confusion, decreased concentration, hallucinations, self-injury, sleep disturbance and suicidal ideas. The patient is not nervous/anxious.      Objective:    Vital Signs:  Vitals:    01/30/25 0838   BP: 117/65   Pulse: 68   Resp: 14   Temp: 98.5 °F (36.9 °C)   SpO2: 97%   Weight: 63.5 kg (140 lb)   Height: 177.8 cm (70\")   PainSc: 0-No pain     Body mass index is 20.09 kg/m².    ECOG  (3) Capable of limited self-care, confined to bed or chair > 50% of waking hours    Physical Exam:   Physical Exam  Constitutional:       General: He is not in acute distress.     Appearance: Normal appearance. He is ill-appearing. He is not toxic-appearing or diaphoretic.   HENT:      Head: Normocephalic and atraumatic.      Right Ear: External ear normal.      Left Ear: External ear normal.      Nose: Nose normal.      Mouth/Throat:      Mouth: Mucous membranes are moist.      Pharynx: Oropharynx is clear.   Eyes:      General: No scleral icterus.        Right eye: No discharge.         Left eye: No discharge.      Conjunctiva/sclera: Conjunctivae normal.      Pupils: Pupils are equal, round, and reactive to light.   Cardiovascular:      Rate and Rhythm: Normal rate and regular rhythm.      Pulses: Normal pulses.      Heart sounds: Normal heart sounds. No murmur heard.     No friction rub. No gallop.   Pulmonary:      Effort: No respiratory distress.      Breath sounds: No stridor. No wheezing, rhonchi or rales.      Comments: Breath sounds markedly diminished bilaterally.  Chest:      Chest wall: No tenderness.   Abdominal:      General: Abdomen is flat. Bowel sounds are normal. There is no distension.      Palpations: Abdomen is soft. There is no mass.      Tenderness: There is no " abdominal tenderness. There is no right CVA tenderness, left CVA tenderness, guarding or rebound.      Comments: No palpable visceromegaly.   Musculoskeletal:         General: No tenderness, deformity or signs of injury.      Cervical back: No rigidity.      Right lower leg: No edema.      Left lower leg: No edema.   Lymphadenopathy:      Cervical: No cervical adenopathy.   Skin:     General: Skin is warm and dry.      Coloration: Skin is not jaundiced or pale.      Findings: No bruising or rash.   Neurological:      General: No focal deficit present.      Mental Status: He is alert and oriented to person, place, and time.      Cranial Nerves: No cranial nerve deficit.   Psychiatric:         Mood and Affect: Mood normal.         Behavior: Behavior normal.         Thought Content: Thought content normal.         Judgment: Judgment normal.     IBRAHIMA Welch MD performed the physical exam on 1/30/2025 as documented above.    Lab Results - Last 18 Months   Lab Units 01/30/25  0835 01/06/25  0233 01/04/25  2352   WBC 10*3/mm3 8.49 14.05* 17.92*   HEMOGLOBIN g/dL 9.9* 9.9* 10.9*   HEMATOCRIT % 33.8* 31.4* 34.8*   PLATELETS 10*3/mm3 252 292 331   MCV fL 97.1* 94.3 94.3     Lab Results - Last 18 Months   Lab Units 01/08/25  1103 01/06/25  0233 01/04/25  2352 01/04/25  1135 12/18/24  1024   SODIUM mmol/L 139 139 137 137 139   POTASSIUM mmol/L 4.3 3.9 3.9 3.6 3.9   CHLORIDE mmol/L 102 100 99 99 103   CO2 mmol/L 26.9 26.1 25.5 27.5 24.5   BUN mg/dL 45* 43* 32* 30* 31*   CREATININE mg/dL 2.16* 2.67* 2.14* 1.84* 1.71*   CALCIUM mg/dL 9.3 9.4 9.8 9.9 9.8   BILIRUBIN mg/dL 0.3  --   --  0.9 0.4   ALK PHOS U/L 131*  --   --  145* 137*   ALT (SGPT) U/L 10  --   --  13 24   AST (SGOT) U/L 16  --   --  21 27   GLUCOSE mg/dL 123* 205* 123* 131* 91     Lab Results   Component Value Date    GLUCOSE 123 (H) 01/08/2025    BUN 45 (H) 01/08/2025    CREATININE 2.16 (H) 01/08/2025    EGFRIFNONA 36 (L) 11/12/2021    BCR 20.8 01/08/2025    K  4.3 01/08/2025    CO2 26.9 01/08/2025    CALCIUM 9.3 01/08/2025    ALBUMIN 3.2 (L) 01/08/2025    LABIL2 0.9 (L) 04/04/2017    AST 16 01/08/2025    ALT 10 01/08/2025     Lab Results - Last 18 Months   Lab Units 12/18/24  1024 09/25/24  1044   INR  1.18* 1.03   APTT seconds 31.7 29.7*     Lab Results   Component Value Date    IRON 25 (L) 02/12/2024    TIBC 155 (L) 02/12/2024    FERRITIN 720.9 (H) 02/12/2024     Lab Results   Component Value Date    FOLATE 11.5 02/12/2024       Lab Results   Component Value Date    RETICCTPCT 1.49 03/16/2023     Lab Results   Component Value Date    PCGUTADD58 829 02/12/2024     Uric Acid   Date Value Ref Range Status   04/04/2017 7.3 4.8 - 8.7 mg/dL Final     Lab Results   Component Value Date    HAPTOGLOBIN 276 (H) 05/21/2021     Lab Results   Component Value Date    PTT 31.7 12/18/2024    INR 1.18 (H) 12/18/2024     Assessment & Plan     1.  Apparent malignancy of the right lower lobe: The history is highly suggestive of small cell carcinoma that is recurring.  However his initial diagnosis was made a few years ago and the possibility of a different histology exists.  A histologic diagnosis is essential.  Recovered from the recent infectious process.  To have a repeat CT in February and undergo bronchoscopy and biopsy, if indeed needed.  He is to see me early in March 2025.  2.  Severe chronic obstructive pulmonary disease.  3.  History of tobacco smoking.  4.  Peripheral vascular disease  5.  I have reviewed the records from the hospital.  Reviewed the reports of bronchoscopy.  Reviewed all imaging studies.  Discussed with him.  Reviewed the reports of pathology.  5.  He is to see me early in March 2025.    Ham Welch MD on 1/30/2025 at 8:56 AM.

## 2025-01-30 ENCOUNTER — OFFICE VISIT (OUTPATIENT)
Dept: ONCOLOGY | Facility: CLINIC | Age: 80
End: 2025-01-30
Payer: MEDICARE

## 2025-01-30 ENCOUNTER — HOSPITAL ENCOUNTER (OUTPATIENT)
Dept: ONCOLOGY | Facility: HOSPITAL | Age: 80
Discharge: HOME | End: 2025-01-30
Admitting: INTERNAL MEDICINE
Payer: MEDICARE

## 2025-01-30 VITALS
OXYGEN SATURATION: 97 % | WEIGHT: 140 LBS | HEIGHT: 70 IN | HEART RATE: 68 BPM | TEMPERATURE: 98.5 F | SYSTOLIC BLOOD PRESSURE: 117 MMHG | RESPIRATION RATE: 14 BRPM | BODY MASS INDEX: 20.04 KG/M2 | DIASTOLIC BLOOD PRESSURE: 65 MMHG

## 2025-01-30 DIAGNOSIS — C34.90 SMALL CELL CARCINOMA OF LUNG, UNSPECIFIED LATERALITY, UNSPECIFIED PART OF LUNG: Primary | ICD-10-CM

## 2025-01-30 DIAGNOSIS — Z95.828 PORT-A-CATH IN PLACE: Primary | ICD-10-CM

## 2025-01-30 LAB
BASOPHILS # BLD AUTO: 0.03 10*3/MM3 (ref 0–0.2)
BASOPHILS NFR BLD AUTO: 0.4 % (ref 0–1.5)
DEPRECATED RDW RBC AUTO: 51.8 FL (ref 37–54)
EOSINOPHIL # BLD AUTO: 0.46 10*3/MM3 (ref 0–0.4)
EOSINOPHIL NFR BLD AUTO: 5.4 % (ref 0.3–6.2)
ERYTHROCYTE [DISTWIDTH] IN BLOOD BY AUTOMATED COUNT: 15.3 % (ref 12.3–15.4)
HCT VFR BLD AUTO: 33.8 % (ref 37.5–51)
HGB BLD-MCNC: 9.9 G/DL (ref 13–17.7)
LYMPHOCYTES # BLD AUTO: 0.43 10*3/MM3 (ref 0.7–3.1)
LYMPHOCYTES NFR BLD AUTO: 5.1 % (ref 19.6–45.3)
MCH RBC QN AUTO: 28.4 PG (ref 26.6–33)
MCHC RBC AUTO-ENTMCNC: 29.3 G/DL (ref 31.5–35.7)
MCV RBC AUTO: 97.1 FL (ref 79–97)
MONOCYTES # BLD AUTO: 0.76 10*3/MM3 (ref 0.1–0.9)
MONOCYTES NFR BLD AUTO: 9 % (ref 5–12)
NEUTROPHILS NFR BLD AUTO: 6.81 10*3/MM3 (ref 1.7–7)
NEUTROPHILS NFR BLD AUTO: 80.1 % (ref 42.7–76)
PLATELET # BLD AUTO: 252 10*3/MM3 (ref 140–450)
PMV BLD AUTO: 9.9 FL (ref 6–12)
RBC # BLD AUTO: 3.48 10*6/MM3 (ref 4.14–5.8)
WBC NRBC COR # BLD AUTO: 8.49 10*3/MM3 (ref 3.4–10.8)

## 2025-01-30 PROCEDURE — 3074F SYST BP LT 130 MM HG: CPT | Performed by: INTERNAL MEDICINE

## 2025-01-30 PROCEDURE — 1160F RVW MEDS BY RX/DR IN RCRD: CPT | Performed by: INTERNAL MEDICINE

## 2025-01-30 PROCEDURE — 99213 OFFICE O/P EST LOW 20 MIN: CPT | Performed by: INTERNAL MEDICINE

## 2025-01-30 PROCEDURE — 1159F MED LIST DOCD IN RCRD: CPT | Performed by: INTERNAL MEDICINE

## 2025-01-30 PROCEDURE — 25010000002 HEPARIN LOCK FLUSH PER 10 UNITS: Performed by: INTERNAL MEDICINE

## 2025-01-30 PROCEDURE — 85025 COMPLETE CBC W/AUTO DIFF WBC: CPT | Performed by: INTERNAL MEDICINE

## 2025-01-30 PROCEDURE — 1126F AMNT PAIN NOTED NONE PRSNT: CPT | Performed by: INTERNAL MEDICINE

## 2025-01-30 PROCEDURE — 3078F DIAST BP <80 MM HG: CPT | Performed by: INTERNAL MEDICINE

## 2025-01-30 PROCEDURE — 36591 DRAW BLOOD OFF VENOUS DEVICE: CPT

## 2025-01-30 RX ORDER — HEPARIN SODIUM (PORCINE) LOCK FLUSH IV SOLN 100 UNIT/ML 100 UNIT/ML
500 SOLUTION INTRAVENOUS AS NEEDED
Status: DISCONTINUED | OUTPATIENT
Start: 2025-01-30 | End: 2025-02-01 | Stop reason: HOSPADM

## 2025-01-30 RX ORDER — HEPARIN SODIUM (PORCINE) LOCK FLUSH IV SOLN 100 UNIT/ML 100 UNIT/ML
500 SOLUTION INTRAVENOUS AS NEEDED
OUTPATIENT
Start: 2025-01-30

## 2025-01-30 RX ORDER — SODIUM CHLORIDE 0.9 % (FLUSH) 0.9 %
20 SYRINGE (ML) INJECTION AS NEEDED
Status: DISCONTINUED | OUTPATIENT
Start: 2025-01-30 | End: 2025-02-01 | Stop reason: HOSPADM

## 2025-01-30 RX ORDER — SODIUM CHLORIDE 0.9 % (FLUSH) 0.9 %
20 SYRINGE (ML) INJECTION AS NEEDED
OUTPATIENT
Start: 2025-01-30

## 2025-01-30 RX ADMIN — Medication 20 ML: at 08:36

## 2025-01-30 RX ADMIN — Medication 300 UNITS: at 08:37

## 2025-01-30 NOTE — PROGRESS NOTES
0836 Port accessed and flushed with good blood return noted. 10cc of blood wasted prior to specimen collection. Blood specimen, cbc obtained and sent to lab for processing per protocol.  Port flushed with saline and heparin prior to needle removal.

## 2025-02-04 ENCOUNTER — APPOINTMENT (OUTPATIENT)
Dept: CT IMAGING | Facility: HOSPITAL | Age: 80
End: 2025-02-04
Payer: MEDICARE

## 2025-02-04 ENCOUNTER — HOSPITAL ENCOUNTER (EMERGENCY)
Facility: HOSPITAL | Age: 80
Discharge: HOME | End: 2025-02-04
Attending: EMERGENCY MEDICINE | Admitting: EMERGENCY MEDICINE
Payer: MEDICARE

## 2025-02-04 VITALS
RESPIRATION RATE: 22 BRPM | HEART RATE: 109 BPM | WEIGHT: 137.5 LBS | OXYGEN SATURATION: 95 % | TEMPERATURE: 99.8 F | HEIGHT: 71 IN | BODY MASS INDEX: 19.25 KG/M2 | DIASTOLIC BLOOD PRESSURE: 96 MMHG | SYSTOLIC BLOOD PRESSURE: 155 MMHG

## 2025-02-04 DIAGNOSIS — S09.90XA CLOSED HEAD INJURY, INITIAL ENCOUNTER: Primary | ICD-10-CM

## 2025-02-04 PROCEDURE — 99284 EMERGENCY DEPT VISIT MOD MDM: CPT

## 2025-02-04 PROCEDURE — 70450 CT HEAD/BRAIN W/O DYE: CPT

## 2025-02-04 RX ORDER — IOPAMIDOL 755 MG/ML
100 INJECTION, SOLUTION INTRAVASCULAR
Status: DISCONTINUED | OUTPATIENT
Start: 2025-02-04 | End: 2025-02-04

## 2025-02-04 NOTE — DISCHARGE INSTRUCTIONS
Follow-up with your doctor as scheduled.  Return if you develop severe pain or for any other concerns.

## 2025-02-04 NOTE — FSED PROVIDER NOTE
Subjective   History of Present Illness  79-year-old male presents for evaluation after having mechanical fall this morning hitting his head.  He lost his balance and hit the back of his head, did not have loss of consciousness.  Was told by his home health nurse he should come get a CT scan to make sure there are no intracranial bleeding.  Patient denies any severe headache no altered mental status today, daughter says baby normally.  Review of Systems  As noted in HPI  Past Medical History:   Diagnosis Date    Anemia     Cellulitis     CKD (chronic kidney disease), stage III     COPD (chronic obstructive pulmonary disease)     Diverticulitis     Dyslipidemia     Hypertension     Myocardial infarction 2016    PVD (peripheral vascular disease)     Small cell lung cancer 05/2021       Allergies   Allergen Reactions    Diltiazem Unknown - High Severity     Swelling of lips       Past Surgical History:   Procedure Laterality Date    BELOW KNEE LEG AMPUTATION Left 2015    BRONCHOSCOPY N/A 05/20/2021    Procedure: BRONCHOSCOPY WITH ENDOBRONCHIAL ULTRASOUND WITH FINE NEEDLE ASPIRATION. BRONCHOALVEOLAR LAVAGE;  Surgeon: Jackson Quezada MD;  Location: [facility] ENDOSCOPY;  Service: Pulmonary;  Laterality: N/A;  subcarinal mass    BRONCHOSCOPY N/A 1/6/2025    Procedure: BRONCHOSCOPY with bronchial washing;  Surgeon: Jackson Quezada MD;  Location: [facility] ENDOSCOPY;  Service: Pulmonary;  Laterality: N/A;  postop: pneumonia    CARDIAC SURGERY      CHOLECYSTECTOMY      CORONARY ARTERY BYPASS GRAFT  2016    GALLBLADDER SURGERY         Family History   Problem Relation Age of Onset    Heart attack Mother     Heart attack Father     Heart disease Father        Social History     Socioeconomic History    Marital status:    Tobacco Use    Smoking status: Former     Current packs/day: 0.00     Average packs/day: 2.0 packs/day for 57.0 years (114.0 ttl pk-yrs)     Types: Cigarettes     Start date: 1958     Quit date: 2015     Years since  quitting: 10.1    Smokeless tobacco: Never   Vaping Use    Vaping status: Never Used   Substance and Sexual Activity    Alcohol use: Not Currently    Drug use: Never    Sexual activity: Defer           Objective   Physical Exam  Nursing notes reviewed.  INITIAL VITAL SIGNS: Reviewed by me.  Pulse ox normal  GENERAL: Alert. Well developed and well nourished. No respiratory distress.  HEAD: Normocephalic.  No ecchymosis swelling or laceration to the posterior scalp  EYES: No conjunctival injection.  ENT: Oral mucosa is moist.  NECK/BACK: Supple. Full range of motion.  No midline tenderness crepitus or step-offs in the cervical spine.  RESPIRATORY: Non-labored respirations.  EXTREMITIES: No deformity.  SKIN: Warm and dry. No rashes. No diaphoresis.  NEUROLOGIC: Alert. Normal gait    Procedures           ED Course  ED Course as of 02/04/25 1848 Tue Feb 04, 2025   1750 H&P as above, pending head CT [RO]   1847 CT scan negative will discharge home. [RO]      ED Course User Index  [RO] Curry Juarez MD                                           Medical Decision Making  Amount and/or Complexity of Data Reviewed  Radiology: ordered.        Final diagnoses:   Closed head injury, initial encounter       ED Disposition  ED Disposition       ED Disposition   Discharge    Condition   Good    Comment   --               Walter Valero MD  [address] [place] [address] [phone]    Call   To schedule follow-up appointment         Medication List      No changes were made to your prescriptions during this visit.

## 2025-02-05 ENCOUNTER — PATIENT OUTREACH (OUTPATIENT)
Dept: ONCOLOGY | Facility: CLINIC | Age: 80
End: 2025-02-05
Payer: COMMERCIAL

## 2025-02-05 NOTE — SIGNIFICANT NOTE
Spoke with patient's caregiver. She is aware of CT scan and Dr. Luis Manuel BEDOYA. All questions answered.     Kiki Reina  Lung Nurse Navigator   [facility]  [phone]  [email or web address]

## 2025-02-06 LAB — FUNGUS WND CULT: ABNORMAL

## 2025-02-10 ENCOUNTER — HOSPITAL ENCOUNTER (OUTPATIENT)
Dept: PET IMAGING | Facility: HOSPITAL | Age: 80
Discharge: HOME | End: 2025-02-10
Admitting: NURSE PRACTITIONER
Payer: MEDICARE

## 2025-02-10 DIAGNOSIS — R59.0 MEDIASTINAL ADENOPATHY: ICD-10-CM

## 2025-02-10 DIAGNOSIS — J18.0 BRONCHIAL PNEUMONIA: ICD-10-CM

## 2025-02-10 PROCEDURE — 71250 CT THORAX DX C-: CPT

## 2025-02-11 ENCOUNTER — OFFICE VISIT (OUTPATIENT)
Dept: SURGERY | Facility: CLINIC | Age: 80
End: 2025-02-11
Payer: MEDICARE

## 2025-02-11 ENCOUNTER — TELEPHONE (OUTPATIENT)
Dept: CARDIOLOGY | Facility: CLINIC | Age: 80
End: 2025-02-11
Payer: COMMERCIAL

## 2025-02-11 ENCOUNTER — PATIENT OUTREACH (OUTPATIENT)
Dept: ONCOLOGY | Facility: CLINIC | Age: 80
End: 2025-02-11
Payer: COMMERCIAL

## 2025-02-11 ENCOUNTER — PREP FOR SURGERY (OUTPATIENT)
Dept: OTHER | Facility: HOSPITAL | Age: 80
End: 2025-02-11
Payer: COMMERCIAL

## 2025-02-11 VITALS
BODY MASS INDEX: 19.61 KG/M2 | SYSTOLIC BLOOD PRESSURE: 96 MMHG | HEIGHT: 70 IN | DIASTOLIC BLOOD PRESSURE: 61 MMHG | WEIGHT: 137 LBS | OXYGEN SATURATION: 95 %

## 2025-02-11 DIAGNOSIS — C34.91 SMALL CELL CARCINOMA OF RIGHT LUNG, UNSPECIFIED PART OF LUNG: Primary | ICD-10-CM

## 2025-02-11 DIAGNOSIS — C34.90 SMALL CELL CARCINOMA OF LUNG, UNSPECIFIED LATERALITY, UNSPECIFIED PART OF LUNG: Primary | ICD-10-CM

## 2025-02-11 NOTE — SIGNIFICANT NOTE
I accompanied patient and daughter to Dr. Issa's office visit.     Dr. Issa reviewed medical history and new scan. He discussed need for biopsy. Patient on Plavix. Dr. Mistry cardiologist. Biopsy risks and benefits discussed. All questions answered.

## 2025-02-11 NOTE — TELEPHONE ENCOUNTER
ZOHREH.... [facility] IS FAXING OVER CLEARANCE REQUEST FOR PATIENT TO HOLD PLAVIX. PROCEDURE SCHEDULED 2/18, NEEDS TO START HOLDING PLAVIX 2/13.

## 2025-02-11 NOTE — TELEPHONE ENCOUNTER
FACILITY: [facility] TH[facility]ACIC SURGERY  DR: KALA JORDAN  PHONE: [phone]  FAX: [phone]  PROCEDURE: BRONCHOSCOPY  SCHEDULED: 2/18/2025  MEDS TO HOLD: PLAVIX FOR 5 DAYS    PLACED ON PROVIDERS DESK FOR REVIEW    Office Visit with Kali Mistry MD (04/11/2024)

## 2025-02-12 NOTE — PROGRESS NOTES
THORACIC SURGERY CLINIC CONSULT NOTE    REASON FOR CONSULT: Right lung consolidation, history of small cell lung cancer.    Subjective   HISTORY OF PRESENTING ILLNESS:   David A Jolissaint is a 79 y.o. male who has significant medical problems as mentioned in the medical chart.     History of Present Illness  Patient has a history of limited stage small cell lung cancer diagnosed in 2021.  He has received definitive chemoradiation treatment and total brain irradiation.  He has been followed by medical oncology at [facility].  He has a recent PET CT scan which showed increased size and activity in the right lower lobe mass and lung nodules concerning for recurrent disease.  He was referred to thoracic surgery clinic for navigational bronchoscopy and biopsy.  He presented to hospital on 1/4/2025 with community-acquired pneumonia.  He underwent bronchoscopy on 1/6/2025 by Dr. Quezada and was found to have significant mucopurulent secretions in the tracheobronchial tree.  He was treating for pneumonia and was discharged home.  Follow-up CT scan of the chest on 2/10/2025 reported improved aeration in the bilateral lower lobes suggesting improving pneumonia.  There was no significant change in masslike consolidation in the perihilar right lung involving the posterior right upper lobe and superior right lower lobe.    He came to clinic for follow-up visit.  He reports satisfactory respiratory function, currently maintained on a continuous 3-liter oxygen supply. He is uncertain about the impact of this oxygen therapy on his breathing. He has previously undergone a lung biopsy, which resulted in a pneumothorax. He is currently on Plavix for stroke or stent, following a double bypass heart attack in 2016. He also takes metoprolol. He has a history of small cell lung cancer, which has since gone into remission.    MEDICATIONS  Current: Plavix, aspirin, metoprolol    Past Medical History:   Diagnosis Date    Anemia      Cellulitis     CKD (chronic kidney disease), stage III     COPD (chronic obstructive pulmonary disease)     Diverticulitis     Dyslipidemia     Hypertension     Myocardial infarction 2016    PVD (peripheral vascular disease)     Small cell lung cancer 05/2021       Past Surgical History:   Procedure Laterality Date    BELOW KNEE LEG AMPUTATION Left 2015    BRONCHOSCOPY N/A 05/20/2021    Procedure: BRONCHOSCOPY WITH ENDOBRONCHIAL ULTRASOUND WITH FINE NEEDLE ASPIRATION. BRONCHOALVEOLAR LAVAGE;  Surgeon: Jackson Quezada MD;  Location: [facility] ENDOSCOPY;  Service: Pulmonary;  Laterality: N/A;  subcarinal mass    BRONCHOSCOPY N/A 1/6/2025    Procedure: BRONCHOSCOPY with bronchial washing;  Surgeon: Jackson Quezada MD;  Location: [facility] ENDOSCOPY;  Service: Pulmonary;  Laterality: N/A;  postop: pneumonia    CARDIAC SURGERY      CHOLECYSTECTOMY      CORONARY ARTERY BYPASS GRAFT  2016    GALLBLADDER SURGERY         Family History   Problem Relation Age of Onset    Heart attack Mother     Heart attack Father     Heart disease Father        Social History     Socioeconomic History    Marital status:    Tobacco Use    Smoking status: Former     Current packs/day: 0.00     Average packs/day: 2.0 packs/day for 57.0 years (114.0 ttl pk-yrs)     Types: Cigarettes     Start date: 1958     Quit date: 2015     Years since quitting: 10.1    Smokeless tobacco: Never   Vaping Use    Vaping status: Never Used   Substance and Sexual Activity    Alcohol use: Not Currently    Drug use: Never    Sexual activity: Defer         Current Outpatient Medications:     aspirin 81 MG EC tablet, Take 1 tablet by mouth Daily With Lunch., Disp: , Rfl: 0    atorvastatin (LIPITOR) 40 MG tablet, Take 1 tablet by mouth Every Morning., Disp: , Rfl:     budesonide (PULMICORT) 0.5 MG/2ML nebulizer solution, Take 2 mL by nebulization 2 (two) times a day., Disp: , Rfl:     clopidogrel (PLAVIX) 75 MG tablet, Take 1 tablet by mouth Every Morning., Disp: , Rfl:      "diphenhydrAMINE (BENADRYL) 25 mg capsule, Take 1 capsule by mouth Every 6 (Six) Hours As Needed for Itching. States he takes branded Z-Quil at hs, Disp: , Rfl:     dronabinol (MARINOL) 2.5 MG capsule, Take 1 capsule by mouth 2 (Two) Times a Day., Disp: , Rfl:     dutasteride (AVODART) 0.5 MG capsule, Take 1 capsule by mouth Every Night., Disp: , Rfl:     ferrous sulfate 324 (65 Fe) MG tablet delayed-release EC tablet, Take 1 tablet by mouth Daily With Breakfast., Disp: 30 tablet, Rfl: 1    formoterol (Perforomist) 20 MCG/2ML nebulizer solution, Take  by nebulization 2 (Two) Times a Day., Disp: , Rfl:     HYDROcodone-acetaminophen (NORCO) 5-325 MG per tablet, Take 1 tablet by mouth Every 6 (Six) Hours As Needed for Moderate Pain., Disp: , Rfl:     metoprolol succinate XL (TOPROL-XL) 25 MG 24 hr tablet, TAKE 1 TABLET EVERY DAY, Disp: 90 tablet, Rfl: 3    montelukast (SINGULAIR) 10 MG tablet, Take 1 tablet by mouth Every Night., Disp: , Rfl:     Multiple Vitamins-Minerals (MULTI VITAMIN/MINERALS) tablet, 1 tablet Daily With Lunch., Disp: , Rfl:     ondansetron (ZOFRAN) 4 MG tablet, Take 1 tablet by mouth As Needed., Disp: , Rfl:     pantoprazole (PROTONIX) 40 MG EC tablet, Take 1 tablet by mouth Daily., Disp: , Rfl:     revefenacin (YUPELRI) 175 MCG/3ML nebulizer solution, Take 3 mL by nebulization Daily., Disp: , Rfl:     saccharomyces boulardii (FLORASTOR) 250 MG capsule, Take 1 capsule by mouth Daily With Lunch., Disp: , Rfl:     sodium bicarbonate 650 MG tablet, Take 1 tablet by mouth 2 (Two) Times a Day., Disp: , Rfl:     theophylline (THEODUR) 300 MG 12 hr tablet, Take 1 tablet by mouth 2 (Two) Times a Day., Disp: , Rfl:      Allergies   Allergen Reactions    Diltiazem Unknown - High Severity     Swelling of lips             Objective    OBJECTIVE:     VITAL SIGNS:  BP 96/61   Ht 177.8 cm (70\")   Wt 62.1 kg (137 lb)   SpO2 95%   BMI 19.66 kg/m²     PHYSICAL EXAM:  Normal appearance.   Head is normocephalic. "   Nose appears normal.   No obvious deformity of the mouth and throat.  Conjunctivae normal.   Heart rate and rhythm is normal.  Pulmonary effort is normal.   Moving all 4 extremities.  Extremities warm.  No focal deficit present.   Alert and oriented to person, place, and time.     RESULTS REVIEW:  I have reviewed the patient's all relevant laboratory and imaging findings.     Assessment & Plan    ASSESSMENT & PLAN:  David A Jolissaint is a 79 y.o. male with significant medical conditions as mentioned above presented to my clinic.    Assessment & Plan  1.  Right lung mass  The patient's pneumonia has resolved, but a persistent area of concern remains in the right lung. A lung biopsy will be conducted next Tuesday to determine if the area is cancerous. Risks, including a 5% chance of lung collapse and minor bleeding, have been discussed. Clearance from cardiology is required to discontinue Plavix 5 days before the procedure to minimize bleeding risks. Aspirin can be continued. The  will provide further details.    I discussed the patients findings and my recommendations with the patient/family/caregiver. The patient/family/caregiver was given adequate time to ask questions and all questions were answered to patient satisfaction. Thank you for this consult and allowing us to participate in the care of your patient.      Pineda Issa MD  Thoracic Surgeon  [facility]        Dictated utilizing Dragon dictation    I spent 40 minutes caring for Brian on this date of service. This time includes time spent by me in the following activities:preparing for the visit, reviewing tests, obtaining and/or reviewing a separately obtained history, performing a medically appropriate examination and/or evaluation, counseling and educating the patient/family/caregiver, ordering medications, tests, or procedures, referring and communicating with other health care professionals , documenting information  in the medical record, independently interpreting results and communicating that information with the patient/family/caregiver, and care coordination and more than half the time was spent in direct face to face evaluation and decision making.     Patient or patient representative verbalized consent for the use of Ambient Listening during the visit with  Pineda Issa MD for chart documentation. 2/12/2025  17:31 EST

## 2025-02-12 NOTE — TELEPHONE ENCOUNTER
PATIENT HAS BEEN NOTIFIED THAT HE CAN HOLD HIS PLAVIX STARTING TOMORROW AND I ALSO NOTIFIED 'S OFFICE TO LET THEM KNOW THAT WE WILL BE FAXING THEM THE CLEARANCE TODAY.

## 2025-02-12 NOTE — H&P (VIEW-ONLY)
THORACIC SURGERY CLINIC CONSULT NOTE    REASON FOR CONSULT: Right lung consolidation, history of small cell lung cancer.    Subjective   HISTORY OF PRESENTING ILLNESS:   David A Jolissaint is a 79 y.o. male who has significant medical problems as mentioned in the medical chart.     History of Present Illness  Patient has a history of limited stage small cell lung cancer diagnosed in 2021.  He has received definitive chemoradiation treatment and total brain irradiation.  He has been followed by medical oncology at [facility].  He has a recent PET CT scan which showed increased size and activity in the right lower lobe mass and lung nodules concerning for recurrent disease.  He was referred to thoracic surgery clinic for navigational bronchoscopy and biopsy.  He presented to hospital on 1/4/2025 with community-acquired pneumonia.  He underwent bronchoscopy on 1/6/2025 by Dr. Quezada and was found to have significant mucopurulent secretions in the tracheobronchial tree.  He was treating for pneumonia and was discharged home.  Follow-up CT scan of the chest on 2/10/2025 reported improved aeration in the bilateral lower lobes suggesting improving pneumonia.  There was no significant change in masslike consolidation in the perihilar right lung involving the posterior right upper lobe and superior right lower lobe.    He came to clinic for follow-up visit.  He reports satisfactory respiratory function, currently maintained on a continuous 3-liter oxygen supply. He is uncertain about the impact of this oxygen therapy on his breathing. He has previously undergone a lung biopsy, which resulted in a pneumothorax. He is currently on Plavix for stroke or stent, following a double bypass heart attack in 2016. He also takes metoprolol. He has a history of small cell lung cancer, which has since gone into remission.    MEDICATIONS  Current: Plavix, aspirin, metoprolol    Past Medical History:   Diagnosis Date    Anemia      Cellulitis     CKD (chronic kidney disease), stage III     COPD (chronic obstructive pulmonary disease)     Diverticulitis     Dyslipidemia     Hypertension     Myocardial infarction 2016    PVD (peripheral vascular disease)     Small cell lung cancer 05/2021       Past Surgical History:   Procedure Laterality Date    BELOW KNEE LEG AMPUTATION Left 2015    BRONCHOSCOPY N/A 05/20/2021    Procedure: BRONCHOSCOPY WITH ENDOBRONCHIAL ULTRASOUND WITH FINE NEEDLE ASPIRATION. BRONCHOALVEOLAR LAVAGE;  Surgeon: Jackson Quezada MD;  Location: [facility] ENDOSCOPY;  Service: Pulmonary;  Laterality: N/A;  subcarinal mass    BRONCHOSCOPY N/A 1/6/2025    Procedure: BRONCHOSCOPY with bronchial washing;  Surgeon: Jackson Quezada MD;  Location: [facility] ENDOSCOPY;  Service: Pulmonary;  Laterality: N/A;  postop: pneumonia    CARDIAC SURGERY      CHOLECYSTECTOMY      CORONARY ARTERY BYPASS GRAFT  2016    GALLBLADDER SURGERY         Family History   Problem Relation Age of Onset    Heart attack Mother     Heart attack Father     Heart disease Father        Social History     Socioeconomic History    Marital status:    Tobacco Use    Smoking status: Former     Current packs/day: 0.00     Average packs/day: 2.0 packs/day for 57.0 years (114.0 ttl pk-yrs)     Types: Cigarettes     Start date: 1958     Quit date: 2015     Years since quitting: 10.1    Smokeless tobacco: Never   Vaping Use    Vaping status: Never Used   Substance and Sexual Activity    Alcohol use: Not Currently    Drug use: Never    Sexual activity: Defer         Current Outpatient Medications:     aspirin 81 MG EC tablet, Take 1 tablet by mouth Daily With Lunch., Disp: , Rfl: 0    atorvastatin (LIPITOR) 40 MG tablet, Take 1 tablet by mouth Every Morning., Disp: , Rfl:     budesonide (PULMICORT) 0.5 MG/2ML nebulizer solution, Take 2 mL by nebulization 2 (two) times a day., Disp: , Rfl:     clopidogrel (PLAVIX) 75 MG tablet, Take 1 tablet by mouth Every Morning., Disp: , Rfl:      "diphenhydrAMINE (BENADRYL) 25 mg capsule, Take 1 capsule by mouth Every 6 (Six) Hours As Needed for Itching. States he takes branded Z-Quil at hs, Disp: , Rfl:     dronabinol (MARINOL) 2.5 MG capsule, Take 1 capsule by mouth 2 (Two) Times a Day., Disp: , Rfl:     dutasteride (AVODART) 0.5 MG capsule, Take 1 capsule by mouth Every Night., Disp: , Rfl:     ferrous sulfate 324 (65 Fe) MG tablet delayed-release EC tablet, Take 1 tablet by mouth Daily With Breakfast., Disp: 30 tablet, Rfl: 1    formoterol (Perforomist) 20 MCG/2ML nebulizer solution, Take  by nebulization 2 (Two) Times a Day., Disp: , Rfl:     HYDROcodone-acetaminophen (NORCO) 5-325 MG per tablet, Take 1 tablet by mouth Every 6 (Six) Hours As Needed for Moderate Pain., Disp: , Rfl:     metoprolol succinate XL (TOPROL-XL) 25 MG 24 hr tablet, TAKE 1 TABLET EVERY DAY, Disp: 90 tablet, Rfl: 3    montelukast (SINGULAIR) 10 MG tablet, Take 1 tablet by mouth Every Night., Disp: , Rfl:     Multiple Vitamins-Minerals (MULTI VITAMIN/MINERALS) tablet, 1 tablet Daily With Lunch., Disp: , Rfl:     ondansetron (ZOFRAN) 4 MG tablet, Take 1 tablet by mouth As Needed., Disp: , Rfl:     pantoprazole (PROTONIX) 40 MG EC tablet, Take 1 tablet by mouth Daily., Disp: , Rfl:     revefenacin (YUPELRI) 175 MCG/3ML nebulizer solution, Take 3 mL by nebulization Daily., Disp: , Rfl:     saccharomyces boulardii (FLORASTOR) 250 MG capsule, Take 1 capsule by mouth Daily With Lunch., Disp: , Rfl:     sodium bicarbonate 650 MG tablet, Take 1 tablet by mouth 2 (Two) Times a Day., Disp: , Rfl:     theophylline (THEODUR) 300 MG 12 hr tablet, Take 1 tablet by mouth 2 (Two) Times a Day., Disp: , Rfl:      Allergies   Allergen Reactions    Diltiazem Unknown - High Severity     Swelling of lips             Objective    OBJECTIVE:     VITAL SIGNS:  BP 96/61   Ht 177.8 cm (70\")   Wt 62.1 kg (137 lb)   SpO2 95%   BMI 19.66 kg/m²     PHYSICAL EXAM:  Normal appearance.   Head is normocephalic. "   Nose appears normal.   No obvious deformity of the mouth and throat.  Conjunctivae normal.   Heart rate and rhythm is normal.  Pulmonary effort is normal.   Moving all 4 extremities.  Extremities warm.  No focal deficit present.   Alert and oriented to person, place, and time.     RESULTS REVIEW:  I have reviewed the patient's all relevant laboratory and imaging findings.     Assessment & Plan    ASSESSMENT & PLAN:  David A Jolissaint is a 79 y.o. male with significant medical conditions as mentioned above presented to my clinic.    Assessment & Plan  1.  Right lung mass  The patient's pneumonia has resolved, but a persistent area of concern remains in the right lung. A lung biopsy will be conducted next Tuesday to determine if the area is cancerous. Risks, including a 5% chance of lung collapse and minor bleeding, have been discussed. Clearance from cardiology is required to discontinue Plavix 5 days before the procedure to minimize bleeding risks. Aspirin can be continued. The  will provide further details.    I discussed the patients findings and my recommendations with the patient/family/caregiver. The patient/family/caregiver was given adequate time to ask questions and all questions were answered to patient satisfaction. Thank you for this consult and allowing us to participate in the care of your patient.      Pineda Issa MD  Thoracic Surgeon  [facility]        Dictated utilizing Dragon dictation    I spent 40 minutes caring for Brian on this date of service. This time includes time spent by me in the following activities:preparing for the visit, reviewing tests, obtaining and/or reviewing a separately obtained history, performing a medically appropriate examination and/or evaluation, counseling and educating the patient/family/caregiver, ordering medications, tests, or procedures, referring and communicating with other health care professionals , documenting information  in the medical record, independently interpreting results and communicating that information with the patient/family/caregiver, and care coordination and more than half the time was spent in direct face to face evaluation and decision making.     Patient or patient representative verbalized consent for the use of Ambient Listening during the visit with  Pineda Issa MD for chart documentation. 2/12/2025  17:31 EST

## 2025-02-17 ENCOUNTER — ANESTHESIA EVENT (OUTPATIENT)
Dept: GASTROENTEROLOGY | Facility: HOSPITAL | Age: 80
End: 2025-02-17
Payer: MEDICARE

## 2025-02-17 LAB
MYCOBACTERIUM SPEC CULT: NORMAL
NIGHT BLUE STAIN TISS: NORMAL

## 2025-02-17 NOTE — ANESTHESIA PREPROCEDURE EVALUATION
Anesthesia Evaluation     NPO Solid Status: > 8 hours  NPO Liquid Status: > 8 hours           Airway   Mallampati: I  TM distance: >3 FB  Neck ROM: full  No difficulty expected  Dental - normal exam     Pulmonary - normal exam   (+) pneumonia , lung cancer, COPD,shortness of breath    ROS comment: 1.  Right lung mass  The patient's pneumonia has resolved, but a persistent area of concern remains in the right lung. A lung biopsy will be conducted next Tuesday to determine if the area is cancerous. Risks, including a 5% chance of lung collapse and minor bleeding, have been discussed. Clearance from cardiology is required to discontinue Plavix 5 days before the procedure to minimize bleeding risks. Aspirin can be continued. The  will provide further details.    Cardiovascular - normal exam    (+) hypertension, past MI , CAD, CABG, dysrhythmias, PVD,  carotid artery disease      Neuro/Psych  (+) weakness  GI/Hepatic/Renal/Endo    (+) renal disease-    Musculoskeletal     Abdominal  - normal exam    Bowel sounds: normal.   Substance History      OB/GYN          Other      history of cancer    ROS/Med Hx Other: ENDOBRONCH US 2021 MAC4 GRI  TTE 2023 EF 60-65 STRUCTURALLY NORMAL   CABG  2016  PLAVIX 5 DAYS AGO  PT WANT DNR IN EFFECT DURING PROCEDURE              Anesthesia Plan    ASA 3     general     intravenous induction     Anesthetic plan, risks, benefits, and alternatives have been provided, discussed and informed consent has been obtained with: patient.  Pre-procedure education provided  Plan discussed with CRNA.    CODE STATUS:

## 2025-02-18 ENCOUNTER — ANESTHESIA (OUTPATIENT)
Dept: GASTROENTEROLOGY | Facility: HOSPITAL | Age: 80
End: 2025-02-18
Payer: MEDICARE

## 2025-02-18 ENCOUNTER — APPOINTMENT (OUTPATIENT)
Dept: GENERAL RADIOLOGY | Facility: HOSPITAL | Age: 80
End: 2025-02-18
Payer: MEDICARE

## 2025-02-18 ENCOUNTER — HOSPITAL ENCOUNTER (OUTPATIENT)
Facility: HOSPITAL | Age: 80
Setting detail: HOSPITAL OUTPATIENT SURGERY
Discharge: HOME | End: 2025-02-18
Attending: SURGERY | Admitting: SURGERY
Payer: MEDICARE

## 2025-02-18 VITALS
DIASTOLIC BLOOD PRESSURE: 66 MMHG | OXYGEN SATURATION: 100 % | WEIGHT: 137 LBS | SYSTOLIC BLOOD PRESSURE: 153 MMHG | TEMPERATURE: 97.8 F | RESPIRATION RATE: 28 BRPM | HEART RATE: 81 BPM | BODY MASS INDEX: 19.61 KG/M2 | HEIGHT: 70 IN

## 2025-02-18 DIAGNOSIS — C34.90 SMALL CELL CARCINOMA OF LUNG, UNSPECIFIED LATERALITY, UNSPECIFIED PART OF LUNG: ICD-10-CM

## 2025-02-18 LAB
B PARAPERT DNA SPEC QL NAA+PROBE: NOT DETECTED
B PERT DNA SPEC QL NAA+PROBE: NOT DETECTED
C PNEUM DNA NPH QL NAA+NON-PROBE: NOT DETECTED
FLUAV SUBTYP SPEC NAA+PROBE: NOT DETECTED
FLUBV RNA ISLT QL NAA+PROBE: NOT DETECTED
HADV DNA SPEC NAA+PROBE: NOT DETECTED
HCOV 229E RNA SPEC QL NAA+PROBE: NOT DETECTED
HCOV HKU1 RNA SPEC QL NAA+PROBE: NOT DETECTED
HCOV NL63 RNA SPEC QL NAA+PROBE: NOT DETECTED
HCOV OC43 RNA SPEC QL NAA+PROBE: NOT DETECTED
HMPV RNA NPH QL NAA+NON-PROBE: NOT DETECTED
HPIV1 RNA ISLT QL NAA+PROBE: NOT DETECTED
HPIV2 RNA SPEC QL NAA+PROBE: NOT DETECTED
HPIV3 RNA NPH QL NAA+PROBE: NOT DETECTED
HPIV4 P GENE NPH QL NAA+PROBE: NOT DETECTED
M PNEUMO IGG SER IA-ACNC: NOT DETECTED
RHINOVIRUS RNA SPEC NAA+PROBE: DETECTED
RSV RNA NPH QL NAA+NON-PROBE: NOT DETECTED
SARS-COV-2 RNA RESP QL NAA+PROBE: NOT DETECTED

## 2025-02-18 PROCEDURE — 88342 IMHCHEM/IMCYTCHM 1ST ANTB: CPT | Performed by: SURGERY

## 2025-02-18 PROCEDURE — 88305 TISSUE EXAM BY PATHOLOGIST: CPT | Performed by: SURGERY

## 2025-02-18 PROCEDURE — 31629 BRONCHOSCOPY/NEEDLE BX EACH: CPT | Performed by: SURGERY

## 2025-02-18 PROCEDURE — 31627 NAVIGATIONAL BRONCHOSCOPY: CPT | Performed by: SURGERY

## 2025-02-18 PROCEDURE — 87071 CULTURE AEROBIC QUANT OTHER: CPT | Performed by: SURGERY

## 2025-02-18 PROCEDURE — 25010000002 PHENYLEPHRINE 10 MG/ML SOLUTION 5 ML VIAL: Performed by: NURSE ANESTHETIST, CERTIFIED REGISTERED

## 2025-02-18 PROCEDURE — 87798 DETECT AGENT NOS DNA AMP: CPT | Performed by: SURGERY

## 2025-02-18 PROCEDURE — 25010000002 ONDANSETRON PER 1 MG: Performed by: NURSE ANESTHETIST, CERTIFIED REGISTERED

## 2025-02-18 PROCEDURE — 88173 CYTOPATH EVAL FNA REPORT: CPT | Performed by: SURGERY

## 2025-02-18 PROCEDURE — 0202U NFCT DS 22 TRGT SARS-COV-2: CPT | Performed by: SURGERY

## 2025-02-18 PROCEDURE — 25010000002 PROPOFOL 1000 MG/100ML EMULSION: Performed by: NURSE ANESTHETIST, CERTIFIED REGISTERED

## 2025-02-18 PROCEDURE — 88172 CYTP DX EVAL FNA 1ST EA SITE: CPT | Performed by: SURGERY

## 2025-02-18 PROCEDURE — 25810000003 SODIUM CHLORIDE 0.9 % SOLUTION: Performed by: NURSE ANESTHETIST, CERTIFIED REGISTERED

## 2025-02-18 PROCEDURE — 88334 PATH CONSLTJ SURG CYTO XM EA: CPT | Performed by: SURGERY

## 2025-02-18 PROCEDURE — 31624 DX BRONCHOSCOPE/LAVAGE: CPT | Performed by: SURGERY

## 2025-02-18 PROCEDURE — 88333 PATH CONSLTJ SURG CYTO XM 1: CPT | Performed by: SURGERY

## 2025-02-18 PROCEDURE — 25010000002 LIDOCAINE 2% SOLUTION: Performed by: NURSE ANESTHETIST, CERTIFIED REGISTERED

## 2025-02-18 PROCEDURE — 87206 SMEAR FLUORESCENT/ACID STAI: CPT | Performed by: SURGERY

## 2025-02-18 PROCEDURE — 87252 VIRUS INOCULATION TISSUE: CPT | Performed by: SURGERY

## 2025-02-18 PROCEDURE — 76000 FLUOROSCOPY <1 HR PHYS/QHP: CPT

## 2025-02-18 PROCEDURE — 31641 BRONCHOSCOPY TREAT BLOCKAGE: CPT | Performed by: SURGERY

## 2025-02-18 PROCEDURE — 94640 AIRWAY INHALATION TREATMENT: CPT

## 2025-02-18 PROCEDURE — 88341 IMHCHEM/IMCYTCHM EA ADD ANTB: CPT | Performed by: SURGERY

## 2025-02-18 PROCEDURE — 87102 FUNGUS ISOLATION CULTURE: CPT | Performed by: SURGERY

## 2025-02-18 PROCEDURE — 71045 X-RAY EXAM CHEST 1 VIEW: CPT

## 2025-02-18 PROCEDURE — 88108 CYTOPATH CONCENTRATE TECH: CPT | Performed by: SURGERY

## 2025-02-18 PROCEDURE — 25010000002 SUGAMMADEX 200 MG/2ML SOLUTION: Performed by: NURSE ANESTHETIST, CERTIFIED REGISTERED

## 2025-02-18 PROCEDURE — 25010000002 PROPOFOL 200 MG/20ML EMULSION: Performed by: NURSE ANESTHETIST, CERTIFIED REGISTERED

## 2025-02-18 PROCEDURE — 25010000002 GLYCOPYRROLATE 0.2 MG/ML SOLUTION: Performed by: NURSE ANESTHETIST, CERTIFIED REGISTERED

## 2025-02-18 PROCEDURE — 94799 UNLISTED PULMONARY SVC/PX: CPT

## 2025-02-18 PROCEDURE — 31652 BRONCH EBUS SAMPLNG 1/2 NODE: CPT | Performed by: SURGERY

## 2025-02-18 PROCEDURE — 25010000002 FENTANYL CITRATE (PF) 100 MCG/2ML SOLUTION: Performed by: NURSE ANESTHETIST, CERTIFIED REGISTERED

## 2025-02-18 PROCEDURE — 25810000003 SODIUM CHLORIDE 0.9 % SOLUTION 250 ML FLEX CONT: Performed by: NURSE ANESTHETIST, CERTIFIED REGISTERED

## 2025-02-18 PROCEDURE — 25010000002 SUCCINYLCHOLINE PER 20 MG: Performed by: NURSE ANESTHETIST, CERTIFIED REGISTERED

## 2025-02-18 PROCEDURE — 25010000002 VASOPRESSIN 20 UNIT/ML SOLUTION: Performed by: NURSE ANESTHETIST, CERTIFIED REGISTERED

## 2025-02-18 PROCEDURE — 87116 MYCOBACTERIA CULTURE: CPT | Performed by: SURGERY

## 2025-02-18 PROCEDURE — 87205 SMEAR GRAM STAIN: CPT | Performed by: SURGERY

## 2025-02-18 RX ORDER — LIDOCAINE 50 MG/G
OINTMENT TOPICAL AS NEEDED
Status: DISCONTINUED | OUTPATIENT
Start: 2025-02-18 | End: 2025-02-18 | Stop reason: HOSPADM

## 2025-02-18 RX ORDER — ONDANSETRON 2 MG/ML
4 INJECTION INTRAMUSCULAR; INTRAVENOUS ONCE AS NEEDED
Status: DISCONTINUED | OUTPATIENT
Start: 2025-02-18 | End: 2025-02-18 | Stop reason: HOSPADM

## 2025-02-18 RX ORDER — LABETALOL HYDROCHLORIDE 5 MG/ML
5 INJECTION, SOLUTION INTRAVENOUS
Status: DISCONTINUED | OUTPATIENT
Start: 2025-02-18 | End: 2025-02-18 | Stop reason: HOSPADM

## 2025-02-18 RX ORDER — LIDOCAINE HYDROCHLORIDE 40 MG/ML
SOLUTION TOPICAL
Status: COMPLETED
Start: 2025-02-18 | End: 2025-02-18

## 2025-02-18 RX ORDER — FLUMAZENIL 0.1 MG/ML
0.2 INJECTION INTRAVENOUS AS NEEDED
Status: DISCONTINUED | OUTPATIENT
Start: 2025-02-18 | End: 2025-02-18 | Stop reason: HOSPADM

## 2025-02-18 RX ORDER — PROPOFOL 10 MG/ML
INJECTION, EMULSION INTRAVENOUS AS NEEDED
Status: DISCONTINUED | OUTPATIENT
Start: 2025-02-18 | End: 2025-02-18 | Stop reason: SURG

## 2025-02-18 RX ORDER — DIPHENHYDRAMINE HYDROCHLORIDE 50 MG/ML
12.5 INJECTION INTRAMUSCULAR; INTRAVENOUS
Status: DISCONTINUED | OUTPATIENT
Start: 2025-02-18 | End: 2025-02-18 | Stop reason: HOSPADM

## 2025-02-18 RX ORDER — SODIUM CHLORIDE 9 MG/ML
INJECTION, SOLUTION INTRAVENOUS CONTINUOUS PRN
Status: DISCONTINUED | OUTPATIENT
Start: 2025-02-18 | End: 2025-02-18 | Stop reason: SURG

## 2025-02-18 RX ORDER — FENTANYL CITRATE 50 UG/ML
50 INJECTION, SOLUTION INTRAMUSCULAR; INTRAVENOUS
Status: DISCONTINUED | OUTPATIENT
Start: 2025-02-18 | End: 2025-02-18 | Stop reason: HOSPADM

## 2025-02-18 RX ORDER — OXYCODONE HYDROCHLORIDE 5 MG/1
10 TABLET ORAL EVERY 4 HOURS PRN
Status: DISCONTINUED | OUTPATIENT
Start: 2025-02-18 | End: 2025-02-18 | Stop reason: HOSPADM

## 2025-02-18 RX ORDER — GLYCOPYRROLATE 0.2 MG/ML
INJECTION INTRAMUSCULAR; INTRAVENOUS AS NEEDED
Status: DISCONTINUED | OUTPATIENT
Start: 2025-02-18 | End: 2025-02-18 | Stop reason: SURG

## 2025-02-18 RX ORDER — PHENYLEPHRINE HCL IN 0.9% NACL 1 MG/10 ML
SYRINGE (ML) INTRAVENOUS AS NEEDED
Status: DISCONTINUED | OUTPATIENT
Start: 2025-02-18 | End: 2025-02-18 | Stop reason: SURG

## 2025-02-18 RX ORDER — OXYCODONE HYDROCHLORIDE 5 MG/1
5 TABLET ORAL ONCE AS NEEDED
Status: DISCONTINUED | OUTPATIENT
Start: 2025-02-18 | End: 2025-02-18 | Stop reason: HOSPADM

## 2025-02-18 RX ORDER — PROPOFOL 10 MG/ML
INJECTION, EMULSION INTRAVENOUS CONTINUOUS PRN
Status: DISCONTINUED | OUTPATIENT
Start: 2025-02-18 | End: 2025-02-18 | Stop reason: SURG

## 2025-02-18 RX ORDER — SUCCINYLCHOLINE CHLORIDE 20 MG/ML
INJECTION INTRAMUSCULAR; INTRAVENOUS AS NEEDED
Status: DISCONTINUED | OUTPATIENT
Start: 2025-02-18 | End: 2025-02-18 | Stop reason: SURG

## 2025-02-18 RX ORDER — LIDOCAINE HYDROCHLORIDE 40 MG/ML
SOLUTION TOPICAL AS NEEDED
Status: DISCONTINUED | OUTPATIENT
Start: 2025-02-18 | End: 2025-02-18 | Stop reason: SURG

## 2025-02-18 RX ORDER — HYDRALAZINE HYDROCHLORIDE 20 MG/ML
5 INJECTION INTRAMUSCULAR; INTRAVENOUS
Status: DISCONTINUED | OUTPATIENT
Start: 2025-02-18 | End: 2025-02-18 | Stop reason: HOSPADM

## 2025-02-18 RX ORDER — ROCURONIUM BROMIDE 10 MG/ML
INJECTION, SOLUTION INTRAVENOUS AS NEEDED
Status: DISCONTINUED | OUTPATIENT
Start: 2025-02-18 | End: 2025-02-18 | Stop reason: SURG

## 2025-02-18 RX ORDER — LIDOCAINE HYDROCHLORIDE 20 MG/ML
INJECTION, SOLUTION INFILTRATION; PERINEURAL AS NEEDED
Status: DISCONTINUED | OUTPATIENT
Start: 2025-02-18 | End: 2025-02-18 | Stop reason: SURG

## 2025-02-18 RX ORDER — ONDANSETRON 2 MG/ML
INJECTION INTRAMUSCULAR; INTRAVENOUS AS NEEDED
Status: DISCONTINUED | OUTPATIENT
Start: 2025-02-18 | End: 2025-02-18 | Stop reason: SURG

## 2025-02-18 RX ORDER — IPRATROPIUM BROMIDE AND ALBUTEROL SULFATE 2.5; .5 MG/3ML; MG/3ML
3 SOLUTION RESPIRATORY (INHALATION) ONCE AS NEEDED
Status: COMPLETED | OUTPATIENT
Start: 2025-02-18 | End: 2025-02-18

## 2025-02-18 RX ORDER — NALOXONE HCL 0.4 MG/ML
0.4 VIAL (ML) INJECTION AS NEEDED
Status: DISCONTINUED | OUTPATIENT
Start: 2025-02-18 | End: 2025-02-18 | Stop reason: HOSPADM

## 2025-02-18 RX ORDER — VASOPRESSIN 20 [USP'U]/ML
INJECTION, SOLUTION INTRAVENOUS AS NEEDED
Status: DISCONTINUED | OUTPATIENT
Start: 2025-02-18 | End: 2025-02-18 | Stop reason: SURG

## 2025-02-18 RX ORDER — DIPHENHYDRAMINE HYDROCHLORIDE 50 MG/ML
12.5 INJECTION INTRAMUSCULAR; INTRAVENOUS ONCE AS NEEDED
Status: DISCONTINUED | OUTPATIENT
Start: 2025-02-18 | End: 2025-02-18 | Stop reason: HOSPADM

## 2025-02-18 RX ORDER — EPHEDRINE SULFATE 5 MG/ML
5 INJECTION INTRAVENOUS ONCE AS NEEDED
Status: DISCONTINUED | OUTPATIENT
Start: 2025-02-18 | End: 2025-02-18 | Stop reason: HOSPADM

## 2025-02-18 RX ORDER — FENTANYL CITRATE 50 UG/ML
INJECTION, SOLUTION INTRAMUSCULAR; INTRAVENOUS AS NEEDED
Status: DISCONTINUED | OUTPATIENT
Start: 2025-02-18 | End: 2025-02-18 | Stop reason: SURG

## 2025-02-18 RX ADMIN — ONDANSETRON 4 MG: 2 INJECTION, SOLUTION INTRAMUSCULAR; INTRAVENOUS at 12:54

## 2025-02-18 RX ADMIN — FENTANYL CITRATE 50 MCG: 50 INJECTION, SOLUTION INTRAMUSCULAR; INTRAVENOUS at 12:05

## 2025-02-18 RX ADMIN — GLYCOPYRROLATE 0.2 MG: 0.2 INJECTION, SOLUTION INTRAMUSCULAR; INTRAVENOUS at 12:18

## 2025-02-18 RX ADMIN — ROCURONIUM BROMIDE 5 MG: 10 INJECTION INTRAVENOUS at 12:05

## 2025-02-18 RX ADMIN — SUGAMMADEX 200 MG: 100 INJECTION, SOLUTION INTRAVENOUS at 12:54

## 2025-02-18 RX ADMIN — IPRATROPIUM BROMIDE AND ALBUTEROL SULFATE 3 ML: .5; 3 SOLUTION RESPIRATORY (INHALATION) at 13:43

## 2025-02-18 RX ADMIN — PHENYLEPHRINE HYDROCHLORIDE 0.4 MCG/KG/MIN: 10 INJECTION INTRAVENOUS at 12:11

## 2025-02-18 RX ADMIN — Medication 200 MCG: at 12:11

## 2025-02-18 RX ADMIN — PROPOFOL 130 MG: 10 INJECTION, EMULSION INTRAVENOUS at 12:05

## 2025-02-18 RX ADMIN — ROCURONIUM BROMIDE 45 MG: 10 INJECTION INTRAVENOUS at 12:10

## 2025-02-18 RX ADMIN — LIDOCAINE HYDROCHLORIDE 40 MG: 20 INJECTION, SOLUTION INFILTRATION; PERINEURAL at 12:05

## 2025-02-18 RX ADMIN — SODIUM CHLORIDE: 9 INJECTION, SOLUTION INTRAVENOUS at 11:56

## 2025-02-18 RX ADMIN — PROPOFOL INJECTABLE EMULSION 150 MCG/KG/MIN: 10 INJECTION, EMULSION INTRAVENOUS at 12:05

## 2025-02-18 RX ADMIN — Medication 100 MCG: at 12:39

## 2025-02-18 RX ADMIN — Medication 200 MCG: at 12:12

## 2025-02-18 RX ADMIN — VASOPRESSIN 1 UNITS: 20 INJECTION INTRAVENOUS at 12:23

## 2025-02-18 RX ADMIN — SUCCINYLCHOLINE CHLORIDE 160 MG: 20 INJECTION, SOLUTION INTRAMUSCULAR; INTRAVENOUS at 12:05

## 2025-02-18 RX ADMIN — LIDOCAINE HYDROCHLORIDE 1 EACH: 40 SOLUTION TOPICAL at 12:06

## 2025-02-18 RX ADMIN — ROCURONIUM BROMIDE 10 MG: 10 INJECTION INTRAVENOUS at 12:42

## 2025-02-18 NOTE — OP NOTE
Operative Note     Date of procedure: 2/18/2025     Patient name: David A Jolissaint  MRN: [id number]    Pre OP diagnosis:    Small cell carcinoma of lung      Post OP diagnosis:  Same as above.    Procedure performed:   Flexible bronchoscopy.  Navigational bronchoscopy with ION robot with C-arm.  Interpretation of fluoroscopy images.  Radial endobronchial ultrasound.  Fine-needle aspiration of the right upper lobe mass.  Cryo biopsy of the the right upper lobe mass using 1.1 mm ERBECRYO®  cryoprobe.  Right upper lobe segmental bronchoalveolar lavage.  Endobronchial ultrasound and fine-needle aspiration of level 10R lymph node.    ION Synoptic report:  Date of radiological diagnosis: 1/4/2025  Lesions biopsied: Single  Tumor size: 58 mm  Location: Right Upper Lobe  Peripheral 1/3: No  Appearance: Solid  PET avidity: Yes  Bronchial sign: Yes  Prior biopsy: No  Radial EBUS: Concentric  Tools utilized: 19-Gauge Needle, 23-gauge needle, cryobiopsy, BAL  KAMI result: Significant necrotic material and few malignant cells  EBUS performed: Yes    Indications:   David A Jolissaint is a 79 y.o. male who has significant medical problems as mentioned in the medical chart.      Patient has a history of limited stage small cell lung cancer diagnosed in 2021.  He has received definitive chemoradiation treatment and total brain irradiation.  He has been followed by medical oncology at [facility].  He has a recent PET CT scan which showed increased size and activity in the right lower lobe mass and lung nodules concerning for recurrent disease.  He was referred to thoracic surgery clinic for navigational bronchoscopy and biopsy.  He presented to hospital on 1/4/2025 with community-acquired pneumonia.  He underwent bronchoscopy on 1/6/2025 by Dr. Quezada and was found to have significant mucopurulent secretions in the tracheobronchial tree.  He was treating for pneumonia and was discharged home.  Follow-up CT scan of the chest on  2/10/2025 reported improved aeration in the bilateral lower lobes suggesting improving pneumonia.  There was no significant change in masslike consolidation in the perihilar right lung involving the posterior right upper lobe and superior right lower lobe.  I recommended robotic navigational bronchoscopy and biopsy to establish diagnosis.       Surgeon: Pineda Issa MD     Assistants: No qualified assistant was available for this procedure.      Anesthesia: General endotracheal anesthesia    ASA Class: 4    Procedure Details   On 2/18/2025, the patient was brought to the operating room and placed in the supine position on the operating room table. Following an uneventful induction of general anesthesia, patient was intubated with a single endotracheal tube without incident.  Antibiotic for surgical prophylaxis was not indicated due to the nature of the procedure.  Prior to beginning the operation, a time-out was conducted with all members of surgical team present. The patient was identified as Brian LANE Jolissaint, the procedure and the correct site were verified.     I began by performing flexible bronchoscopy.  A flexible adult bronchoscope was advanced through the endotracheal tube.  A complete examination of the distal trachea and bilateral mainstem and lobar bronchi and all segmental bronchial orifices was performed.  The patient has normal endobronchial anatomy.  All the tracheobronchial tree had moderate thick mucus secretion.  There was no blood, endoluminal lesions or other abnormal findings.  The bronchoscope was removed.    Prior to the procedure, the patient CT scan was integrated into the PlanPoint interface that generated the patient's 3D airway tree.  The target nodule was identified and its anatomical borders were mapped.  A path to the target nodule was generated through the PlanPoint interface.  After the plan was finalized, the patient image and plan guide was transferred to the Valcon  platform.  Using the Ion's controller, the 3.5 mm robotic catheter was advanced with the Ion's vision probe and navigated to the target along the preplanned path.  The catheter was parked next to the target lesion.  The Ion vision probe was removed and radial EBUS was used to confirm the presence of the target lesion.  The radial EBUS was removed and under fluoroscopic guidance, a 23 gauge Ion’s Flexision needle was passed into the robotic catheter.  The needle was advanced into the target lesion and the location of the needle was confirmed with the C-arm. Multiple passes of the needle were made into the target lesion and the aspirate was sent for Rapid on Site Evaluation (KAMI). The needle was removed. I then performed cryo biopsy using 1.1 mm ERBECRYO®  cryoprobe through the working channel in the same location. The bronchioloalveolar lavage was performed in the segmental bronchi.  The aspirate was sent for cytology and culture.  The robotic catheter was removed and an adult flexible bronchoscope was inserted.  There was no pooling of blood into the bronchial tree.  All tracheobronchial tree were cleared from secretions.    The flexible bronchoscope with the Olympus endobronchial ultrasound was inserted.  The level 10R lymph node was identified.  It was unclear that if the ultrasonographic findings were due to large hilar mass or metastatic lymph node.  Using 21-gauge needle endobronchial Olympus needle, transbronchial FNA was performed.  Multiple passes with the needle were performed. The Olympus endobronchial ultrasound was removed and adult bronchoscope was inserted. There was no pooling of blood into the bronchial tree.  All tracheobronchial tree were cleared from secretions.    The patient was awakened from anesthesia, was extubated without incident, and was transported to the Post Anesthesia Care Unit in stable condition.    Findings:  Using 23-gauge and 19-gauge needle, fine-needle aspiration of the right  upper lobe mass was performed.  Cryo biopsy of the right upper lobe mass was performed using 1.1 mm ERBECRYO®  cryoprobe.  Bronchoalveolar lavage of the right upper lobe segment was performed.  The pathologist reported significant necrosis and some malignant cells.  10R lymph node identified on endobronchial ultrasound.  It was unclear to me if this represented a large hilar mass or a metastatic lymph node.  Fine-needle aspiration was performed.  No evidence of significant active bleeding at the end of the procedure.    Estimated Blood Loss:  Minimal           Drains: None                 Specimens:   ID Type Source Tests Collected by Time   A (Not marked as sent) : FNA RUL Fine Needle Aspirate Lung, Right Upper Lobe FINE NEEDLE ASPIRATION Pineda Issa MD 2/18/2025 1223   B (Not marked as sent) : cryo tissue biopsy Tissue Lung, Right Upper Lobe TISSUE PATHOLOGY EXAM Pineda Issa MD 2/18/2025 1224   C (Not marked as sent) : Bronchoalveolar Lavage Right Upper Lobe Lavage Lung, Right Upper Lobe NON-GYNECOLOGIC CYTOLOGY, FUNGAL CULTURE, VIRUS CULTURE, AFB CULTURE, BAL CULTURE, QUANTITATIVE, PNEUMOCYSTIS PCR, RESPIRATORY PANEL PCR W/ COVID-19 (SARS-COV-2), NP SWAB IN UTM/VTP, 2 HR TAT Pineda Issa MD 2/18/2025 1240   D (Not marked as sent) : Level 10R Fine Needle Aspirate Lymph Node FINE NEEDLE ASPIRATION Pineda Issa MD 2/18/2025 1244              Implants: None           Complications: None           Disposition: PACU - hemodynamically stable.           Condition: Stable     Pineda Issa MD   Thoracic Surgeon  [facility]

## 2025-02-18 NOTE — ANESTHESIA POSTPROCEDURE EVALUATION
Patient: David A Jolissaint    Procedure Summary       Date: 02/18/25 Room / Location: [facility] ENDOSCOPY 3 / [facility] ENDOSCOPY    Anesthesia Start: 1156 Anesthesia Stop: 1309    Procedure: BRONCHOSCOPY NAVIGATION WITH ION ROBOT, Fine needle aspiration and cryo biopsy right upper lobe, bronchoalveolar lavage right upper lobe, endobronchial ultrasound with fine needle aspiration Level 10R Lymph Node (Bronchus) Diagnosis:       Small cell carcinoma of lung, unspecified laterality, unspecified part of lung      (Small cell carcinoma of lung, unspecified laterality, unspecified part of lung [C34.90])    Surgeons: Pineda Issa MD Provider: Cj Durham MD    Anesthesia Type: general ASA Status: 3            Anesthesia Type: general    Vitals  Vitals Value Taken Time   /66 02/18/25 1407   Temp 97.8 °F (36.6 °C) 02/18/25 1315   Pulse 79 02/18/25 1435   Resp 28 02/18/25 1400   SpO2 100 % 02/18/25 1435   Vitals shown include unfiled device data.        Post Anesthesia Care and Evaluation    Patient location during evaluation: PACU  Patient participation: complete - patient participated  Level of consciousness: awake  Pain scale: See nurse's notes for pain score.  Pain management: adequate    Airway patency: patent  Anesthetic complications: No anesthetic complications  PONV Status: none  Cardiovascular status: acceptable  Respiratory status: acceptable and spontaneous ventilation  Hydration status: acceptable    Comments: Patient seen and examined postoperatively; vital signs stable; SpO2 greater than or equal to 90%; cardiopulmonary status stable; nausea/vomiting adequately controlled; pain adequately controlled; no apparent anesthesia complications; patient discharged from anesthesia care when discharge criteria were met

## 2025-02-18 NOTE — ANESTHESIA PROCEDURE NOTES
Airway  Urgency: elective    Date/Time: 2/18/2025 12:06 PM  Airway not difficult    General Information and Staff    Patient location during procedure: OR  CRNA/CAA: Ayde Perdomo, CRNA    Indications and Patient Condition  Indications for airway management: airway protection    Preoxygenated: yes  Mask difficulty assessment: 0 - not attempted    Final Airway Details  Final airway type: endotracheal airway      Successful airway: ETT  Cuffed: yes   Successful intubation technique: video laryngoscopy and RSI  Facilitating devices/methods: intubating stylet  Endotracheal tube insertion site: oral  Blade: Gutiérrez  Blade size: 3  ETT size (mm): 8.5  Cormack-Lehane Classification: grade I - full view of glottis  Placement verified by: capnometry   Measured from: lips  ETT/EBT  to lips (cm): 21  Number of attempts at approach: 1  Assessment: lips, teeth, and gum same as pre-op and atraumatic intubation    Additional Comments  Placement verified via bronchoscope per Dr. Issa

## 2025-02-18 NOTE — DISCHARGE INSTRUCTIONS
Endoscopic ultrasound and Robotic bronchoscopy with fine needle aspiration    Samples (biopsies) of the lymph nodes are taken from inside the lungs and sent for diagnostic testing.    Final results of your biopsy take up to 5 business days to process; your endoscopic physician will contact you with your results.    EBUS and robotic bronchoscopy is recommended in the following instances:  To diagnose different types of lung disorders (such as sarcoidosis or tuberculosis)  To diagnose or 'stage' cancer   To investigate enlarged lymph  nodes in the chest    Due to effects of sedation, do not drive or operate heavy machinery for 24 hours.    Avoid heavy lifting (>10 lbs.) or strenuous activity for 48 hours.    Continue to avoid non-steroidal anti-inflammatory medications (NSAIDS) for 5-7 days after the procedure unless told otherwise by your endoscopic and primary care physicians.    Some patients have a temporary sore throat after the procedure; this is a normal finding and over-the-counter lozenges help soothe symptoms.    You may resume normal diet once you are discharged.     Avoid red-colored foods for 24 hours.     You may resume your blood thinner medications (if applicable) as directed by your endoscopic and primary care physicians.    It is normal to have a very small amount of blood-tinged sputum immediately after the procedure. This should resolve within 3-4 days.    Although complications are rare, there is a small risk of pain, collapsed lung, bleeding and infection. Contact your endoscopic physician if you have these signs or symptoms (Dr. Issa):  Temperature greater than 102°F  Worsening pain not relieved by medications  Go to your nearest emergency room is you experience:  Shaking, chills or a temperature over 102°F.    New, sudden difficulty breathing.    New pain when taking a deep breath.    New, sudden chest pain.  Worsening cough that produces large amounts of blood.      RESUME PLAVIX ON  2/21/2025

## 2025-02-20 LAB
BACTERIA SPEC AEROBE CULT: NO GROWTH
GRAM STN SPEC: NORMAL
GRAM STN SPEC: NORMAL

## 2025-02-21 LAB
BEAKER LAB AP INTRAOPERATIVE CONSULTATION: NORMAL
LAB AP CASE REPORT: NORMAL
LAB AP DIAGNOSIS COMMENT: NORMAL
LAB AP DIAGNOSIS COMMENT: NORMAL
Lab: NORMAL
PATH REPORT.FINAL DX SPEC: NORMAL
PATH REPORT.GROSS SPEC: NORMAL

## 2025-02-23 LAB
FUNGUS WND CULT: NORMAL
MYCOBACTERIUM SPEC CULT: NORMAL
NIGHT BLUE STAIN TISS: NORMAL

## 2025-02-24 LAB
P JIROVECII DNA L RESP QL NAA+NON-PROBE: NEGATIVE
REF LAB TEST METHOD: NORMAL

## 2025-02-25 LAB
FUNGUS WND CULT: NORMAL
MYCOBACTERIUM SPEC CULT: NORMAL
NIGHT BLUE STAIN TISS: NORMAL

## 2025-02-27 LAB — VIRUS SPEC CULT: NORMAL

## 2025-03-04 LAB
FUNGUS WND CULT: NORMAL
MYCOBACTERIUM SPEC CULT: NORMAL
NIGHT BLUE STAIN TISS: NORMAL

## 2025-03-04 NOTE — PROGRESS NOTES
HEMATOLOGY ONCOLOGY OUTPATIENT FOLLOW UP       Patient name: David A Jolissaint  : 1945  MRN: [id number]  Primary Care Physician: Walter Valero MD  Referring Physician: Walter Valero MD  Reason For Consult: Progressive small cell lung cancer.    History of Present Illness:    2024: In the office for the first time for the treatment of what appears to be progressive small cell lung cancer.  He was first diagnosed with small cell lung cancer in .  At the time he is disease was staged as limited stage and he was treated both with radiation and chemotherapy.  Subsequently he had prophylactic cranial irradiation.  He did well until closer to the time of this visit when, on a PET scan, that reports increased size and activity of the necrotic tumor in the right lower lobe and superior segment, without associated right lung nodules that are increased in size and show increased and fluorodeoxyglucose avidity.  There is mediastinal adenopathy.  Plans for biopsy were made but he declined.  He was being seen at the [facility] but he decided to transfer his care here as he was not satisfied with the setting and the options he had received.  He is known as well for coronary artery disease and has undergone a coronary artery bypass graft surgery.  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 He suffers from chronic kidney disease and at some point needed to be on dialysis and, in fact, he has a fistula in the left upper extremity that has never been used.  On exam he is a well-built man who appears the stated age.  He seems chronically ill.  He is transported in a wheelchair.  He is in no distress.  No oral lesions.  Respirations not labored.  Lungs markedly diminished bilaterally.  Heart tachycardic and regular.  Abdomen soft.  Right lower extremity without edema.  Reviewed the  images of the PET scan and its report.  Discussed with him.  Explained to him the importance of tissue diagnosis.  I have asked him to see Dr. Donohue for consideration of endoscopic fine-needle aspiration both for histologic diagnosis and next-generation sequencing.  I have asked him to see me with results.  Explained the difference between a percutaneous biopsy and endoscopic biopsy and the reduced risk of the endoscopic biopsy.  Discussed with him goals of care and explained to him the importance of thinking about what he wants to do in the future given the findings, he has chronic problems and this most recent diagnosis.    1/5/2025: Mr. Jolissaint came to the emergency room after he started to have dyspnea with minimal activity.  This started probably 2 days, or so, before his presentation.  Imaging of the chest revealed progressive enlargement of the previously identified right lung tumor with some postobstructive consolidation of the upper and lower lobes on the right.  No new findings concerning for metastatic disease were identified.  History of jaundice and had not been seen by thoracic surgery and was scheduled to be seen on January 7, 2025.  He has no pain.  He has been eating well.  He denies fevers.  He has been coughing about as much as usual but has had little expectoration and no hemoptysis.  He denies abdominal pain.  He has had no difficulties with defecation or dysuria and he denies peripheral edema.     1/30/2025: Was admitted to the hospital with respiratory symptoms.  Underwent a bronchoscopy that revealed abundant purulent secretions.  He was treated with antibiotics.  He feels better today.  He has been eating well and has not had any new problems.  He has had no fevers.  On exam chronically ill-appearing but in no distress.  No jaundice.  The lungs are diminished bilaterally.  The heart is regular.  Abdomen is not distended.  There is no edema.  Laboratory exams reviewed.  Reviewed and  discussed with him the images of the recent scans and the results of the bronchoscopy.  The bronchial washings did not reveal a malignant process.  There was mainly inflammation.  He is to have a repeat CT scan in early February 2025 and, if needed, will undergo bronchoscopy and biopsy.  He will see me early in March.    3/6/2025: Feels about the same as at the time of the last visit.  Persists with dyspnea and continues to use the oxygen at the same level as before.  He continues to eat reasonably well.  Has been tired.  Denies chest pains.  Continues to cough frequently and has had no hemoptysis.  No diarrhea or dysuria.  He underwent a bronchoscopy.  The final report of pathology confirms malignancy but describes squamous cell carcinoma.  Unfortunately, in addition, there is no additional tissue left for further testing.  On exam he is alert, conversant and in good spirits.  Transported in his wheelchair.  No distress.  No jaundice.  The lungs are markedly diminished bilaterally.  Heart is regular.  There is no edema.  Reviewed the laboratory exams.  Reviewed the images of the most recent PET scan as well as the recent CT scan.  There is clear progression though very slow.  Discussed with him and explained that chemotherapy and immunotherapy can result in improvement in quality of   life and prolongation of life.  He is not interested in treatment at this time.  He wants to wait longer.  I have asked him to see me in approximately 6 weeks with new scans.    Past Medical History:   Diagnosis Date    Anemia     Cellulitis     CKD (chronic kidney disease), stage III     COPD (chronic obstructive pulmonary disease)     Diverticulitis     Dyslipidemia     Hypertension     Myocardial infarction 2016    PVD (peripheral vascular disease)     Small cell lung cancer 05/2021     Past Surgical History:   Procedure Laterality Date    BELOW KNEE LEG AMPUTATION Left 2015    BRONCHOSCOPY N/A 05/20/2021    Procedure: BRONCHOSCOPY  WITH ENDOBRONCHIAL ULTRASOUND WITH FINE NEEDLE ASPIRATION. BRONCHOALVEOLAR LAVAGE;  Surgeon: Jackson Quezada MD;  Location: [facility] ENDOSCOPY;  Service: Pulmonary;  Laterality: N/A;  subcarinal mass    BRONCHOSCOPY N/A 1/6/2025    Procedure: BRONCHOSCOPY with bronchial washing;  Surgeon: Jackson Quezada MD;  Location: [facility] ENDOSCOPY;  Service: Pulmonary;  Laterality: N/A;  postop: pneumonia    BRONCHOSCOPY WITH ION ROBOTIC ASSIST N/A 2/18/2025    Procedure: BRONCHOSCOPY NAVIGATION WITH ION ROBOT, Fine needle aspiration and cryo biopsy right upper lobe, bronchoalveolar lavage right upper lobe, endobronchial ultrasound with fine needle aspiration Level 10R Lymph Node;  Surgeon: Pineda Issa MD;  Location: [facility] ENDOSCOPY;  Service: Robotics - Pulmonary;  Laterality: N/A;  post: right upper lobe mass    CARDIAC SURGERY      CHOLECYSTECTOMY      CORONARY ARTERY BYPASS GRAFT  2016    GALLBLADDER SURGERY         Current Outpatient Medications:     aspirin 81 MG EC tablet, Take 1 tablet by mouth Daily With Lunch., Disp: , Rfl: 0    atorvastatin (LIPITOR) 40 MG tablet, Take 1 tablet by mouth Every Morning., Disp: , Rfl:     budesonide (PULMICORT) 0.5 MG/2ML nebulizer solution, Take 2 mL by nebulization 2 (two) times a day., Disp: , Rfl:     clopidogrel (PLAVIX) 75 MG tablet, Take 1 tablet by mouth Every Morning., Disp: , Rfl:     diphenhydrAMINE (BENADRYL) 25 mg capsule, Take 1 capsule by mouth Every 6 (Six) Hours As Needed for Itching. States he takes branded Z-Quil at hs, Disp: , Rfl:     dronabinol (MARINOL) 2.5 MG capsule, Take 1 capsule by mouth 2 (Two) Times a Day., Disp: , Rfl:     dutasteride (AVODART) 0.5 MG capsule, Take 1 capsule by mouth Every Night., Disp: , Rfl:     ferrous sulfate 324 (65 Fe) MG tablet delayed-release EC tablet, Take 1 tablet by mouth Daily With Breakfast., Disp: 30 tablet, Rfl: 1    formoterol (Perforomist) 20 MCG/2ML nebulizer solution, Take  by nebulization 2 (Two) Times a Day., Disp: , Rfl:      HYDROcodone-acetaminophen (NORCO) 5-325 MG per tablet, Take 1 tablet by mouth Every 6 (Six) Hours As Needed for Moderate Pain., Disp: , Rfl:     metoprolol succinate XL (TOPROL-XL) 25 MG 24 hr tablet, TAKE 1 TABLET EVERY DAY, Disp: 90 tablet, Rfl: 3    montelukast (SINGULAIR) 10 MG tablet, Take 1 tablet by mouth Every Night., Disp: , Rfl:     Multiple Vitamins-Minerals (MULTI VITAMIN/MINERALS) tablet, 1 tablet Daily With Lunch., Disp: , Rfl:     ondansetron (ZOFRAN) 4 MG tablet, Take 1 tablet by mouth As Needed., Disp: , Rfl:     pantoprazole (PROTONIX) 40 MG EC tablet, Take 1 tablet by mouth Daily., Disp: , Rfl:     revefenacin (YUPELRI) 175 MCG/3ML nebulizer solution, Take 3 mL by nebulization Daily., Disp: , Rfl:     saccharomyces boulardii (FLORASTOR) 250 MG capsule, Take 1 capsule by mouth Daily With Lunch., Disp: , Rfl:     sodium bicarbonate 650 MG tablet, Take 1 tablet by mouth 2 (Two) Times a Day., Disp: , Rfl:     theophylline (THEODUR) 300 MG 12 hr tablet, Take 1 tablet by mouth 2 (Two) Times a Day., Disp: , Rfl:   No current facility-administered medications for this visit.    Facility-Administered Medications Ordered in Other Visits:     heparin injection 500 Units, 500 Units, Intravenous, PRNRebekah Alfonso, MD, 500 Units at 03/06/25 0827    sodium chloride 0.9 % flush 20 mL, 20 mL, Intravenous, PRNRebekah Alfonso, MD, 20 mL at 03/06/25 0827    Allergies   Allergen Reactions    Diltiazem Unknown - High Severity     Swelling of lips     Family History   Problem Relation Age of Onset    Heart attack Mother     Heart attack Father     Heart disease Father      Cancer-related family history is not on file.    Social History     Tobacco Use    Smoking status: Former     Current packs/day: 0.00     Average packs/day: 2.0 packs/day for 57.0 years (114.0 ttl pk-yrs)     Types: Cigarettes     Start date: 1958     Quit date: 2015     Years since quitting: 10.1    Smokeless tobacco: Never   Vaping Use     Vaping status: Never Used   Substance Use Topics    Alcohol use: Not Currently    Drug use: Never     Social History     Social History Narrative    Not on file     ROS:   Review of Systems   Constitutional:  Positive for activity change and fatigue. Negative for appetite change, chills, diaphoresis, fever and unexpected weight change.   HENT:  Negative for congestion, dental problem, drooling, ear discharge, ear pain, facial swelling, hearing loss, mouth sores, nosebleeds, postnasal drip, rhinorrhea, sinus pressure, sinus pain, sneezing, sore throat, tinnitus, trouble swallowing and voice change.    Eyes:  Negative for photophobia, pain, discharge, redness, itching and visual disturbance.   Respiratory:  Positive for cough and shortness of breath. Negative for apnea, choking, chest tightness, wheezing and stridor.    Cardiovascular:  Negative for chest pain, palpitations and leg swelling.   Gastrointestinal:  Negative for abdominal distention, abdominal pain, anal bleeding, blood in stool, constipation, diarrhea, nausea, rectal pain and vomiting.   Endocrine: Negative for cold intolerance, heat intolerance, polydipsia and polyuria.   Genitourinary:  Negative for decreased urine volume, difficulty urinating, dysuria, flank pain, frequency, genital sores, hematuria and urgency.   Musculoskeletal:  Negative for arthralgias, back pain, gait problem, joint swelling, myalgias, neck pain and neck stiffness.   Skin:  Negative for color change, pallor and rash.   Neurological:  Negative for dizziness, tremors, seizures, syncope, facial asymmetry, speech difficulty, weakness, light-headedness, numbness and headaches.   Hematological:  Negative for adenopathy. Does not bruise/bleed easily.   Psychiatric/Behavioral:  Negative for agitation, behavioral problems, confusion, decreased concentration, hallucinations, self-injury, sleep disturbance and suicidal ideas. The patient is not nervous/anxious.      Objective:    Vital  "Signs:  Vitals:    03/06/25 0829   BP: 138/74   Resp: 19   Temp: 98 °F (36.7 °C)   SpO2: 91%   Weight: 62.6 kg (138 lb)   Height: 177.8 cm (70\")   PainSc: 0-No pain     Body mass index is 19.8 kg/m².    ECOG  (3) Capable of limited self-care, confined to bed or chair > 50% of waking hours    Physical Exam:   Physical Exam  Constitutional:       General: He is not in acute distress.     Appearance: Normal appearance. He is ill-appearing. He is not toxic-appearing or diaphoretic.   HENT:      Head: Normocephalic and atraumatic.      Right Ear: External ear normal.      Left Ear: External ear normal.      Nose: Nose normal.      Mouth/Throat:      Mouth: Mucous membranes are moist.      Pharynx: Oropharynx is clear.   Eyes:      General: No scleral icterus.        Right eye: No discharge.         Left eye: No discharge.      Conjunctiva/sclera: Conjunctivae normal.      Pupils: Pupils are equal, round, and reactive to light.   Cardiovascular:      Rate and Rhythm: Normal rate and regular rhythm.      Pulses: Normal pulses.      Heart sounds: Normal heart sounds. No murmur heard.     No friction rub. No gallop.   Pulmonary:      Effort: No respiratory distress.      Breath sounds: No stridor. No wheezing, rhonchi or rales.      Comments: Breath sounds markedly diminished bilaterally.  Chest:      Chest wall: No tenderness.   Abdominal:      General: Abdomen is flat. Bowel sounds are normal. There is no distension.      Palpations: Abdomen is soft. There is no mass.      Tenderness: There is no abdominal tenderness. There is no right CVA tenderness, left CVA tenderness, guarding or rebound.      Comments: No palpable visceromegaly.   Musculoskeletal:         General: No tenderness, deformity or signs of injury.      Cervical back: No rigidity.      Right lower leg: No edema.      Left lower leg: No edema.   Lymphadenopathy:      Cervical: No cervical adenopathy.   Skin:     General: Skin is warm and dry.      Coloration: " Skin is not jaundiced or pale.      Findings: No bruising or rash.   Neurological:      General: No focal deficit present.      Mental Status: He is alert and oriented to person, place, and time.      Cranial Nerves: No cranial nerve deficit.   Psychiatric:         Mood and Affect: Mood normal.         Behavior: Behavior normal.         Thought Content: Thought content normal.         Judgment: Judgment normal.     IBRAHIMA Welch MD performed the physical exam on 3/6/2025 as documented above.    Lab Results - Last 18 Months   Lab Units 03/06/25  0818 01/30/25  0835 01/06/25  0233   WBC 10*3/mm3 10.32 8.49 14.05*   HEMOGLOBIN g/dL 9.9* 9.9* 9.9*   HEMATOCRIT % 33.6* 33.8* 31.4*   PLATELETS 10*3/mm3 279 252 292   MCV fL 96.6 97.1* 94.3     Lab Results - Last 18 Months   Lab Units 01/08/25  1103 01/06/25  0233 01/04/25  2352 01/04/25  1135 12/18/24  1024   SODIUM mmol/L 139 139 137 137 139   POTASSIUM mmol/L 4.3 3.9 3.9 3.6 3.9   CHLORIDE mmol/L 102 100 99 99 103   CO2 mmol/L 26.9 26.1 25.5 27.5 24.5   BUN mg/dL 45* 43* 32* 30* 31*   CREATININE mg/dL 2.16* 2.67* 2.14* 1.84* 1.71*   CALCIUM mg/dL 9.3 9.4 9.8 9.9 9.8   BILIRUBIN mg/dL 0.3  --   --  0.9 0.4   ALK PHOS U/L 131*  --   --  145* 137*   ALT (SGPT) U/L 10  --   --  13 24   AST (SGOT) U/L 16  --   --  21 27   GLUCOSE mg/dL 123* 205* 123* 131* 91     Lab Results   Component Value Date    GLUCOSE 123 (H) 01/08/2025    BUN 45 (H) 01/08/2025    CREATININE 2.16 (H) 01/08/2025    EGFRIFNONA 36 (L) 11/12/2021    BCR 20.8 01/08/2025    K 4.3 01/08/2025    CO2 26.9 01/08/2025    CALCIUM 9.3 01/08/2025    ALBUMIN 3.2 (L) 01/08/2025    AST 16 01/08/2025    ALT 10 01/08/2025     Lab Results - Last 18 Months   Lab Units 12/18/24  1024 09/25/24  1044   INR  1.18* 1.03   APTT seconds 31.7 29.7*     Lab Results   Component Value Date    IRON 25 (L) 02/12/2024    TIBC 155 (L) 02/12/2024    FERRITIN 720.9 (H) 02/12/2024     Lab Results   Component Value Date    FOLATE 11.5  02/12/2024       Lab Results   Component Value Date    RETICCTPCT 1.49 03/16/2023     Lab Results   Component Value Date    JCQPWUJO66 829 02/12/2024     Uric Acid   Date Value Ref Range Status   04/04/2017 7.3 4.8 - 8.7 mg/dL Final     Lab Results   Component Value Date    HAPTOGLOBIN 276 (H) 05/21/2021     Lab Results   Component Value Date    PTT 31.7 12/18/2024    INR 1.18 (H) 12/18/2024     Assessment & Plan     1.  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  I have discussed at length with him the use of treatment.  He is not interested in receiving any medication at this time as he is afraid of the potential side effects and complications of the treatment.  He does understand that treatment can prolong life and improve quality of life and despite this he prefers to wait.  The malignancy appears to have a relatively indolent course and thus I believe it is reasonable to continue to observe without treatment.  I have asked him to see me in 6 weeks with new scans to see if there is any change in size.  2.  Severe chronic obstructive pulmonary disease.  3.  History of tobacco smoking.  4.  Peripheral vascular disease  5.  Independently reviewed the images of the PET scan from December 2024 and all the recent scans during this year.  Reviewed the images with him.  Discussed the findings with him.  Reviewed the laboratory exams.  5.  He will see me in approximately 6 weeks with new scans.    Ham Welch MD on 3/6/2025 at 9:03 AM.

## 2025-03-05 ENCOUNTER — TELEPHONE (OUTPATIENT)
Dept: SURGERY | Facility: CLINIC | Age: 80
End: 2025-03-05
Payer: MEDICARE

## 2025-03-06 ENCOUNTER — HOSPITAL ENCOUNTER (OUTPATIENT)
Dept: ONCOLOGY | Facility: HOSPITAL | Age: 80
Discharge: HOME | End: 2025-03-06
Admitting: INTERNAL MEDICINE
Payer: MEDICARE

## 2025-03-06 ENCOUNTER — OFFICE VISIT (OUTPATIENT)
Dept: ONCOLOGY | Facility: CLINIC | Age: 80
End: 2025-03-06
Payer: MEDICARE

## 2025-03-06 VITALS
WEIGHT: 138 LBS | SYSTOLIC BLOOD PRESSURE: 138 MMHG | RESPIRATION RATE: 19 BRPM | HEIGHT: 70 IN | TEMPERATURE: 98 F | BODY MASS INDEX: 19.76 KG/M2 | DIASTOLIC BLOOD PRESSURE: 74 MMHG | OXYGEN SATURATION: 91 %

## 2025-03-06 DIAGNOSIS — Z95.828 PORT-A-CATH IN PLACE: Primary | ICD-10-CM

## 2025-03-06 DIAGNOSIS — C34.90 SMALL CELL CARCINOMA OF LUNG, UNSPECIFIED LATERALITY, UNSPECIFIED PART OF LUNG: Primary | ICD-10-CM

## 2025-03-06 LAB
BASOPHILS # BLD AUTO: 0.02 10*3/MM3 (ref 0–0.2)
BASOPHILS NFR BLD AUTO: 0.2 % (ref 0–1.5)
DEPRECATED RDW RBC AUTO: 53.5 FL (ref 37–54)
EOSINOPHIL # BLD AUTO: 0.42 10*3/MM3 (ref 0–0.4)
EOSINOPHIL NFR BLD AUTO: 4.1 % (ref 0.3–6.2)
ERYTHROCYTE [DISTWIDTH] IN BLOOD BY AUTOMATED COUNT: 15.8 % (ref 12.3–15.4)
HCT VFR BLD AUTO: 33.6 % (ref 37.5–51)
HGB BLD-MCNC: 9.9 G/DL (ref 13–17.7)
LYMPHOCYTES # BLD AUTO: 0.55 10*3/MM3 (ref 0.7–3.1)
LYMPHOCYTES NFR BLD AUTO: 5.3 % (ref 19.6–45.3)
MCH RBC QN AUTO: 28.4 PG (ref 26.6–33)
MCHC RBC AUTO-ENTMCNC: 29.5 G/DL (ref 31.5–35.7)
MCV RBC AUTO: 96.6 FL (ref 79–97)
MONOCYTES # BLD AUTO: 0.86 10*3/MM3 (ref 0.1–0.9)
MONOCYTES NFR BLD AUTO: 8.3 % (ref 5–12)
NEUTROPHILS NFR BLD AUTO: 8.47 10*3/MM3 (ref 1.7–7)
NEUTROPHILS NFR BLD AUTO: 82.1 % (ref 42.7–76)
PLATELET # BLD AUTO: 279 10*3/MM3 (ref 140–450)
PMV BLD AUTO: 9.7 FL (ref 6–12)
RBC # BLD AUTO: 3.48 10*6/MM3 (ref 4.14–5.8)
WBC NRBC COR # BLD AUTO: 10.32 10*3/MM3 (ref 3.4–10.8)

## 2025-03-06 PROCEDURE — 99214 OFFICE O/P EST MOD 30 MIN: CPT | Performed by: INTERNAL MEDICINE

## 2025-03-06 PROCEDURE — 1160F RVW MEDS BY RX/DR IN RCRD: CPT | Performed by: INTERNAL MEDICINE

## 2025-03-06 PROCEDURE — 85025 COMPLETE CBC W/AUTO DIFF WBC: CPT | Performed by: INTERNAL MEDICINE

## 2025-03-06 PROCEDURE — 3075F SYST BP GE 130 - 139MM HG: CPT | Performed by: INTERNAL MEDICINE

## 2025-03-06 PROCEDURE — 1159F MED LIST DOCD IN RCRD: CPT | Performed by: INTERNAL MEDICINE

## 2025-03-06 PROCEDURE — 36591 DRAW BLOOD OFF VENOUS DEVICE: CPT

## 2025-03-06 PROCEDURE — 3078F DIAST BP <80 MM HG: CPT | Performed by: INTERNAL MEDICINE

## 2025-03-06 PROCEDURE — 1126F AMNT PAIN NOTED NONE PRSNT: CPT | Performed by: INTERNAL MEDICINE

## 2025-03-06 PROCEDURE — 25010000002 HEPARIN LOCK FLUSH PER 10 UNITS: Performed by: INTERNAL MEDICINE

## 2025-03-06 RX ORDER — SODIUM CHLORIDE 0.9 % (FLUSH) 0.9 %
20 SYRINGE (ML) INJECTION AS NEEDED
Status: DISCONTINUED | OUTPATIENT
Start: 2025-03-06 | End: 2025-03-07 | Stop reason: HOSPADM

## 2025-03-06 RX ORDER — SODIUM CHLORIDE 0.9 % (FLUSH) 0.9 %
20 SYRINGE (ML) INJECTION AS NEEDED
OUTPATIENT
Start: 2025-03-06

## 2025-03-06 RX ORDER — HEPARIN SODIUM (PORCINE) LOCK FLUSH IV SOLN 100 UNIT/ML 100 UNIT/ML
500 SOLUTION INTRAVENOUS AS NEEDED
Status: DISCONTINUED | OUTPATIENT
Start: 2025-03-06 | End: 2025-03-07 | Stop reason: HOSPADM

## 2025-03-06 RX ORDER — HEPARIN SODIUM (PORCINE) LOCK FLUSH IV SOLN 100 UNIT/ML 100 UNIT/ML
500 SOLUTION INTRAVENOUS AS NEEDED
OUTPATIENT
Start: 2025-03-06

## 2025-03-06 RX ADMIN — Medication 20 ML: at 08:27

## 2025-03-06 RX ADMIN — HEPARIN 500 UNITS: 100 SYRINGE at 08:27

## 2025-03-06 NOTE — PROGRESS NOTES
Port accessed for blood collection using sterile procedure. Port aspirated and positive blood return noted. 10 ml blood wasted prior to blood for labs being collected. Port flushed with 20 ml NS and heparin 500 units, then de-accessed. Pt tolerated well.

## 2025-03-11 LAB
FUNGUS WND CULT: NORMAL
MYCOBACTERIUM SPEC CULT: NORMAL
NIGHT BLUE STAIN TISS: NORMAL

## 2025-03-18 LAB
FUNGUS WND CULT: NORMAL
MYCOBACTERIUM SPEC CULT: NORMAL
NIGHT BLUE STAIN TISS: NORMAL

## 2025-03-25 LAB
MYCOBACTERIUM SPEC CULT: NORMAL
NIGHT BLUE STAIN TISS: NORMAL

## 2025-04-01 LAB
MYCOBACTERIUM SPEC CULT: NORMAL
NIGHT BLUE STAIN TISS: NORMAL

## 2025-04-02 ENCOUNTER — HOSPITAL ENCOUNTER (OUTPATIENT)
Facility: HOSPITAL | Age: 80
Discharge: HOME | End: 2025-04-02
Attending: EMERGENCY MEDICINE | Admitting: EMERGENCY MEDICINE
Payer: MEDICARE

## 2025-04-02 VITALS
WEIGHT: 134 LBS | HEART RATE: 92 BPM | BODY MASS INDEX: 19.18 KG/M2 | OXYGEN SATURATION: 93 % | HEIGHT: 70 IN | RESPIRATION RATE: 18 BRPM | DIASTOLIC BLOOD PRESSURE: 73 MMHG | SYSTOLIC BLOOD PRESSURE: 135 MMHG | TEMPERATURE: 97.8 F

## 2025-04-02 DIAGNOSIS — Z78.9 PROBLEM WITH VASCULAR ACCESS: Primary | ICD-10-CM

## 2025-04-02 PROCEDURE — 99212 OFFICE O/P EST SF 10 MIN: CPT

## 2025-04-02 PROCEDURE — G0463 HOSPITAL OUTPT CLINIC VISIT: HCPCS

## 2025-04-02 NOTE — DISCHARGE INSTRUCTIONS
Recommend that you call your family doctor in the morning or your surgeon who placed your vascular access site to schedule appointment    Recommend that you avoid irritating the vascular access site to your left arm.  You may want to wrap the vascular access site with an Ace wrap to keep from irritating it.    Return to ER for worsening symptom

## 2025-04-02 NOTE — FSED PROVIDER NOTE
Subjective   History of Present Illness  79-year-old male presents here with a family member reporting that he has a dialysis access site to his left elbow.  He reports that last Tuesday he was getting out of bed and may have slid out of bed awkwardly and caused the site injury.  He reports that the access site is now swollen and protruding more than what it was last week.  He denies that the site is bleeding.  He denies that there is any pain to the side or that it is bruised or reddened.  He reports he has not called his family doctor or his surgeon who placed the dialysis site.        Review of Systems   All other systems reviewed and are negative.      Past Medical History:   Diagnosis Date    Anemia     Cellulitis     CKD (chronic kidney disease), stage III     COPD (chronic obstructive pulmonary disease)     Diverticulitis     Dyslipidemia     Hypertension     Myocardial infarction 2016    PVD (peripheral vascular disease)     Small cell lung cancer 05/2021       Allergies   Allergen Reactions    Diltiazem Unknown - High Severity     Swelling of lips       Past Surgical History:   Procedure Laterality Date    BELOW KNEE LEG AMPUTATION Left 2015    BRONCHOSCOPY N/A 05/20/2021    Procedure: BRONCHOSCOPY WITH ENDOBRONCHIAL ULTRASOUND WITH FINE NEEDLE ASPIRATION. BRONCHOALVEOLAR LAVAGE;  Surgeon: Jackson Quezada MD;  Location: [facility] ENDOSCOPY;  Service: Pulmonary;  Laterality: N/A;  subcarinal mass    BRONCHOSCOPY N/A 1/6/2025    Procedure: BRONCHOSCOPY with bronchial washing;  Surgeon: Jackson Quezada MD;  Location: [facility] ENDOSCOPY;  Service: Pulmonary;  Laterality: N/A;  postop: pneumonia    BRONCHOSCOPY WITH ION ROBOTIC ASSIST N/A 2/18/2025    Procedure: BRONCHOSCOPY NAVIGATION WITH ION ROBOT, Fine needle aspiration and cryo biopsy right upper lobe, bronchoalveolar lavage right upper lobe, endobronchial ultrasound with fine needle aspiration Level 10R Lymph Node;  Surgeon: Pineda Issa MD;  Location: [facility]  ENDOSCOPY;  Service: Robotics - Pulmonary;  Laterality: N/A;  post: right upper lobe mass    CARDIAC SURGERY      CHOLECYSTECTOMY      CORONARY ARTERY BYPASS GRAFT  2016    GALLBLADDER SURGERY         Family History   Problem Relation Age of Onset    Heart attack Mother     Heart attack Father     Heart disease Father        Social History     Socioeconomic History    Marital status:    Tobacco Use    Smoking status: Former     Current packs/day: 0.00     Average packs/day: 2.0 packs/day for 57.0 years (114.0 ttl pk-yrs)     Types: Cigarettes     Start date: 1958     Quit date: 2015     Years since quitting: 10.2    Smokeless tobacco: Never   Vaping Use    Vaping status: Never Used   Substance and Sexual Activity    Alcohol use: Not Currently    Drug use: Never    Sexual activity: Defer           Objective   Physical Exam  Vitals and nursing note reviewed.   Constitutional:       General: He is not in acute distress.     Appearance: Normal appearance. He is not toxic-appearing.   HENT:      Head: Normocephalic and atraumatic.      Nose: Nose normal.      Mouth/Throat:      Mouth: Mucous membranes are moist.      Pharynx: Oropharynx is clear.   Eyes:      Extraocular Movements: Extraocular movements intact.      Pupils: Pupils are equal, round, and reactive to light.   Cardiovascular:      Rate and Rhythm: Normal rate.      Pulses: Normal pulses.   Pulmonary:      Effort: Pulmonary effort is normal. No respiratory distress.      Breath sounds: Normal breath sounds. No stridor. No wheezing or rhonchi.   Abdominal:      General: Abdomen is flat. Bowel sounds are normal.      Palpations: Abdomen is soft.   Musculoskeletal:         General: No swelling, tenderness or deformity.      Cervical back: Normal range of motion.      Comments: Patient's left AC region there is an obvious fistula approximately 3.5 to 4 cm in length 0.5 cm in width.    A palpable pulse is felt along with a vibrating thrill    There is no  redness or warmth, the skin is thickened over the dialysis site.    There is no evidence of bleeding in and around the site.    There is no evidence of pain on examination.  Patient has full range of motion of the left arm   Skin:     Capillary Refill: Capillary refill takes less than 2 seconds.      Findings: No bruising, erythema or lesion.   Neurological:      General: No focal deficit present.      Mental Status: He is alert and oriented to person, place, and time. Mental status is at baseline.         Procedures           ED Course                                           Medical Decision Making  Family is concerned that the dialysis access site looks different than it did last week.  The patient is not reporting any acute symptoms.  I have offered to reach out to the patient's surgeon or on-call vascular surgery however the patient does not want to wait and is preferring to be discharged home.  Patient states to me that he will call his surgeon or his family doctor in the morning.    Discussed that patient should cover the site with a loose gauze wrap so that he does not snag or cause injury to the site.    Patient and caregiver are preferring to be discharged at this time    Problems Addressed:  Problem with vascular access: acute illness or injury        Final diagnoses:   Problem with vascular access       ED Disposition  ED Disposition       ED Disposition   Discharge    Condition   Stable    Comment   --               Walter Valero MD  [address] [facility] [place] [id number] [phone]               Medication List      No changes were made to your prescriptions during this visit.

## 2025-04-07 ENCOUNTER — HOSPITAL ENCOUNTER (OUTPATIENT)
Dept: PET IMAGING | Facility: HOSPITAL | Age: 80
Discharge: HOME | End: 2025-04-07
Admitting: INTERNAL MEDICINE
Payer: MEDICARE

## 2025-04-07 DIAGNOSIS — C34.90 SMALL CELL CARCINOMA OF LUNG, UNSPECIFIED LATERALITY, UNSPECIFIED PART OF LUNG: ICD-10-CM

## 2025-04-07 PROCEDURE — 71250 CT THORAX DX C-: CPT

## 2025-04-13 NOTE — PROGRESS NOTES
Subjective:     Encounter Date:04/17/2025      Patient ID: David A Jolissaint is a 80 y.o. male.    Chief Complaint and history of present illness:       Follow-up for CAD, CABG, PAD, hypertension, dyslipidemia, right bundle branch block .     History of Present Illness  :     Mr. David A Jolissaint  has PMH of     #  CAD, NSTEMI, 2 vessel CABG with SVG to OM and PDA 3/9/16  #  PAD, left lower extremity amputation  #   RBBB  #  diabetes, hypertension, COPD, tobacco abuse quit in October 2015  #  CKD  #  left BKA, cholecystectomy  #  carotid disease  #Former smoker      here for  follow-up.  Patient denies any chest pain or shortness of breath.  Is on round-the-clock oxygen now.     Patient's arterial blood pressure is 129/77, heart rate 92 bpm, O2 sat of 96% on O2 by nasal cannula.     Patient  had carotid Doppler 06/15/2018 which showed bilateral 50-70% worse compared to 2016 which was less than 50% bilat. carotid Dopplers 11/11/2021 revealed moderate bilateral carotid disease.   Labs from 10/05/2017 showed cholesterol 98 triglycerides 87 HDL 41 LDL 32 BUN 34 creatinine 2.4 hemoglobin A1c 5.7.  Repeat labs 5/18/2019 revealed BUN 32 creatinine 2.5 follows with Dr. Mendez. proBNP was elevated at 3362.  Labs from 11/19/2019 reveal cholesterol 104 HDL 73 LDL 46, A1c 5.8, CMP with a creatinine of 46/2.34.Labs from 5/26/2020 reveal normal CBC, BMP with BUN of 36 creatinine 2.23 GFR of 29.  Labs from 11/23/2020 revealed normal PSA, creatinine on CMP of 2.09, GFR 33, triglycerides 98 LDL 49 HDL 38.  Total cholesterol is not seen on this labs.,  CBC is normal.  Hemoglobin A1c of 5.9.  Labs from 11/12/2021 reveal BUN/creatinine of 35/1.83, calcium 8.3, GFR 36, hemoglobin 8.5 with MCV of 93.       Chest x-ray 11/11/2021 - for any acute current cardiopulmonary abnormalities.  Echo 11/10/2021 revealed normal LV function   5/21/21:   Lexiscan was negative for ischemia.   Echocardiogram done 10/1/2020 which revealed normal LV  systolic function with mild concentric LVH and left atrial enlargement.        Labs from 2/23/2023 revealed lipid profile with cholesterol 117, triglycerides 91, HDL 49, LDL 51.  CMP with a BUN/creatinine of 30/1.97, GFR of 30.  A1c of 5.3 improved from 5.9 by labs 1-year ago.  Labs from 1/30/2025 reveal hemoglobin of 9.9.  BMP with a creatinine of 2.16, EGFR 30.  Glucose 123.  Labs from 1/4/2025 reveal proBNP of 2744.     ASSESSMENT:        #  bradycardia, RBBB  #  CAD, CABG, history of NSTEMI  #  diabetes,   #  hypertension,   # CKD,   # COPD,  # dyslipidemia  #. Bilateral carotid stenosis  # PAD with absent right radial pulse, left BKA        PLAN:     Reviewed hospital records and summarized.  Continue medical management with aspirin, atorvastatin, clopidogrel, metoprolol to help with CAD, MI, CABG, hypertension, dyslipidemia, diabetes as tolerated  Monitor right bundle branch block and bradycardia.  Advise labs.  Patient has labs done in nephrology in [facility] office.  Follow-up with nephrology for CKD.  Patient sees Dr. Mendez  Discussed with patient's family and patient answered all their questions.        Procedures:  Echocardiogram 3/17/2023 reveals EF of 60 to 65%  ABIs done 4/24/2023 reveal normal right.  Left could not be assessed due to below-knee amputation.           ECG 12 Lead     Date/Time: 4/11/2024 4:55 PM  Performed by: Kali Mistry MD     Authorized by: Kali Mistry MD  Comparison: compared with previous ECG from 3/13/2023  Comparison to previous ECG: EKG done today reviewed/interpreted by me reveals sinus rhythm with a rate of 78 bpm with right bundle branch block and left posterior fascicular block, no new change compared EKG from 3/13/2023.            Procedures    EKG done 1/4/2025 reviewed/interpreted by me reveals sinus tachycardia with rate of 133 bpm with right bundle branch block    ECHOCARDIOGRAM:  Results for orders placed during the hospital encounter of  23    Adult Transthoracic Echo Limited W/ Cont if Necessary Per Protocol    Interpretation Summary  Only limited echo.  Parasternal windows were done.  Patient refused procedures midway through the echo.  Normal LV size and contractility EF of 60 to 65%  Normal RV size  Normal atrial size  Pulmonic valve is not well visualized.  Aortic valve, mitral valve, tricuspid valve appears structurally normal, no significant regurgitation seen.  No pericardial effusion seen.  Proximal aorta appears normal in size.      STRESS TEST  Results for orders placed during the hospital encounter of 21    Stress Test With Myocardial Perfusion One Day    Interpretation Summary  · Findings consistent with a normal ECG stress test.    LEXISCAN CARDIOLITE REPORT    DATE OF PROCEDURE: May 21, 2021    INDICATION FOR PROCEDURE: Chest pain, lung mass, CAD    PROCEDURE PERFORMED: Lexiscan Cardiolite    PROCEDURE COMMENTS:    After informed consent was obtained.  Stress test was supervised by nurse practitioner Angelica Zarate. Patient's resting heart rate was 68 bpm, resting blood pressure was 180/84, resting EKG revealed sinus rhythm with rate of 68 bpm.  Patient was given 0.4 mg of regadenosine for stress testing.  There was no significant change in heart rate, blood pressure, symptoms with regadenoson injection.  Patient tolerated procedure well.  Complications were none.    NUCLEAR IMAGIN.  There was  uniform uptake of Cardiolite both in resting and post stress images, no ischemia seen.  2.  Gated images reveal  normal LV size and contractility, LVEF of 57%.    CONCLUSION:  1.  Lexiscan Cardiolite with normal perfusion, negative for ischemia.  2.  Normal wall motion.  LVEF of 57%.    RECOMMENDATIONS:    Clinical correlation recommended.      Kali Mistry MD  21  13:41 EDT          HEART CATHETERIZATION  No results found for this or any previous visit.      Copied text in this portion of the note has been  reviewed and is accurate as of 4/17/2025  The following portions of the patient's history were reviewed and updated as appropriate: allergies, current medications, past family history, past medical history, past social history, past surgical history and problem list.    Assessment:         [facility]       Diagnosis Plan   1. Coronary artery disease involving coronary bypass graft of native heart without angina pectoris        2. History of non-ST elevation myocardial infarction (NSTEMI)        3. Essential hypertension        4. Dyslipidemia        5. Type 2 diabetes mellitus without complication, without long-term current use of insulin        6. PAD (peripheral artery disease)        7. Stage 3b chronic kidney disease               Plan:               Past Medical History:  Past Medical History:   Diagnosis Date    Anemia     Cellulitis     CKD (chronic kidney disease), stage III     COPD (chronic obstructive pulmonary disease)     Diverticulitis     Dyslipidemia     Hypertension     Myocardial infarction 2016    PVD (peripheral vascular disease)     Small cell lung cancer 05/2021     Past Surgical History:  Past Surgical History:   Procedure Laterality Date    BELOW KNEE LEG AMPUTATION Left 2015    BRONCHOSCOPY N/A 05/20/2021    Procedure: BRONCHOSCOPY WITH ENDOBRONCHIAL ULTRASOUND WITH FINE NEEDLE ASPIRATION. BRONCHOALVEOLAR LAVAGE;  Surgeon: Jackson Quezada MD;  Location: [facility] ENDOSCOPY;  Service: Pulmonary;  Laterality: N/A;  subcarinal mass    BRONCHOSCOPY N/A 1/6/2025    Procedure: BRONCHOSCOPY with bronchial washing;  Surgeon: Jackson Quezada MD;  Location: [facility] ENDOSCOPY;  Service: Pulmonary;  Laterality: N/A;  postop: pneumonia    BRONCHOSCOPY WITH ION ROBOTIC ASSIST N/A 2/18/2025    Procedure: BRONCHOSCOPY NAVIGATION WITH ION ROBOT, Fine needle aspiration and cryo biopsy right upper lobe, bronchoalveolar lavage right upper lobe, endobronchial ultrasound with fine needle aspiration Level 10R Lymph Node;  Surgeon:  Pineda Issa MD;  Location: [facility] ENDOSCOPY;  Service: Robotics - Pulmonary;  Laterality: N/A;  post: right upper lobe mass    CARDIAC SURGERY      CHOLECYSTECTOMY      CORONARY ARTERY BYPASS GRAFT  2016    GALLBLADDER SURGERY        Allergies:  Allergies   Allergen Reactions    Diltiazem Unknown - High Severity     Swelling of lips     Home Meds:  Current Meds:     Current Outpatient Medications:     aspirin 81 MG EC tablet, Take 1 tablet by mouth Daily With Lunch., Disp: , Rfl: 0    atorvastatin (LIPITOR) 40 MG tablet, Take 1 tablet by mouth Every Morning., Disp: , Rfl:     budesonide (PULMICORT) 0.5 MG/2ML nebulizer solution, Take 2 mL by nebulization 2 (two) times a day., Disp: , Rfl:     clopidogrel (PLAVIX) 75 MG tablet, Take 1 tablet by mouth Every Morning., Disp: , Rfl:     diphenhydrAMINE (BENADRYL) 25 mg capsule, Take 1 capsule by mouth Every 6 (Six) Hours As Needed for Itching. States he takes branded Z-Quil at hs, Disp: , Rfl:     diphenhydrAMINE HCl, Sleep, (ZZZQUIL PO), Take  by mouth., Disp: , Rfl:     dronabinol (MARINOL) 2.5 MG capsule, Take 1 capsule by mouth 2 (Two) Times a Day., Disp: , Rfl:     DULoxetine (CYMBALTA) 30 MG capsule, Take 1 capsule by mouth Daily., Disp: , Rfl:     dutasteride (AVODART) 0.5 MG capsule, Take 1 capsule by mouth Every Night., Disp: , Rfl:     ferrous sulfate 324 (65 Fe) MG tablet delayed-release EC tablet, Take 1 tablet by mouth Daily With Breakfast., Disp: 30 tablet, Rfl: 1    formoterol (Perforomist) 20 MCG/2ML nebulizer solution, Take  by nebulization 2 (Two) Times a Day., Disp: , Rfl:     HYDROcodone-acetaminophen (NORCO) 5-325 MG per tablet, Take 1 tablet by mouth Every 6 (Six) Hours As Needed for Moderate Pain., Disp: , Rfl:     metoprolol succinate XL (TOPROL-XL) 25 MG 24 hr tablet, TAKE 1 TABLET EVERY DAY, Disp: 90 tablet, Rfl: 3    montelukast (SINGULAIR) 10 MG tablet, Take 1 tablet by mouth Every Night., Disp: , Rfl:     Multiple Vitamins-Minerals (MULTI  "VITAMIN/MINERALS) tablet, 1 tablet Daily With Lunch., Disp: , Rfl:     ondansetron (ZOFRAN) 4 MG tablet, Take 1 tablet by mouth As Needed., Disp: , Rfl:     pantoprazole (PROTONIX) 40 MG EC tablet, Take 1 tablet by mouth Daily., Disp: , Rfl:     revefenacin (YUPELRI) 175 MCG/3ML nebulizer solution, Take 3 mL by nebulization Daily., Disp: , Rfl:     saccharomyces boulardii (FLORASTOR) 250 MG capsule, Take 1 capsule by mouth Daily With Lunch., Disp: , Rfl:     sodium bicarbonate 650 MG tablet, Take 1 tablet by mouth 2 (Two) Times a Day., Disp: , Rfl:     theophylline (THEODUR) 300 MG 12 hr tablet, Take 1 tablet by mouth 2 (Two) Times a Day., Disp: , Rfl:   Social History:   Social History     Tobacco Use    Smoking status: Former     Current packs/day: 0.00     Average packs/day: 2.0 packs/day for 57.0 years (114.0 ttl pk-yrs)     Types: Cigarettes     Start date: 1958     Quit date: 2015     Years since quitting: 10.2    Smokeless tobacco: Never   Substance Use Topics    Alcohol use: Not Currently      Family History:  Family History   Problem Relation Age of Onset    Heart attack Mother     Heart attack Father     Heart disease Father               Review of Systems   Constitutional: Negative for malaise/fatigue.   Cardiovascular:  Negative for chest pain, leg swelling and palpitations.   Respiratory:  Negative for shortness of breath.    Skin:  Negative for rash.   Neurological:  Negative for dizziness, light-headedness and numbness.     All other systems are negative         Objective:     Physical Exam  /77   Pulse 92   Ht 177.8 cm (70\")   Wt 61.7 kg (136 lb)   SpO2 96%   BMI 19.51 kg/m²   General:  Appears in no acute distress  Eyes: Sclera is anicteric,  conjunctiva is clear   HEENT:  No JVD.  No carotid bruits  Respiratory: Respirations regular and unlabored at rest.  Clear to auscultation.  On O2 by nasal cannula  Cardiovascular: S1,S2 Regular rate and rhythm. .   Extremities: Left BKA and " "prosthesis seen.  Skin: Color pink. Skin warm and dry to touch. No rashes  No xanthoma  Neuro: Alert and awake.  In a wheelchair.    Lab Reviewed:         Kali Mistry MD  4/17/2025 16:51 EDT      EMR Dragon/Transcription:   \"Dictated utilizing Dragon dictation\".        "

## 2025-04-15 NOTE — PROGRESS NOTES
HEMATOLOGY ONCOLOGY OUTPATIENT FOLLOW UP       Patient name: David A Jolissaint  : 1945  MRN: [id number]  Primary Care Physician: Walter Valero MD  Referring Physician: Walter Valero MD  Reason For Consult: Progressive small cell lung cancer.    History of Present Illness:    2024: In the office for the first time for the treatment of what appears to be progressive small cell lung cancer.  He was first diagnosed with small cell lung cancer in .  At the time he is disease was staged as limited stage and he was treated both with radiation and chemotherapy.  Subsequently he had prophylactic cranial irradiation.  He did well until closer to the time of this visit when, on a PET scan, that reports increased size and activity of the necrotic tumor in the right lower lobe and superior segment, without associated right lung nodules that are increased in size and show increased and fluorodeoxyglucose avidity.  There is mediastinal adenopathy.  Plans for biopsy were made but he declined.  He was being seen at the [facility] but he decided to transfer his care here as he was not satisfied with the setting and the options he had received.  He is known as well for coronary artery disease and has undergone a coronary artery bypass graft surgery.  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 He suffers from chronic kidney disease and at some point needed to be on dialysis and, in fact, he has a fistula in the left upper extremity that has never been used.  On exam he is a well-built man who appears the stated age.  He seems chronically ill.  He is transported in a wheelchair.  He is in no distress.  No oral lesions.  Respirations not labored.  Lungs markedly diminished bilaterally.  Heart tachycardic and regular.  Abdomen soft.  Right lower extremity without edema.  Reviewed the  images of the PET scan and its report.  Discussed with him.  Explained to him the importance of tissue diagnosis.  I have asked him to see Dr. Donohue for consideration of endoscopic fine-needle aspiration both for histologic diagnosis and next-generation sequencing.  I have asked him to see me with results.  Explained the difference between a percutaneous biopsy and endoscopic biopsy and the reduced risk of the endoscopic biopsy.  Discussed with him goals of care and explained to him the importance of thinking about what he wants to do in the future given the findings, he has chronic problems and this most recent diagnosis.    1/5/2025: Mr. Jolissaint came to the emergency room after he started to have dyspnea with minimal activity.  This started probably 2 days, or so, before his presentation.  Imaging of the chest revealed progressive enlargement of the previously identified right lung tumor with some postobstructive consolidation of the upper and lower lobes on the right.  No new findings concerning for metastatic disease were identified.  History of jaundice and had not been seen by thoracic surgery and was scheduled to be seen on January 7, 2025.  He has no pain.  He has been eating well.  He denies fevers.  He has been coughing about as much as usual but has had little expectoration and no hemoptysis.  He denies abdominal pain.  He has had no difficulties with defecation or dysuria and he denies peripheral edema.     1/30/2025: Was admitted to the hospital with respiratory symptoms.  Underwent a bronchoscopy that revealed abundant purulent secretions.  He was treated with antibiotics.  He feels better today.  He has been eating well and has not had any new problems.  He has had no fevers.  On exam chronically ill-appearing but in no distress.  No jaundice.  The lungs are diminished bilaterally.  The heart is regular.  Abdomen is not distended.  There is no edema.  Laboratory exams reviewed.  Reviewed and  discussed with him the images of the recent scans and the results of the bronchoscopy.  The bronchial washings did not reveal a malignant process.  There was mainly inflammation.  He is to have a repeat CT scan in early February 2025 and, if needed, will undergo bronchoscopy and biopsy.  He will see me early in March.    3/6/2025: Feels about the same as at the time of the last visit.  Persists with dyspnea and continues to use the oxygen at the same level as before.  He continues to eat reasonably well.  Has been tired.  Denies chest pains.  Continues to cough frequently and has had no hemoptysis.  No diarrhea or dysuria.  He underwent a bronchoscopy.  The final report of pathology confirms malignancy but describes squamous cell carcinoma.  Unfortunately, in addition, there is no additional tissue left for further testing.  On exam he is alert, conversant and in good spirits.  Transported in his wheelchair.  No distress.  No jaundice.  The lungs are markedly diminished bilaterally.  Heart is regular.  There is no edema.  Reviewed the laboratory exams.  Reviewed the images of the most recent PET scan as well as the recent CT scan.  There is clear progression though very slow.  Discussed with him and explained that chemotherapy and immunotherapy can result in improvement in quality of   life and prolongation of life.  He is not interested in treatment at this time.  He wants to wait longer.  I have asked him to see me in approximately 6 weeks with new scans.    4/18/2025: Feels well and actually better than before.  He says he is breathing better and has not been wheezing as frequently.  He is eating reasonably well and his weight appears to be stable.  He has no pain.  Has been afebrile.  No nausea or vomiting.  No diarrhea or dysuria.  Exam he appears a stated age.  He seems chronically ill and is transported in a wheelchair.  His breath sounds are markedly diminished bilaterally.  Heart is regular.  Abdomen is  soft.  There is no edema.  The laboratory exams were reviewed and discussed with him.  I reviewed the images and the report of the scans.  Discussed with him.  There is no obvious progression.  He remains convinced that he wants to hold off on any treatment for now.  I explained to him that the downside of that decision is that the disease will most likely progress.  For now continue observation only.  He will see me in 3 months with new scans.    Past Medical History:   Diagnosis Date    Anemia     Cellulitis     CKD (chronic kidney disease), stage III     COPD (chronic obstructive pulmonary disease)     Diverticulitis     Dyslipidemia     Hypertension     Myocardial infarction 2016    PVD (peripheral vascular disease)     Small cell lung cancer 05/2021     Past Surgical History:   Procedure Laterality Date    BELOW KNEE LEG AMPUTATION Left 2015    BRONCHOSCOPY N/A 05/20/2021    Procedure: BRONCHOSCOPY WITH ENDOBRONCHIAL ULTRASOUND WITH FINE NEEDLE ASPIRATION. BRONCHOALVEOLAR LAVAGE;  Surgeon: Jackson Quezada MD;  Location: [facility] ENDOSCOPY;  Service: Pulmonary;  Laterality: N/A;  subcarinal mass    BRONCHOSCOPY N/A 1/6/2025    Procedure: BRONCHOSCOPY with bronchial washing;  Surgeon: Jackson Quezada MD;  Location: [facility] ENDOSCOPY;  Service: Pulmonary;  Laterality: N/A;  postop: pneumonia    BRONCHOSCOPY WITH ION ROBOTIC ASSIST N/A 2/18/2025    Procedure: BRONCHOSCOPY NAVIGATION WITH ION ROBOT, Fine needle aspiration and cryo biopsy right upper lobe, bronchoalveolar lavage right upper lobe, endobronchial ultrasound with fine needle aspiration Level 10R Lymph Node;  Surgeon: Pineda Issa MD;  Location: [facility] ENDOSCOPY;  Service: Robotics - Pulmonary;  Laterality: N/A;  post: right upper lobe mass    CARDIAC SURGERY      CHOLECYSTECTOMY      CORONARY ARTERY BYPASS GRAFT  2016    GALLBLADDER SURGERY         Current Outpatient Medications:     aspirin 81 MG EC tablet, Take 1 tablet by mouth Daily With Lunch., Disp: , Rfl:  0    atorvastatin (LIPITOR) 40 MG tablet, Take 1 tablet by mouth Every Morning., Disp: , Rfl:     budesonide (PULMICORT) 0.5 MG/2ML nebulizer solution, Take 2 mL by nebulization 2 (two) times a day., Disp: , Rfl:     clopidogrel (PLAVIX) 75 MG tablet, Take 1 tablet by mouth Every Morning., Disp: , Rfl:     diphenhydrAMINE (BENADRYL) 25 mg capsule, Take 1 capsule by mouth Every 6 (Six) Hours As Needed for Itching. States he takes branded Z-Quil at hs, Disp: , Rfl:     diphenhydrAMINE HCl, Sleep, (ZZZQUIL PO), Take  by mouth., Disp: , Rfl:     dronabinol (MARINOL) 2.5 MG capsule, Take 1 capsule by mouth 2 (Two) Times a Day., Disp: , Rfl:     DULoxetine (CYMBALTA) 30 MG capsule, Take 1 capsule by mouth Daily., Disp: , Rfl:     dutasteride (AVODART) 0.5 MG capsule, Take 1 capsule by mouth Every Night., Disp: , Rfl:     ferrous sulfate 324 (65 Fe) MG tablet delayed-release EC tablet, Take 1 tablet by mouth Daily With Breakfast., Disp: 30 tablet, Rfl: 1    formoterol (Perforomist) 20 MCG/2ML nebulizer solution, Take  by nebulization 2 (Two) Times a Day., Disp: , Rfl:     HYDROcodone-acetaminophen (NORCO) 5-325 MG per tablet, Take 1 tablet by mouth Every 6 (Six) Hours As Needed for Moderate Pain., Disp: , Rfl:     metoprolol succinate XL (TOPROL-XL) 25 MG 24 hr tablet, TAKE 1 TABLET EVERY DAY, Disp: 90 tablet, Rfl: 3    montelukast (SINGULAIR) 10 MG tablet, Take 1 tablet by mouth Every Night., Disp: , Rfl:     Multiple Vitamins-Minerals (MULTI VITAMIN/MINERALS) tablet, 1 tablet Daily With Lunch., Disp: , Rfl:     ondansetron (ZOFRAN) 4 MG tablet, Take 1 tablet by mouth As Needed., Disp: , Rfl:     pantoprazole (PROTONIX) 40 MG EC tablet, Take 1 tablet by mouth Daily., Disp: , Rfl:     revefenacin (YUPELRI) 175 MCG/3ML nebulizer solution, Take 3 mL by nebulization Daily., Disp: , Rfl:     saccharomyces boulardii (FLORASTOR) 250 MG capsule, Take 1 capsule by mouth Daily With Lunch., Disp: , Rfl:     sodium bicarbonate 650 MG  tablet, Take 1 tablet by mouth 2 (Two) Times a Day., Disp: , Rfl:     theophylline (THEODUR) 300 MG 12 hr tablet, Take 1 tablet by mouth 2 (Two) Times a Day., Disp: , Rfl:   No current facility-administered medications for this visit.    Facility-Administered Medications Ordered in Other Visits:     heparin injection 500 Units, 500 Units, Intravenous, Rebekah MEJIA Alfonso, MD, 500 Units at 04/18/25 0922    sodium chloride 0.9 % flush 20 mL, 20 mL, Intravenous, PRN, Ham Welch MD, 20 mL at 04/18/25 0922    Allergies   Allergen Reactions    Diltiazem Unknown - High Severity     Swelling of lips     Family History   Problem Relation Age of Onset    Heart attack Mother     Heart attack Father     Heart disease Father      Cancer-related family history is not on file.    Social History     Tobacco Use    Smoking status: Former     Current packs/day: 0.00     Average packs/day: 2.0 packs/day for 57.0 years (114.0 ttl pk-yrs)     Types: Cigarettes     Start date: 1958     Quit date: 2015     Years since quitting: 10.3    Smokeless tobacco: Never   Vaping Use    Vaping status: Never Used   Substance Use Topics    Alcohol use: Not Currently    Drug use: Never     Social History     Social History Narrative    Not on file     ROS:   Review of Systems   Constitutional:  Positive for activity change and fatigue. Negative for appetite change, chills, diaphoresis, fever and unexpected weight change.   HENT:  Negative for congestion, dental problem, drooling, ear discharge, ear pain, facial swelling, hearing loss, mouth sores, nosebleeds, postnasal drip, rhinorrhea, sinus pressure, sinus pain, sneezing, sore throat, tinnitus, trouble swallowing and voice change.    Eyes:  Negative for photophobia, pain, discharge, redness, itching and visual disturbance.   Respiratory:  Positive for cough and shortness of breath. Negative for apnea, choking, chest tightness, wheezing and stridor.    Cardiovascular:  Negative for chest  "pain, palpitations and leg swelling.   Gastrointestinal:  Negative for abdominal distention, abdominal pain, anal bleeding, blood in stool, constipation, diarrhea, nausea, rectal pain and vomiting.   Endocrine: Negative for cold intolerance, heat intolerance, polydipsia and polyuria.   Genitourinary:  Negative for decreased urine volume, difficulty urinating, dysuria, flank pain, frequency, genital sores, hematuria and urgency.   Musculoskeletal:  Negative for arthralgias, back pain, gait problem, joint swelling, myalgias, neck pain and neck stiffness.   Skin:  Negative for color change, pallor and rash.   Neurological:  Negative for dizziness, tremors, seizures, syncope, facial asymmetry, speech difficulty, weakness, light-headedness, numbness and headaches.   Hematological:  Negative for adenopathy. Does not bruise/bleed easily.   Psychiatric/Behavioral:  Negative for agitation, behavioral problems, confusion, decreased concentration, hallucinations, self-injury, sleep disturbance and suicidal ideas. The patient is not nervous/anxious.      Objective:    Vital Signs:  Vitals:    04/18/25 0904   BP: 144/83   Pulse: 84   Temp: 97.7 °F (36.5 °C)   TempSrc: Temporal   SpO2: 94%  Comment: with 3 liters oxygen   Weight: 61.7 kg (136 lb)   Height: 177.8 cm (70\")   PainSc: 0-No pain     Body mass index is 19.51 kg/m².    ECOG  (3) Capable of limited self-care, confined to bed or chair > 50% of waking hours    Physical Exam:   Physical Exam  Constitutional:       General: He is not in acute distress.     Appearance: Normal appearance. He is ill-appearing. He is not toxic-appearing or diaphoretic.   HENT:      Head: Normocephalic and atraumatic.      Right Ear: External ear normal.      Left Ear: External ear normal.      Nose: Nose normal.      Mouth/Throat:      Mouth: Mucous membranes are moist.      Pharynx: Oropharynx is clear.   Eyes:      General: No scleral icterus.        Right eye: No discharge.         Left eye: " No discharge.      Conjunctiva/sclera: Conjunctivae normal.      Pupils: Pupils are equal, round, and reactive to light.   Cardiovascular:      Rate and Rhythm: Normal rate and regular rhythm.      Pulses: Normal pulses.      Heart sounds: Normal heart sounds. No murmur heard.     No friction rub. No gallop.   Pulmonary:      Effort: No respiratory distress.      Breath sounds: No stridor. No wheezing, rhonchi or rales.      Comments: Breath sounds markedly diminished bilaterally.  Chest:      Chest wall: No tenderness.   Abdominal:      General: Abdomen is flat. Bowel sounds are normal. There is no distension.      Palpations: Abdomen is soft. There is no mass.      Tenderness: There is no abdominal tenderness. There is no right CVA tenderness, left CVA tenderness, guarding or rebound.      Comments: No palpable visceromegaly.   Musculoskeletal:         General: No tenderness, deformity or signs of injury.      Cervical back: No rigidity.      Right lower leg: No edema.      Left lower leg: No edema.   Lymphadenopathy:      Cervical: No cervical adenopathy.   Skin:     General: Skin is warm and dry.      Coloration: Skin is not jaundiced or pale.      Findings: No bruising or rash.   Neurological:      General: No focal deficit present.      Mental Status: He is alert and oriented to person, place, and time.      Cranial Nerves: No cranial nerve deficit.   Psychiatric:         Mood and Affect: Mood normal.         Behavior: Behavior normal.         Thought Content: Thought content normal.         Judgment: Judgment normal.     IBRAHIMA Welch MD performed the physical exam on 4/18/2025 as documented above.    Lab Results - Last 18 Months   Lab Units 04/18/25  0921 03/06/25  0818 01/30/25  0835   WBC 10*3/mm3 10.51 10.32 8.49   HEMOGLOBIN g/dL 10.6* 9.9* 9.9*   HEMATOCRIT % 35.6* 33.6* 33.8*   PLATELETS 10*3/mm3 305 279 252   MCV fL 97.0 96.6 97.1*     Lab Results - Last 18 Months   Lab Units 01/08/25  1103  01/06/25  0233 01/04/25  2352 01/04/25  1135 12/18/24  1024   SODIUM mmol/L 139 139 137 137 139   POTASSIUM mmol/L 4.3 3.9 3.9 3.6 3.9   CHLORIDE mmol/L 102 100 99 99 103   CO2 mmol/L 26.9 26.1 25.5 27.5 24.5   BUN mg/dL 45* 43* 32* 30* 31*   CREATININE mg/dL 2.16* 2.67* 2.14* 1.84* 1.71*   CALCIUM mg/dL 9.3 9.4 9.8 9.9 9.8   BILIRUBIN mg/dL 0.3  --   --  0.9 0.4   ALK PHOS U/L 131*  --   --  145* 137*   ALT (SGPT) U/L 10  --   --  13 24   AST (SGOT) U/L 16  --   --  21 27   GLUCOSE mg/dL 123* 205* 123* 131* 91     Lab Results   Component Value Date    GLUCOSE 123 (H) 01/08/2025    BUN 45 (H) 01/08/2025    CREATININE 2.16 (H) 01/08/2025    EGFRIFNONA 36 (L) 11/12/2021    BCR 20.8 01/08/2025    K 4.3 01/08/2025    CO2 26.9 01/08/2025    CALCIUM 9.3 01/08/2025    ALBUMIN 3.2 (L) 01/08/2025    AST 16 01/08/2025    ALT 10 01/08/2025     Lab Results - Last 18 Months   Lab Units 12/18/24  1024 09/25/24  1044   INR  1.18* 1.03   APTT seconds 31.7 29.7*     Lab Results   Component Value Date    IRON 25 (L) 02/12/2024    TIBC 155 (L) 02/12/2024    FERRITIN 720.9 (H) 02/12/2024     Lab Results   Component Value Date    FOLATE 11.5 02/12/2024       Lab Results   Component Value Date    RETICCTPCT 1.49 03/16/2023     Lab Results   Component Value Date    AEHYSCXC85 829 02/12/2024     Uric Acid   Date Value Ref Range Status   04/04/2017 7.3 4.8 - 8.7 mg/dL Final     Lab Results   Component Value Date    HAPTOGLOBIN 276 (H) 05/21/2021     Lab Results   Component Value Date    PTT 31.7 12/18/2024    INR 1.18 (H) 12/18/2024     Assessment & Plan     1.  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  I have discussed at length with him the use of treatment.  He is not interested in receiving any medication at this time as he is afraid of the potential side effects and complications of the treatment.  He does understand that treatment can prolong life and improve quality of life and despite this he prefers to wait.   Scans reviewed.  No obvious progression of the tumor.  He remains convinced that he wants to hold off on any treatment for now.  2.  Severe chronic obstructive pulmonary disease.  3.  History of tobacco smoking.  4.  Peripheral vascular disease  5.  Independently reviewed the CT scan of the chest.  Discussed the results with him.  Reviewed the laboratory exams.  5.  He is to see me in approximately 3 months with new scans.    Ham Welch MD on 4/18/2025 at 9:50 AM.

## 2025-04-17 ENCOUNTER — OFFICE VISIT (OUTPATIENT)
Dept: CARDIOLOGY | Facility: CLINIC | Age: 80
End: 2025-04-17
Payer: MEDICARE

## 2025-04-17 VITALS
BODY MASS INDEX: 19.47 KG/M2 | WEIGHT: 136 LBS | OXYGEN SATURATION: 96 % | DIASTOLIC BLOOD PRESSURE: 77 MMHG | SYSTOLIC BLOOD PRESSURE: 129 MMHG | HEIGHT: 70 IN | HEART RATE: 92 BPM

## 2025-04-17 DIAGNOSIS — E11.9 TYPE 2 DIABETES MELLITUS WITHOUT COMPLICATION, WITHOUT LONG-TERM CURRENT USE OF INSULIN: ICD-10-CM

## 2025-04-17 DIAGNOSIS — I25.2 HISTORY OF NON-ST ELEVATION MYOCARDIAL INFARCTION (NSTEMI): ICD-10-CM

## 2025-04-17 DIAGNOSIS — I10 ESSENTIAL HYPERTENSION: ICD-10-CM

## 2025-04-17 DIAGNOSIS — I73.9 PAD (PERIPHERAL ARTERY DISEASE): ICD-10-CM

## 2025-04-17 DIAGNOSIS — N18.32 STAGE 3B CHRONIC KIDNEY DISEASE: ICD-10-CM

## 2025-04-17 DIAGNOSIS — E78.5 DYSLIPIDEMIA: ICD-10-CM

## 2025-04-17 DIAGNOSIS — I25.810 CORONARY ARTERY DISEASE INVOLVING CORONARY BYPASS GRAFT OF NATIVE HEART WITHOUT ANGINA PECTORIS: Primary | ICD-10-CM

## 2025-04-17 RX ORDER — DULOXETIN HYDROCHLORIDE 30 MG/1
30 CAPSULE, DELAYED RELEASE ORAL DAILY
COMMUNITY

## 2025-04-18 ENCOUNTER — OFFICE VISIT (OUTPATIENT)
Dept: ONCOLOGY | Facility: CLINIC | Age: 80
End: 2025-04-18
Payer: MEDICARE

## 2025-04-18 ENCOUNTER — HOSPITAL ENCOUNTER (OUTPATIENT)
Dept: ONCOLOGY | Facility: HOSPITAL | Age: 80
Discharge: HOME | End: 2025-04-18
Payer: MEDICARE

## 2025-04-18 ENCOUNTER — TELEPHONE (OUTPATIENT)
Dept: ONCOLOGY | Facility: CLINIC | Age: 80
End: 2025-04-18
Payer: MEDICARE

## 2025-04-18 VITALS
BODY MASS INDEX: 19.47 KG/M2 | WEIGHT: 136 LBS | TEMPERATURE: 97.7 F | DIASTOLIC BLOOD PRESSURE: 83 MMHG | SYSTOLIC BLOOD PRESSURE: 144 MMHG | OXYGEN SATURATION: 94 % | HEART RATE: 84 BPM | HEIGHT: 70 IN

## 2025-04-18 DIAGNOSIS — C34.90 SMALL CELL CARCINOMA OF LUNG, UNSPECIFIED LATERALITY, UNSPECIFIED PART OF LUNG: ICD-10-CM

## 2025-04-18 DIAGNOSIS — C34.90 SMALL CELL CARCINOMA OF LUNG, UNSPECIFIED LATERALITY, UNSPECIFIED PART OF LUNG: Primary | ICD-10-CM

## 2025-04-18 DIAGNOSIS — Z95.828 PORT-A-CATH IN PLACE: Primary | ICD-10-CM

## 2025-04-18 LAB
ALBUMIN SERPL-MCNC: 3.8 G/DL (ref 3.5–5.2)
ALBUMIN/GLOB SERPL: 1.2 G/DL
ALP SERPL-CCNC: 137 U/L (ref 39–117)
ALT SERPL W P-5'-P-CCNC: 9 U/L (ref 1–41)
ANION GAP SERPL CALCULATED.3IONS-SCNC: 10 MMOL/L (ref 5–15)
AST SERPL-CCNC: 19 U/L (ref 1–40)
BASOPHILS # BLD AUTO: 0.01 10*3/MM3 (ref 0–0.2)
BASOPHILS NFR BLD AUTO: 0.1 % (ref 0–1.5)
BILIRUB SERPL-MCNC: 0.5 MG/DL (ref 0–1.2)
BUN SERPL-MCNC: 29 MG/DL (ref 8–23)
BUN/CREAT SERPL: 15.8 (ref 7–25)
CALCIUM SPEC-SCNC: 9.8 MG/DL (ref 8.6–10.5)
CHLORIDE SERPL-SCNC: 99 MMOL/L (ref 98–107)
CO2 SERPL-SCNC: 26 MMOL/L (ref 22–29)
CREAT SERPL-MCNC: 1.83 MG/DL (ref 0.76–1.27)
DEPRECATED RDW RBC AUTO: 51.4 FL (ref 37–54)
EGFRCR SERPLBLD CKD-EPI 2021: 36.6 ML/MIN/1.73
EOSINOPHIL # BLD AUTO: 0.34 10*3/MM3 (ref 0–0.4)
EOSINOPHIL NFR BLD AUTO: 3.2 % (ref 0.3–6.2)
ERYTHROCYTE [DISTWIDTH] IN BLOOD BY AUTOMATED COUNT: 15.2 % (ref 12.3–15.4)
GLOBULIN UR ELPH-MCNC: 3.1 GM/DL
GLUCOSE SERPL-MCNC: 116 MG/DL (ref 65–99)
HCT VFR BLD AUTO: 35.6 % (ref 37.5–51)
HGB BLD-MCNC: 10.6 G/DL (ref 13–17.7)
LYMPHOCYTES # BLD AUTO: 0.48 10*3/MM3 (ref 0.7–3.1)
LYMPHOCYTES NFR BLD AUTO: 4.6 % (ref 19.6–45.3)
MCH RBC QN AUTO: 28.9 PG (ref 26.6–33)
MCHC RBC AUTO-ENTMCNC: 29.8 G/DL (ref 31.5–35.7)
MCV RBC AUTO: 97 FL (ref 79–97)
MONOCYTES # BLD AUTO: 0.88 10*3/MM3 (ref 0.1–0.9)
MONOCYTES NFR BLD AUTO: 8.4 % (ref 5–12)
NEUTROPHILS NFR BLD AUTO: 8.8 10*3/MM3 (ref 1.7–7)
NEUTROPHILS NFR BLD AUTO: 83.7 % (ref 42.7–76)
PLATELET # BLD AUTO: 305 10*3/MM3 (ref 140–450)
PMV BLD AUTO: 9.7 FL (ref 6–12)
POTASSIUM SERPL-SCNC: 4 MMOL/L (ref 3.5–5.2)
PROT SERPL-MCNC: 6.9 G/DL (ref 6–8.5)
RBC # BLD AUTO: 3.67 10*6/MM3 (ref 4.14–5.8)
SODIUM SERPL-SCNC: 135 MMOL/L (ref 136–145)
WBC NRBC COR # BLD AUTO: 10.51 10*3/MM3 (ref 3.4–10.8)

## 2025-04-18 PROCEDURE — 36591 DRAW BLOOD OFF VENOUS DEVICE: CPT

## 2025-04-18 PROCEDURE — 3079F DIAST BP 80-89 MM HG: CPT | Performed by: INTERNAL MEDICINE

## 2025-04-18 PROCEDURE — 3077F SYST BP >= 140 MM HG: CPT | Performed by: INTERNAL MEDICINE

## 2025-04-18 PROCEDURE — 99214 OFFICE O/P EST MOD 30 MIN: CPT | Performed by: INTERNAL MEDICINE

## 2025-04-18 PROCEDURE — 85025 COMPLETE CBC W/AUTO DIFF WBC: CPT | Performed by: INTERNAL MEDICINE

## 2025-04-18 PROCEDURE — 25010000002 HEPARIN LOCK FLUSH PER 10 UNITS: Performed by: INTERNAL MEDICINE

## 2025-04-18 PROCEDURE — 1159F MED LIST DOCD IN RCRD: CPT | Performed by: INTERNAL MEDICINE

## 2025-04-18 PROCEDURE — 80053 COMPREHEN METABOLIC PANEL: CPT | Performed by: INTERNAL MEDICINE

## 2025-04-18 PROCEDURE — 1160F RVW MEDS BY RX/DR IN RCRD: CPT | Performed by: INTERNAL MEDICINE

## 2025-04-18 PROCEDURE — 1126F AMNT PAIN NOTED NONE PRSNT: CPT | Performed by: INTERNAL MEDICINE

## 2025-04-18 RX ORDER — SODIUM CHLORIDE 0.9 % (FLUSH) 0.9 %
20 SYRINGE (ML) INJECTION AS NEEDED
Status: DISCONTINUED | OUTPATIENT
Start: 2025-04-18 | End: 2025-04-19 | Stop reason: HOSPADM

## 2025-04-18 RX ORDER — HEPARIN SODIUM (PORCINE) LOCK FLUSH IV SOLN 100 UNIT/ML 100 UNIT/ML
500 SOLUTION INTRAVENOUS AS NEEDED
OUTPATIENT
Start: 2025-04-18

## 2025-04-18 RX ORDER — SODIUM CHLORIDE 0.9 % (FLUSH) 0.9 %
20 SYRINGE (ML) INJECTION AS NEEDED
OUTPATIENT
Start: 2025-04-18

## 2025-04-18 RX ORDER — HEPARIN SODIUM (PORCINE) LOCK FLUSH IV SOLN 100 UNIT/ML 100 UNIT/ML
500 SOLUTION INTRAVENOUS AS NEEDED
Status: DISCONTINUED | OUTPATIENT
Start: 2025-04-18 | End: 2025-04-19 | Stop reason: HOSPADM

## 2025-04-18 RX ADMIN — Medication 500 UNITS: at 09:22

## 2025-04-18 RX ADMIN — Medication 20 ML: at 09:22

## 2025-04-18 NOTE — TELEPHONE ENCOUNTER
Called to let know tome and date of scan that is halima'd in July for PtBrennan Hernandez v/constantin

## 2025-04-18 NOTE — PROGRESS NOTES
Pt to injection chair for PF and labs prior to appt with Dr. Welch. Port accessed using sterile technique and flushed with good blood return noted. 10cc of blood wasted prior to specimen collection. Blood specimen obtained and sent to lab for processing per protocol.  Port flushed with saline and heparin prior to needle removal. Pt to waiting room.

## 2025-05-17 ENCOUNTER — LAB (OUTPATIENT)
Dept: LAB | Facility: HOSPITAL | Age: 80
End: 2025-05-17
Payer: MEDICARE

## 2025-05-17 ENCOUNTER — TRANSCRIBE ORDERS (OUTPATIENT)
Dept: ADMINISTRATIVE | Facility: HOSPITAL | Age: 80
End: 2025-05-17
Payer: MEDICARE

## 2025-05-17 DIAGNOSIS — N40.0 ENLARGED PROSTATE: ICD-10-CM

## 2025-05-17 DIAGNOSIS — N18.32 CHRONIC KIDNEY DISEASE (CKD) STAGE G3B/A1, MODERATELY DECREASED GLOMERULAR FILTRATION RATE (GFR) BETWEEN 30-44 ML/MIN/1.73 SQUARE METER AND ALBUMINURIA CREATININE RATIO LESS THAN 30 MG/G (CMS/H*: ICD-10-CM

## 2025-05-17 DIAGNOSIS — D63.1 ERYTHROPOIETIN DEFICIENCY ANEMIA: ICD-10-CM

## 2025-05-17 DIAGNOSIS — I12.9 HYPERTENSIVE NEPHROPATHY: ICD-10-CM

## 2025-05-17 DIAGNOSIS — N18.32 CHRONIC KIDNEY DISEASE (CKD) STAGE G3B/A1, MODERATELY DECREASED GLOMERULAR FILTRATION RATE (GFR) BETWEEN 30-44 ML/MIN/1.73 SQUARE METER AND ALBUMINURIA CREATININE RATIO LESS THAN 30 MG/G (CMS/H*: Primary | ICD-10-CM

## 2025-05-17 LAB
ALBUMIN SERPL-MCNC: 3.9 G/DL (ref 3.5–5.2)
ANION GAP SERPL CALCULATED.3IONS-SCNC: 12 MMOL/L (ref 5–15)
BACTERIA UR QL AUTO: NORMAL /HPF
BILIRUB UR QL STRIP: NEGATIVE
BUN SERPL-MCNC: 27 MG/DL (ref 8–23)
BUN/CREAT SERPL: 14.1 (ref 7–25)
CALCIUM SPEC-SCNC: 10.1 MG/DL (ref 8.6–10.5)
CHLORIDE SERPL-SCNC: 99 MMOL/L (ref 98–107)
CLARITY UR: CLEAR
CO2 SERPL-SCNC: 28 MMOL/L (ref 22–29)
COLOR UR: YELLOW
CREAT SERPL-MCNC: 1.91 MG/DL (ref 0.76–1.27)
CREAT UR-MCNC: 67.9 MG/DL
DEPRECATED RDW RBC AUTO: 51.5 FL (ref 37–54)
EGFRCR SERPLBLD CKD-EPI 2021: 34.8 ML/MIN/1.73
ERYTHROCYTE [DISTWIDTH] IN BLOOD BY AUTOMATED COUNT: 14.7 % (ref 12.3–15.4)
GLUCOSE SERPL-MCNC: 107 MG/DL (ref 65–99)
GLUCOSE UR STRIP-MCNC: NEGATIVE MG/DL
HCT VFR BLD AUTO: 36.8 % (ref 37.5–51)
HGB BLD-MCNC: 11 G/DL (ref 13–17.7)
HGB UR QL STRIP.AUTO: NEGATIVE
HYALINE CASTS UR QL AUTO: NORMAL /LPF
KETONES UR QL STRIP: NEGATIVE
LEUKOCYTE ESTERASE UR QL STRIP.AUTO: NEGATIVE
MCH RBC QN AUTO: 28.7 PG (ref 26.6–33)
MCHC RBC AUTO-ENTMCNC: 29.9 G/DL (ref 31.5–35.7)
MCV RBC AUTO: 96.1 FL (ref 79–97)
NITRITE UR QL STRIP: NEGATIVE
PH UR STRIP.AUTO: 7.5 [PH] (ref 5–8)
PHOSPHATE SERPL-MCNC: 3.5 MG/DL (ref 2.5–4.5)
PLATELET # BLD AUTO: 276 10*3/MM3 (ref 140–450)
PMV BLD AUTO: 9.8 FL (ref 6–12)
POTASSIUM SERPL-SCNC: 4.1 MMOL/L (ref 3.5–5.2)
PROT ?TM UR-MCNC: 59.7 MG/DL
PROT UR QL STRIP: ABNORMAL
PROT/CREAT UR: 879.2 MG/G CREA (ref 0–200)
RBC # BLD AUTO: 3.83 10*6/MM3 (ref 4.14–5.8)
RBC # UR STRIP: NORMAL /HPF
REF LAB TEST METHOD: NORMAL
SODIUM SERPL-SCNC: 139 MMOL/L (ref 136–145)
SP GR UR STRIP: 1.01 (ref 1–1.03)
SQUAMOUS #/AREA URNS HPF: NORMAL /HPF
UROBILINOGEN UR QL STRIP: ABNORMAL
WBC # UR STRIP: NORMAL /HPF
WBC NRBC COR # BLD AUTO: 11.21 10*3/MM3 (ref 3.4–10.8)

## 2025-05-17 PROCEDURE — 81001 URINALYSIS AUTO W/SCOPE: CPT

## 2025-05-17 PROCEDURE — 85027 COMPLETE CBC AUTOMATED: CPT

## 2025-05-17 PROCEDURE — 36415 COLL VENOUS BLD VENIPUNCTURE: CPT

## 2025-05-17 PROCEDURE — 82570 ASSAY OF URINE CREATININE: CPT

## 2025-05-17 PROCEDURE — 80069 RENAL FUNCTION PANEL: CPT

## 2025-05-17 PROCEDURE — 84156 ASSAY OF PROTEIN URINE: CPT

## 2025-06-05 ENCOUNTER — HOSPITAL ENCOUNTER (OUTPATIENT)
Dept: ONCOLOGY | Facility: HOSPITAL | Age: 80
Discharge: HOME | End: 2025-06-05
Admitting: INTERNAL MEDICINE
Payer: MEDICARE

## 2025-06-05 DIAGNOSIS — Z95.828 PORT-A-CATH IN PLACE: Primary | ICD-10-CM

## 2025-06-05 PROCEDURE — 25010000002 HEPARIN LOCK FLUSH PER 10 UNITS: Performed by: INTERNAL MEDICINE

## 2025-06-05 PROCEDURE — 96523 IRRIG DRUG DELIVERY DEVICE: CPT

## 2025-06-05 RX ORDER — HEPARIN SODIUM (PORCINE) LOCK FLUSH IV SOLN 100 UNIT/ML 100 UNIT/ML
500 SOLUTION INTRAVENOUS AS NEEDED
Status: DISCONTINUED | OUTPATIENT
Start: 2025-06-05 | End: 2025-06-06 | Stop reason: HOSPADM

## 2025-06-05 RX ORDER — SODIUM CHLORIDE 0.9 % (FLUSH) 0.9 %
20 SYRINGE (ML) INJECTION AS NEEDED
Status: DISCONTINUED | OUTPATIENT
Start: 2025-06-05 | End: 2025-06-06 | Stop reason: HOSPADM

## 2025-06-05 RX ORDER — SODIUM CHLORIDE 0.9 % (FLUSH) 0.9 %
20 SYRINGE (ML) INJECTION AS NEEDED
OUTPATIENT
Start: 2025-06-05

## 2025-06-05 RX ORDER — HEPARIN SODIUM (PORCINE) LOCK FLUSH IV SOLN 100 UNIT/ML 100 UNIT/ML
500 SOLUTION INTRAVENOUS AS NEEDED
OUTPATIENT
Start: 2025-06-05

## 2025-06-05 RX ADMIN — Medication 20 ML: at 08:08

## 2025-06-05 RX ADMIN — HEPARIN 500 UNITS: 100 SYRINGE at 08:08

## 2025-06-05 NOTE — PROGRESS NOTES
Pt to port chair for MPF. Port accessed using sterile technique and flushed with good blood return noted. No labs collected. Port flushed with saline and heparin prior to needle removal. Pt aware of next appt and discharged.

## 2025-06-17 RX ORDER — METOPROLOL SUCCINATE 25 MG/1
25 TABLET, EXTENDED RELEASE ORAL DAILY
Qty: 90 TABLET | Refills: 3 | Status: SHIPPED | OUTPATIENT
Start: 2025-06-17

## 2025-06-17 NOTE — TELEPHONE ENCOUNTER
Rx Refill Note  Requested Prescriptions     Pending Prescriptions Disp Refills    metoprolol succinate XL (TOPROL-XL) 25 MG 24 hr tablet [Pharmacy Med Name: Metoprolol Succinate ER Oral Tablet Extended Release 24 Hour 25 MG] 90 tablet 3     Sig: TAKE 1 TABLET EVERY DAY      Last office visit with prescribing clinician: 4/17/2025   Last telemedicine visit with prescribing clinician: Visit date not found   Next office visit with prescribing clinician: 4/20/2026                         Would you like a call back once the refill request has been completed: [] Yes [] No    If the office needs to give you a call back, can they leave a voicemail: [] Yes [] No    Venessa Jackson MA  06/17/25, 08:41 EDT

## 2025-07-10 ENCOUNTER — HOSPITAL ENCOUNTER (OUTPATIENT)
Dept: PET IMAGING | Facility: HOSPITAL | Age: 80
Discharge: HOME | End: 2025-07-10
Admitting: INTERNAL MEDICINE
Payer: MEDICARE

## 2025-07-10 DIAGNOSIS — C34.90 SMALL CELL CARCINOMA OF LUNG, UNSPECIFIED LATERALITY, UNSPECIFIED PART OF LUNG: ICD-10-CM

## 2025-07-10 PROCEDURE — 71250 CT THORAX DX C-: CPT

## 2025-07-10 NOTE — PROGRESS NOTES
HEMATOLOGY ONCOLOGY OUTPATIENT FOLLOW UP       Patient name: David A Jolissaint  : 1945  MRN: [id number]  Primary Care Physician: Walter Valero MD  Referring Physician: Walter Valero MD  Reason For Consult: Progressive small cell lung cancer.    History of Present Illness:    2024: In the office for the first time for the treatment of what appears to be progressive small cell lung cancer.  He was first diagnosed with small cell lung cancer in .  At the time he is disease was staged as limited stage and he was treated both with radiation and chemotherapy.  Subsequently he had prophylactic cranial irradiation.  He did well until closer to the time of this visit when, on a PET scan, that reports increased size and activity of the necrotic tumor in the right lower lobe and superior segment, without associated right lung nodules that are increased in size and show increased and fluorodeoxyglucose avidity.  There is mediastinal adenopathy.  Plans for biopsy were made but he declined.  He was being seen at the [facility] but he decided to transfer his care here as he was not satisfied with the setting and the options he had received.  He is known as well for coronary artery disease and has undergone a coronary artery bypass graft surgery.  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 He suffers from chronic kidney disease and at some point needed to be on dialysis and, in fact, he has a fistula in the left upper extremity that has never been used.  On exam he is a well-built man who appears the stated age.  He seems chronically ill.  He is transported in a wheelchair.  He is in no distress.  No oral lesions.  Respirations not labored.  Lungs markedly diminished bilaterally.  Heart tachycardic and regular.  Abdomen soft.  Right lower extremity without edema.  Reviewed the  images of the PET scan and its report.  Discussed with him.  Explained to him the importance of tissue diagnosis.  I have asked him to see Dr. Donohue for consideration of endoscopic fine-needle aspiration both for histologic diagnosis and next-generation sequencing.  I have asked him to see me with results.  Explained the difference between a percutaneous biopsy and endoscopic biopsy and the reduced risk of the endoscopic biopsy.  Discussed with him goals of care and explained to him the importance of thinking about what he wants to do in the future given the findings, he has chronic problems and this most recent diagnosis.    1/5/2025: Mr. Jolissaint came to the emergency room after he started to have dyspnea with minimal activity.  This started probably 2 days, or so, before his presentation.  Imaging of the chest revealed progressive enlargement of the previously identified right lung tumor with some postobstructive consolidation of the upper and lower lobes on the right.  No new findings concerning for metastatic disease were identified.  History of jaundice and had not been seen by thoracic surgery and was scheduled to be seen on January 7, 2025.  He has no pain.  He has been eating well.  He denies fevers.  He has been coughing about as much as usual but has had little expectoration and no hemoptysis.  He denies abdominal pain.  He has had no difficulties with defecation or dysuria and he denies peripheral edema.     1/30/2025: Was admitted to the hospital with respiratory symptoms.  Underwent a bronchoscopy that revealed abundant purulent secretions.  He was treated with antibiotics.  He feels better today.  He has been eating well and has not had any new problems.  He has had no fevers.  On exam chronically ill-appearing but in no distress.  No jaundice.  The lungs are diminished bilaterally.  The heart is regular.  Abdomen is not distended.  There is no edema.  Laboratory exams reviewed.  Reviewed and  discussed with him the images of the recent scans and the results of the bronchoscopy.  The bronchial washings did not reveal a malignant process.  There was mainly inflammation.  He is to have a repeat CT scan in early February 2025 and, if needed, will undergo bronchoscopy and biopsy.  He will see me early in March.    3/6/2025: Feels about the same as at the time of the last visit.  Persists with dyspnea and continues to use the oxygen at the same level as before.  He continues to eat reasonably well.  Has been tired.  Denies chest pains.  Continues to cough frequently and has had no hemoptysis.  No diarrhea or dysuria.  He underwent a bronchoscopy.  The final report of pathology confirms malignancy but describes squamous cell carcinoma.  Unfortunately, in addition, there is no additional tissue left for further testing.  On exam he is alert, conversant and in good spirits.  Transported in his wheelchair.  No distress.  No jaundice.  The lungs are markedly diminished bilaterally.  Heart is regular.  There is no edema.  Reviewed the laboratory exams.  Reviewed the images of the most recent PET scan as well as the recent CT scan.  There is clear progression though very slow.  Discussed with him and explained that chemotherapy and immunotherapy can result in improvement in quality of   life and prolongation of life.  He is not interested in treatment at this time.  He wants to wait longer.  I have asked him to see me in approximately 6 weeks with new scans.    4/18/2025: Feels well and actually better than before.  He says he is breathing better and has not been wheezing as frequently.  He is eating reasonably well and his weight appears to be stable.  He has no pain.  Has been afebrile.  No nausea or vomiting.  No diarrhea or dysuria.  Exam he appears a stated age.  He seems chronically ill and is transported in a wheelchair.  His breath sounds are markedly diminished bilaterally.  Heart is regular.  Abdomen is  soft.  There is no edema.  The laboratory exams were reviewed and discussed with him.  I reviewed the images and the report of the scans.  Discussed with him.  There is no obvious progression.  He remains convinced that he wants to hold off on any treatment for now.  I explained to him that the downside of that decision is that the disease will most likely progress.  For now continue observation only.  He will see me in 3 months with new scans.    7/17/2025: Feels reasonably well.  Has had no new symptoms.  Lost about 6 pounds since the last visit.  He has not been eating very well.  He has no nausea or vomiting.  He has been without more dyspnea than before and has been using the same amount of oxygen.  No chest pains.  No abdominal pain.  Denies diarrhea or dysuria.  No edema.  On exam he appears chronically ill but is not in distress.  He is not jaundiced.  The lungs are diminished bilaterally and the heart regular.  No edema.  Laboratory exams reviewed.  He is anemia is somewhat worse this time.  It likely has much to do with his kidney function but chronic disease and the malignancy are probably participating as well.  Will follow in approximately 4 months before any commencement of treatment but he might benefit from erythropoietin.  No intervention from the point of view of the lung malignancy.  To see me in 4 months with new scans.    Past Medical History:   Diagnosis Date    Anemia     Cellulitis     CKD (chronic kidney disease), stage III     COPD (chronic obstructive pulmonary disease)     Diverticulitis     Dyslipidemia     Hypertension     Myocardial infarction 2016    PVD (peripheral vascular disease)     Small cell lung cancer 05/2021     Past Surgical History:   Procedure Laterality Date    BELOW KNEE LEG AMPUTATION Left 2015    BRONCHOSCOPY N/A 05/20/2021    Procedure: BRONCHOSCOPY WITH ENDOBRONCHIAL ULTRASOUND WITH FINE NEEDLE ASPIRATION. BRONCHOALVEOLAR LAVAGE;  Surgeon: Jackson Quezada MD;  Location:  [facility] ENDOSCOPY;  Service: Pulmonary;  Laterality: N/A;  subcarinal mass    BRONCHOSCOPY N/A 1/6/2025    Procedure: BRONCHOSCOPY with bronchial washing;  Surgeon: Jackson Quezada MD;  Location: [facility] ENDOSCOPY;  Service: Pulmonary;  Laterality: N/A;  postop: pneumonia    BRONCHOSCOPY WITH ION ROBOTIC ASSIST N/A 2/18/2025    Procedure: BRONCHOSCOPY NAVIGATION WITH ION ROBOT, Fine needle aspiration and cryo biopsy right upper lobe, bronchoalveolar lavage right upper lobe, endobronchial ultrasound with fine needle aspiration Level 10R Lymph Node;  Surgeon: Pineda Issa MD;  Location: [facility] ENDOSCOPY;  Service: Robotics - Pulmonary;  Laterality: N/A;  post: right upper lobe mass    CARDIAC SURGERY      CHOLECYSTECTOMY      CORONARY ARTERY BYPASS GRAFT  2016    GALLBLADDER SURGERY         Current Outpatient Medications:     aspirin 81 MG EC tablet, Take 1 tablet by mouth Daily With Lunch., Disp: , Rfl: 0    atorvastatin (LIPITOR) 40 MG tablet, Take 1 tablet by mouth Every Morning., Disp: , Rfl:     budesonide (PULMICORT) 0.5 MG/2ML nebulizer solution, Take 2 mL by nebulization 2 (two) times a day., Disp: , Rfl:     clopidogrel (PLAVIX) 75 MG tablet, Take 1 tablet by mouth Every Morning., Disp: , Rfl:     diphenhydrAMINE (BENADRYL) 25 mg capsule, Take 1 capsule by mouth Every 6 (Six) Hours As Needed for Itching. States he takes branded Z-Quil at hs, Disp: , Rfl:     diphenhydrAMINE HCl, Sleep, (ZZZQUIL PO), Take  by mouth., Disp: , Rfl:     dronabinol (MARINOL) 2.5 MG capsule, Take 1 capsule by mouth 2 (Two) Times a Day., Disp: , Rfl:     DULoxetine (CYMBALTA) 30 MG capsule, Take 1 capsule by mouth Daily., Disp: , Rfl:     dutasteride (AVODART) 0.5 MG capsule, Take 1 capsule by mouth Every Night., Disp: , Rfl:     ferrous sulfate 324 (65 Fe) MG tablet delayed-release EC tablet, Take 1 tablet by mouth Daily With Breakfast., Disp: 30 tablet, Rfl: 1    formoterol (Perforomist) 20 MCG/2ML nebulizer solution, Take  by  nebulization 2 (Two) Times a Day., Disp: , Rfl:     HYDROcodone-acetaminophen (NORCO) 5-325 MG per tablet, Take 1 tablet by mouth Every 6 (Six) Hours As Needed for Moderate Pain., Disp: , Rfl:     metoprolol succinate XL (TOPROL-XL) 25 MG 24 hr tablet, TAKE 1 TABLET EVERY DAY, Disp: 90 tablet, Rfl: 3    montelukast (SINGULAIR) 10 MG tablet, Take 1 tablet by mouth Every Night., Disp: , Rfl:     Multiple Vitamins-Minerals (MULTI VITAMIN/MINERALS) tablet, 1 tablet Daily With Lunch., Disp: , Rfl:     ondansetron (ZOFRAN) 4 MG tablet, Take 1 tablet by mouth As Needed., Disp: , Rfl:     pantoprazole (PROTONIX) 40 MG EC tablet, Take 1 tablet by mouth Daily., Disp: , Rfl:     revefenacin (YUPELRI) 175 MCG/3ML nebulizer solution, Take 3 mL by nebulization Daily., Disp: , Rfl:     saccharomyces boulardii (FLORASTOR) 250 MG capsule, Take 1 capsule by mouth Daily With Lunch., Disp: , Rfl:     sodium bicarbonate 650 MG tablet, Take 1 tablet by mouth 2 (Two) Times a Day., Disp: , Rfl:     theophylline (THEODUR) 300 MG 12 hr tablet, Take 1 tablet by mouth 2 (Two) Times a Day., Disp: , Rfl:   No current facility-administered medications for this visit.    Facility-Administered Medications Ordered in Other Visits:     heparin injection 500 Units, 500 Units, Intravenous, PRNRebekah Alfonso, MD, 500 Units at 07/17/25 0826    sodium chloride 0.9 % flush 20 mL, 20 mL, Intravenous, PRN, Ham Welch MD, 20 mL at 07/17/25 0825    Allergies   Allergen Reactions    Diltiazem Unknown - High Severity     Swelling of lips     Family History   Problem Relation Age of Onset    Heart attack Mother     Heart attack Father     Heart disease Father      Cancer-related family history is not on file.    Social History     Tobacco Use    Smoking status: Former     Current packs/day: 0.00     Average packs/day: 2.0 packs/day for 57.0 years (114.0 ttl pk-yrs)     Types: Cigarettes     Start date: 1958     Quit date: 2015     Years since quitting:  10.5    Smokeless tobacco: Never   Vaping Use    Vaping status: Never Used   Substance Use Topics    Alcohol use: Not Currently    Drug use: Never     Social History     Social History Narrative    Not on file     ROS:   Review of Systems   Constitutional:  Positive for activity change and fatigue. Negative for appetite change, chills, diaphoresis, fever and unexpected weight change.   HENT:  Negative for congestion, dental problem, drooling, ear discharge, ear pain, facial swelling, hearing loss, mouth sores, nosebleeds, postnasal drip, rhinorrhea, sinus pressure, sinus pain, sneezing, sore throat, tinnitus, trouble swallowing and voice change.    Eyes:  Negative for photophobia, pain, discharge, redness, itching and visual disturbance.   Respiratory:  Positive for cough and shortness of breath. Negative for apnea, choking, chest tightness, wheezing and stridor.    Cardiovascular:  Negative for chest pain, palpitations and leg swelling.   Gastrointestinal:  Negative for abdominal distention, abdominal pain, anal bleeding, blood in stool, constipation, diarrhea, nausea, rectal pain and vomiting.   Endocrine: Negative for cold intolerance, heat intolerance, polydipsia and polyuria.   Genitourinary:  Negative for decreased urine volume, difficulty urinating, dysuria, flank pain, frequency, genital sores, hematuria and urgency.   Musculoskeletal:  Negative for arthralgias, back pain, gait problem, joint swelling, myalgias, neck pain and neck stiffness.   Skin:  Negative for color change, pallor and rash.   Neurological:  Negative for dizziness, tremors, seizures, syncope, facial asymmetry, speech difficulty, weakness, light-headedness, numbness and headaches.   Hematological:  Negative for adenopathy. Does not bruise/bleed easily.   Psychiatric/Behavioral:  Negative for agitation, behavioral problems, confusion, decreased concentration, hallucinations, self-injury, sleep disturbance and suicidal ideas. The patient is  "not nervous/anxious.      Objective:    Vital Signs:  Vitals:    07/17/25 0828   BP: 116/74   Pulse: 89   Temp: 97.5 °F (36.4 °C)   TempSrc: Temporal   SpO2: 96%   Weight: 59 kg (130 lb)   Height: 177.8 cm (70\")   PainSc: 0-No pain     Body mass index is 18.65 kg/m².    ECOG  (3) Capable of limited self-care, confined to bed or chair > 50% of waking hours    Physical Exam:   Physical Exam  Constitutional:       General: He is not in acute distress.     Appearance: Normal appearance. He is ill-appearing. He is not toxic-appearing or diaphoretic.   HENT:      Head: Normocephalic and atraumatic.      Right Ear: External ear normal.      Left Ear: External ear normal.      Nose: Nose normal.      Mouth/Throat:      Mouth: Mucous membranes are moist.      Pharynx: Oropharynx is clear.   Eyes:      General: No scleral icterus.        Right eye: No discharge.         Left eye: No discharge.      Conjunctiva/sclera: Conjunctivae normal.      Pupils: Pupils are equal, round, and reactive to light.   Cardiovascular:      Rate and Rhythm: Normal rate and regular rhythm.      Pulses: Normal pulses.      Heart sounds: Normal heart sounds. No murmur heard.     No friction rub. No gallop.   Pulmonary:      Effort: No respiratory distress.      Breath sounds: No stridor. No wheezing, rhonchi or rales.      Comments: Breath sounds markedly diminished bilaterally.  Chest:      Chest wall: No tenderness.   Abdominal:      General: Abdomen is flat. Bowel sounds are normal. There is no distension.      Palpations: Abdomen is soft. There is no mass.      Tenderness: There is no abdominal tenderness. There is no right CVA tenderness, left CVA tenderness, guarding or rebound.      Comments: No palpable visceromegaly.   Musculoskeletal:         General: No tenderness, deformity or signs of injury.      Cervical back: No rigidity.      Right lower leg: No edema.      Left lower leg: No edema.   Lymphadenopathy:      Cervical: No cervical " adenopathy.   Skin:     General: Skin is warm and dry.      Coloration: Skin is not jaundiced or pale.      Findings: No bruising or rash.   Neurological:      General: No focal deficit present.      Mental Status: He is alert and oriented to person, place, and time.      Cranial Nerves: No cranial nerve deficit.   Psychiatric:         Mood and Affect: Mood normal.         Behavior: Behavior normal.         Thought Content: Thought content normal.         Judgment: Judgment normal.     IBRAHIMA Welch MD performed the physical exam on 7/17/2025 as documented above.    Lab Results - Last 18 Months   Lab Units 07/17/25  0826 05/17/25  0955 04/18/25  0921   WBC 10*3/mm3 12.11* 11.21* 10.51   HEMOGLOBIN g/dL 9.7* 11.0* 10.6*   HEMATOCRIT % 31.3* 36.8* 35.6*   PLATELETS 10*3/mm3 321 276 305   MCV fL 95.4 96.1 97.0     Lab Results - Last 18 Months   Lab Units 05/17/25  0955 04/18/25  0921 01/08/25  1103 01/04/25  2352 01/04/25  1135   SODIUM mmol/L 139 135* 139   < > 137   POTASSIUM mmol/L 4.1 4.0 4.3   < > 3.6   CHLORIDE mmol/L 99 99 102   < > 99   CO2 mmol/L 28.0 26.0 26.9   < > 27.5   BUN mg/dL 27* 29* 45*   < > 30*   CREATININE mg/dL 1.91* 1.83* 2.16*   < > 1.84*   CALCIUM mg/dL 10.1 9.8 9.3   < > 9.9   BILIRUBIN mg/dL  --  0.5 0.3  --  0.9   ALK PHOS U/L  --  137* 131*  --  145*   ALT (SGPT) U/L  --  9 10  --  13   AST (SGOT) U/L  --  19 16  --  21   GLUCOSE mg/dL 107* 116* 123*   < > 131*    < > = values in this interval not displayed.     Lab Results   Component Value Date    GLUCOSE 107 (H) 05/17/2025    BUN 27 (H) 05/17/2025    CREATININE 1.91 (H) 05/17/2025    EGFRIFNONA 36 (L) 11/12/2021    BCR 14.1 05/17/2025    K 4.1 05/17/2025    CO2 28.0 05/17/2025    CALCIUM 10.1 05/17/2025    ALBUMIN 3.9 05/17/2025    AST 19 04/18/2025    ALT 9 04/18/2025     Lab Results - Last 18 Months   Lab Units 12/18/24  1024 09/25/24  1044   INR  1.18* 1.03   APTT seconds 31.7 29.7*     Lab Results   Component Value Date    IRON  25 (L) 02/12/2024    TIBC 155 (L) 02/12/2024    FERRITIN 720.9 (H) 02/12/2024     Lab Results   Component Value Date    FOLATE 11.5 02/12/2024       Lab Results   Component Value Date    RETICCTPCT 1.49 03/16/2023     Lab Results   Component Value Date    KUXHXCQP70 829 02/12/2024     Uric Acid   Date Value Ref Range Status   04/04/2017 7.3 4.8 - 8.7 mg/dL Final     Lab Results   Component Value Date    HAPTOGLOBIN 276 (H) 05/21/2021     Lab Results   Component Value Date    PTT 31.7 12/18/2024    INR 1.18 (H) 12/18/2024     Assessment & Plan     1.  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  I have discussed at length with him the use of treatment.  He is not interested in receiving any medication at this time as he is afraid of the potential side effects and complications of the treatment.  He does understand that treatment can prolong life and improve quality of life and despite this he prefers to wait.  Reviewed the scans.  There has been no progression of the malignancy.  He remains convinced he wants to hold off on any treatment.  He would prefer to see me in 6 months but has agreed to see me in 4 months with new scans.  2.  Severe chronic obstructive pulmonary disease.  3.  History of tobacco smoking.  4.  Peripheral vascular disease  5.  Normocytic anemia: Likely multifactorial.  The malignancy, chronic disease and his chronic kidney disease are probably important factors.  He is chronic kidney disease is perhaps the most important one and he might benefit from erythropoietin.  5.  Independently reviewed the scans and the report.  Reviewed the laboratory exams and discussed with him.  5.  He will return to see me in approximately 4 months with new scans.    Ham Welch MD on 7/17/2025 at 8:57 AM.

## 2025-07-17 ENCOUNTER — OFFICE VISIT (OUTPATIENT)
Dept: ONCOLOGY | Facility: CLINIC | Age: 80
End: 2025-07-17
Payer: MEDICARE

## 2025-07-17 ENCOUNTER — HOSPITAL ENCOUNTER (OUTPATIENT)
Dept: ONCOLOGY | Facility: HOSPITAL | Age: 80
Discharge: HOME | End: 2025-07-17
Admitting: INTERNAL MEDICINE
Payer: MEDICARE

## 2025-07-17 VITALS
WEIGHT: 130 LBS | BODY MASS INDEX: 18.61 KG/M2 | SYSTOLIC BLOOD PRESSURE: 116 MMHG | HEART RATE: 89 BPM | OXYGEN SATURATION: 96 % | DIASTOLIC BLOOD PRESSURE: 74 MMHG | TEMPERATURE: 97.5 F | HEIGHT: 70 IN

## 2025-07-17 DIAGNOSIS — Z95.828 PORT-A-CATH IN PLACE: Primary | ICD-10-CM

## 2025-07-17 DIAGNOSIS — C34.90 SMALL CELL CARCINOMA OF LUNG, UNSPECIFIED LATERALITY, UNSPECIFIED PART OF LUNG: ICD-10-CM

## 2025-07-17 DIAGNOSIS — C34.90 SMALL CELL CARCINOMA OF LUNG, UNSPECIFIED LATERALITY, UNSPECIFIED PART OF LUNG: Primary | ICD-10-CM

## 2025-07-17 LAB
BASOPHILS # BLD AUTO: 0.03 10*3/MM3 (ref 0–0.2)
BASOPHILS NFR BLD AUTO: 0.2 % (ref 0–1.5)
DEPRECATED RDW RBC AUTO: 52 FL (ref 37–54)
EOSINOPHIL # BLD AUTO: 0.42 10*3/MM3 (ref 0–0.4)
EOSINOPHIL NFR BLD AUTO: 3.5 % (ref 0.3–6.2)
ERYTHROCYTE [DISTWIDTH] IN BLOOD BY AUTOMATED COUNT: 14.7 % (ref 12.3–15.4)
HCT VFR BLD AUTO: 31.3 % (ref 37.5–51)
HGB BLD-MCNC: 9.7 G/DL (ref 13–17.7)
IMM GRANULOCYTES # BLD AUTO: 0.03 10*3/MM3 (ref 0–0.05)
IMM GRANULOCYTES NFR BLD AUTO: 0.2 % (ref 0–0.5)
LYMPHOCYTES # BLD AUTO: 0.49 10*3/MM3 (ref 0.7–3.1)
LYMPHOCYTES NFR BLD AUTO: 4 % (ref 19.6–45.3)
MCH RBC QN AUTO: 29.6 PG (ref 26.6–33)
MCHC RBC AUTO-ENTMCNC: 31 G/DL (ref 31.5–35.7)
MCV RBC AUTO: 95.4 FL (ref 79–97)
MONOCYTES # BLD AUTO: 0.78 10*3/MM3 (ref 0.1–0.9)
MONOCYTES NFR BLD AUTO: 6.4 % (ref 5–12)
NEUTROPHILS NFR BLD AUTO: 10.36 10*3/MM3 (ref 1.7–7)
NEUTROPHILS NFR BLD AUTO: 85.7 % (ref 42.7–76)
PLATELET # BLD AUTO: 321 10*3/MM3 (ref 140–450)
PMV BLD AUTO: 9.5 FL (ref 6–12)
RBC # BLD AUTO: 3.28 10*6/MM3 (ref 4.14–5.8)
WBC NRBC COR # BLD AUTO: 12.11 10*3/MM3 (ref 3.4–10.8)

## 2025-07-17 PROCEDURE — 99214 OFFICE O/P EST MOD 30 MIN: CPT | Performed by: INTERNAL MEDICINE

## 2025-07-17 PROCEDURE — 25010000002 HEPARIN LOCK FLUSH PER 10 UNITS: Performed by: INTERNAL MEDICINE

## 2025-07-17 PROCEDURE — 3078F DIAST BP <80 MM HG: CPT | Performed by: INTERNAL MEDICINE

## 2025-07-17 PROCEDURE — 1126F AMNT PAIN NOTED NONE PRSNT: CPT | Performed by: INTERNAL MEDICINE

## 2025-07-17 PROCEDURE — 1160F RVW MEDS BY RX/DR IN RCRD: CPT | Performed by: INTERNAL MEDICINE

## 2025-07-17 PROCEDURE — 3074F SYST BP LT 130 MM HG: CPT | Performed by: INTERNAL MEDICINE

## 2025-07-17 PROCEDURE — 36591 DRAW BLOOD OFF VENOUS DEVICE: CPT

## 2025-07-17 PROCEDURE — 85025 COMPLETE CBC W/AUTO DIFF WBC: CPT | Performed by: INTERNAL MEDICINE

## 2025-07-17 PROCEDURE — 1159F MED LIST DOCD IN RCRD: CPT | Performed by: INTERNAL MEDICINE

## 2025-07-17 RX ORDER — SODIUM CHLORIDE 0.9 % (FLUSH) 0.9 %
20 SYRINGE (ML) INJECTION AS NEEDED
OUTPATIENT
Start: 2025-07-17

## 2025-07-17 RX ORDER — HEPARIN SODIUM (PORCINE) LOCK FLUSH IV SOLN 100 UNIT/ML 100 UNIT/ML
500 SOLUTION INTRAVENOUS AS NEEDED
OUTPATIENT
Start: 2025-07-17

## 2025-07-17 RX ORDER — SODIUM CHLORIDE 0.9 % (FLUSH) 0.9 %
20 SYRINGE (ML) INJECTION AS NEEDED
Status: DISCONTINUED | OUTPATIENT
Start: 2025-07-17 | End: 2025-07-18 | Stop reason: HOSPADM

## 2025-07-17 RX ORDER — HEPARIN SODIUM (PORCINE) LOCK FLUSH IV SOLN 100 UNIT/ML 100 UNIT/ML
500 SOLUTION INTRAVENOUS AS NEEDED
Status: DISCONTINUED | OUTPATIENT
Start: 2025-07-17 | End: 2025-07-18 | Stop reason: HOSPADM

## 2025-07-17 RX ADMIN — Medication 20 ML: at 08:25

## 2025-07-17 RX ADMIN — HEPARIN 500 UNITS: 100 SYRINGE at 08:26

## 2025-07-17 NOTE — PROGRESS NOTES
0825 Port accessed and flushed with good blood return noted. 10cc of blood wasted prior to specimen collection. Blood specimen cbc obtained and sent to lab for processing per protocol.  Port flushed with saline and heparin prior to needle removal.

## 2025-07-18 ENCOUNTER — PATIENT OUTREACH (OUTPATIENT)
Dept: ONCOLOGY | Facility: CLINIC | Age: 80
End: 2025-07-18
Payer: MEDICARE

## 2025-07-18 NOTE — SIGNIFICANT NOTE
Patient under surveillance with medical oncology. Can call if anything is needed.     Kiki Reina  Lung Nurse Navigator   [facility]  [phone]  [email or web address]

## (undated) DEVICE — ST NDL BRONCH ASP VIZISHOT 2 FLX 19GA

## (undated) DEVICE — PRESSURE MONITORING ACCESSORY: Brand: TRUWAVE

## (undated) DEVICE — BIOPSY NEEDLE, 23G: Brand: FLEXISION

## (undated) DEVICE — SWIVEL CONNECTOR

## (undated) DEVICE — Device: Brand: BALLOON

## (undated) DEVICE — BAPTIST FLOYD BRONCHOSCOPY: Brand: MEDLINE INDUSTRIES, INC.

## (undated) DEVICE — Device: Brand: ION

## (undated) DEVICE — BIOPSY NEEDLE, 19G: Brand: FLEXISION

## (undated) DEVICE — VISION PROBE ADAPTER AND SUCTION ADAPTER

## (undated) DEVICE — VISION PROBE: Brand: ION

## (undated) DEVICE — Device: Brand: ERBE

## (undated) DEVICE — Device: Brand: SINGLE USE ASPIRATION NEEDLE NA-U401SX

## (undated) DEVICE — CATHETER GUIDE

## (undated) DEVICE — PRESSURE TUBING: Brand: TRUWAVE

## (undated) DEVICE — CATHETER: Brand: ION